# Patient Record
Sex: MALE | Race: WHITE | NOT HISPANIC OR LATINO | Employment: OTHER | URBAN - METROPOLITAN AREA
[De-identification: names, ages, dates, MRNs, and addresses within clinical notes are randomized per-mention and may not be internally consistent; named-entity substitution may affect disease eponyms.]

---

## 2019-09-06 ENCOUNTER — OFFICE VISIT CONVERTED (OUTPATIENT)
Dept: NEUROLOGY | Facility: CLINIC | Age: 64
End: 2019-09-06
Attending: PSYCHIATRY & NEUROLOGY

## 2019-10-07 ENCOUNTER — OFFICE VISIT CONVERTED (OUTPATIENT)
Dept: NEUROLOGY | Facility: CLINIC | Age: 64
End: 2019-10-07
Attending: PSYCHIATRY & NEUROLOGY

## 2019-10-17 ENCOUNTER — HOSPITAL ENCOUNTER (OUTPATIENT)
Dept: OTHER | Facility: HOSPITAL | Age: 64
Discharge: HOME | End: 2019-10-17
Attending: PSYCHIATRY & NEUROLOGY

## 2019-10-17 LAB
ALBUMIN SERPL-MCNC: 4.8 G/DL (ref 3.5–5)
ALBUMIN/GLOB SERPL: 1.7 {RATIO} (ref 1.4–2.6)
ALP SERPL-CCNC: 112 U/L (ref 56–155)
ALT SERPL-CCNC: 10 U/L (ref 10–40)
ANION GAP SERPL CALC-SCNC: 21 MMOL/L (ref 8–19)
AST SERPL-CCNC: 11 U/L (ref 15–50)
BASOPHILS # BLD AUTO: 0.14 10*3/UL (ref 0–0.2)
BASOPHILS NFR BLD AUTO: 1.4 % (ref 0–3)
BILIRUB SERPL-MCNC: 0.37 MG/DL (ref 0.2–1.3)
BUN SERPL-MCNC: 11 MG/DL (ref 5–25)
BUN/CREAT SERPL: 12 {RATIO} (ref 6–20)
CALCIUM SERPL-MCNC: 9.9 MG/DL (ref 8.7–10.4)
CHLORIDE SERPL-SCNC: 100 MMOL/L (ref 99–111)
CONV ABS IMM GRAN: 0.03 10*3/UL (ref 0–0.2)
CONV CO2: 28 MMOL/L (ref 22–32)
CONV IMMATURE GRAN: 0.3 % (ref 0–1.8)
CONV TOTAL PROTEIN: 7.7 G/DL (ref 6.3–8.2)
CREAT UR-MCNC: 0.9 MG/DL (ref 0.7–1.2)
DEPRECATED RDW RBC AUTO: 46.1 FL (ref 35.1–43.9)
EOSINOPHIL # BLD AUTO: 0.58 10*3/UL (ref 0–0.7)
EOSINOPHIL # BLD AUTO: 5.7 % (ref 0–7)
ERYTHROCYTE [DISTWIDTH] IN BLOOD BY AUTOMATED COUNT: 13.2 % (ref 11.6–14.4)
GFR SERPLBLD BASED ON 1.73 SQ M-ARVRAT: >60 ML/MIN/{1.73_M2}
GLOBULIN UR ELPH-MCNC: 2.9 G/DL (ref 2–3.5)
GLUCOSE SERPL-MCNC: 100 MG/DL (ref 70–99)
HCT VFR BLD AUTO: 47 % (ref 42–52)
HGB BLD-MCNC: 15.5 G/DL (ref 14–18)
LYMPHOCYTES # BLD AUTO: 3.26 10*3/UL (ref 1–5)
LYMPHOCYTES NFR BLD AUTO: 32.2 % (ref 20–45)
MCH RBC QN AUTO: 31.2 PG (ref 27–31)
MCHC RBC AUTO-ENTMCNC: 33 G/DL (ref 33–37)
MCV RBC AUTO: 94.6 FL (ref 80–96)
MONOCYTES # BLD AUTO: 0.68 10*3/UL (ref 0.2–1.2)
MONOCYTES NFR BLD AUTO: 6.7 % (ref 3–10)
NEUTROPHILS # BLD AUTO: 5.44 10*3/UL (ref 2–8)
NEUTROPHILS NFR BLD AUTO: 53.7 % (ref 30–85)
NRBC CBCN: 0 % (ref 0–0.7)
OSMOLALITY SERPL CALC.SUM OF ELEC: 297 MOSM/KG (ref 273–304)
PHENYTOIN SERPL-MCNC: 21.5 UG/ML (ref 10–20)
PLATELET # BLD AUTO: 274 10*3/UL (ref 130–400)
PMV BLD AUTO: 8.8 FL (ref 9.4–12.4)
POTASSIUM SERPL-SCNC: 4.7 MMOL/L (ref 3.5–5.3)
RBC # BLD AUTO: 4.97 10*6/UL (ref 4.7–6.1)
SODIUM SERPL-SCNC: 144 MMOL/L (ref 135–147)
WBC # BLD AUTO: 10.13 10*3/UL (ref 4.8–10.8)

## 2020-01-09 ENCOUNTER — CONVERSION ENCOUNTER (OUTPATIENT)
Dept: NEUROLOGY | Facility: CLINIC | Age: 65
End: 2020-01-09

## 2020-01-09 ENCOUNTER — OFFICE VISIT CONVERTED (OUTPATIENT)
Dept: NEUROLOGY | Facility: CLINIC | Age: 65
End: 2020-01-09
Attending: PSYCHIATRY & NEUROLOGY

## 2020-07-09 ENCOUNTER — OFFICE VISIT CONVERTED (OUTPATIENT)
Dept: NEUROLOGY | Facility: CLINIC | Age: 65
End: 2020-07-09
Attending: PSYCHIATRY & NEUROLOGY

## 2020-10-09 ENCOUNTER — OFFICE VISIT CONVERTED (OUTPATIENT)
Dept: NEUROLOGY | Facility: CLINIC | Age: 65
End: 2020-10-09
Attending: PSYCHIATRY & NEUROLOGY

## 2020-10-09 ENCOUNTER — CONVERSION ENCOUNTER (OUTPATIENT)
Dept: NEUROLOGY | Facility: CLINIC | Age: 65
End: 2020-10-09

## 2020-10-14 ENCOUNTER — HOSPITAL ENCOUNTER (OUTPATIENT)
Dept: GENERAL RADIOLOGY | Facility: HOSPITAL | Age: 65
Discharge: HOME | End: 2020-10-14
Attending: PSYCHIATRY & NEUROLOGY

## 2020-10-29 ENCOUNTER — HOSPITAL ENCOUNTER (OUTPATIENT)
Dept: SURGERY | Facility: CLINIC | Age: 65
Discharge: HOME | End: 2020-10-29
Attending: NURSE PRACTITIONER

## 2020-10-29 ENCOUNTER — OFFICE VISIT CONVERTED (OUTPATIENT)
Dept: UROLOGY | Facility: CLINIC | Age: 65
End: 2020-10-29
Attending: NURSE PRACTITIONER

## 2020-10-29 ENCOUNTER — HOSPITAL ENCOUNTER (OUTPATIENT)
Dept: LAB | Facility: HOSPITAL | Age: 65
Discharge: HOME | End: 2020-10-29
Attending: NURSE PRACTITIONER

## 2020-10-29 LAB — PSA SERPL-MCNC: 0.66 NG/ML (ref 0–4)

## 2020-10-31 LAB — BACTERIA UR CULT: NORMAL

## 2020-12-15 ENCOUNTER — TELEPHONE CONVERTED (OUTPATIENT)
Dept: UROLOGY | Facility: CLINIC | Age: 65
End: 2020-12-15
Attending: NURSE PRACTITIONER

## 2020-12-17 ENCOUNTER — HOSPITAL ENCOUNTER (OUTPATIENT)
Dept: LAB | Facility: HOSPITAL | Age: 65
Discharge: HOME | End: 2020-12-17
Attending: NURSE PRACTITIONER

## 2020-12-17 LAB
APPEARANCE UR: CLEAR
BILIRUB UR QL: ABNORMAL
COLOR UR: ABNORMAL
CONV BACTERIA: NEGATIVE
CONV COLLECTION SOURCE (UA): ABNORMAL
CONV HYALINE CASTS IN URINE MICRO: ABNORMAL /[LPF]
CONV UROBILINOGEN IN URINE BY AUTOMATED TEST STRIP: 1 {EHRLICHU}/DL (ref 0.1–1)
GLUCOSE UR QL: NEGATIVE MG/DL
HGB UR QL STRIP: NEGATIVE
KETONES UR QL STRIP: NEGATIVE MG/DL
LEUKOCYTE ESTERASE UR QL STRIP: ABNORMAL
NITRITE UR QL STRIP: NEGATIVE
PH UR STRIP.AUTO: 7.5 [PH] (ref 5–8)
PROT UR QL: NEGATIVE MG/DL
RBC #/AREA URNS HPF: ABNORMAL /[HPF]
SP GR UR: 1.02 (ref 1–1.03)
SQUAMOUS SPT QL MICRO: ABNORMAL /[HPF]
WBC #/AREA URNS HPF: ABNORMAL /[HPF]

## 2020-12-19 LAB — BACTERIA UR CULT: NORMAL

## 2020-12-30 ENCOUNTER — OFFICE VISIT CONVERTED (OUTPATIENT)
Dept: NEUROLOGY | Facility: CLINIC | Age: 65
End: 2020-12-30
Attending: PSYCHIATRY & NEUROLOGY

## 2021-01-28 ENCOUNTER — HOSPITAL ENCOUNTER (OUTPATIENT)
Dept: SURGERY | Facility: CLINIC | Age: 66
Discharge: HOME | End: 2021-01-28
Attending: NURSE PRACTITIONER

## 2021-01-28 ENCOUNTER — OFFICE VISIT CONVERTED (OUTPATIENT)
Dept: UROLOGY | Facility: CLINIC | Age: 66
End: 2021-01-28
Attending: NURSE PRACTITIONER

## 2021-01-28 ENCOUNTER — CONVERSION ENCOUNTER (OUTPATIENT)
Dept: SURGERY | Facility: CLINIC | Age: 66
End: 2021-01-28

## 2021-01-30 LAB — BACTERIA UR CULT: NORMAL

## 2021-02-03 ENCOUNTER — HOSPITAL ENCOUNTER (OUTPATIENT)
Dept: GENERAL RADIOLOGY | Facility: HOSPITAL | Age: 66
Discharge: HOME | End: 2021-02-03
Attending: NURSE PRACTITIONER

## 2021-02-03 LAB
CREAT BLD-MCNC: 0.9 MG/DL (ref 0.6–1.4)
GFR SERPLBLD BASED ON 1.73 SQ M-ARVRAT: >60 ML/MIN/{1.73_M2}

## 2021-02-05 ENCOUNTER — TELEPHONE CONVERTED (OUTPATIENT)
Dept: UROLOGY | Facility: CLINIC | Age: 66
End: 2021-02-05
Attending: NURSE PRACTITIONER

## 2021-02-26 ENCOUNTER — PROCEDURE VISIT CONVERTED (OUTPATIENT)
Dept: UROLOGY | Facility: CLINIC | Age: 66
End: 2021-02-26
Attending: UROLOGY

## 2021-03-16 ENCOUNTER — HOSPITAL ENCOUNTER (OUTPATIENT)
Dept: VACCINE CLINIC | Facility: HOSPITAL | Age: 66
Discharge: HOME | End: 2021-03-16
Attending: INTERNAL MEDICINE

## 2021-04-06 ENCOUNTER — HOSPITAL ENCOUNTER (OUTPATIENT)
Dept: VACCINE CLINIC | Facility: HOSPITAL | Age: 66
Discharge: HOME | End: 2021-04-06
Attending: INTERNAL MEDICINE

## 2021-05-10 NOTE — PROCEDURES
"   Procedure Note      Patient Name: Davonte Marshall   Patient ID: [id number]   Sex: Male   YOB: 1955    Primary Care Provider: Akira Hoff MD   Referring Provider: Akira Hoff MD    Visit Date: February 26, 2021    Provider: Penelope Moya MD   Location: [facility]   Location Address: [address] [place] [address]  [id number]   Location Phone: [phone]          Cystoscopy Procedure:  PROCEDURE: Flexible cystoscope was passed per urethra into the bladder without difficulty after proper consent. The pendulous and bulbar urethra appeared without abnormalities. The prostate was noted to be somewhat enlarged with small median lobe. Patient the bladder was inspected in a systematic meridian fashion. There were no tumors, lesions, or stones noted within the bladder. Of note, there was no increased vascularity as well. Diffuse inflammation noted with some friable mucosa and small areas of erythema. No discrete tumors. Patient had some moderate trabeculations and a posterior diverticulum. Both ureteral orifices were identified and were normal in appearance. The flexible cystoscope was removed. The patient tolerated the procedure well.   FOLLOW UP OFFICE NOTE: Reports gross hematuria last week; also has had associated urethral pain when hematuria happens. Took wife's \"UTI medicine\" at onset of symptoms and it went away. Aware that urine culture was negative. No complaints today.           Assessment  · Hematuria     599.70/R31.9    Problems Reconciled  Plan  · Orders  o Cystoscopy (70543) - 599.70/R31.9 - 02/26/2021  · Medications  o Medications have been Reconciled  o Transition of Care or Provider Policy  · Instructions  o Electronically Identified Patient Education Materials Provided Electronically     Tolerated procedure well.  Instructions provided.      Cystoscopic findings discussed with patient and wife at length.  No discrete tumors or lesions of suspicion appreciated " today.  Patient reports significant history of gross hematuria; could be attributed to patient's prostatomegaly, and Plavix.  Did discuss that patient's bladder appears chronically inflamed and has some friable mucosa which could also be the source of his hematuria.    Patient to continue to monitor and notify office if hematuria recurs.    If patient has recurrence of hematuria with associated pain and discomfort; specimen cups provided so that sample may be obtained for urine culture.  Discussed the importance of obtaining urine sample prior to treatment with antibiotic    Given patient's significant tobacco history; should he have recurrent hematuria will consider further operative evaluation via cystoscopy and bladder biopsy    Patient to follow-up for further discussion in 3 months  All question addressed             Electronically Signed by: Penelope Moya MD -Author on February 26, 2021 06:20:07 PM

## 2021-05-10 NOTE — H&P
History and Physical      Patient Name: Davonte Marshall   Patient ID: [id number]   Sex: Male   YOB: 1955    Primary Care Provider: Katie BROWN   Referring Provider: Akira Hoff MD    Visit Date: October 29, 2020    Provider: KEVIN Eisenberg   Location: [facility]   Location Address: [address] [place] [address]  [id number]   Location Phone: [phone]          Chief Complaint  · pt here for urologic concerns      History Of Present Illness  The patient is a 65 year old /White male, who is sent by Akira Hoff MD, for evaluation of trouble voiding.   Symptoms  The patient's complaints are described as nocturia, dysuria, hesitancy, weak stream, and urinary retention. He describes getting up 2-3 times during the night. The patient reports no additional symptoms. This patient denies gross hematuria and frequency.   He states that AZO OTC has made symptoms better in the past, and nothing makes it worse. The patient's past medical history notable for BPH and hypertension.      Patient states uncle had colon cancer     No HX of  cancers     He was started on Flomax in 9/2020 and per his PCP note he has that he was able to urinate well although patient states no relief with this.    I have no PSA levels I have no other labs, I have no documented rectal exam either.       Past Medical History  Anemia; Anxiety; Arthritis; Chronic bronchitis; Depression; Gall Stones; Head injury; Hemorrhoids; Hyperlipidemia; Hypertension, Benign Essential; Leg pain; Seizures         Past Surgical History  Appendectomy; Back surgery; Cholecystectomy; Hand surgery; Thumb surgery         Medication List  Dilantin Extended 100 mg oral capsule; duloxetine 60 mg oral capsule,delayed release(DR/EC); Flomax 0.4 mg oral capsule; hydrocodone-acetaminophen  mg/15 mL(15 mL) oral solution; ibuprofen oral; losartan-hydrochlorothiazide 100-25 mg oral tablet; morphine 60 mg oral  "capsule, ER multiphase 24 hr; pantoprazole 40 mg oral tablet,delayed release (DR/EC); primidone 50 mg oral tablet; Vitamin D3 oral         Allergy List  Codeine Phosphate; Codeine Sulfate       Allergies Reconciled  Family Medical History  Heart Disease; Family history of bleeding disorder; Family history of stroke; Family history of heart disease         Social History  Alcohol (Never); lives with spouse; ; Tobacco (Current every day); Unemployed         Review of Systems  · Constitutional  o Denies  o : fever, chills  · Eyes  o Denies  o : double vision, cataracts  · HENT  o Denies  o : hearing loss, headaches  · Cardiovascular  o Denies  o : chest pain at rest, chest pain with exercise, irregular heart beats, palpitations, leg cramps with exercise  · Respiratory  o Denies  o : shortness of breath, wheezing, sleep apnea  · Gastrointestinal  o Denies  o : heartburn or indigestion, nausea or vomiting, change in abdominal girth, diarrhea, constipation, blood in stools  · Genitourinary  o Admits  o : additional symptoms as noted in HPI  · Integument  o Denies  o : rash, new skin lesions  · Neurologic  o Denies  o : memory difficulties, headache, mini-strokes, seizures  · Endocrine  o Denies  o : hot flashes, thyroid disorders  · Psychiatric  o Denies  o : depression, schizophrenia, bipolar disorder  · Heme-Lymph  o Denies  o : easy bleeding, easy bruising, sickle cell disease or trait, lymph node enlargement or tenderness  · Allergic-Immunologic  o Denies  o : immune deficiency, HIV, Hepatitis C      Vitals  Date Time BP Position Site L\R Cuff Size HR RR TEMP (F) WT  HT  BMI kg/m2 BSA m2 O2 Sat FR L/min FiO2        10/29/2020 08:47 AM       14  217lbs 6oz 5'  9\" 32.1 2.19             Physical Examination  · Constitutional  o Appearance  o : Well nourished, well developed patient in no acute distress. Ambulating without difficulty.  · Head and Face  o Head  o :   § Inspection  § : atraumatic, " normocephalic  o Face  o :   § Inspection  § : no facial lesions  · Eyes  o Sclerae  o : sclerae white  · Ears, Nose, Mouth and Throat  o Ears  o :   § External Ears  § : appearance within normal limits, no lesions present  o Nose  o :   § External Nose  § : appearance normal  · Neck  o Inspection/Palpation  o : normal appearance, trachea midline  · Respiratory  o Respiratory Effort  o : breathing unlabored  o Inspection of Chest  o : normal appearance, no retractions  o Auscultation of Lungs  o : normal breath sounds throughout  · Genitourinary  o Digital Rectal Examination  o :   § Tone and Masses  § : normal sphincter tone, no rectal masses present  § Prostate  § : nontender to palpation, moderate enlargment, no nodules present, consistency normal  § Seminal Vesicles  § : normal size and symmetry without masses or tenderness  · Musculoskeletal  o Pelvis  o : no pelvic bony or muscular tenderness  o Ribs  o : no deformities or tenderness to palpation present, costochondral junctions nontender to palpation  · Skin and Subcutaneous Tissue  o General Inspection  o : no rashes or lesions present, no lesions present, no areas of discoloration  · Neurologic  o Mental Status Examination  o :   § Orientation  § : grossly oriented to person, place and time  § Speech/Language  § : communication ability within normal limits  o Gait and Station  o : normal gait, able to stand without difficulty  · Psychiatric  o Judgement and Insight  o : judgment and insight intact, judgement for everyday activities and social situations within normal limits, insight intact  o Mood and Affect  o : mood normal, affect appropriate          Results  · In-Office Procedures  o Surgical procedure  § IOP - Bladder Scan/Residual Urine (59794)   § Specimen vol Ur: 166       Assessment  · Nocturia     788.43/R35.1  · BPH with Urinary Obstruction       Benign prostatic hyperplasia with lower urinary tract symptoms     600.01/N40.1  Other obstructive  and reflux uropathy     600.01/N13.8  · Urinary Retention     788.20/R33.9      Plan  · Orders  o Urodynamic study (95731, 66836) - 788.43/R35.1, 788.20/R33.9, 600.01/N40.1 - 10/29/2020  o Catheterization for residual urine (97989) - 788.43/R35.1, 788.20/R33.9, 600.01/N40.1 - 10/29/2020  o PSA Ultrasensitive, ANNUAL SCREENING [facility] (53968, ) - 788.43/R35.1, 788.20/R33.9, 600.01/N40.1 - 10/29/2020  o Urine Culture (Clean Catch) [facility] (01330) - 788.43/R35.1, 788.20/R33.9, 600.01/N40.1 - 10/29/2020  · Medications  o doxycycline hyclate 100 mg oral capsule   SIG: take 1 capsule (100 mg) by oral route 2 times per day for 28 days   DISP: (56) Capsule with 0 refills  Prescribed on 10/29/2020     o Florajen3 460 mg (7.5-6- 1.5 bill. cell) oral capsule   SIG: take 1 capsule by oral route 2 times a day for 10 days   DISP: (20) Capsule with 0 refills  Prescribed on 10/29/2020     o Flomax 0.4 mg oral capsule   SIG: take 2 capsules (0.8 mg) by oral route once daily 1/2 hour following the same meal each day for 60 days   DISP: (120) Capsule with 6 refills  Adjusted on 10/29/2020     o Medications have been Reconciled  o Transition of Care or Provider Policy  · Instructions  o DISCUSSION:  o Findings, possible etiologies and management options were discussed with patient in comprehensive detail  o PLAN: Will order 28 days of antibiotic therapy and increase his Tamsulosin dose to attempt to alleviate his LUTs and ensure this is not related to an infectious process. Will have PSA before next appt  o Electronically Identified Patient Education Materials Provided Electronically            Electronically Signed by: KEVIN Eisenberg -Author on October 30, 2020 09:17:07 AM

## 2021-05-13 NOTE — PROGRESS NOTES
Progress Note      Patient Name: Davonte Marshall   Patient ID: [id number]   Sex: Male   YOB: 1955    Primary Care Provider: Katie BROWN   Referring Provider: Katie BROWN    Visit Date: July 9, 2020    Provider: Glen Pantoja MD   Location: [facility]   Location Address: [address] [place] [address]  [id number]   Location Phone: [phone]          Chief Complaint     8 mo f/u epilepsy, essential tremor, memory loss.       History Of Present Illness  Davonte Marshall is a 64 year old /White male who presents today to [facility] today referred from Katie BROWN.      64-year-old man here for follow-up of his seizures as well as his memory loss and gait abnormalities.  He is memory loss is bothering him that he cannot remember things as well.  He has not had any recurrent seizure.  His other problem is that he has problems with balance.  He thinks that his back has been bothering him and has back pain and he also has limping for years.  He states that he does not have a good balance.  He is continues to take Dilantin 500 mg daily as well as primidone 200 mg twice a day.  He is taking the primidone for his head tremor and his arm tremor.  He also has a leg tremor.  Taking Cymbalta for his back pain.  His wife states that he gets really xiong if he does not take the Cymbalta.       Past Medical History  Anemia; Anxiety; Arthritis; Chronic bronchitis; Depression; Gall Stones; Head injury; Hemorrhoids; Hyperlipidemia; Hypertension, Benign Essential; Leg pain; Seizures         Past Surgical History  Appendectomy; Back surgery; Cholecystectomy; Hand surgery; Thumb surgery         Medication List  Dilantin Extended 100 mg oral capsule; duloxetine 60 mg oral capsule,delayed release(DR/EC); hydrocodone-acetaminophen  mg/15 mL(15 mL) oral solution; ibuprofen oral; losartan-hydrochlorothiazide 100-25 mg oral tablet; morphine 60 mg oral  "capsule, ER multiphase 24 hr; primidone 50 mg oral tablet         Allergy List  Codeine Phosphate; Codeine Sulfate         Family Medical History  Heart Disease; Family history of bleeding disorder; Family history of stroke; Family history of heart disease         Social History  Alcohol (Never); lives with spouse; ; Tobacco (Current every day); Unemployed         Review of Systems  · Constitutional  o Denies  o : chills, excessive sweating, fatigue, fever, sycope/passing out, weight gain, weight loss  · Eyes  o Denies  o : changes in vision, blurry vision, double vision  · HENT  o Denies  o : loss of hearing, ringing in the ears, ear aches, sore throat, nasal congestion, sinus pain, nose bleeds, seasonal allergies  · Cardiovascular  o Admits  o : swollen legs  o Denies  o : blood clots, anemia, easy burising or bleeding, transfusions  · Respiratory  o Denies  o : shortness of breath, dry cough, productive cough, pneumonia, COPD  · Gastrointestinal  o Denies  o : difficulty swallowing, reflux  · Genitourinary  o Admits  o : erectile dysfunction  o Denies  o : incontinence  · Neurologic  o Admits  o : tremor, loss of balance, falls, weakness  o Denies  o : headache, seizure, stroke, dizziness/vertigo, difficulty with sleep, numbness/tingling/paresthesia , difficulty with coordination, difficulty with dexterity  · Musculoskeletal  o Admits  o : pain radiating in leg, low back pain  o Denies  o : neck stiffness/pain, swollen lymph nodes, muscle aches, joint pain, weakness, spasms, sciatica, pain radiating in arm  · Endocrine  o Denies  o : diabetes, thyroid disorder  · Psychiatric  o Denies  o : anxiety, depression      Vitals  Date Time BP Position Site L\R Cuff Size HR RR TEMP (F) WT  HT  BMI kg/m2 BSA m2 O2 Sat HC       07/09/2020 01:07 PM        97.6         07/09/2020 01:30 /71 Sitting    88 - R   226lbs 0oz 5'  9\" 33.37 2.23           Physical Examination     He is alert, fluent, phasic, follows " commands well.  His Mini-Mental status score is 28 out of 30.  He missed 2 out of 3 recent objects.  Clock drawing is intact.  There is no ataxia on finger-to-nose testing.  There is no Parkinson tremor.  There is no rigidity or cogwheeling.  There is no hypomimia..  On Station and gait he is able to get up from the chair slight difficulty he is limping because of his back.  His armswing is normal.  Turning is intact.           Assessment  · Essential tremor       Essential tremor     333.1/G25.0  He is to continue taking primidone at this time for his essential tremor.  · Memory loss     780.93/R41.3  I will repeat his CT scan or MRI of the brain on his next like visit.    40 minutes was spent for this high complexity visit more than half the time spent face-to-face with the patient for examination, counseling, planning and recommendations  · Generalized seizure disorder     345.90/G40.309  I will take him off Dilantin which I told him can cause cerebellar degeneration as well as unsteadiness. He is to take levetiracetam 500 mg twice a day for 2 weeks and then advance milligrams twice a day thereafter. On the fifth week he is to start decreasing the Dilantin by 1 tablet every week until his stop taking the Dilantin in the ninth week. He is to continue taking Dilantin 500 mg daily along with levetiracetam for 4 weeks and start tapering it. I told him that Dilantin can cause cerebellar degeneration.    I told him that he should take precautions with his driving when we are taking him off the Dilantin. He did take a 's evaluation in the past which she states he was able to pass that.  · Back pain     724.5/M54.9  I told him to ask his primary care physician to prescribe him Cymbalta. According to the wife he is using it for back pain as well as his mood swings. I told him that levetiracetam can cause mood swings and they are to call my office should have any problems with the levetiracetam.    Problems  Reconciled  Plan  · Medications  o levetiracetam 500 mg oral tablet   SI tablet twice a day for 2 weeks then 2 tablets twice a day thereafter.   DISP: (120) tablets with 8 refills  Prescribed on 2020     o Medications have been Reconciled  o Transition of Care or Provider Policy  · Instructions  o Encouraged to follow-up with Primary Care Provider for preventative care.            Electronically Signed by: Glen Pantoja MD -Author on 2020 02:34:56 PM

## 2021-05-13 NOTE — PROGRESS NOTES
Progress Note      Patient Name: Davonte Marshall   Patient ID: [id number]   Sex: Male   YOB: 1955    Primary Care Provider: Katie BROWN   Referring Provider: Katie BROWN    Visit Date: October 9, 2020    Provider: Glen Pantoja MD   Location: [facility]   Location Address: [address] [place] [address]  [id number]   Location Phone: [phone]          Chief Complaint     3 mo f/u essential tremor, memory loss, generalized seizure disorder, back pain.       History Of Present Illness  Davonte Marshall is a 65 year old /White male who presents today to [facility] today referred from Katie BROWN.      65-year-old man here for follow-up he is memory loss, head tremor, seizures.  He could not take the Keppra and therefore he is again taking Dilantin.  He has not had any seizures.  He is also taking primidone 200 mg twice a day for his head tremor which his wife states is getting worse.  He has memory worse that is getting worse as well according to his wife.  He is independent with activities of daily living.  He is driving.  He continues to smoke significantly.  He had an MRI of the brain last year showing atrophy with mild chronic ischemic changes around the ventricles.       Past Medical History  Anemia; Anxiety; Arthritis; Chronic bronchitis; Depression; Gall Stones; Head injury; Hemorrhoids; Hyperlipidemia; Hypertension, Benign Essential; Leg pain; Seizures         Past Surgical History  Appendectomy; Back surgery; Cholecystectomy; Hand surgery; Thumb surgery         Medication List  Dilantin Extended 100 mg oral capsule; duloxetine 60 mg oral capsule,delayed release(DR/EC); Flomax 0.4 mg oral capsule; hydrocodone-acetaminophen  mg/15 mL(15 mL) oral solution; ibuprofen oral; losartan-hydrochlorothiazide 100-25 mg oral tablet; morphine 60 mg oral capsule, ER multiphase 24 hr; primidone 50 mg oral tablet  "        Allergy List  Codeine Phosphate; Codeine Sulfate         Family Medical History  Heart Disease; Family history of bleeding disorder; Family history of stroke; Family history of heart disease         Social History  Alcohol (Never); lives with spouse; ; Tobacco (Current every day); Unemployed         Review of Systems  · Constitutional  o Denies  o : chills, excessive sweating, fatigue, fever, sycope/passing out, weight gain, weight loss  · Eyes  o Denies  o : changes in vision, blurry vision, double vision  · HENT  o Denies  o : loss of hearing, ringing in the ears, ear aches, sore throat, nasal congestion, sinus pain, nose bleeds, seasonal allergies  · Cardiovascular  o Denies  o : blood clots, swollen legs, anemia, easy burising or bleeding, transfusions  · Respiratory  o Denies  o : shortness of breath, dry cough, productive cough, pneumonia, COPD  · Gastrointestinal  o Denies  o : difficulty swallowing, reflux  · Genitourinary  o Admits  o : erectile dysfunction  o Denies  o : incontinence  · Neurologic  o Admits  o : headache, tremor, loss of balance, difficulty with coordination  o Denies  o : seizure, stroke, falls, dizziness/vertigo, difficulty with sleep, numbness/tingling/paresthesia , difficulty with dexterity, weakness  · Musculoskeletal  o Admits  o : joint pain, pain radiating in leg, low back pain  o Denies  o : neck stiffness/pain, swollen lymph nodes, muscle aches, weakness, spasms, sciatica, pain radiating in arm  · Endocrine  o Denies  o : diabetes, thyroid disorder  · Psychiatric  o Denies  o : anxiety, depression      Vitals  Date Time BP Position Site L\R Cuff Size HR RR TEMP (F) WT  HT  BMI kg/m2 BSA m2 O2 Sat FR L/min FiO2 HC       10/09/2020 01:53 PM        96           10/09/2020 02:07 /65 Sitting    82 - R   214lbs 16oz 5'  9\" 31.75 2.18             Physical Examination     He is alert, fluent, phasic, follows commands well.  His Mini-Mental status score is 27 out of " 30.  He missed the year, 1 out of 3 recent objects and spelling world 1 out of 5 backwards.  Everything else is intact and clock drawing is normal.  There is no significant tremor noted of the hands extended finger-to-nose testing.  There is intubation of his head.  His head is bent forwards.           Assessment  · Essential tremor       Essential tremor     333.1/G25.0  I discussed with him that there is no effective treatment for head tremor. He is taking primidone 200 mg twice a day. I do not think that increasing it will make a difference.  · Memory loss     780.93/R41.3  I discussed with him that he needs to stop smoking to decrease his vascular risk of dementia. I also told him that he needs to exercise, watch his diet. I will do a CT scan of the brain without contrast and there to call me to find out the results.  · Generalized seizure disorder     345.90/G40.309  He has not had any recurrent seizures. I will see him again in 1 year's time for follow-up. 25 minutes was spent for this moderate complexity visit more than half the time was spent face-to-face with the patient examination, counseling, planning recommendations      Plan  · Orders  o CT Head/Brain without IV Contrast [facility] (68979) - 780.93/R41.3, 333.1/G25.0, 345.90/G40.309 - 10/09/2020  · Medications  o Medications have been Reconciled  o Transition of Care or Provider Policy  · Instructions  o Encouraged to follow-up with Primary Care Provider for preventative care.            Electronically Signed by: Glen Pantoja MD -Author on October 9, 2020 02:43:58 PM

## 2021-05-14 VITALS
SYSTOLIC BLOOD PRESSURE: 118 MMHG | TEMPERATURE: 96 F | HEIGHT: 69 IN | WEIGHT: 215 LBS | DIASTOLIC BLOOD PRESSURE: 65 MMHG | HEART RATE: 82 BPM | BODY MASS INDEX: 31.84 KG/M2

## 2021-05-14 VITALS
HEART RATE: 87 BPM | WEIGHT: 208.37 LBS | TEMPERATURE: 97.6 F | HEIGHT: 69 IN | BODY MASS INDEX: 30.86 KG/M2 | SYSTOLIC BLOOD PRESSURE: 119 MMHG | DIASTOLIC BLOOD PRESSURE: 75 MMHG

## 2021-05-14 VITALS — BODY MASS INDEX: 32.2 KG/M2 | RESPIRATION RATE: 14 BRPM | WEIGHT: 217.37 LBS | HEIGHT: 69 IN

## 2021-05-14 VITALS — BODY MASS INDEX: 30.06 KG/M2 | HEIGHT: 70 IN | WEIGHT: 210 LBS | RESPIRATION RATE: 12 BRPM

## 2021-05-14 NOTE — PROGRESS NOTES
Quick Note      Patient Name: Davonte Marshall   Patient ID: [id number]   Sex: Male   YOB: 1955    Primary Care Provider: Akira Hoff MD   Referring Provider: Akira Hoff MD    Visit Date: December 15, 2020    Provider: KEVIN Eisenberg   Location: [facility]   Location Address: [address] [place] [address]  [id number]   Location Phone: [phone]          History Of Present Illness  TELEHEALTH TELEPHONE VISIT  Davonte Marshall is a 65 year old /White male who is presenting for evaluation via telehealth telephone visit. Verbal consent obtained before beginning visit.   Provider spent 12 minutes with the patient during the telehealth visit.   The following staff were present during this visit: Nisha Ahuja RN, Madeline BROWN   Past Medical History/ Overview of Patient Symptoms     Called patient to discuss results of his urodynamic study.    The patient's urodynamic study performed on 12/10/20 for a weak urinary stream and incomplete bladder emptying.  Weak flow with intermittency of right as well as increased sensation a low bladder capacity due to bladder instability.  There is evidence of strong involuntary bladder contractions that cause urge urinary incontinence.  There is no stress urinary incontinence observed.  During the voiding phase the bladder displayed a very strong detrusor contraction with no evidence of abdominal muscle recruitment to assist voiding.  The flow was weak.  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  Urge urinary incontinence.    The patient's wife reports that he has had some improvement in symptoms since he was started on finasteride in the hospital after his stroke several weeks ago.    He is now only taking 0.4 mg daily of tamsulosin as well as 5 mg of finasteride daily    The patient's wife reports that since his urodynamic study he has had some soreness in his hips with gross hematuria as well  as a fever up to 102.0.    The patient states that he has been decreasing his caffeine intake as well as trying to quit smoking.           Assessment  · Gross hematuria     599.71/R31.0  · BPH loc w urin obs/LUTS       Benign prostatic hyperplasia with lower urinary tract symptoms     600.21/N40.1      Plan  · Orders  o Physician Telephone Evaluation, 11-20 minutes (76316) - 599.71/R31.0, 600.21/N40.1 - 12/15/2020  o Urine Culture (Clean Catch) [facility] (91113) - 599.71/R31.0, 600.21/N40.1 - 12/15/2020  o Urinalysis with Reflex Microscopy (60035) - 599.71/R31.0, 600.21/N40.1 - 12/15/2020  · Medications  o Medications have been Reconciled  o Transition of Care or Provider Policy  · Instructions  o Plan Of Care: Discussed with the patient's wife that the patient will need to provide a outpatient urine culture sample preferably today if not then tomorrow as his complaints could signal a urinary tract infection. Discussed with the patient's wife that the patient will need to continue his finasteride 5 mg daily although I would like to increase his tamsulosin yet again to 0.8 mg daily. We will call patient wife with urine culture results and follow-up in office in 3 months or sooner if needed. Should the patient be without infection and still with urgency and frequency then will refer patient for an in office cystoscopy for evaluation of his lower urinary tracts.            Electronically Signed by: KEVIN Eisenberg -Author on December 15, 2020 08:54:32 PM

## 2021-05-14 NOTE — PROGRESS NOTES
Progress Note      Patient Name: Davonte Marshall   Patient ID: [id number]   Sex: Male   YOB: 1955    Primary Care Provider: Akira Hoff MD   Referring Provider: Akira Hoff MD    Visit Date: December 30, 2020    Provider: Glen Pantoja MD   Location: [facility]   Location Address: [address] [place] [address]  [id number]   Location Phone: [phone]          Chief Complaint     Patient here for f/u       History Of Present Illness  Davonte Marshall is a 65 year old /White male who presents today to [facility] today referred from Akira Hoff MD.      65-year-old man here for follow-up his essential tremor as well as his stroke.  He had Dilantin toxicity and was unsteady and ataxic and was admitted November 13.  He was found coincidentally to have a new lacunar infarction on MRI of the brain.  Patient was started on Plavix as well as aspirin.  He is discharge also on atorvastatin 20 mg since his LDL cholesterol was low at 60 and cholesterol is 115.  He states that he is significantly better with his tremor being off Dilantin and is only taking primidone 250 mg 3 times a day.  He states that he feels much better than before.  He did not know that Dilantin was really giving him so much problems.       Past Medical History  Anemia; Anxiety; Arthritis; Chronic bronchitis; Depression; Gall Stones; Head injury; Hemorrhoids; Hyperlipidemia; Hypertension, Benign Essential; Leg pain; Seizures         Past Surgical History  Appendectomy; Back surgery; Cholecystectomy; Hand surgery; Thumb surgery         Medication List  duloxetine 60 mg oral capsule,delayed release(DR/EC); finasteride 5 mg oral tablet; Flomax 0.4 mg oral capsule; hydrocodone-acetaminophen  mg/15 mL(15 mL) oral solution; Lipitor 20 mg oral tablet; losartan-hydrochlorothiazide 100-25 mg oral tablet; morphine 60 mg oral capsule, ER multiphase 24 hr; Plavix 75 mg oral  "tablet; primidone 50 mg oral tablet; Vitamin D3 oral         Allergy List  Codeine Phosphate; Codeine Sulfate         Family Medical History  Heart Disease; Family history of bleeding disorder; Family history of stroke; Family history of heart disease         Social History  Alcohol (Never); lives with spouse; ; Tobacco (Current every day); Unemployed         Review of Systems  · Constitutional  o Denies  o : chills, excessive sweating, fatigue, fever, sycope/passing out, weight gain, weight loss  · Eyes  o Denies  o : changes in vision, blurred vision, double vision  · HENT  o Denies  o : hearing loss, ringing in the ears, ear aches, sore throat, nasal congestion, sinus pain, nose bleeds, seasonal allergies  · Cardiovascular  o Denies  o : blood clots, swollen legs, anemia, easy burising or bleeding, transfusions  · Respiratory  o Denies  o : shortness of breath, dry cough, productive cough, pneumonia, COPD  · Gastrointestinal  o Denies  o : dysphagia, reflux  · Genitourinary  o Admits  o : incontinence  · Neurologic  o Admits  o : headache, stroke, tremor, loss of balance, falls, difficulty with sleep, difficulty with coordination  o Denies  o : seizure, dizziness/vertigo, numbness/tingling/paresthesia , difficulty with dexterity, weakness  · Musculoskeletal  o Admits  o : low back pain  o Denies  o : neck stiffness/pain, swollen lymph nodes, muscle aches, joint pain, weakness, spasms, sciatica, pain radiating in arm, pain radiating in leg  · Endocrine  o Denies  o : diabetes, thyroid disorder  · Psychiatric  o Denies  o : anxiety, depression      Vitals  Date Time BP Position Site L\R Cuff Size HR RR TEMP (F) WT  HT  BMI kg/m2 BSA m2 O2 Sat FR L/min FiO2        12/30/2020 03:43 /75 Sitting    87 - R  97.6 208lbs 6oz 5'  9\" 30.77 2.15             Physical Examination     He is alert, fluent, phasic, follows commands well.  There is no significant tremor noted with hands extended and finger-to-nose " testing.  Heart is regular in rhythm normal in rate.  Station and gait is unremarkable other than he has a stooped posture which is chronic.  He does not have any Parkinson symptoms.           Assessment  · Essential tremor       Essential tremor     333.1/G25.0  He is to continue taking primidone 250 mg 3 times a day. I will see him again in 6 months time for follow-up.  · Generalized seizure disorder     345.90/G40.309  · Stroke     434.91/I63.9  He is to take aspirin and Plavix for another month and then discontinue aspirin.    15 minutes was spent for this low complexity visit more half the time was spent face-to-face with the patient for examination, counseling, planning and recommendations.        Plan  · Medications  o Medications have been Reconciled  o Transition of Care or Provider Policy  · Instructions  o Encouraged to follow-up with Primary Care Provider for preventative care.            Electronically Signed by: Glen Pantoja MD -Author on December 30, 2020 04:02:44 PM

## 2021-05-14 NOTE — PROGRESS NOTES
Progress Note      Patient Name: Davonte Marshall   Patient ID: [id number]   Sex: Male   YOB: 1955    Primary Care Provider: Akira Hoff MD   Referring Provider: Akira Hoff MD    Visit Date: January 28, 2021    Provider: KEVIN Eisenberg   Location: [facility]   Location Address: [address] [place] [address]  [id number]   Location Phone: [phone]          Chief Complaint  · pt here for urologic concerns      History Of Present Illness  The patient is a 65 year old /White male , who Returns for follow-up on trouble voiding.        Reports that he feels like he is emptying better at this point in time however approximately 4 days ago he states that he had an episode of gross hematuria with clots.    Patient does have an extensive smoking history.  Greater than a pack a day for over 30 years.    PVR 35      Previous:  No HX of  cancers     He was started on Flomax in 9/2020 and per his PCP note he has that he was able to urinate well although patient states no relief with this.    I have no PSA levels I have no other labs, I have no documented rectal exam either.       Past Medical History  Anemia; Anxiety; Arthritis; Chronic bronchitis; Depression; Gall Stones; Head injury; Hemorrhoids; Hyperlipidemia; Hypertension, Benign Essential; Leg pain; Seizures         Past Surgical History  Appendectomy; Back surgery; Cholecystectomy; Hand surgery; Thumb surgery         Medication List  duloxetine 60 mg oral capsule,delayed release(DR/EC); finasteride 5 mg oral tablet; Flomax 0.4 mg oral capsule; hydrocodone-acetaminophen  mg/15 mL(15 mL) oral solution; Lipitor 20 mg oral tablet; losartan-hydrochlorothiazide 100-25 mg oral tablet; morphine 60 mg oral capsule, ER multiphase 24 hr; Plavix 75 mg oral tablet; primidone 250 mg oral tablet; Vitamin D3 oral         Allergy List  Codeine Phosphate; Codeine Sulfate         Family Medical History  Heart Disease;  "Family history of bleeding disorder; Family history of stroke; Family history of heart disease         Social History  Alcohol (Never); lives with spouse; ; Tobacco (Current every day); Unemployed         Review of Systems  · Constitutional  o Denies  o : fever, chills  · Eyes  o Denies  o : double vision, cataracts  · HENT  o Denies  o : hearing loss, headaches  · Cardiovascular  o Denies  o : chest pain at rest, chest pain with exercise, irregular heart beats, palpitations, leg cramps with exercise  · Respiratory  o Denies  o : shortness of breath, wheezing, sleep apnea  · Gastrointestinal  o Denies  o : heartburn or indigestion, nausea or vomiting, change in abdominal girth, diarrhea, constipation, blood in stools  · Genitourinary  o Admits  o : additional symptoms as noted in HPI  · Integument  o Denies  o : rash, new skin lesions  · Neurologic  o Denies  o : memory difficulties, headache, mini-strokes, seizures  · Endocrine  o Denies  o : hot flashes, thyroid disorders  · Psychiatric  o Denies  o : depression, schizophrenia, bipolar disorder  · Heme-Lymph  o Denies  o : easy bleeding, easy bruising, sickle cell disease or trait, lymph node enlargement or tenderness  · Allergic-Immunologic  o Denies  o : immune deficiency, HIV, Hepatitis C      Vitals  Date Time BP Position Site L\R Cuff Size HR RR TEMP (F) WT  HT  BMI kg/m2 BSA m2 O2 Sat FR L/min FiO2 HC       01/28/2021 01:08 PM       12  210lbs 0oz 5'  10\" 30.13 2.17             Physical Examination  · Constitutional  o Appearance  o : Well nourished, well developed patient in no acute distress. Ambulating without difficulty.  · Head and Face  o Head  o :   § Inspection  § : atraumatic, normocephalic  o Face  o :   § Inspection  § : no facial lesions  · Eyes  o Sclerae  o : sclerae white  · Ears, Nose, Mouth and Throat  o Ears  o :   § External Ears  § : appearance within normal limits, no lesions present  o Nose  o :   § External Nose  § : appearance " normal  · Neck  o Inspection/Palpation  o : normal appearance, trachea midline  · Respiratory  o Respiratory Effort  o : breathing unlabored  o Inspection of Chest  o : normal appearance, no retractions  · Musculoskeletal  o Pelvis  o : no pelvic bony or muscular tenderness  o Ribs  o : no deformities or tenderness to palpation present, costochondral junctions nontender to palpation  · Skin and Subcutaneous Tissue  o General Inspection  o : no rashes or lesions present, no lesions present, no areas of discoloration  · Neurologic  o Mental Status Examination  o :   § Orientation  § : grossly oriented to person, place and time  § Speech/Language  § : communication ability within normal limits  o Gait and Station  o : normal gait, able to stand without difficulty  · Psychiatric  o Judgement and Insight  o : judgment and insight intact, judgement for everyday activities and social situations within normal limits, insight intact  o Mood and Affect  o : mood normal, affect appropriate          Results  · In-Office Procedures  o Surgical procedure  § IOP - Bladder Scan/Residual Urine (03587)   § Specimen vol Ur: 35   o Lab procedure  § Automated dipstick urinalysis with microscopy ([id number])   § Color Ur: Yellow   § Clarity Ur: Clear   § Glucose Ur Ql Strip: Negative   § Bilirub Ur Ql Strip: Negative   § Ketones Ur Ql Strip: Negative   § Sp Gr Ur Qn: 1.010   § Hgb Ur Ql Strip: Negative   § pH Ur-LsCnc: 6.0   § Prot Ur Ql Strip: Negative   § Urobilinogen Ur Strip-mCnc: 0.2 E.U./dL   § Nitrite Ur Ql Strip: Negative   § WBC Est Ur Ql Strip: Trace   § RBC UrnS Qn HPF: 0   § WBC UrnS Qn HPF: 2-3   § Bacteria UrnS Qn HPF: few   § Crystals UrnS Qn HPF: 0   § Epithelial Cells (non renal): 0 /HPF  § Epithelial Cells (renal): 0       Assessment  · Gross hematuria     599.71/R31.0  · Nocturia     788.43/R35.1  · BPH with Urinary Obstruction       Benign prostatic hyperplasia with lower urinary tract symptoms     600.01/N40.1  Other  obstructive and reflux uropathy     600.01/N13.8  · Urinary Retention     788.20/R33.9      Plan  · Orders  o CT Abdomen and Pelvis (with and without Contrast) with Bosniak Class and delayed images ([id number]) - 599.71/R31.0 - 01/28/2021  o Cystoscopy (18718) - 599.71/R31.0 - 01/28/2021  o Urine Culture (Clean Catch) [facility] ([id number], [id number]) - 788.43/R35.1, 788.20/R33.9, 600.01/N40.1 - 01/28/2021  · Medications  o Medications have been Reconciled  o Transition of Care or Provider Policy  · Instructions  o DISCUSSION:  o Findings, possible etiologies and management options were discussed with patient in comprehensive detail. As he was with gross hematuria and given his extensive smoking history, a diagnostic workup will need to be performed to rule out the possibility of a malignancy.  o PLAN: Will send patient's urine for culture. We will schedule patient for CT scan abdomen pelvis with and without IV contrast with delayed imaging follow-up with cystoscopy with Dr. Penelope Fox.  o Electronically Identified Patient Education Materials Provided Electronically            Electronically Signed by: KEVIN Eisenberg -Author on January 28, 2021 03:46:15 PM

## 2021-05-14 NOTE — PROGRESS NOTES
Quick Note      Patient Name: Davonte Marshall   Patient ID: [id number]   Sex: Male   YOB: 1955    Primary Care Provider: Akira Hoff MD   Referring Provider: Akira Hoff MD    Visit Date: February 5, 2021    Provider: KEVIN Eisenberg   Location: [facility]   Location Address: [address] [place] [address]  [id number]   Location Phone: [phone]          History Of Present Illness  TELEHEALTH TELEPHONE VISIT  Davonte Marshall is a 65 year old /White male who is presenting for evaluation via telehealth telephone visit. Verbal consent obtained before beginning visit.   Provider spent 10 minutes with the patient during the telehealth visit.   The following staff were present during this visit: Raya Wesley MA, Madeline BROWN   Past Medical History/ Overview of Patient Symptoms     Called patient to discuss results of recent diagnostic imaging.    CT abdomen pelvis with and without IV contrast with delayed imaging performed related to gross hematuria.  Study performed on 2/3/2021 revealed findings most suspicious for cystitis.  No enhancing renal mass or urinary system calculus.  Hepatomegaly.  Circumferential bladder wall thickening and perivesicular haziness were noted on CT scan.    Discussed with patient that as his urine culture was negative this may be chronic inflammation.    Patient is to proceed with scheduled cystoscopy for evaluation of his lower urinary tracts to ensure there is no abnormalities within his bladder that may be causing gross hematuria           Assessment  · Gross hematuria     599.71/R31.0      Plan  · Orders  o Physican Telephone evaluation, 5-10 min (89113) - 599.71/R31.0 - 02/05/2021  · Medications  o Medications have been Reconciled  o Transition of Care or Provider Policy  · Instructions  o Plan Of Care: Patient's wife states continued gross hematuria. Discussed that CT findings consistent with cystitis may be the reason  for this. The patient will need to follow-up with his scheduled cystoscopy to ensure full evaluation of his lower urinary tracts and also ensure that there is no discrete malignancy that was possibly missed on CT scan  o Electronically Identified Patient Education Materials Provided Electronically            Electronically Signed by: KEVIN Eisenberg -Author on February 5, 2021 09:32:37 PM

## 2021-05-15 VITALS
DIASTOLIC BLOOD PRESSURE: 71 MMHG | WEIGHT: 226 LBS | HEIGHT: 69 IN | HEART RATE: 88 BPM | TEMPERATURE: 97.6 F | SYSTOLIC BLOOD PRESSURE: 118 MMHG | BODY MASS INDEX: 33.47 KG/M2

## 2021-05-15 VITALS
DIASTOLIC BLOOD PRESSURE: 70 MMHG | WEIGHT: 216 LBS | HEIGHT: 69 IN | SYSTOLIC BLOOD PRESSURE: 129 MMHG | HEART RATE: 83 BPM | BODY MASS INDEX: 31.99 KG/M2

## 2021-05-15 VITALS
WEIGHT: 209 LBS | HEART RATE: 106 BPM | DIASTOLIC BLOOD PRESSURE: 74 MMHG | BODY MASS INDEX: 30.96 KG/M2 | HEIGHT: 69 IN | SYSTOLIC BLOOD PRESSURE: 132 MMHG

## 2021-05-15 VITALS — SYSTOLIC BLOOD PRESSURE: 139 MMHG | DIASTOLIC BLOOD PRESSURE: 83 MMHG | WEIGHT: 213 LBS | HEART RATE: 91 BPM

## 2021-06-14 NOTE — PROGRESS NOTES
"    UROLOGY OFFICE FOLLOW-UP NOTE    Subjective   HPI  Davonte Marshall is a 65 y.o. male with history of gross hematuria; had cystoscopy at last appointment which indicated prostatomegaly and some friable bladder mucosa.  Patient on Plavix.  Presents for routine follow-up.  Discussed at last appointment that given patient's significant tobacco history, should patient have recurrence of gross hematuria, formal work-up with bladder biopsy in OR should be performed.  Reports gross hematuria when he had UTI; denies other hematuria since last visit.   Patient states that he had a \"really bad\" UTI since last visit.  Sought care and was placed on antibiotic course by [facility].  Unfortunately, those records are unavailable for review at today's appointment.  Patient feeling much better today.  Remains bothered by significantly weakened stream; takes at x20 minutes to void also notes significant sensation of incomplete emptying.  Continues to take Flomax and finasteride.  Takes Plavix and aspirin due to history of stroke.      ______________  Urodynamic testing, 12/10/2020: During filling, increased bladder sensation, low bladder capacity due to bladder instability; evidence of strong involuntary bladder contraction causing urge urinary incontinence.   During voiding, very strong detrusor contraction with no evidence abdominal muscle recruitment to assist with voiding.  Weak flow.  PVR 15 cc.  EMG normal.  Detrusor instability secondary to urinary outlet obstruction; urge urinary incontinence        Medical History:  Past Medical History:   Diagnosis Date   • Anemia    • Anxiety    • Arthritis    • Benign essential hypertension    • Chronic bronchitis (CMS/HCC)    • Depression    • Gall stones    • Head injury    • Hemorrhoids    • Hyperlipidemia    • Leg pain    • Seizures (CMS/HCC)         Social History:  Social History     Socioeconomic History   • Marital status:      Spouse name: Not on file   • " Number of children: Not on file   • Years of education: Not on file   • Highest education level: Not on file   Tobacco Use   • Smoking status: Current Every Day Smoker     Packs/day: 1.00     Types: Cigarettes   Substance and Sexual Activity   • Alcohol use: Never        Family History:  Family History   Problem Relation Age of Onset   • Bleeding Disorder Mother    • Stroke Father    • Heart disease Father    • Heart disease Other         Surgical History:  Past Surgical History:   Procedure Laterality Date   • APPENDECTOMY     • BACK SURGERY     • CHOLECYSTECTOMY     • HAND SURGERY      thumb        Allergies:  Allergies   Allergen Reactions   • Cefdinir Unknown - Low Severity   • Codeine Unknown - High Severity     Chest pain   • Guaifenesin-Codeine Unknown - High Severity        Current Medications:  Current Outpatient Medications   Medication Sig Dispense Refill   • aspirin 81 MG chewable tablet aspirin 81 mg chewable tablet   Chew 1 tablet every day by oral route.     • atorvastatin (LIPITOR) 20 MG tablet atorvastatin 20 mg tablet   TAKE 1 TABLET BY MOUTH ONCE DAILY     • clopidogrel (PLAVIX) 75 MG tablet clopidogrel 75 mg tablet   TAKE 1 TABLET BY MOUTH ONCE DAILY     • DULoxetine (CYMBALTA) 60 MG capsule duloxetine 60 mg capsule,delayed release   TAKE 1 CAPSULE BY MOUTH 2 (TWO) TIMES DAILY     • finasteride (PROSCAR) 5 MG tablet finasteride 5 mg tablet   TAKE ONE TABLET BY MOUTH ONCE DAILY     • HYDROcodone-acetaminophen (NORCO)  MG per tablet Every 8 (Eight) Hours.     • losartan (COZAAR) 50 MG tablet Take 50 mg by mouth Daily.     • Morphine (AVINza) 60 MG 24 hr capsule morphine 60 mg oral capsule, ER multiphase 24 hr take 2 capsules (120 mg) by oral route once daily   Active     • primidone (MYSOLINE) 50 MG tablet 250 mg.     • tamsulosin (FLOMAX) 0.4 MG capsule 24 hr capsule 0.4 mg Daily.       No current facility-administered medications for this visit.       Review of systems  Constitutional:  "Denies fever chills  GI: Denies nausea, vomiting    Objective     Vital Signs:   Resp 15   Ht 175.3 cm (69\")   Wt 96.5 kg (212 lb 12.8 oz)   BMI 31.43 kg/m²       Physical exam  No acute distress, well-nourished  Awake alert and oriented  Mood normal; affect normal    Problem List:  Patient Active Problem List   Diagnosis   • Anemia   • Anxiety   • Arthritis   • Back pain   • Chronic bronchitis (CMS/HCC)   • Complication of surgical procedure   • Degeneration of lumbosacral intervertebral disc   • Depression   • Encounter for long-term (current) use of insulin (CMS/HCC)   • Essential tremor   • Gall stones   • Head injury   • Hemorrhoids   • Herniation of intervertebral disc   • Hyperlipidemia   • Leg pain   • Multifactorial gait disorder   • Neck pain   • Numbness in both hands   • Renal insufficiency   • Scoliosis   • Seizure disorder (CMS/HCC)   • Smokes 1.5 packs of cigarettes per day   • Vitamin D deficiency disease   • Gross hematuria       Assessment/Plan   Diagnoses and all orders for this visit:    1. Gross hematuria (Primary)    2. Dysuria  -     Urine Culture - Urine, Urine, Clean Catch; Future  -     Urine Culture - Urine, Urine, Clean Catch    Discussed findings of UDS and cystoscopy with patient.  Symptoms refractory to both Flomax and finasteride; UDS consistent with bladder outlet obstruction obstructed flow.    Believe patient would likely benefit from bladder outlet procedure; discussed option of performing transurethral resection of prostate.  This procedure would be performed in hopes of alleviating bladder outlet obstruction and thereby his symptoms.  Also aware that the entire prostate is not removed thus there will be regrowth of the prostate over a period of time and could have recurrence of symptoms. Risks benefits and alternatives of transurethral resection of prostate discussed with patient.  Other risks including but not limited to bleeding, infection, pain, need for further " procedures, need for decompression of his bladder via catheter or self cath, worsening urinary symptoms, sexual side effects, urinary incontinence, or damage to surrounding structures.  Patient was understanding of these risks.    Urine culture today  Schedule TURP after obtaining medical clearance to hold aspirin and Plavix   all questions addressed        Signed:  Penelope Fox MD  06/15/21  13:35 EDT      [facility] moderate: 1 chronic illness with exacerbation, progression; decision regarding surgery with identified patient procedure risk factors

## 2021-06-15 ENCOUNTER — PREP FOR SURGERY (OUTPATIENT)
Dept: OTHER | Facility: HOSPITAL | Age: 66
End: 2021-06-15

## 2021-06-15 ENCOUNTER — OFFICE VISIT (OUTPATIENT)
Dept: UROLOGY | Facility: CLINIC | Age: 66
End: 2021-06-15

## 2021-06-15 VITALS — HEIGHT: 69 IN | BODY MASS INDEX: 31.52 KG/M2 | RESPIRATION RATE: 15 BRPM | WEIGHT: 212.8 LBS

## 2021-06-15 DIAGNOSIS — R30.0 DYSURIA: ICD-10-CM

## 2021-06-15 DIAGNOSIS — N40.1 BPH WITH OBSTRUCTION/LOWER URINARY TRACT SYMPTOMS: Primary | ICD-10-CM

## 2021-06-15 DIAGNOSIS — N39.41 URGE INCONTINENCE OF URINE: ICD-10-CM

## 2021-06-15 DIAGNOSIS — N13.8 BPH WITH OBSTRUCTION/LOWER URINARY TRACT SYMPTOMS: Primary | ICD-10-CM

## 2021-06-15 DIAGNOSIS — N40.0 BPH WITHOUT OBSTRUCTION/LOWER URINARY TRACT SYMPTOMS: ICD-10-CM

## 2021-06-15 DIAGNOSIS — R31.0 GROSS HEMATURIA: Primary | ICD-10-CM

## 2021-06-15 PROBLEM — N28.9 RENAL INSUFFICIENCY: Status: ACTIVE | Noted: 2018-09-26

## 2021-06-15 PROBLEM — IMO0002 HERNIATION OF INTERVERTEBRAL DISC: Status: ACTIVE | Noted: 2017-02-24

## 2021-06-15 PROBLEM — K80.20 GALL STONES: Status: ACTIVE | Noted: 2021-06-15

## 2021-06-15 PROBLEM — M51.379 DEGENERATION OF LUMBOSACRAL INTERVERTEBRAL DISC: Status: ACTIVE | Noted: 2018-03-15

## 2021-06-15 PROBLEM — D64.9 ANEMIA: Status: ACTIVE | Noted: 2021-06-15

## 2021-06-15 PROBLEM — K64.9 HEMORRHOIDS: Status: ACTIVE | Noted: 2021-06-15

## 2021-06-15 PROBLEM — M19.90 ARTHRITIS: Status: ACTIVE | Noted: 2021-06-15

## 2021-06-15 PROBLEM — M79.606 LEG PAIN: Status: ACTIVE | Noted: 2021-06-15

## 2021-06-15 PROBLEM — M51.37 DEGENERATION OF LUMBOSACRAL INTERVERTEBRAL DISC: Status: ACTIVE | Noted: 2018-03-15

## 2021-06-15 PROBLEM — M54.2 NECK PAIN: Status: ACTIVE | Noted: 2018-06-06

## 2021-06-15 PROBLEM — F41.9 ANXIETY: Status: ACTIVE | Noted: 2021-06-15

## 2021-06-15 PROBLEM — T81.9XXA COMPLICATION OF SURGICAL PROCEDURE: Status: ACTIVE | Noted: 2018-03-15

## 2021-06-15 PROBLEM — Z79.4 ENCOUNTER FOR LONG-TERM (CURRENT) USE OF INSULIN (HCC): Status: ACTIVE | Noted: 2018-03-15

## 2021-06-15 PROBLEM — F32.A DEPRESSION: Status: ACTIVE | Noted: 2021-06-15

## 2021-06-15 PROBLEM — G25.0 ESSENTIAL TREMOR: Status: ACTIVE | Noted: 2018-09-26

## 2021-06-15 PROBLEM — S09.90XA HEAD INJURY: Status: ACTIVE | Noted: 2021-06-15

## 2021-06-15 PROBLEM — E78.5 HYPERLIPIDEMIA: Status: ACTIVE | Noted: 2021-06-15

## 2021-06-15 PROBLEM — J42 CHRONIC BRONCHITIS (HCC): Status: ACTIVE | Noted: 2021-06-15

## 2021-06-15 PROCEDURE — 87086 URINE CULTURE/COLONY COUNT: CPT | Performed by: UROLOGY

## 2021-06-15 PROCEDURE — 99214 OFFICE O/P EST MOD 30 MIN: CPT | Performed by: UROLOGY

## 2021-06-15 RX ORDER — ATORVASTATIN CALCIUM 20 MG/1
20 TABLET, FILM COATED ORAL NIGHTLY
COMMUNITY
Start: 2020-12-30 | End: 2021-08-31

## 2021-06-15 RX ORDER — MORPHINE SULFATE 60 MG/1
60 CAPSULE, EXTENDED RELEASE ORAL 2 TIMES DAILY
COMMUNITY

## 2021-06-15 RX ORDER — FINASTERIDE 5 MG/1
5 TABLET, FILM COATED ORAL NIGHTLY
COMMUNITY
Start: 2020-12-10 | End: 2021-12-14

## 2021-06-15 RX ORDER — TAMSULOSIN HYDROCHLORIDE 0.4 MG/1
0.4 CAPSULE ORAL DAILY
COMMUNITY
Start: 2020-09-29 | End: 2021-09-21 | Stop reason: SDUPTHER

## 2021-06-15 RX ORDER — DULOXETIN HYDROCHLORIDE 60 MG/1
60 CAPSULE, DELAYED RELEASE ORAL 2 TIMES DAILY
COMMUNITY
Start: 2021-03-29

## 2021-06-15 RX ORDER — CLOPIDOGREL BISULFATE 75 MG/1
75 TABLET ORAL DAILY
Status: ON HOLD | COMMUNITY
Start: 2020-12-30 | End: 2021-07-12

## 2021-06-15 RX ORDER — ASPIRIN 81 MG/1
81 TABLET, CHEWABLE ORAL DAILY
Status: ON HOLD | COMMUNITY
End: 2021-07-13 | Stop reason: SDUPTHER

## 2021-06-15 RX ORDER — PRIMIDONE 50 MG/1
250 TABLET ORAL
COMMUNITY
End: 2021-06-30 | Stop reason: SDUPTHER

## 2021-06-15 RX ORDER — LOSARTAN POTASSIUM 50 MG/1
50 TABLET ORAL 2 TIMES DAILY
Status: ON HOLD | COMMUNITY
End: 2021-07-12

## 2021-06-15 RX ORDER — HYDROCODONE BITARTRATE AND ACETAMINOPHEN 10; 325 MG/1; MG/1
1 TABLET ORAL EVERY 8 HOURS PRN
COMMUNITY

## 2021-06-16 LAB — BACTERIA SPEC AEROBE CULT: ABNORMAL

## 2021-06-30 ENCOUNTER — OFFICE VISIT (OUTPATIENT)
Dept: NEUROLOGY | Facility: CLINIC | Age: 66
End: 2021-06-30

## 2021-06-30 VITALS
WEIGHT: 209 LBS | TEMPERATURE: 97.8 F | HEART RATE: 83 BPM | BODY MASS INDEX: 30.96 KG/M2 | DIASTOLIC BLOOD PRESSURE: 81 MMHG | SYSTOLIC BLOOD PRESSURE: 131 MMHG | HEIGHT: 69 IN

## 2021-06-30 DIAGNOSIS — G25.0 ESSENTIAL TREMOR: ICD-10-CM

## 2021-06-30 DIAGNOSIS — G40.909 SEIZURE DISORDER (HCC): Primary | ICD-10-CM

## 2021-06-30 DIAGNOSIS — I69.30 SEQUELAE, POST-STROKE: ICD-10-CM

## 2021-06-30 PROCEDURE — 99213 OFFICE O/P EST LOW 20 MIN: CPT | Performed by: PSYCHIATRY & NEUROLOGY

## 2021-06-30 RX ORDER — PRIMIDONE 250 MG/1
TABLET ORAL
Qty: 270 TABLET | Refills: 3 | Status: SHIPPED | OUTPATIENT
Start: 2021-06-30 | End: 2022-12-14 | Stop reason: SDUPTHER

## 2021-06-30 NOTE — PROGRESS NOTES
"Chief Complaint  No chief complaint on file.    Subjective          Davonte Marshall is a 65 y.o. male who presents to [facility]  History of Present Illness  65-year-old man here for follow-up of his seizures and essential tremor and memory loss.  I took him off Dilantin November of last year when he developed Dilantin toxicity since he put himself back on Dilantin.  He states that since has been off Dilantin his head is clear and he is not as unsteady.  He is on primidone 250 mg 3 times a day for essential tremor and he states that his tremor significantly improved.  He states that his mind is also clear and he can think much clearer.  He is driving and is independent with activities of daily living.  He lives with his wife.    He tells me that he needs to have prostate surgery.  His doctor wants him to be taken off aspirin.  He continues to take aspirin and Plavix at this time.  He had a stroke which was found coincidently with a lacunar infarction in November of last year.    Objective   Vital Signs:   /81 (BP Location: Right arm, Patient Position: Sitting, Cuff Size: Adult)   Pulse 83   Temp 97.8 °F (36.6 °C)   Ht 175.3 cm (69.02\")   Wt 94.8 kg (209 lb)   BMI 30.85 kg/m²     Physical Exam   He is alert, fluent, phasic, follows commands well.  There is no tremor noted signs extended or finger-to-nose testing.  On station gait is able to tiptoe, heel walk with slight difficulty.  He is able to tandem without losing his balance.  Heart is regular in rate and rhythm.        Assessment and Plan  Diagnoses and all orders for this visit:    1. Seizure disorder (CMS/HCC) (Primary)  Assessment & Plan:  He has not any recurrent seizures.  He is continue taking primidone 250 mg 3 times a day.  He is to get periodic laboratory work-up with CBC and compressive metabolic profile.  I will see him again in 8 months time for follow-up.      2. Essential tremor  Assessment & " Plan:  He has improvement of his essential tremor with primidone 250 mg 3 times a day.      3. Sequelae, post-stroke  Assessment & Plan:  I discussed with him that he needs to stop taking Plavix.  He is to continue taking baby aspirin and he can taking it 3 days to 1 week prior to planned prostate surgery and resume it after prostate surgery.  He is aware of the small risk for recurrent stroke.  He has high risk factors and he continues to smoke.         Total time spent with the patient and coordinating patient care was 25 minutes.    Follow Up  No follow-ups on file.  Patient was given instructions and counseling regarding his condition or for health maintenance advice. Please see specific information pulled into the AVS if appropriate.

## 2021-06-30 NOTE — ASSESSMENT & PLAN NOTE
He has not any recurrent seizures.  He is continue taking primidone 250 mg 3 times a day.  He is to get periodic laboratory work-up with CBC and compressive metabolic profile.  I will see him again in 8 months time for follow-up.

## 2021-06-30 NOTE — ASSESSMENT & PLAN NOTE
I discussed with him that he needs to stop taking Plavix.  He is to continue taking baby aspirin and he can taking it 3 days to 1 week prior to planned prostate surgery and resume it after prostate surgery.  He is aware of the small risk for recurrent stroke.  He has high risk factors and he continues to smoke.

## 2021-07-06 ENCOUNTER — PRE-ADMISSION TESTING (OUTPATIENT)
Dept: PREADMISSION TESTING | Facility: HOSPITAL | Age: 66
End: 2021-07-06

## 2021-07-06 ENCOUNTER — HOSPITAL ENCOUNTER (OUTPATIENT)
Dept: GENERAL RADIOLOGY | Facility: HOSPITAL | Age: 66
Discharge: HOME | End: 2021-07-06

## 2021-07-06 VITALS
OXYGEN SATURATION: 95 % | HEART RATE: 83 BPM | DIASTOLIC BLOOD PRESSURE: 70 MMHG | SYSTOLIC BLOOD PRESSURE: 110 MMHG | WEIGHT: 208 LBS | BODY MASS INDEX: 30.81 KG/M2 | HEIGHT: 69 IN | TEMPERATURE: 97.6 F

## 2021-07-06 DIAGNOSIS — N40.1 BPH WITH OBSTRUCTION/LOWER URINARY TRACT SYMPTOMS: ICD-10-CM

## 2021-07-06 DIAGNOSIS — N13.8 BPH WITH OBSTRUCTION/LOWER URINARY TRACT SYMPTOMS: ICD-10-CM

## 2021-07-06 LAB
ABO GROUP BLD: NORMAL
ANION GAP SERPL CALCULATED.3IONS-SCNC: 10.4 MMOL/L (ref 5–15)
BUN SERPL-MCNC: 8 MG/DL (ref 8–23)
BUN/CREAT SERPL: 9.1 (ref 7–25)
CALCIUM SPEC-SCNC: 9.2 MG/DL (ref 8.6–10.5)
CHLORIDE SERPL-SCNC: 100 MMOL/L (ref 98–107)
CO2 SERPL-SCNC: 29.6 MMOL/L (ref 22–29)
CREAT SERPL-MCNC: 0.88 MG/DL (ref 0.76–1.27)
DEPRECATED RDW RBC AUTO: 41.9 FL (ref 37–54)
ERYTHROCYTE [DISTWIDTH] IN BLOOD BY AUTOMATED COUNT: 12.9 % (ref 12.3–15.4)
GFR SERPL CREATININE-BSD FRML MDRD: 87 ML/MIN/1.73
GLUCOSE SERPL-MCNC: 90 MG/DL (ref 65–99)
HCT VFR BLD AUTO: 41.8 % (ref 37.5–51)
HGB BLD-MCNC: 14.5 G/DL (ref 13–17.7)
MCH RBC QN AUTO: 30.6 PG (ref 26.6–33)
MCHC RBC AUTO-ENTMCNC: 34.7 G/DL (ref 31.5–35.7)
MCV RBC AUTO: 88.2 FL (ref 79–97)
PLATELET # BLD AUTO: 252 10*3/MM3 (ref 140–450)
PMV BLD AUTO: 9.5 FL (ref 6–12)
POTASSIUM SERPL-SCNC: 4 MMOL/L (ref 3.5–5.2)
QT INTERVAL: 378 MS
RBC # BLD AUTO: 4.74 10*6/MM3 (ref 4.14–5.8)
RH BLD: POSITIVE
SODIUM SERPL-SCNC: 140 MMOL/L (ref 136–145)
WBC # BLD AUTO: 9.85 10*3/MM3 (ref 3.4–10.8)

## 2021-07-06 PROCEDURE — 71046 X-RAY EXAM CHEST 2 VIEWS: CPT

## 2021-07-06 PROCEDURE — 85027 COMPLETE CBC AUTOMATED: CPT

## 2021-07-06 PROCEDURE — 93005 ELECTROCARDIOGRAM TRACING: CPT

## 2021-07-06 PROCEDURE — 86901 BLOOD TYPING SEROLOGIC RH(D): CPT

## 2021-07-06 PROCEDURE — 80048 BASIC METABOLIC PNL TOTAL CA: CPT

## 2021-07-06 PROCEDURE — 93010 ELECTROCARDIOGRAM REPORT: CPT | Performed by: INTERNAL MEDICINE

## 2021-07-06 PROCEDURE — 36415 COLL VENOUS BLD VENIPUNCTURE: CPT

## 2021-07-06 PROCEDURE — 86900 BLOOD TYPING SEROLOGIC ABO: CPT

## 2021-07-06 RX ORDER — LOSARTAN POTASSIUM AND HYDROCHLOROTHIAZIDE 12.5; 1 MG/1; MG/1
1 TABLET ORAL DAILY
COMMUNITY
Start: 2021-06-15

## 2021-07-06 RX ORDER — HYDROXYZINE HYDROCHLORIDE 25 MG/1
25 TABLET, FILM COATED ORAL NIGHTLY
COMMUNITY
Start: 2021-06-15 | End: 2023-03-16

## 2021-07-06 NOTE — DISCHARGE INSTRUCTIONS
IMPORTANT INSTRUCTIONS - PRE-ADMISSION TESTING  1. DO NOT EAT OR CHEW anything after midnight the night before your procedure.    2. You may have CLEAR liquids up to __2____ hours prior to ARRIVAL time.   3. Take the following medications the morning of your procedure with JUST A SIP OF WATER:  _Norco, Morphine if needed ________  4. DO NOT BRING your medications to the hospital with you, UNLESS something has changed since your PRE-Admission Testing appointment.  5. Hold all vitamins, supplements, and NSAIDS (Non- steroidal anti-inflammatory meds) for one week prior to surgery (you MAY take Tylenol or Acetaminophen).  6. If you are diabetic, check your blood sugar the morning of your procedure. If it is less than 70 or if you are feeling symptomatic, call the following number for further instructions: [phone]__.  7. Use your inhalers/nebulizers as usual, the morning of your procedure. BRING YOUR INHALERS with you.   8. Bring your CPAP or BIPAP to hospital, ONLY IF YOU WILL BE SPENDING THE NIGHT.   9. Make sure you have a ride home and have someone who will stay with you the day of your procedure after you go home.  10. If you have any questions, please call your Pre-Admission Testing Nurse, Melony _ at [phone]_.   11. Per anesthesia request, do not smoke for 24 hours before your procedure or as instructed by your surgeon.      ••••••Clear Liquid Diet        Find out when you need to start a clear liquid diet.   Think of “clear liquids” as anything you could read a newspaper through. This includes things like water, broth, sports drinks, or tea WITHOUT any kind of milk or cream.           Once you are told to start a clear liquid diet, only drink these things until 2 hours before arrival to the hospital or when the hospital says to stop. Total volume limitation: 8 oz.       Clear liquids you CAN drink:   ; Water   ; Clear broth: beef, chicken, vegetable, or bone broth with nothing in it   ; Gatorade    ; Lemonade or Giles-aid   ; Soda   ; Tea, coffee (NO cream or honey)   ; Jell-O (without fruit)   ; Popsicles (without fruit or cream)   ; Italian ices   ; Juice without pulp: apple, white, grape   ; You may use salt, pepper, and sugar    Do NOT drink:   ; Milk or cream   ; Soy milk, almond milk, coconut milk, or other non-dairy drinks and   creamers   ; Milkshakes or smoothies   ; Tomato juice   ; Orange juice   ; Grapefruit juice   ; Cream soups or any other than broth         Clear Liquid Diet:  ? Do NOT eat any solid food.  ? Do NOT eat or suck on mints or candy.  ? Do NOT chew gum.  ? Do NOT drink thick liquids like milk or juice with pulp in it.  ? Do NOT add milk, cream, or anything like soy milk or almond milk to coffee or tea.

## 2021-07-07 ENCOUNTER — ANESTHESIA EVENT (OUTPATIENT)
Dept: PERIOP | Facility: HOSPITAL | Age: 66
End: 2021-07-07

## 2021-07-12 ENCOUNTER — HOSPITAL ENCOUNTER (OUTPATIENT)
Facility: HOSPITAL | Age: 66
Setting detail: OBSERVATION
Discharge: HOME | End: 2021-07-13
Attending: UROLOGY | Admitting: UROLOGY

## 2021-07-12 ENCOUNTER — ANESTHESIA (OUTPATIENT)
Dept: PERIOP | Facility: HOSPITAL | Age: 66
End: 2021-07-12

## 2021-07-12 DIAGNOSIS — N13.8 BPH WITH OBSTRUCTION/LOWER URINARY TRACT SYMPTOMS: ICD-10-CM

## 2021-07-12 DIAGNOSIS — N40.1 BPH WITH OBSTRUCTION/LOWER URINARY TRACT SYMPTOMS: ICD-10-CM

## 2021-07-12 LAB
ABO GROUP BLD: NORMAL
ALBUMIN SERPL-MCNC: 3.7 G/DL (ref 3.5–5.2)
ALBUMIN/GLOB SERPL: 1.4 G/DL
ALP SERPL-CCNC: 85 U/L (ref 39–117)
ALT SERPL W P-5'-P-CCNC: 9 U/L (ref 1–41)
ANION GAP SERPL CALCULATED.3IONS-SCNC: 9.8 MMOL/L (ref 5–15)
AST SERPL-CCNC: 13 U/L (ref 1–40)
BASOPHILS # BLD AUTO: 0.09 10*3/MM3 (ref 0–0.2)
BASOPHILS NFR BLD AUTO: 1.1 % (ref 0–1.5)
BILIRUB SERPL-MCNC: 0.2 MG/DL (ref 0–1.2)
BLD GP AB SCN SERPL QL: NEGATIVE
BUN SERPL-MCNC: 8 MG/DL (ref 8–23)
BUN/CREAT SERPL: 8.9 (ref 7–25)
CALCIUM SPEC-SCNC: 8.4 MG/DL (ref 8.6–10.5)
CHLORIDE SERPL-SCNC: 101 MMOL/L (ref 98–107)
CO2 SERPL-SCNC: 27.2 MMOL/L (ref 22–29)
CREAT SERPL-MCNC: 0.9 MG/DL (ref 0.76–1.27)
DEPRECATED RDW RBC AUTO: 43.1 FL (ref 37–54)
EOSINOPHIL # BLD AUTO: 0.08 10*3/MM3 (ref 0–0.4)
EOSINOPHIL NFR BLD AUTO: 1 % (ref 0.3–6.2)
ERYTHROCYTE [DISTWIDTH] IN BLOOD BY AUTOMATED COUNT: 12.9 % (ref 12.3–15.4)
GFR SERPL CREATININE-BSD FRML MDRD: 85 ML/MIN/1.73
GLOBULIN UR ELPH-MCNC: 2.6 GM/DL
GLUCOSE SERPL-MCNC: 116 MG/DL (ref 65–99)
HCT VFR BLD AUTO: 41.7 % (ref 37.5–51)
HGB BLD-MCNC: 13.9 G/DL (ref 13–17.7)
IMM GRANULOCYTES # BLD AUTO: 0.03 10*3/MM3 (ref 0–0.05)
IMM GRANULOCYTES NFR BLD AUTO: 0.4 % (ref 0–0.5)
LYMPHOCYTES # BLD AUTO: 1.35 10*3/MM3 (ref 0.7–3.1)
LYMPHOCYTES NFR BLD AUTO: 17.2 % (ref 19.6–45.3)
MAGNESIUM SERPL-MCNC: 1.7 MG/DL (ref 1.6–2.4)
MCH RBC QN AUTO: 30.7 PG (ref 26.6–33)
MCHC RBC AUTO-ENTMCNC: 33.3 G/DL (ref 31.5–35.7)
MCV RBC AUTO: 92.1 FL (ref 79–97)
MONOCYTES # BLD AUTO: 0.3 10*3/MM3 (ref 0.1–0.9)
MONOCYTES NFR BLD AUTO: 3.8 % (ref 5–12)
NEUTROPHILS NFR BLD AUTO: 5.99 10*3/MM3 (ref 1.7–7)
NEUTROPHILS NFR BLD AUTO: 76.5 % (ref 42.7–76)
NRBC BLD AUTO-RTO: 0 /100 WBC (ref 0–0.2)
PLATELET # BLD AUTO: 195 10*3/MM3 (ref 140–450)
PMV BLD AUTO: 9.6 FL (ref 6–12)
POTASSIUM SERPL-SCNC: 3.9 MMOL/L (ref 3.5–5.2)
PROT SERPL-MCNC: 6.3 G/DL (ref 6–8.5)
RBC # BLD AUTO: 4.53 10*6/MM3 (ref 4.14–5.8)
RH BLD: POSITIVE
SODIUM SERPL-SCNC: 138 MMOL/L (ref 136–145)
T&S EXPIRATION DATE: NORMAL
WBC # BLD AUTO: 7.84 10*3/MM3 (ref 3.4–10.8)

## 2021-07-12 PROCEDURE — 80053 COMPREHEN METABOLIC PANEL: CPT | Performed by: INTERNAL MEDICINE

## 2021-07-12 PROCEDURE — 94799 UNLISTED PULMONARY SVC/PX: CPT

## 2021-07-12 PROCEDURE — 83735 ASSAY OF MAGNESIUM: CPT | Performed by: INTERNAL MEDICINE

## 2021-07-12 PROCEDURE — 25010000002 DEXAMETHASONE PER 1 MG: Performed by: NURSE ANESTHETIST, CERTIFIED REGISTERED

## 2021-07-12 PROCEDURE — 25010000002 HYDROMORPHONE 1 MG/ML SOLUTION: Performed by: NURSE ANESTHETIST, CERTIFIED REGISTERED

## 2021-07-12 PROCEDURE — 25010000002 ONDANSETRON PER 1 MG: Performed by: NURSE ANESTHETIST, CERTIFIED REGISTERED

## 2021-07-12 PROCEDURE — 25010000002 FENTANYL CITRATE (PF) 50 MCG/ML SOLUTION: Performed by: NURSE ANESTHETIST, CERTIFIED REGISTERED

## 2021-07-12 PROCEDURE — 52601 PROSTATECTOMY (TURP): CPT | Performed by: UROLOGY

## 2021-07-12 PROCEDURE — 25010000002 PROPOFOL 10 MG/ML EMULSION: Performed by: NURSE ANESTHETIST, CERTIFIED REGISTERED

## 2021-07-12 PROCEDURE — 94640 AIRWAY INHALATION TREATMENT: CPT

## 2021-07-12 PROCEDURE — 85025 COMPLETE CBC W/AUTO DIFF WBC: CPT | Performed by: INTERNAL MEDICINE

## 2021-07-12 PROCEDURE — C1769 GUIDE WIRE: HCPCS | Performed by: UROLOGY

## 2021-07-12 PROCEDURE — 88305 TISSUE EXAM BY PATHOLOGIST: CPT | Performed by: UROLOGY

## 2021-07-12 PROCEDURE — 86850 RBC ANTIBODY SCREEN: CPT | Performed by: UROLOGY

## 2021-07-12 PROCEDURE — 86900 BLOOD TYPING SEROLOGIC ABO: CPT | Performed by: UROLOGY

## 2021-07-12 PROCEDURE — 99220 PR INITIAL OBSERVATION CARE/DAY 70 MINUTES: CPT | Performed by: INTERNAL MEDICINE

## 2021-07-12 PROCEDURE — 25010000002 MIDAZOLAM PER 1MG: Performed by: ANESTHESIOLOGY

## 2021-07-12 PROCEDURE — G0378 HOSPITAL OBSERVATION PER HR: HCPCS

## 2021-07-12 PROCEDURE — 86901 BLOOD TYPING SEROLOGIC RH(D): CPT | Performed by: UROLOGY

## 2021-07-12 RX ORDER — MAGNESIUM HYDROXIDE 1200 MG/15ML
LIQUID ORAL AS NEEDED
Status: DISCONTINUED | OUTPATIENT
Start: 2021-07-12 | End: 2021-07-12 | Stop reason: HOSPADM

## 2021-07-12 RX ORDER — ALBUTEROL SULFATE 2.5 MG/3ML
2.5 SOLUTION RESPIRATORY (INHALATION) EVERY 4 HOURS PRN
Status: DISCONTINUED | OUTPATIENT
Start: 2021-07-12 | End: 2021-07-13 | Stop reason: HOSPADM

## 2021-07-12 RX ORDER — SODIUM CHLORIDE, SODIUM LACTATE, POTASSIUM CHLORIDE, CALCIUM CHLORIDE 600; 310; 30; 20 MG/100ML; MG/100ML; MG/100ML; MG/100ML
9 INJECTION, SOLUTION INTRAVENOUS CONTINUOUS PRN
Status: DISCONTINUED | OUTPATIENT
Start: 2021-07-12 | End: 2021-07-13 | Stop reason: HOSPADM

## 2021-07-12 RX ORDER — ARFORMOTEROL TARTRATE 15 UG/2ML
15 SOLUTION RESPIRATORY (INHALATION)
Status: DISCONTINUED | OUTPATIENT
Start: 2021-07-12 | End: 2021-07-13 | Stop reason: HOSPADM

## 2021-07-12 RX ORDER — ONDANSETRON 2 MG/ML
INJECTION INTRAMUSCULAR; INTRAVENOUS AS NEEDED
Status: DISCONTINUED | OUTPATIENT
Start: 2021-07-12 | End: 2021-07-12 | Stop reason: SURG

## 2021-07-12 RX ORDER — SODIUM CHLORIDE 0.9 % (FLUSH) 0.9 %
10 SYRINGE (ML) INJECTION EVERY 12 HOURS SCHEDULED
Status: DISCONTINUED | OUTPATIENT
Start: 2021-07-12 | End: 2021-07-13 | Stop reason: HOSPADM

## 2021-07-12 RX ORDER — ACETAMINOPHEN 650 MG/1
650 SUPPOSITORY RECTAL EVERY 4 HOURS PRN
Status: DISCONTINUED | OUTPATIENT
Start: 2021-07-12 | End: 2021-07-13 | Stop reason: HOSPADM

## 2021-07-12 RX ORDER — POLYETHYLENE GLYCOL 3350 17 G/17G
17 POWDER, FOR SOLUTION ORAL DAILY PRN
Status: DISCONTINUED | OUTPATIENT
Start: 2021-07-12 | End: 2021-07-13 | Stop reason: HOSPADM

## 2021-07-12 RX ORDER — MORPHINE SULFATE 2 MG/ML
2 INJECTION, SOLUTION INTRAMUSCULAR; INTRAVENOUS
Status: DISCONTINUED | OUTPATIENT
Start: 2021-07-12 | End: 2021-07-13 | Stop reason: HOSPADM

## 2021-07-12 RX ORDER — AMOXICILLIN 250 MG
2 CAPSULE ORAL 2 TIMES DAILY
Status: DISCONTINUED | OUTPATIENT
Start: 2021-07-12 | End: 2021-07-13 | Stop reason: HOSPADM

## 2021-07-12 RX ORDER — NALOXONE HCL 0.4 MG/ML
0.4 VIAL (ML) INJECTION
Status: DISCONTINUED | OUTPATIENT
Start: 2021-07-12 | End: 2021-07-13 | Stop reason: HOSPADM

## 2021-07-12 RX ORDER — AMOXICILLIN 250 MG
2 CAPSULE ORAL 2 TIMES DAILY
Status: DISCONTINUED | OUTPATIENT
Start: 2021-07-12 | End: 2021-07-12

## 2021-07-12 RX ORDER — ACETAMINOPHEN 500 MG
1000 TABLET ORAL ONCE
Status: COMPLETED | OUTPATIENT
Start: 2021-07-12 | End: 2021-07-12

## 2021-07-12 RX ORDER — GLYCOPYRROLATE 0.2 MG/ML
0.2 INJECTION INTRAMUSCULAR; INTRAVENOUS
Status: COMPLETED | OUTPATIENT
Start: 2021-07-12 | End: 2021-07-12

## 2021-07-12 RX ORDER — CHOLECALCIFEROL (VITAMIN D3) 125 MCG
5 CAPSULE ORAL NIGHTLY PRN
Status: DISCONTINUED | OUTPATIENT
Start: 2021-07-12 | End: 2021-07-13 | Stop reason: HOSPADM

## 2021-07-12 RX ORDER — FINASTERIDE 5 MG/1
5 TABLET, FILM COATED ORAL DAILY
Status: DISCONTINUED | OUTPATIENT
Start: 2021-07-12 | End: 2021-07-13 | Stop reason: HOSPADM

## 2021-07-12 RX ORDER — MEPERIDINE HYDROCHLORIDE 25 MG/ML
12.5 INJECTION INTRAMUSCULAR; INTRAVENOUS; SUBCUTANEOUS
Status: DISCONTINUED | OUTPATIENT
Start: 2021-07-12 | End: 2021-07-12

## 2021-07-12 RX ORDER — CEFAZOLIN SODIUM IN 0.9 % NACL 3 G/100 ML
3 INTRAVENOUS SOLUTION, PIGGYBACK (ML) INTRAVENOUS ONCE
Status: COMPLETED | OUTPATIENT
Start: 2021-07-12 | End: 2021-07-12

## 2021-07-12 RX ORDER — SODIUM CHLORIDE 0.9 % (FLUSH) 0.9 %
10 SYRINGE (ML) INJECTION AS NEEDED
Status: DISCONTINUED | OUTPATIENT
Start: 2021-07-12 | End: 2021-07-13 | Stop reason: HOSPADM

## 2021-07-12 RX ORDER — PRIMIDONE 50 MG/1
50 TABLET ORAL EVERY 8 HOURS SCHEDULED
Status: DISCONTINUED | OUTPATIENT
Start: 2021-07-12 | End: 2021-07-13 | Stop reason: HOSPADM

## 2021-07-12 RX ORDER — SODIUM CHLORIDE, SODIUM LACTATE, POTASSIUM CHLORIDE, CALCIUM CHLORIDE 600; 310; 30; 20 MG/100ML; MG/100ML; MG/100ML; MG/100ML
100 INJECTION, SOLUTION INTRAVENOUS CONTINUOUS
Status: DISCONTINUED | OUTPATIENT
Start: 2021-07-12 | End: 2021-07-13

## 2021-07-12 RX ORDER — ACETAMINOPHEN 325 MG/1
650 TABLET ORAL EVERY 4 HOURS PRN
Status: DISCONTINUED | OUTPATIENT
Start: 2021-07-12 | End: 2021-07-13 | Stop reason: HOSPADM

## 2021-07-12 RX ORDER — MORPHINE SULFATE 60 MG/1
60 TABLET, FILM COATED, EXTENDED RELEASE ORAL EVERY 12 HOURS SCHEDULED
Status: DISCONTINUED | OUTPATIENT
Start: 2021-07-12 | End: 2021-07-13 | Stop reason: HOSPADM

## 2021-07-12 RX ORDER — FENTANYL CITRATE 50 UG/ML
INJECTION, SOLUTION INTRAMUSCULAR; INTRAVENOUS AS NEEDED
Status: DISCONTINUED | OUTPATIENT
Start: 2021-07-12 | End: 2021-07-12 | Stop reason: SURG

## 2021-07-12 RX ORDER — PROMETHAZINE HYDROCHLORIDE 12.5 MG/1
25 TABLET ORAL ONCE AS NEEDED
Status: DISCONTINUED | OUTPATIENT
Start: 2021-07-12 | End: 2021-07-12

## 2021-07-12 RX ORDER — ONDANSETRON 2 MG/ML
4 INJECTION INTRAMUSCULAR; INTRAVENOUS ONCE AS NEEDED
Status: DISCONTINUED | OUTPATIENT
Start: 2021-07-12 | End: 2021-07-12

## 2021-07-12 RX ORDER — ONDANSETRON 2 MG/ML
4 INJECTION INTRAMUSCULAR; INTRAVENOUS EVERY 6 HOURS PRN
Status: DISCONTINUED | OUTPATIENT
Start: 2021-07-12 | End: 2021-07-13 | Stop reason: HOSPADM

## 2021-07-12 RX ORDER — HYDROXYZINE HYDROCHLORIDE 25 MG/1
25 TABLET, FILM COATED ORAL EVERY 8 HOURS PRN
Status: DISCONTINUED | OUTPATIENT
Start: 2021-07-12 | End: 2021-07-13 | Stop reason: HOSPADM

## 2021-07-12 RX ORDER — PROPOFOL 10 MG/ML
VIAL (ML) INTRAVENOUS AS NEEDED
Status: DISCONTINUED | OUTPATIENT
Start: 2021-07-12 | End: 2021-07-12 | Stop reason: SURG

## 2021-07-12 RX ORDER — BISACODYL 5 MG/1
5 TABLET, DELAYED RELEASE ORAL DAILY PRN
Status: DISCONTINUED | OUTPATIENT
Start: 2021-07-12 | End: 2021-07-13 | Stop reason: HOSPADM

## 2021-07-12 RX ORDER — ONDANSETRON 4 MG/1
4 TABLET, FILM COATED ORAL EVERY 6 HOURS PRN
Status: DISCONTINUED | OUTPATIENT
Start: 2021-07-12 | End: 2021-07-13 | Stop reason: HOSPADM

## 2021-07-12 RX ORDER — DEXAMETHASONE SODIUM PHOSPHATE 4 MG/ML
INJECTION, SOLUTION INTRA-ARTICULAR; INTRALESIONAL; INTRAMUSCULAR; INTRAVENOUS; SOFT TISSUE AS NEEDED
Status: DISCONTINUED | OUTPATIENT
Start: 2021-07-12 | End: 2021-07-12 | Stop reason: SURG

## 2021-07-12 RX ORDER — SODIUM CHLORIDE 0.9 % (FLUSH) 0.9 %
3 SYRINGE (ML) INJECTION EVERY 12 HOURS SCHEDULED
Status: DISCONTINUED | OUTPATIENT
Start: 2021-07-12 | End: 2021-07-12

## 2021-07-12 RX ORDER — LIDOCAINE HYDROCHLORIDE 20 MG/ML
INJECTION, SOLUTION INFILTRATION; PERINEURAL AS NEEDED
Status: DISCONTINUED | OUTPATIENT
Start: 2021-07-12 | End: 2021-07-12 | Stop reason: SURG

## 2021-07-12 RX ORDER — TAMSULOSIN HYDROCHLORIDE 0.4 MG/1
0.4 CAPSULE ORAL DAILY
Status: DISCONTINUED | OUTPATIENT
Start: 2021-07-12 | End: 2021-07-13 | Stop reason: HOSPADM

## 2021-07-12 RX ORDER — BUDESONIDE 0.5 MG/2ML
0.5 INHALANT ORAL
Status: DISCONTINUED | OUTPATIENT
Start: 2021-07-12 | End: 2021-07-13 | Stop reason: HOSPADM

## 2021-07-12 RX ORDER — IPRATROPIUM BROMIDE AND ALBUTEROL SULFATE 2.5; .5 MG/3ML; MG/3ML
3 SOLUTION RESPIRATORY (INHALATION)
Status: DISCONTINUED | OUTPATIENT
Start: 2021-07-12 | End: 2021-07-13

## 2021-07-12 RX ORDER — MIDAZOLAM HYDROCHLORIDE 1 MG/ML
2 INJECTION INTRAMUSCULAR; INTRAVENOUS ONCE
Status: COMPLETED | OUTPATIENT
Start: 2021-07-12 | End: 2021-07-12

## 2021-07-12 RX ORDER — HYDROCODONE BITARTRATE AND ACETAMINOPHEN 10; 325 MG/1; MG/1
1 TABLET ORAL EVERY 4 HOURS PRN
Status: DISCONTINUED | OUTPATIENT
Start: 2021-07-12 | End: 2021-07-13 | Stop reason: HOSPADM

## 2021-07-12 RX ORDER — PHENYLEPHRINE HCL IN 0.9% NACL 1 MG/10 ML
SYRINGE (ML) INTRAVENOUS AS NEEDED
Status: DISCONTINUED | OUTPATIENT
Start: 2021-07-12 | End: 2021-07-12 | Stop reason: SURG

## 2021-07-12 RX ORDER — BISACODYL 10 MG
10 SUPPOSITORY, RECTAL RECTAL DAILY PRN
Status: DISCONTINUED | OUTPATIENT
Start: 2021-07-12 | End: 2021-07-13 | Stop reason: HOSPADM

## 2021-07-12 RX ORDER — ROCURONIUM BROMIDE 10 MG/ML
INJECTION, SOLUTION INTRAVENOUS AS NEEDED
Status: DISCONTINUED | OUTPATIENT
Start: 2021-07-12 | End: 2021-07-12 | Stop reason: SURG

## 2021-07-12 RX ORDER — DULOXETIN HYDROCHLORIDE 30 MG/1
60 CAPSULE, DELAYED RELEASE ORAL 2 TIMES DAILY
Status: DISCONTINUED | OUTPATIENT
Start: 2021-07-12 | End: 2021-07-13 | Stop reason: HOSPADM

## 2021-07-12 RX ORDER — ARFORMOTEROL TARTRATE 15 UG/2ML
15 SOLUTION RESPIRATORY (INHALATION)
Status: DISCONTINUED | OUTPATIENT
Start: 2021-07-12 | End: 2021-07-12

## 2021-07-12 RX ORDER — PROMETHAZINE HYDROCHLORIDE 25 MG/1
25 SUPPOSITORY RECTAL ONCE AS NEEDED
Status: DISCONTINUED | OUTPATIENT
Start: 2021-07-12 | End: 2021-07-12

## 2021-07-12 RX ORDER — ONDANSETRON 2 MG/ML
4 INJECTION INTRAMUSCULAR; INTRAVENOUS EVERY 4 HOURS PRN
Status: DISCONTINUED | OUTPATIENT
Start: 2021-07-12 | End: 2021-07-13 | Stop reason: HOSPADM

## 2021-07-12 RX ORDER — BUDESONIDE 0.5 MG/2ML
0.5 INHALANT ORAL
Status: DISCONTINUED | OUTPATIENT
Start: 2021-07-12 | End: 2021-07-12

## 2021-07-12 RX ORDER — ATORVASTATIN CALCIUM 20 MG/1
20 TABLET, FILM COATED ORAL DAILY
Status: DISCONTINUED | OUTPATIENT
Start: 2021-07-12 | End: 2021-07-13 | Stop reason: HOSPADM

## 2021-07-12 RX ORDER — PROMETHAZINE HYDROCHLORIDE 12.5 MG/1
12.5 TABLET ORAL EVERY 6 HOURS PRN
Status: DISCONTINUED | OUTPATIENT
Start: 2021-07-12 | End: 2021-07-13 | Stop reason: HOSPADM

## 2021-07-12 RX ORDER — OXYCODONE HYDROCHLORIDE 5 MG/1
5 TABLET ORAL
Status: DISCONTINUED | OUTPATIENT
Start: 2021-07-12 | End: 2021-07-12

## 2021-07-12 RX ORDER — CALCIUM CARBONATE 200(500)MG
2 TABLET,CHEWABLE ORAL 2 TIMES DAILY PRN
Status: DISCONTINUED | OUTPATIENT
Start: 2021-07-12 | End: 2021-07-13 | Stop reason: HOSPADM

## 2021-07-12 RX ADMIN — ACETAMINOPHEN 1000 MG: 500 TABLET ORAL at 07:27

## 2021-07-12 RX ADMIN — PRIMIDONE 50 MG: 50 TABLET ORAL at 16:09

## 2021-07-12 RX ADMIN — DOCUSATE SODIUM 50MG AND SENNOSIDES 8.6MG 2 TABLET: 8.6; 5 TABLET, FILM COATED ORAL at 21:15

## 2021-07-12 RX ADMIN — SODIUM CHLORIDE, PRESERVATIVE FREE 10 ML: 5 INJECTION INTRAVENOUS at 21:16

## 2021-07-12 RX ADMIN — GLYCOPYRROLATE 0.2 MG: 0.2 INJECTION INTRAMUSCULAR; INTRAVENOUS at 07:28

## 2021-07-12 RX ADMIN — HYDROMORPHONE HYDROCHLORIDE 0.5 MG: 1 INJECTION, SOLUTION INTRAMUSCULAR; INTRAVENOUS; SUBCUTANEOUS at 09:25

## 2021-07-12 RX ADMIN — ARFORMOTEROL TARTRATE 15 MCG: 15 SOLUTION RESPIRATORY (INHALATION) at 18:29

## 2021-07-12 RX ADMIN — ROCURONIUM BROMIDE 10 MG: 10 INJECTION INTRAVENOUS at 08:35

## 2021-07-12 RX ADMIN — Medication 100 MCG: at 08:38

## 2021-07-12 RX ADMIN — MIDAZOLAM HYDROCHLORIDE 2 MG: 1 INJECTION, SOLUTION INTRAMUSCULAR; INTRAVENOUS at 07:28

## 2021-07-12 RX ADMIN — ROCURONIUM BROMIDE 40 MG: 10 INJECTION INTRAVENOUS at 07:40

## 2021-07-12 RX ADMIN — Medication 100 MCG: at 08:08

## 2021-07-12 RX ADMIN — PROPOFOL 150 MG: 10 INJECTION, EMULSION INTRAVENOUS at 07:39

## 2021-07-12 RX ADMIN — Medication 150 MCG: at 08:13

## 2021-07-12 RX ADMIN — Medication 100 MCG: at 08:23

## 2021-07-12 RX ADMIN — Medication 50 MCG: at 08:18

## 2021-07-12 RX ADMIN — DULOXETINE HYDROCHLORIDE 60 MG: 30 CAPSULE, DELAYED RELEASE ORAL at 21:15

## 2021-07-12 RX ADMIN — ONDANSETRON 4 MG: 2 INJECTION INTRAMUSCULAR; INTRAVENOUS at 08:22

## 2021-07-12 RX ADMIN — DEXAMETHASONE SODIUM PHOSPHATE 4 MG: 4 INJECTION INTRA-ARTICULAR; INTRALESIONAL; INTRAMUSCULAR; INTRAVENOUS; SOFT TISSUE at 07:52

## 2021-07-12 RX ADMIN — Medication 100 MCG: at 07:57

## 2021-07-12 RX ADMIN — MORPHINE SULFATE 60 MG: 60 TABLET, FILM COATED, EXTENDED RELEASE ORAL at 18:08

## 2021-07-12 RX ADMIN — BUDESONIDE 0.5 MG: 0.5 INHALANT ORAL at 18:29

## 2021-07-12 RX ADMIN — LIDOCAINE HYDROCHLORIDE 80 MG: 20 INJECTION, SOLUTION INFILTRATION; PERINEURAL at 07:39

## 2021-07-12 RX ADMIN — HYDROCODONE BITARTRATE AND ACETAMINOPHEN 1 TABLET: 10; 325 TABLET ORAL at 17:05

## 2021-07-12 RX ADMIN — SUGAMMADEX 200 MG: 100 INJECTION, SOLUTION INTRAVENOUS at 08:52

## 2021-07-12 RX ADMIN — PROPOFOL 40 MG: 10 INJECTION, EMULSION INTRAVENOUS at 07:51

## 2021-07-12 RX ADMIN — HYDROCODONE BITARTRATE AND ACETAMINOPHEN 1 TABLET: 10; 325 TABLET ORAL at 12:36

## 2021-07-12 RX ADMIN — IPRATROPIUM BROMIDE AND ALBUTEROL SULFATE 3 ML: .5; 2.5 SOLUTION RESPIRATORY (INHALATION) at 18:29

## 2021-07-12 RX ADMIN — Medication 100 MCG: at 08:25

## 2021-07-12 RX ADMIN — PRIMIDONE 50 MG: 50 TABLET ORAL at 21:16

## 2021-07-12 RX ADMIN — SODIUM CHLORIDE, POTASSIUM CHLORIDE, SODIUM LACTATE AND CALCIUM CHLORIDE 100 ML/HR: 600; 310; 30; 20 INJECTION, SOLUTION INTRAVENOUS at 16:10

## 2021-07-12 RX ADMIN — SODIUM CHLORIDE, POTASSIUM CHLORIDE, SODIUM LACTATE AND CALCIUM CHLORIDE 9 ML/HR: 600; 310; 30; 20 INJECTION, SOLUTION INTRAVENOUS at 07:27

## 2021-07-12 RX ADMIN — SODIUM CHLORIDE, POTASSIUM CHLORIDE, SODIUM LACTATE AND CALCIUM CHLORIDE 100 ML/HR: 600; 310; 30; 20 INJECTION, SOLUTION INTRAVENOUS at 10:54

## 2021-07-12 RX ADMIN — Medication 100 MCG: at 08:45

## 2021-07-12 RX ADMIN — FENTANYL CITRATE 50 MCG: 50 INJECTION INTRAMUSCULAR; INTRAVENOUS at 07:39

## 2021-07-12 RX ADMIN — DOCUSATE SODIUM 50MG AND SENNOSIDES 8.6MG 2 TABLET: 8.6; 5 TABLET, FILM COATED ORAL at 12:16

## 2021-07-12 RX ADMIN — LIDOCAINE HYDROCHLORIDE 60 MG: 20 INJECTION, SOLUTION INFILTRATION; PERINEURAL at 08:48

## 2021-07-12 RX ADMIN — Medication 100 MCG: at 08:40

## 2021-07-12 RX ADMIN — CEFAZOLIN 3 G: 1 INJECTION, POWDER, FOR SOLUTION INTRAMUSCULAR; INTRAVENOUS; PARENTERAL at 07:38

## 2021-07-12 RX ADMIN — ROCURONIUM BROMIDE 20 MG: 10 INJECTION INTRAVENOUS at 08:07

## 2021-07-12 NOTE — ANESTHESIA PREPROCEDURE EVALUATION
Anesthesia Evaluation     Patient summary reviewed and Nursing notes reviewed   no history of anesthetic complications:  NPO Solid Status: > 8 hours  NPO Liquid Status: > 2 hours           Airway   Mallampati: II  TM distance: >3 FB  Neck ROM: full  No difficulty expected  Dental    (+) edentulous    Pulmonary - normal exam    breath sounds clear to auscultation  (+) a smoker Current,   Cardiovascular - normal exam  Exercise tolerance: good (4-7 METS)    Rhythm: regular    (+) hypertension, hyperlipidemia,       Neuro/Psych  (+) CVA (nov 2020, no residual),     GI/Hepatic/Renal/Endo - negative ROS     Musculoskeletal     (+) back pain,       ROS comment: Sees pain mgt for back.  Baseline opioid  Abdominal    Substance History - negative use     OB/GYN negative ob/gyn ROS         Other   arthritis,                      Anesthesia Plan    ASA 3     general   (Spinal contraindicated; only off plavix 4 days)  intravenous induction     Anesthetic plan, all risks, benefits, and alternatives have been provided, discussed and informed consent has been obtained with: patient.

## 2021-07-12 NOTE — OP NOTE
Preoperative diagnosis  Clinical BPH (symptoms included weak stream, straining, hesitancy, and sensation of incomplete emptying)    Postoperative diagnosis  Same as above    Procedure performed  1.  Rigid cystoscopy  2.  Transurethral resection of prostate with bipolar energy    Attending surgeon  Penelope Fox MD     Anesthesia  General    EBL  10 mL    Complications  None    Specimen  Prostate chips for pathology    Findings  No bladder mucosal abnormalities. Cystoscopy revealed bilateral ureteral orifices.   This area was not involved in the resection. Resection proceded and remained proximal to the verumontanum.    Indications  63 y.o. male agreed to undergo the above named procedure after discussion of the alternatives, risks and benefits.    He was found to have BPH symptoms listed above and  failed combination medical therapy.  Informed consent was obtained.      Procedure  The patient was taken to the operating room and placed supine on the operating table.   Pre-operative antibiotics were administered.    Bilateral lower extremity SCDs were placed.    After induction of general anesthesia the patient was positioned in dorsal lithotomy and his cathter was removed.    A time-out was performed and the patient was prepped and draped in a sterile fashion.      A 21 Latvian rigid cystoscope was introduced into the bladder under direct vision and a cystoscopy was performed.  Pan cystoscopy was performed at 360 degree manner.  No mucosal abnormalities were appreciated.  Bilateral orthotopic ureters were identified and appeared normal.  A 27 Latvian continuous flow resectoscope was introduced into the bladder. Resection began with the bipolar unit and saline irrigation.  Using a working element, the prostate was resected and a wide open bladder neck remained.    Hemostasis was confirmed throughout the procedure.  All prostate chips were irrigated out.  A 22F 3-way cathter was placed and CBI started.  Catheter was  placed to traction with a 500 cc bag.  The patient tolerated the procedure well, was awakened, extubated and transferred to the recovery room in stable condition.

## 2021-07-12 NOTE — PLAN OF CARE
Goal Outcome Evaluation:           Progress: improving  Outcome Summary: Pt CBI going at a slow rate, urine clear with no pink tinge. Tension in place as ordered. Pt continues with ETCO2 montior.  Alise Russell RN

## 2021-07-12 NOTE — ANESTHESIA POSTPROCEDURE EVALUATION
Patient: Davonte Marshall    Procedure Summary     Date: 07/12/21 Room / Location: [facility] OR 06 / [facility] MAIN OR    Anesthesia Start: 0736 Anesthesia Stop: 0906    Procedure: CYSTOSCOPY TRANSURETHRAL RESECTION OF PROSTATE (N/A ) Diagnosis:       BPH with obstruction/lower urinary tract symptoms      (BPH with obstruction/lower urinary tract symptoms [N40.1, N13.8])    Surgeons: Penelope Fox MD Provider: Jitendra Pineda MD    Anesthesia Type: general ASA Status: 3          Anesthesia Type: general    Vitals  Vitals Value Taken Time   /63 07/12/21 0921   Temp 36.3 °C (97.3 °F) 07/12/21 0905   Pulse 86 07/12/21 0922   Resp 18 07/12/21 0910   SpO2 96 % 07/12/21 0922   Vitals shown include unvalidated device data.        Post Anesthesia Care and Evaluation    Patient location during evaluation: bedside  Patient participation: complete - patient participated  Level of consciousness: awake  Pain management: adequate  Airway patency: patent  Anesthetic complications: No anesthetic complications  PONV Status: none  Cardiovascular status: acceptable and stable  Respiratory status: acceptable  Hydration status: acceptable

## 2021-07-12 NOTE — H&P
[facility]  [facility]   Patient Name: Davonte Marshall  : 1955  MRN: [id number]  Primary Care Physician:  Ivanna Low, KEVIN  Date of admission: 2021    Subjective   Subjective     Chief Complaint: Bladder pain    HPI:    Davonte Marshall is a 65 y.o. male with PMH PAD, seizure disorder, COPD, hypertension, depression, chronic pain syndrome, BPH with LUTS to is admitted to the hospital after TURP procedure by Dr. Fox in urology.  Patient has had lower urinary tract symptoms for years.  He states he has been taking Flomax and finasteride but lately they seem to no longer help. He has also had a history of gross hematuria, he had a cystoscopy in the past year which indicated prostatomegaly and friable bladder mucosa.  For these reasons, decision was made to proceed with rigid cystoscopy and TURP.  Patient tolerated procedure well and was seen postoperatively.  He states he does have pain in his lower back, about 6/10 which is near his baseline back pain.  Otherwise, states he is feeling well.  Denies fevers, chills, nausea or vomiting.  Otherwise no new complaints.    Personal History     Past Medical History:  PAD  Seizure disorder  COPD  Chronic pain syndrome  Hypertension  Depression  Tremor  Chronic opioid dependence  Hypertension  BPH with LUTS    Past Surgical History:  Appendectomy, cholecystectomy, history of back surgery with rods and artifical discs     Family History:   CAD in her father, mother with GI bleeding     Social History:   Lives at home with his wife.  Current one half PPD smoker, has been a smoker for over 40 years.  No alcohol or illicit drug use.    Home Medications:  DULoxetine, HYDROcodone-acetaminophen, Morphine, aspirin, atorvastatin, clopidogrel, finasteride, hydrOXYzine, losartan-hydrochlorothiazide, primidone, and tamsulosin    Allergies:  Allergies   Allergen Reactions   • Codeine Arrhythmia     Chest pain   •  Guaifenesin-Codeine Arrhythmia     Chest pain   • Cefdinir Unknown - Low Severity       Review of Systems   A 14 point review of systems was obtained and otherwise negative unless stated above    Objective   Objective     Vitals:   Temp:  [97.3 °F (36.3 °C)-98.1 °F (36.7 °C)] 97.7 °F (36.5 °C)  Heart Rate:  [74-95] 79  Resp:  [12-18] 12  BP: ()/(50-86) 115/61  Flow (L/min):  [2] 2    Physical Exam    Constitutional: Awake, alert, no acute distress, resting comfortably on arrival   Eyes: Pupils equal, sclerae anicteric, no conjunctival injection    HENT: NCAT, mucous membranes moist   Neck: Supple, no thyromegaly, no lymphadenopathy, trachea midline   Respiratory: Diminished breath sounds bilateral bases, otherwise clear to auscultation bilaterally, nasal cannula in place, nonlabored respirations    Cardiovascular: RRR, no murmurs, rubs, or gallops, palpable pedal pulses bilaterally   Gastrointestinal: Positive bowel sounds, soft, nontender, nondistended   Musculoskeletal: No bilateral ankle edema, no clubbing or cyanosis to extremities   Psychiatric: Appropriate affect, cooperative   Neurologic: Oriented x 3, strength symmetric in all extremities, Cranial Nerves grossly intact to confrontation, speech clear   Skin: No rashes     Result Review    Result Review:  I have personally reviewed the results from the time of this admission to 7/12/2021 13:03 EDT and agree with these findings:  [x]  Laboratory  [x]  Microbiology  [x]  Radiology  [x]  EKG/Telemetry   []  Cardiology/Vascular   []  Pathology  []  Old records  []  Other:  Telemetry reviewed showed normal sinus rhythm    Assessment/Plan   Assessment / Plan     Assessment/Plan:   Status post TURP  BPH with LUTS  Postoperative hypoxia  History of hematuria  COPD  PAD  Seizure disorder  Chronic pain syndrome  Hypertension  Depression  Tremor  Chronic opioid dependence  Hypertension     Well observe in the hospital for management of the above  Dr. Fox in  urology is following, appreciate assistance  Plan for CBI per her recommendations  Obtain repeat CBC and CMP now  Restart appropriate home medications, patient is on MS Contin with Norco for breakthrough.  Monitor vital signs closely while on high doses of narcotics.  Narcan if needed for respiratory depression  Start duo nebs every 4 hours, Pulmicort and Brovana twice daily, albuterol as needed  Incentive spirometer, wean O2 as tolerated keep sats greater than 90%  Trend renal function and electrolytes with a.m. BMP  Trend Hgb and WBC with a.m. CBC  Clinical course will dictate further management    Discussed case with, bedside RN, urology    DVT prophylaxis:  Mechanical DVT prophylaxis orders are present.    CODE STATUS:    Level Of Support Discussed With: Patient  Code Status: CPR  Medical Interventions (Level of Support Prior to Arrest): Full      Admission Status:  I believe this patient meets observation status.    Electronically signed by Chencho Hauser MD, 07/12/21, 1:03 PM EDT.

## 2021-07-13 VITALS
SYSTOLIC BLOOD PRESSURE: 135 MMHG | DIASTOLIC BLOOD PRESSURE: 69 MMHG | HEIGHT: 69 IN | BODY MASS INDEX: 31.44 KG/M2 | OXYGEN SATURATION: 98 % | HEART RATE: 76 BPM | RESPIRATION RATE: 16 BRPM | WEIGHT: 212.3 LBS | TEMPERATURE: 99 F

## 2021-07-13 PROBLEM — R09.02 POSTOPERATIVE HYPOXIA: Status: RESOLVED | Noted: 2021-07-13 | Resolved: 2021-07-13

## 2021-07-13 PROBLEM — I10 ESSENTIAL HYPERTENSION: Chronic | Status: ACTIVE | Noted: 2021-07-13

## 2021-07-13 PROBLEM — F11.20 OPIOID DEPENDENCE (HCC): Chronic | Status: ACTIVE | Noted: 2021-07-13

## 2021-07-13 PROBLEM — Z90.79 S/P TURP: Status: ACTIVE | Noted: 2021-07-13

## 2021-07-13 PROBLEM — Z98.890 POSTOPERATIVE HYPOXIA: Status: ACTIVE | Noted: 2021-07-13

## 2021-07-13 PROBLEM — Z98.890 POSTOPERATIVE HYPOXIA: Status: RESOLVED | Noted: 2021-07-13 | Resolved: 2021-07-13

## 2021-07-13 PROBLEM — D64.9 POSTOPERATIVE ANEMIA: Status: ACTIVE | Noted: 2021-07-13

## 2021-07-13 PROBLEM — R09.02 POSTOPERATIVE HYPOXIA: Status: ACTIVE | Noted: 2021-07-13

## 2021-07-13 LAB
ANION GAP SERPL CALCULATED.3IONS-SCNC: 9.7 MMOL/L (ref 5–15)
BASOPHILS # BLD AUTO: 0.08 10*3/MM3 (ref 0–0.2)
BASOPHILS NFR BLD AUTO: 0.8 % (ref 0–1.5)
BUN SERPL-MCNC: 8 MG/DL (ref 8–23)
BUN/CREAT SERPL: 9.2 (ref 7–25)
CALCIUM SPEC-SCNC: 8.4 MG/DL (ref 8.6–10.5)
CHLORIDE SERPL-SCNC: 100 MMOL/L (ref 98–107)
CO2 SERPL-SCNC: 30.3 MMOL/L (ref 22–29)
CREAT SERPL-MCNC: 0.87 MG/DL (ref 0.76–1.27)
CYTO UR: NORMAL
DEPRECATED RDW RBC AUTO: 43.3 FL (ref 37–54)
EOSINOPHIL # BLD AUTO: 0.32 10*3/MM3 (ref 0–0.4)
EOSINOPHIL NFR BLD AUTO: 3 % (ref 0.3–6.2)
ERYTHROCYTE [DISTWIDTH] IN BLOOD BY AUTOMATED COUNT: 13.2 % (ref 12.3–15.4)
GFR SERPL CREATININE-BSD FRML MDRD: 89 ML/MIN/1.73
GLUCOSE SERPL-MCNC: 92 MG/DL (ref 65–99)
HCT VFR BLD AUTO: 35.8 % (ref 37.5–51)
HGB BLD-MCNC: 11.9 G/DL (ref 13–17.7)
IMM GRANULOCYTES # BLD AUTO: 0.03 10*3/MM3 (ref 0–0.05)
IMM GRANULOCYTES NFR BLD AUTO: 0.3 % (ref 0–0.5)
LAB AP CASE REPORT: NORMAL
LAB AP CLINICAL INFORMATION: NORMAL
LYMPHOCYTES # BLD AUTO: 1.94 10*3/MM3 (ref 0.7–3.1)
LYMPHOCYTES NFR BLD AUTO: 18.3 % (ref 19.6–45.3)
MAGNESIUM SERPL-MCNC: 1.6 MG/DL (ref 1.6–2.4)
MCH RBC QN AUTO: 30.4 PG (ref 26.6–33)
MCHC RBC AUTO-ENTMCNC: 33.2 G/DL (ref 31.5–35.7)
MCV RBC AUTO: 91.3 FL (ref 79–97)
MONOCYTES # BLD AUTO: 0.69 10*3/MM3 (ref 0.1–0.9)
MONOCYTES NFR BLD AUTO: 6.5 % (ref 5–12)
NEUTROPHILS NFR BLD AUTO: 7.57 10*3/MM3 (ref 1.7–7)
NEUTROPHILS NFR BLD AUTO: 71.1 % (ref 42.7–76)
NRBC BLD AUTO-RTO: 0 /100 WBC (ref 0–0.2)
PATH REPORT.FINAL DX SPEC: NORMAL
PATH REPORT.GROSS SPEC: NORMAL
PLATELET # BLD AUTO: 187 10*3/MM3 (ref 140–450)
PMV BLD AUTO: 9.6 FL (ref 6–12)
POTASSIUM SERPL-SCNC: 3.5 MMOL/L (ref 3.5–5.2)
RBC # BLD AUTO: 3.92 10*6/MM3 (ref 4.14–5.8)
SODIUM SERPL-SCNC: 140 MMOL/L (ref 136–145)
WBC # BLD AUTO: 10.63 10*3/MM3 (ref 3.4–10.8)

## 2021-07-13 PROCEDURE — 51798 US URINE CAPACITY MEASURE: CPT

## 2021-07-13 PROCEDURE — 99225 PR SBSQ OBSERVATION CARE/DAY 25 MINUTES: CPT | Performed by: INTERNAL MEDICINE

## 2021-07-13 PROCEDURE — 94799 UNLISTED PULMONARY SVC/PX: CPT

## 2021-07-13 PROCEDURE — 83735 ASSAY OF MAGNESIUM: CPT | Performed by: INTERNAL MEDICINE

## 2021-07-13 PROCEDURE — G0378 HOSPITAL OBSERVATION PER HR: HCPCS

## 2021-07-13 PROCEDURE — 80048 BASIC METABOLIC PNL TOTAL CA: CPT | Performed by: UROLOGY

## 2021-07-13 PROCEDURE — 85025 COMPLETE CBC W/AUTO DIFF WBC: CPT | Performed by: INTERNAL MEDICINE

## 2021-07-13 PROCEDURE — 99024 POSTOP FOLLOW-UP VISIT: CPT | Performed by: UROLOGY

## 2021-07-13 RX ORDER — ASPIRIN 81 MG/1
81 TABLET, CHEWABLE ORAL DAILY
Start: 2021-07-14

## 2021-07-13 RX ORDER — DOCUSATE SODIUM 100 MG/1
200 CAPSULE, LIQUID FILLED ORAL 2 TIMES DAILY PRN
Qty: 60 CAPSULE | Refills: 0 | Status: SHIPPED | OUTPATIENT
Start: 2021-07-13

## 2021-07-13 RX ADMIN — MORPHINE SULFATE 60 MG: 60 TABLET, FILM COATED, EXTENDED RELEASE ORAL at 08:03

## 2021-07-13 RX ADMIN — BUDESONIDE 0.5 MG: 0.5 INHALANT ORAL at 06:05

## 2021-07-13 RX ADMIN — PRIMIDONE 50 MG: 50 TABLET ORAL at 05:46

## 2021-07-13 RX ADMIN — PRIMIDONE 50 MG: 50 TABLET ORAL at 13:53

## 2021-07-13 RX ADMIN — ARFORMOTEROL TARTRATE 15 MCG: 15 SOLUTION RESPIRATORY (INHALATION) at 06:05

## 2021-07-13 RX ADMIN — DULOXETINE HYDROCHLORIDE 60 MG: 30 CAPSULE, DELAYED RELEASE ORAL at 08:03

## 2021-07-13 RX ADMIN — HYDROCODONE BITARTRATE AND ACETAMINOPHEN 1 TABLET: 10; 325 TABLET ORAL at 02:07

## 2021-07-13 RX ADMIN — SODIUM CHLORIDE, PRESERVATIVE FREE 10 ML: 5 INJECTION INTRAVENOUS at 08:04

## 2021-07-13 RX ADMIN — IPRATROPIUM BROMIDE AND ALBUTEROL SULFATE 3 ML: .5; 2.5 SOLUTION RESPIRATORY (INHALATION) at 06:05

## 2021-07-13 RX ADMIN — SODIUM CHLORIDE, POTASSIUM CHLORIDE, SODIUM LACTATE AND CALCIUM CHLORIDE 100 ML/HR: 600; 310; 30; 20 INJECTION, SOLUTION INTRAVENOUS at 02:25

## 2021-07-13 RX ADMIN — IPRATROPIUM BROMIDE AND ALBUTEROL SULFATE 3 ML: .5; 2.5 SOLUTION RESPIRATORY (INHALATION) at 01:28

## 2021-07-13 RX ADMIN — DOCUSATE SODIUM 50MG AND SENNOSIDES 8.6MG 2 TABLET: 8.6; 5 TABLET, FILM COATED ORAL at 08:03

## 2021-07-13 NOTE — PLAN OF CARE
Goal Outcome Evaluation:           Progress: improving  Outcome Summary: F/C removed this am at 0746. Pt voiding well and no residual urine on bladder scan. Pt to D/C home. Wife notified. Alise Russell RN

## 2021-07-13 NOTE — PLAN OF CARE
Goal Outcome Evaluation:              Outcome Summary: Urine clear yellow with CBI running slow all night.  Pain controlled with oral pain meds.  VSS.

## 2021-07-13 NOTE — PROGRESS NOTES
[facility]   Hospitalist Progress Note  Date: 2021  Patient Name: Davonte Marshall  : 1955  MRN: [id number]  Date of admission: 2021      Subjective   Subjective     Chief Complaint: follow up for BPH s/p TURP    Summary: 65 year old male history of BPH with LUTS, hematuria with prostatomegaly and friable bladder mucosa underwent cystoscopy and TURP on .    Interval Followup: No overnight events reported.  No fevers.  Patient resting comfortably this morning. De Los Santos has been removed.  Did urinate spontaneously.  Has mild dysuria and urine appears pink.  Denies uncontrolled pain. Has tolerated oral intake without issue.  Ambulating independently.  Overall feeling well without acute complaints    Review of Systems   Negative for fever or chills, chills, malaise, flank pain, nausea or vomiting, difficulty ambulating, confusion   Positive for mild dysuria, hematuria, mild suprapubic pain    Objective   Objective     Vitals:   Temp:  [97.5 °F (36.4 °C)-98.8 °F (37.1 °C)] 98.6 °F (37 °C)  Heart Rate:  [69-87] 84  Resp:  [10-20] 20  BP: ()/(45-77) 130/66  Flow (L/min):  [1-2] 1  Physical Exam    Constitutional: Awake, nontoxic-appearing, conversant, NAD   Eyes: Pupils equal, sclerae anicteric, no conjunctival injection   HENT: NCAT, mucous membranes moist   Neck: Supple, trachea midline   Respiratory: Clear to auscultation bilaterally, nonlabored respirations    Cardiovascular: RRR, no murmurs, no pedal edema   Gastrointestinal: Positive bowel sounds, soft, nontender, nondistended   Musculoskeletal: No gross deformity, no clubbing or cyanosis to extremities   Neurologic: Oriented x 3, Cranial Nerves grossly intact, speech clear   Skin: No rashes or open wounds    Result Review    Result Review:  I have personally reviewed the results from the time of this admission to 2021 09:23 EDT and agree with these findings:  [x]  Laboratory WBC 10.63, hemoglobin 11.9, creatinine 0.87  []   Microbiology  []  Radiology  []  EKG/Telemetry   []  Cardiology/Vascular   []  Pathology  []  Old records  [x]  Other: Intraoperative procedure note 7/12    Assessment/Plan   Assessment / Plan     Assessment:  Active Hospital Problems    Diagnosis  POA   • **BPH with obstruction/lower urinary tract symptoms [N40.1, N13.8]  Yes     Added automatically from request for surgery [id number]     • S/P TURP [Z90.79]  Not Applicable   • Postoperative anemia [D64.9]  No   • Essential hypertension [I10]  Yes   • Opioid dependence (CMS/HCC) [F11.20]  Yes   • Depression [F32.9]  Yes   • Hyperlipidemia [E78.5]  Yes       Plan:    Appreciate urology recommendations  Status post De Los Santos removal  Bladder scan this morning and every 4 hours  Continue finasteride, tamsulosin, primidone   Mild drop in hemoglobin not unexpected; no indication for transfusion  Discontinue IV fluids  Continue to hold home losartan/HCTZ  Continue to hold daily baby aspirin; per urology can likely be resumed tomorrow  Continue chronic MS Contin regimen as ordered.  Continue bowel regimen  Continue duloxetine as ordered  Continue daily statin therapy  Activity as tolerated  Disposition: Home when cleared by urology    Discussed plan with RN.    DVT prophylaxis: SCDs  Mechanical DVT prophylaxis orders are present.    CODE STATUS:   Level Of Support Discussed With: Patient  Code Status: CPR  Medical Interventions (Level of Support Prior to Arrest): Full        Electronically signed by Willy Tena DO, 07/13/21, 9:23 AM EDT.

## 2021-07-13 NOTE — DISCHARGE SUMMARY
[facility]   DISCHARGE SUMMARY      Name:  Davonte Marshall   Age:  65 y.o.  Sex:  male  :  1955  MRN:  [id number]   Visit Number:  [id number]  Primary Care Physician:  Ivanna Low APRN  Date of Discharge:  2021  Admission Date:  2021      Discharge Diagnosis:     Active Hospital Problems    Diagnosis  POA   • **BPH with obstruction/lower urinary tract symptoms [N40.1, N13.8]  Yes   • S/P TURP [Z90.79]  Not Applicable   • Postoperative anemia [D64.9]  No   • Postoperative hypoxia [R09.02, Z98.890]  No   • Essential hypertension [I10]  Yes   • Opioid dependence (CMS/HCC) [F11.20]  Yes   • Depression [F32.9]  Yes   • Hyperlipidemia [E78.5]  Yes      Resolved Hospital Problems   No resolved problems to display.         Presenting Problem/History of Present Illness:    BPH with obstruction/lower urinary tract symptoms [N40.1, N13.8]         Hospital Course:    Patient underwent the below procedure.  He tolerated procedure well.  Hospitalist service requested to admit patient given medical comorbidities.  Please see operative report for further details.  Patient was admitted postoperatively for pain control and monitoring.  Patient remained stable during admission.  Postoperatively he was able to ambulate as well as tolerate a diet.  Void trial was provided and patient was able to spontaneously void without significant postvoid residual.  At time of discharge patient was at his baseline mobility status, tolerating regular diet, and pain was controlled with p.o. medications.  At this time all goals of inpatient care met patient is deemed ready for discharge home     Procedures Performed:    Procedure(s):  CYSTOSCOPY TRANSURETHRAL RESECTION OF PROSTATE       Consults:     Consults     No orders found for last 30 day(s).          Pertinent Test Results:     Lab Results (all)     Procedure Component Value Units Date/Time    Tissue Pathology Exam [[id number]] Collected: 21 0842     Specimen: Tissue from Prostate Updated: 07/13/21 0957     Case Report --     Surgical Pathology Report                         Case: [id number]                                  Authorizing Provider:  Penelope Fox MD      Collected:           07/12/2021 08:42 AM          Ordering Location:     [facility] MAIN Received:            07/12/2021 10:17 AM                                 OR                                                                           Pathologist:           Gilda Aquino MD                                                     Specimen:    Prostate, PROSTATE CHIPS                                                                    Clinical Information --     Comment: BPH with obstruction/lower urinary tract symptoms          Final Diagnosis --     Prostate gland, transurethral resection:   - Benign prostatic hyperplasia         Gross Description --     Prostate chips: Received in formalin are irregular fragments of pink to light tan rubbery prostate gland filtered and measuring 4.6 x 4 x 1.2 cm in aggregate weighing 5 g.  All 1A through 1F.  CRE       Microscopic Description --     Comment: Microscopic examination performed.         CBC Auto Differential [[id number]]  (Abnormal) Collected: 07/13/21 0413    Specimen: Blood Updated: 07/13/21 0551     WBC 10.63 10*3/mm3      RBC 3.92 10*6/mm3      Hemoglobin 11.9 g/dL      Hematocrit 35.8 %      MCV 91.3 fL      MCH 30.4 pg      MCHC 33.2 g/dL      RDW 13.2 %      RDW-SD 43.3 fl      MPV 9.6 fL      Platelets 187 10*3/mm3      Neutrophil % 71.1 %      Lymphocyte % 18.3 %      Monocyte % 6.5 %      Eosinophil % 3.0 %      Basophil % 0.8 %      Immature Grans % 0.3 %      Neutrophils, Absolute 7.57 10*3/mm3      Lymphocytes, Absolute 1.94 10*3/mm3      Monocytes, Absolute 0.69 10*3/mm3      Eosinophils, Absolute 0.32 10*3/mm3      Basophils, Absolute 0.08 10*3/mm3      Immature Grans, Absolute 0.03 10*3/mm3      nRBC 0.0 /100 WBC      Basic Metabolic Panel [[id number]]  (Abnormal) Collected: 07/13/21 0413    Specimen: Blood Updated: 07/13/21 0549     Glucose 92 mg/dL      BUN 8 mg/dL      Creatinine 0.87 mg/dL      Sodium 140 mmol/L      Potassium 3.5 mmol/L      Chloride 100 mmol/L      CO2 30.3 mmol/L      Calcium 8.4 mg/dL      eGFR Non African Amer 89 mL/min/1.73      BUN/Creatinine Ratio 9.2     Anion Gap 9.7 mmol/L     Narrative:      GFR Normal >60  Chronic Kidney Disease <60  Kidney Failure <15      Magnesium [[id number]]  (Normal) Collected: 07/13/21 0413    Specimen: Blood Updated: 07/13/21 0549     Magnesium 1.6 mg/dL     Comprehensive Metabolic Panel [[id number]]  (Abnormal) Collected: 07/12/21 1400    Specimen: Blood Updated: 07/12/21 1527     Glucose 116 mg/dL      BUN 8 mg/dL      Creatinine 0.90 mg/dL      Sodium 138 mmol/L      Potassium 3.9 mmol/L      Chloride 101 mmol/L      CO2 27.2 mmol/L      Calcium 8.4 mg/dL      Total Protein 6.3 g/dL      Albumin 3.70 g/dL      ALT (SGPT) 9 U/L      AST (SGOT) 13 U/L      Alkaline Phosphatase 85 U/L      Total Bilirubin 0.2 mg/dL      eGFR Non African Amer 85 mL/min/1.73      Globulin 2.6 gm/dL      A/G Ratio 1.4 g/dL      BUN/Creatinine Ratio 8.9     Anion Gap 9.8 mmol/L     Narrative:      GFR Normal >60  Chronic Kidney Disease <60  Kidney Failure <15      Magnesium [[id number]]  (Normal) Collected: 07/12/21 1400    Specimen: Blood Updated: 07/12/21 1527     Magnesium 1.7 mg/dL     CBC & Differential [[id number]]  (Abnormal) Collected: 07/12/21 1400    Specimen: Blood Updated: 07/12/21 1426    Narrative:      The following orders were created for panel order CBC & Differential.  Procedure                               Abnormality         Status                     ---------                               -----------         ------                     CBC Auto Differential[[id number]]        Abnormal            Final result                 Please view results for these tests on the  "individual orders.    CBC Auto Differential [[id number]]  (Abnormal) Collected: 07/12/21 1400    Specimen: Blood Updated: 07/12/21 1426     WBC 7.84 10*3/mm3      RBC 4.53 10*6/mm3      Hemoglobin 13.9 g/dL      Hematocrit 41.7 %      MCV 92.1 fL      MCH 30.7 pg      MCHC 33.3 g/dL      RDW 12.9 %      RDW-SD 43.1 fl      MPV 9.6 fL      Platelets 195 10*3/mm3      Neutrophil % 76.5 %      Lymphocyte % 17.2 %      Monocyte % 3.8 %      Eosinophil % 1.0 %      Basophil % 1.1 %      Immature Grans % 0.4 %      Neutrophils, Absolute 5.99 10*3/mm3      Lymphocytes, Absolute 1.35 10*3/mm3      Monocytes, Absolute 0.30 10*3/mm3      Eosinophils, Absolute 0.08 10*3/mm3      Basophils, Absolute 0.09 10*3/mm3      Immature Grans, Absolute 0.03 10*3/mm3      nRBC 0.0 /100 WBC           Imaging Results (All)     None          Condition on Discharge:      Good    Vital Signs:    /66 (BP Location: Right arm, Patient Position: Lying)   Pulse 81   Temp 98.2 °F (36.8 °C) (Oral)   Resp 20   Ht 175.3 cm (69\")   Wt 96.3 kg (212 lb 4.9 oz)   SpO2 96%   BMI 31.35 kg/m²     Discharge Disposition:    Home or Self Care    Discharge Medication:       Discharge Medications      New Medications      Instructions Start Date   docusate sodium 250 MG capsule  Commonly known as: COLACE   250 mg, Oral, 2 Times Daily PRN         Changes to Medications      Instructions Start Date   primidone 250 MG tablet  Commonly known as: MYSOLINE  What changed:   · how much to take  · how to take this  · when to take this   1 tablet 3 times a day         Continue These Medications      Instructions Start Date   aspirin 81 MG chewable tablet   81 mg, Oral, Daily, Per Dr. Arroyo pt instructed to stop 3 days prior to procedure   Start Date: July 14, 2021     atorvastatin 20 MG tablet  Commonly known as: LIPITOR   20 mg, Oral, Nightly      DULoxetine 60 MG capsule  Commonly known as: CYMBALTA   60 mg, Oral, 2 Times Daily      finasteride 5 MG " tablet  Commonly known as: PROSCAR   5 mg, Oral, Nightly      HYDROcodone-acetaminophen  MG per tablet  Commonly known as: NORCO   1 tablet, Oral, Every 8 Hours PRN      hydrOXYzine 25 MG tablet  Commonly known as: ATARAX   25 mg, Oral, Nightly      losartan-hydrochlorothiazide 100-12.5 MG per tablet  Commonly known as: HYZAAR   1 tablet, Oral, Daily      Morphine 60 MG 24 hr capsule  Commonly known as: AVINza   60 mg, Oral, 2 times daily      tamsulosin 0.4 MG capsule 24 hr capsule  Commonly known as: FLOMAX   0.4 mg, Daily             Discharge Diet:     Diet Instructions     Advance Diet as Tolerated      Diet:      Diet Texture / Consistency: Regular    Be sure to drink plenty of fluids until urine is clear          Activity at Discharge:     Activity Instructions     Discharge Activity      Okay to resume aspirin tomorrow (Wednesday) and Eliquis on Thursday if urine is clearing and not passing excessive clot.    Activity may be performed as tolerated.    Do not return to work or drive while taking narcotic pain medication. Blood in the urine as well as back and crampy bladder pain is expected after this procedure. Be sure to drink plenty of fluids. Wean narcotic pain medication as tolerated and discomfort improves. Do not take additional Tylenol while taking prescription pain medication.  May take ibuprofen as needed for additional discomfort.  Once no longer taking narcotic pain medication, may take Tylenol and ibuprofen. It is important to decrease the amount of pain medication as you are discomfort improves as this is not a medication that can be called in over the phone. Call urology office with any questions or concerns.          Follow-up Appointments:    Future Appointments   Date Time Provider Department Center   2/28/2022  2:30 PM Glen Pantoja MD [facility]     Additional Instructions for the Follow-ups that You Need to Schedule     Discharge Follow-up with Specified Provider:  Dr. Fox; 1 Week   As directed      To: Dr. Fox    Follow Up: 1 Week    Follow Up Details: [phone]               Test Results Pending at Discharge:           Penelope Fox MD  07/13/21  15:49 EDT

## 2021-07-13 NOTE — PROGRESS NOTES
[facility]   Urology Progress Note    Patient Name: Davonte Marshall  : 1955  MRN: [id number]  Primary Care Physician:  Ivanna Low APRN  Date of admission: 2021    Subjective   Subjective       No acute events; some bother with catheter    Objective   Objective     Vitals:   Temp:  [97.3 °F (36.3 °C)-98.8 °F (37.1 °C)] 98.6 °F (37 °C)  Heart Rate:  [69-95] 84  Resp:  [10-20] 20  BP: ()/(45-86) 130/66  Flow (L/min):  [1-2] 1  Physical Exam     Alert and oriented x3  No acute distress  Unlabored respirations  Nontender/nondistended       Result Review    Result Review:  I have personally reviewed the results from the time of this admission to 2021 08:33 EDT and agree with these findings:  [x]  Laboratory  []  Microbiology  []  Radiology  []  EKG/Telemetry   []  Cardiology/Vascular   []  Pathology  []  Old records  []  Other:      Assessment/Plan   Assessment / Plan     Brief Patient Summary:  Davonte Marshall is a 65 y.o. male who s/p TURP, 21    Active Hospital Problems:  Active Hospital Problems    Diagnosis    • **BPH with obstruction/lower urinary tract symptoms      Added automatically from request for surgery [id number]       Plan:      Void trial today  Post void bladder scan  Ambulate  PO symptom control  Hold anticoagulation today; ok to resume tomorrow if urine remains clear and not passing excessive clot  Possible dc later today with/without conti    Electronically signed by Penelope Fox MD, 21, 8:33 AM EDT.

## 2021-07-13 NOTE — CONSULTS
"Nutrition Services    Patient Name: Davonte Marshall  YOB: 1955  MRN: [id number]  Admission date: 7/12/2021  Observation status    CLINICAL NUTRITION ASSESSMENT      Reason for Assessment  MST score 2+/ 14-23# wt loss reported     H&P:    Past Medical History:   Diagnosis Date   • Anemia    • Anxiety    • Arthritis    • Benign essential hypertension    • Chronic bronchitis (CMS/HCC)    • Depression    • Enlarged prostate    • Floaters in visual field    • Gall stones    • Generalized weakness    • Head injury    • Hemorrhoids    • Hyperlipidemia    • Leg pain    • Numbness in both hands 7/6/2015   • Seizures (CMS/HCC)     last \"quite a while ago\"   • Stroke (CMS/HCC) 11/2020    left sided weakness        Current Problems:   Active Hospital Problems    Diagnosis    • **BPH with obstruction/lower urinary tract symptoms      Added automatically from request for surgery [id number]          Nutrition/Diet History         Narrative     Chart review reveals stable wt x 6 mos.  Edema:  Leg, Left Edema: 1+ (Trace)  Leg, Right Edema: 1+ (Trace)       Anthropometrics        Current Height, Weight Height: 175.3 cm (69\")  Weight: 96.3 kg (212 lb 4.9 oz)        Admit Height, Weight Flowsheet Rows      First Filed Value   Admission Height  175.3 cm (69\") Documented at 07/12/2021 0611   Admission Weight  96.3 kg (212 lb 4.9 oz) Documented at 07/12/2021 0611             Ideal Body Weight (IBW) Ideal Body Weight (IBW) (kg): 73.69   % Ideal Body Weight % Ideal Body Weight: 130.69        Usual Body Weight    % Usual Body Weight    Wt Change Observation    Weight Hx  Wt Readings from Last 30 Encounters:   07/12/21 0611 96.3 kg (212 lb 4.9 oz)   07/06/21 0948 94.3 kg (208 lb)   06/30/21 1555 94.8 kg (209 lb)   06/15/21 1312 96.5 kg (212 lb 12.8 oz)   01/28/21 0000 95.3 kg (210 lb)   12/30/20 0000 94.5 kg (208 lb 6 oz)   10/29/20 0000 98.6 kg (217 lb 6 oz)   10/09/20 0000 97.5 kg (215 lb)   07/09/20 0000 103 kg (226 lb) " "  01/09/20 0000 98 kg (216 lb)   10/07/19 0000 96.6 kg (213 lb)   09/06/19 0000 94.8 kg (209 lb)        BMI kg/m2 Body mass index is 31.35 kg/m².       Estimated/Assessed Needs       Energy Requirements    Height for Calculation  Height: 175.3 cm (69\")   Weight for Calculation    Method for Estimation     EST Needs (kcal/day)        Protein Requirements    Weight for Calculation    EST Protein Needs (g/kg)    EST Daily Needs (g/day)        Fluid Requirements     Estimated Needs (mL/day)        Fluid Deficit    Current Na Level (mEq/L)    Desired Na Level (mEq/L) 140   Estimated Fluid Deficit (L)       Labs/Medications         Pertinent Labs Reviewed.   Results from last 7 days   Lab Units 07/13/21  0413 07/12/21  1400 07/06/21  1039   SODIUM mmol/L 140 138 140   POTASSIUM mmol/L 3.5 3.9 4.0   CHLORIDE mmol/L 100 101 100   CO2 mmol/L 30.3* 27.2 29.6*   BUN mg/dL 8 8 8   CREATININE mg/dL 0.87 0.90 0.88   CALCIUM mg/dL 8.4* 8.4* 9.2   BILIRUBIN mg/dL  --  0.2  --    ALK PHOS U/L  --  85  --    ALT (SGPT) U/L  --  9  --    AST (SGOT) U/L  --  13  --    GLUCOSE mg/dL 92 116* 90     Results from last 7 days   Lab Units 07/13/21  0413 07/12/21  1400   MAGNESIUM mg/dL 1.6 1.7   HEMOGLOBIN g/dL 11.9* 13.9   HEMATOCRIT % 35.8* 41.7     No results found for: COVID19  No results found for: HGBA1C      Pertinent Medications Reviewed.     Current Nutrition Orders & Evaluation of Intake       Oral Nutrition     Current PO Diet Diet Regular; Consistent Carbohydrate   Supplement    PO Evaluation     % PO Intake      Clinical Course    Nutrition Course Details      Nutrition Diagnosis         Nutrition Dx Problem 1 n/a       Nutrition Intervention        RD Action Continue current diet     Monitor/Evaluation        Monitor Per protocol     Electronically signed by:  Janett Feliciano RD  07/13/21 08:46 EDT    "

## 2021-07-14 ENCOUNTER — READMISSION MANAGEMENT (OUTPATIENT)
Dept: CALL CENTER | Facility: HOSPITAL | Age: 66
End: 2021-07-14

## 2021-07-14 NOTE — OUTREACH NOTE
Prep Survey      Responses   [facility] patient discharged from?  [facility]   Is LACE score < 7 ?  Yes   Emergency Room discharge w/ pulse ox?  No   Eligibility  Not Eligible   What are the reasons patient is not eligible?  Other   Does the patient have one of the following disease processes/diagnoses(primary or secondary)?  Other   Prep survey completed?  Yes          Maranda Stark RN

## 2021-07-21 ENCOUNTER — OFFICE VISIT (OUTPATIENT)
Dept: UROLOGY | Facility: CLINIC | Age: 66
End: 2021-07-21

## 2021-07-21 VITALS — WEIGHT: 212 LBS | RESPIRATION RATE: 16 BRPM | BODY MASS INDEX: 31.4 KG/M2 | HEIGHT: 69 IN

## 2021-07-21 DIAGNOSIS — R39.11 BENIGN PROSTATIC HYPERPLASIA WITH URINARY HESITANCY: ICD-10-CM

## 2021-07-21 DIAGNOSIS — N40.1 BENIGN PROSTATIC HYPERPLASIA WITH URINARY HESITANCY: ICD-10-CM

## 2021-07-21 DIAGNOSIS — N39.41 URGE INCONTINENCE OF URINE: Primary | ICD-10-CM

## 2021-07-21 LAB — SCAN: NORMAL

## 2021-07-21 PROCEDURE — 51798 US URINE CAPACITY MEASURE: CPT | Performed by: UROLOGY

## 2021-07-21 PROCEDURE — 99024 POSTOP FOLLOW-UP VISIT: CPT | Performed by: UROLOGY

## 2021-07-21 RX ORDER — HYDROXYZINE HYDROCHLORIDE 25 MG/1
25 TABLET, FILM COATED ORAL
COMMUNITY
Start: 2021-07-06 | End: 2023-03-16

## 2021-07-21 NOTE — PROGRESS NOTES
"    UROLOGY OFFICE FOLLOW-UP NOTE    Subjective   HPI  Davonte Marshall is a 65 y.o. male with history of gross hematuria; had cystoscopy at last appointment which indicated prostatomegaly and some friable bladder mucosa.  Patient on Plavix.  Presents for routine follow-up.  Discussed at last appointment that given patient's significant tobacco history, should patient have recurrence of gross hematuria, formal work-up with bladder biopsy in OR should be performed.  Reports gross hematuria when he had UTI; denies other hematuria since last visit.   Patient states that he had a \"really bad\" UTI since last visit.  Sought care and was placed on antibiotic course by [facility].  Unfortunately, those records are unavailable for review at today's appointment.  Patient feeling much better today.  Remains bothered by significantly weakened stream; takes at x20 minutes to void also notes significant sensation of incomplete emptying.  Continues to take Flomax and finasteride.  Takes Plavix and aspirin due to history of stroke.    Update 7/21/2021: Patient presents for postop follow-up after TURP, 7/12/2021.  Patient was discharged home without catheter after passing void trial.  Reports doing well after TURP; better stream; significant improvement of hesitancy.  Some increase in baseline urgency.  Denies incontinence.  Generally pleased.  Denies significant hematuria.      ______________  TURP, prostate pathology, 7/12/2021: Benign prostatic hyperplasia    Urodynamic testing, 12/10/2020: During filling, increased bladder sensation, low bladder capacity due to bladder instability; evidence of strong involuntary bladder contraction causing urge urinary incontinence.   During voiding, very strong detrusor contraction with no evidence abdominal muscle recruitment to assist with voiding.  Weak flow.  PVR 15 cc.  EMG normal.  Detrusor instability secondary to urinary outlet obstruction; urge urinary " "incontinence        Medical History:  Past Medical History:   Diagnosis Date   • Anemia    • Anxiety    • Arthritis    • Benign essential hypertension    • Chronic bronchitis (CMS/HCC)    • Depression    • Enlarged prostate    • Floaters in visual field    • Gall stones    • Generalized weakness    • Head injury    • Hemorrhoids    • Hyperlipidemia    • Leg pain    • Numbness in both hands 7/6/2015   • Seizures (CMS/HCC)     last \"quite a while ago\"   • Stroke (CMS/HCC) 11/2020    left sided weakness        Social History:  Social History     Socioeconomic History   • Marital status:      Spouse name: Not on file   • Number of children: Not on file   • Years of education: Not on file   • Highest education level: Not on file   Tobacco Use   • Smoking status: Current Every Day Smoker     Packs/day: 0.50     Years: 45.00     Pack years: 22.50     Types: Cigarettes   • Smokeless tobacco: Never Used   • Tobacco comment: last this morning 4am   Vaping Use   • Vaping Use: Never used   Substance and Sexual Activity   • Alcohol use: Never   • Drug use: Never   • Sexual activity: Defer        Family History:  Family History   Problem Relation Age of Onset   • Bleeding Disorder Mother    • Stroke Father    • Heart disease Father    • Heart disease Other    • Malig Hyperthermia Neg Hx         Surgical History:  Past Surgical History:   Procedure Laterality Date   • APPENDECTOMY     • BACK SURGERY      4   • CHOLECYSTECTOMY     • CYSTOSCOPY Bilateral     7/12/21   • CYSTOSCOPY TRANSURETHRAL RESECTION OF PROSTATE N/A 7/12/2021    Procedure: CYSTOSCOPY TRANSURETHRAL RESECTION OF PROSTATE;  Surgeon: Penelope Fox MD;  Location: [facility] (formerly [facility]);  Service: Urology;  Laterality: N/A;   • HAND SURGERY Bilateral     thumb   • LYMPH NODE DISSECTION      jaw line   • TURP / TRANSURETHRAL INCISION / DRAINAGE PROSTATE  07/12/2021        Allergies:  Allergies   Allergen Reactions   • Codeine Arrhythmia     Chest pain   • " "Guaifenesin-Codeine Arrhythmia     Chest pain   • Cefdinir Unknown - Low Severity        Current Medications:  Current Outpatient Medications   Medication Sig Dispense Refill   • aspirin 81 MG chewable tablet Chew 1 tablet Daily. Per Dr. Arroyo pt instructed to stop 3 days prior to procedure     • DULoxetine (CYMBALTA) 60 MG capsule Take 60 mg by mouth 2 (Two) Times a Day.     • finasteride (PROSCAR) 5 MG tablet Take 5 mg by mouth Every Night.     • HYDROcodone-acetaminophen (NORCO)  MG per tablet Take 1 tablet by mouth Every 8 (Eight) Hours As Needed.     • hydrOXYzine (ATARAX) 25 MG tablet Take 25 mg by mouth Every Night.     • losartan-hydrochlorothiazide (HYZAAR) 100-12.5 MG per tablet Take 1 tablet by mouth Daily.     • Morphine (AVINza) 60 MG 24 hr capsule Take 60 mg by mouth 2 (two) times a day.     • primidone (MYSOLINE) 250 MG tablet 1 tablet 3 times a day (Patient taking differently: Take 250 mg by mouth 3 (Three) Times a Day. 1 tablet 3 times a day) 270 tablet 3   • tamsulosin (FLOMAX) 0.4 MG capsule 24 hr capsule 0.4 mg Daily.     • atorvastatin (LIPITOR) 20 MG tablet Take 20 mg by mouth Every Night.     • docusate sodium (COLACE) 100 MG capsule Take 2 capsules by mouth 2 (Two) Times a Day As Needed for Constipation. 60 capsule 0   • hydrOXYzine (ATARAX) 25 MG tablet Take 25 mg by mouth.       No current facility-administered medications for this visit.       Review of systems  Constitutional: Denies fever chills  GI: Denies nausea, vomiting    Objective     Vital Signs:   Resp 16   Ht 175.3 cm (69.02\")   Wt 96.2 kg (212 lb)   BMI 31.29 kg/m²       Physical exam  No acute distress, well-nourished  Awake alert and oriented  Mood normal; affect normal    Bladder Scan interpretation 07/21/2021    Estimation of residual urine via BVI 3000 [employer] Bladder Scan  Residual Urine: 16 ml  Indication: Urge incontinence of urine    Benign prostatic hyperplasia with urinary hesitancy   Position: " Supine  Examination: Incremental scanning of the suprapubic area using 2.0 MHz transducer using copious amounts of acoustic gel.   Findings: An anechoic area was demonstrated which represented the bladder, with measurement of residual urine as noted. I inspected this myself. In that the residual urine was stable or insignificant, refer to plan for treatment and plan necessary at this time.             Problem List:  Patient Active Problem List   Diagnosis   • Anemia   • Anxiety   • Arthritis   • Back pain   • Chronic bronchitis (CMS/HCC)   • Complication of surgical procedure   • Degeneration of lumbosacral intervertebral disc   • Depression   • Encounter for long-term (current) use of insulin (CMS/HCC)   • Essential tremor   • Gall stones   • Head injury   • Hemorrhoids   • Herniation of intervertebral disc   • Hyperlipidemia   • Leg pain   • Neck pain   • Renal insufficiency   • Scoliosis   • Seizure disorder (CMS/HCC)   • Smokes 1.5 packs of cigarettes per day   • Vitamin D deficiency disease   • Gross hematuria   • BPH without obstruction/lower urinary tract symptoms   • Urge incontinence of urine   • Benign prostatic hyperplasia with urinary hesitancy   • Sequelae, post-stroke   • S/P TURP   • Postoperative anemia   • Postoperative hypoxia   • Essential hypertension   • Opioid dependence (CMS/HCC)       Assessment/Plan   Diagnoses and all orders for this visit:    1. Urge incontinence of urine (Primary)    2. Benign prostatic hyperplasia with urinary hesitancy    Other orders  -     Bladder Scan       Doing well post TURP; discussed expected postoperative course including worsening urinary urgency; would anticipate resolution of this with time.  Currently voiding with low postvoid residual bladder scan; continue to monitor at subsequent visit  Encouraged to slowly increase activity as tolerated  Follow-up in 6 weeks or earlier as needed; repeat bladder scan  All questions addressed    Signed:  Penelope Fox  MD  07/21/21  10:26 EDT

## 2021-08-31 RX ORDER — ATORVASTATIN CALCIUM 20 MG/1
TABLET, FILM COATED ORAL
Qty: 30 TABLET | Refills: 6 | Status: SHIPPED | OUTPATIENT
Start: 2021-08-31

## 2021-08-31 NOTE — TELEPHONE ENCOUNTER
There is no mention of this medication in the last note on 06/30/2021, but it was last filled by [facility] in December 2020 for 30 day supply with 6 refills. Next fu is scheduled for 02/28/2022. Refill request to last until f/u sent to [facility] for review.

## 2021-09-21 ENCOUNTER — OFFICE VISIT (OUTPATIENT)
Dept: UROLOGY | Facility: CLINIC | Age: 66
End: 2021-09-21

## 2021-09-21 VITALS — WEIGHT: 214.8 LBS | HEIGHT: 69 IN | RESPIRATION RATE: 18 BRPM | BODY MASS INDEX: 31.81 KG/M2

## 2021-09-21 DIAGNOSIS — N39.41 URGE INCONTINENCE OF URINE: ICD-10-CM

## 2021-09-21 DIAGNOSIS — R39.11 BENIGN PROSTATIC HYPERPLASIA WITH URINARY HESITANCY: Primary | ICD-10-CM

## 2021-09-21 DIAGNOSIS — N40.1 BENIGN PROSTATIC HYPERPLASIA WITH URINARY HESITANCY: Primary | ICD-10-CM

## 2021-09-21 LAB — SPECIMEN VOL SMN: NORMAL ML

## 2021-09-21 PROCEDURE — 51798 US URINE CAPACITY MEASURE: CPT | Performed by: UROLOGY

## 2021-09-21 PROCEDURE — 99024 POSTOP FOLLOW-UP VISIT: CPT | Performed by: UROLOGY

## 2021-09-21 RX ORDER — TAMSULOSIN HYDROCHLORIDE 0.4 MG/1
0.4 CAPSULE ORAL DAILY
Qty: 90 CAPSULE | Refills: 3 | Status: SHIPPED | OUTPATIENT
Start: 2021-09-21 | End: 2021-12-13 | Stop reason: SDUPTHER

## 2021-09-21 NOTE — PROGRESS NOTES
"    UROLOGY OFFICE FOLLOW-UP NOTE    Subjective   HPI  Davonte Marshall is a 66 y.o. male with history of gross hematuria; had cystoscopy at last appointment which indicated prostatomegaly and some friable bladder mucosa.  Patient on Plavix.  Presents for routine follow-up.  Discussed at last appointment that given patient's significant tobacco history, should patient have recurrence of gross hematuria, formal work-up with bladder biopsy in OR should be performed.  Reports gross hematuria when he had UTI; denies other hematuria since last visit.   Patient states that he had a \"really bad\" UTI since last visit.  Sought care and was placed on antibiotic course by [facility].  Unfortunately, those records are unavailable for review at today's appointment.  Patient feeling much better today.  Remains bothered by significantly weakened stream; takes at x20 minutes to void also notes significant sensation of incomplete emptying.  Continues to take Flomax and finasteride.  Takes Plavix and aspirin due to history of stroke.    Update 7/21/2021: Patient presents for postop follow-up after TURP, 7/12/2021.  Patient was discharged home without catheter after passing void trial.  Reports doing well after TURP; better stream; significant improvement of hesitancy.  Some increase in baseline urgency.  Denies incontinence.  Generally pleased.  Denies significant hematuria.      Update 9/21/2021: Patient presents for routine follow-up, PVR check.  Patient states he is doing very well.  \"Peeing like a fire hose.\"  Has some baseline urgency but decreased urge incontinence since last visit.  Overall very pleased with result after TURP.  PVR today: 82 cc  Continues to take Flomax; wonders if he can come off of it.  ______________  TURP, prostate pathology, 7/12/2021: Benign prostatic hyperplasia    Urodynamic testing, 12/10/2020: During filling, increased bladder sensation, low bladder capacity due to bladder instability; " "evidence of strong involuntary bladder contraction causing urge urinary incontinence.   During voiding, very strong detrusor contraction with no evidence abdominal muscle recruitment to assist with voiding.  Weak flow.  PVR 15 cc.  EMG normal.  Detrusor instability secondary to urinary outlet obstruction; urge urinary incontinence        Medical History:  Past Medical History:   Diagnosis Date   • Anemia    • Anxiety    • Arthritis    • Benign essential hypertension    • Chronic bronchitis (CMS/HCC)    • Depression    • Enlarged prostate    • Floaters in visual field    • Gall stones    • Generalized weakness    • Head injury    • Hemorrhoids    • Hyperlipidemia    • Leg pain    • Numbness in both hands 7/6/2015   • Seizures (CMS/HCC)     last \"quite a while ago\"   • Stroke (CMS/HCC) 11/2020    left sided weakness        Social History:  Social History     Socioeconomic History   • Marital status:      Spouse name: Not on file   • Number of children: Not on file   • Years of education: Not on file   • Highest education level: Not on file   Tobacco Use   • Smoking status: Current Every Day Smoker     Packs/day: 0.50     Years: 45.00     Pack years: 22.50     Types: Cigarettes   • Smokeless tobacco: Never Used   • Tobacco comment: last this morning 4am   Vaping Use   • Vaping Use: Never used   Substance and Sexual Activity   • Alcohol use: Never   • Drug use: Never   • Sexual activity: Defer        Family History:  Family History   Problem Relation Age of Onset   • Bleeding Disorder Mother    • Stroke Father    • Heart disease Father    • Heart disease Other    • Malig Hyperthermia Neg Hx         Surgical History:  Past Surgical History:   Procedure Laterality Date   • APPENDECTOMY     • BACK SURGERY      4   • CHOLECYSTECTOMY     • CYSTOSCOPY Bilateral     7/12/21   • CYSTOSCOPY TRANSURETHRAL RESECTION OF PROSTATE N/A 7/12/2021    Procedure: CYSTOSCOPY TRANSURETHRAL RESECTION OF PROSTATE;  Surgeon: Ruddy" "MD Penelope;  Location: [facility] MAIN OR;  Service: Urology;  Laterality: N/A;   • HAND SURGERY Bilateral     thumb   • LYMPH NODE DISSECTION      jaw line   • TURP / TRANSURETHRAL INCISION / DRAINAGE PROSTATE  07/12/2021        Allergies:  Allergies   Allergen Reactions   • Codeine Arrhythmia     Chest pain   • Guaifenesin-Codeine Arrhythmia     Chest pain   • Cefdinir Unknown - Low Severity        Current Medications:  Current Outpatient Medications   Medication Sig Dispense Refill   • aspirin 81 MG chewable tablet Chew 1 tablet Daily. Per Dr. Arryoo pt instructed to stop 3 days prior to procedure     • atorvastatin (LIPITOR) 20 MG tablet TAKE 1 TABLET BY MOUTH ONCE DAILY 30 tablet 6   • docusate sodium (COLACE) 100 MG capsule Take 2 capsules by mouth 2 (Two) Times a Day As Needed for Constipation. 60 capsule 0   • DULoxetine (CYMBALTA) 60 MG capsule Take 60 mg by mouth 2 (Two) Times a Day.     • finasteride (PROSCAR) 5 MG tablet Take 5 mg by mouth Every Night.     • HYDROcodone-acetaminophen (NORCO)  MG per tablet Take 1 tablet by mouth Every 8 (Eight) Hours As Needed.     • hydrOXYzine (ATARAX) 25 MG tablet Take 25 mg by mouth Every Night.     • hydrOXYzine (ATARAX) 25 MG tablet Take 25 mg by mouth.     • losartan-hydrochlorothiazide (HYZAAR) 100-12.5 MG per tablet Take 1 tablet by mouth Daily.     • Morphine (AVINza) 60 MG 24 hr capsule Take 60 mg by mouth 2 (two) times a day.     • primidone (MYSOLINE) 250 MG tablet 1 tablet 3 times a day (Patient taking differently: Take 250 mg by mouth 3 (Three) Times a Day. 1 tablet 3 times a day) 270 tablet 3   • tamsulosin (FLOMAX) 0.4 MG capsule 24 hr capsule Take 1 capsule by mouth Daily. 90 capsule 3     No current facility-administered medications for this visit.       Review of systems  Constitutional: Denies fever chills  GI: Denies nausea, vomiting    Objective     Vital Signs:   Resp 18   Ht 175.3 cm (69\")   Wt 97.4 kg (214 lb 12.8 oz)   BMI 31.72 kg/m²   "     Physical exam  No acute distress, well-nourished  Awake alert and oriented  Mood normal; affect normal    Bladder Scan interpretation 07/21/2021    Estimation of residual urine via [employer] 3000 [employer] Bladder Scan  Residual Urine: 82 ml  Indication: Benign prostatic hyperplasia with urinary hesitancy    Urge incontinence of urine   Position: Supine  Examination: Incremental scanning of the suprapubic area using 2.0 MHz transducer using copious amounts of acoustic gel.   Findings: An anechoic area was demonstrated which represented the bladder, with measurement of residual urine as noted. I inspected this myself. In that the residual urine was stable or insignificant, refer to plan for treatment and plan necessary at this time.             Problem List:  Patient Active Problem List   Diagnosis   • Anemia   • Anxiety   • Arthritis   • Back pain   • Chronic bronchitis (CMS/HCC)   • Complication of surgical procedure   • Degeneration of lumbosacral intervertebral disc   • Depression   • Encounter for long-term (current) use of insulin (CMS/HCC)   • Essential tremor   • Gall stones   • Head injury   • Hemorrhoids   • Herniation of intervertebral disc   • Hyperlipidemia   • Leg pain   • Neck pain   • Renal insufficiency   • Scoliosis   • Seizure disorder (CMS/HCC)   • Smokes 1.5 packs of cigarettes per day   • Vitamin D deficiency disease   • Gross hematuria   • BPH without obstruction/lower urinary tract symptoms   • Urge incontinence of urine   • Benign prostatic hyperplasia with urinary hesitancy   • Sequelae, post-stroke   • S/P TURP   • Postoperative anemia   • Postoperative hypoxia   • Essential hypertension   • Opioid dependence (CMS/HCC)       Assessment/Plan   Diagnoses and all orders for this visit:    1. Benign prostatic hyperplasia with urinary hesitancy (Primary)  -     Bladder Scan  -     tamsulosin (FLOMAX) 0.4 MG capsule 24 hr capsule; Take 1 capsule by mouth Daily.  Dispense: 90 capsule; Refill: 3    2.  Urge incontinence of urine  -     Bladder Scan       Continues to do well after TURP; voiding with acceptable postvoid residual bladder scan.  Continue to monitor at subsequent visit.  Discussed that often patients are able to come off of BPH meds after TURP; encouraged trial off of Flomax if desired.  Discussed that should he have worsening of symptoms he may resume it; thus refill sent to pharmacy.    Plan for follow-up 1 year or earlier as needed  All question addressed     Signed:  Penelope Fox MD  09/21/21  11:40 EDT

## 2021-12-13 DIAGNOSIS — N40.1 BENIGN PROSTATIC HYPERPLASIA WITH URINARY HESITANCY: ICD-10-CM

## 2021-12-13 DIAGNOSIS — N40.1 BENIGN PROSTATIC HYPERPLASIA WITH URINARY HESITANCY: Primary | ICD-10-CM

## 2021-12-13 DIAGNOSIS — R39.11 BENIGN PROSTATIC HYPERPLASIA WITH URINARY HESITANCY: Primary | ICD-10-CM

## 2021-12-13 DIAGNOSIS — R39.11 BENIGN PROSTATIC HYPERPLASIA WITH URINARY HESITANCY: ICD-10-CM

## 2021-12-13 RX ORDER — TAMSULOSIN HYDROCHLORIDE 0.4 MG/1
0.4 CAPSULE ORAL DAILY
Qty: 90 CAPSULE | Refills: 3 | Status: SHIPPED | OUTPATIENT
Start: 2021-12-13 | End: 2023-01-04 | Stop reason: SDUPTHER

## 2021-12-14 RX ORDER — FINASTERIDE 5 MG/1
TABLET, FILM COATED ORAL
Qty: 90 TABLET | Refills: 3 | Status: SHIPPED | OUTPATIENT
Start: 2021-12-14

## 2022-02-28 ENCOUNTER — OFFICE VISIT (OUTPATIENT)
Dept: NEUROLOGY | Facility: CLINIC | Age: 67
End: 2022-02-28

## 2022-02-28 VITALS
WEIGHT: 215.5 LBS | HEART RATE: 103 BPM | SYSTOLIC BLOOD PRESSURE: 136 MMHG | BODY MASS INDEX: 31.92 KG/M2 | HEIGHT: 69 IN | DIASTOLIC BLOOD PRESSURE: 82 MMHG

## 2022-02-28 DIAGNOSIS — G25.0 ESSENTIAL TREMOR: Primary | ICD-10-CM

## 2022-02-28 DIAGNOSIS — I69.30 SEQUELAE, POST-STROKE: ICD-10-CM

## 2022-02-28 DIAGNOSIS — G40.909 SEIZURE DISORDER: ICD-10-CM

## 2022-02-28 PROCEDURE — 99214 OFFICE O/P EST MOD 30 MIN: CPT | Performed by: PSYCHIATRY & NEUROLOGY

## 2022-02-28 NOTE — ASSESSMENT & PLAN NOTE
He is to continue taking aspirin on a daily basis.  I discussed with him that he cannot take ibuprofen or any nonsteroidal anti-inflammatory agents since it will increase his risk for cardiovascular and cerebrovascular disease as well as renal failure.

## 2022-02-28 NOTE — ASSESSMENT & PLAN NOTE
He is to continue taking primidone 250 mg 3 times a day.  He has not had any recurrent seizures.  He is to get laboratory work-up from his primary care to get liver function testing as well as CBC.  I discussed with him that his primary care provider can follow him up however he wants to continue following up with me.  I will see him again in 1 years time for follow-up.

## 2022-02-28 NOTE — PROGRESS NOTES
"Chief Complaint  No chief complaint on file.    Subjective    31    Davonte Marshall is a 66 y.o. male who presents to [facility] NEUROLOGY & NEUROSURGERY  History of Present Illness  66-year-old man here for follow-up of his stroke, tremors, memory problems.  He is independent with activities of daily living.  His wife is with him today.  He continues to smoke.  He is taking primidone 250 mg 3 times a day.  He has not had any recurrent seizures.  He is taking aspirin on a daily basis.  He is off Plavix.  His wife states that he does not have significant memory problems since I took him off Dilantin.    He has not had no falls.  He is not using a walking device.    He states that he has back pain and is was bothering him more than anything else.  He is seeing pain management.  They are giving him hydrocodone as well as morphine.  He wants to know if he can take ibuprofen.    Objective   Vital Signs:   /82   Pulse 103   Ht 175.3 cm (69\")   Wt 97.8 kg (215 lb 8 oz)   BMI 31.82 kg/m²     Physical Exam   He is alert, fluent, phasic, follows commands well.  Is fully oriented to time and place.  He is able to tell me about world events.  He does not know the president [place].  He can carry on conversation without difficulty.  His wife tells me that she spoils him.  There is no tremor noted with hands extended and finger-to-nose testing.  Heart is regular rhythm normal in rate.  Station gait is unremarkable        Assessment and Plan  Diagnoses and all orders for this visit:    1. Essential tremor (Primary)  Assessment & Plan:  His essential tremor is controlled on primidone.      2. Seizure disorder (HCC)  Assessment & Plan:  He is to continue taking primidone 250 mg 3 times a day.  He has not had any recurrent seizures.  He is to get laboratory work-up from his primary care to get liver function testing as well as CBC.  I discussed with him that his primary care provider can follow him " up however he wants to continue following up with me.  I will see him again in 1 years time for follow-up.      3. Sequelae, post-stroke  Assessment & Plan:  He is to continue taking aspirin on a daily basis.  I discussed with him that he cannot take ibuprofen or any nonsteroidal anti-inflammatory agents since it will increase his risk for cardiovascular and cerebrovascular disease as well as renal failure.         Total time spent with the patient and coordinating patient care was 31 minutes.    Follow Up  No follow-ups on file.  Patient was given instructions and counseling regarding his condition or for health maintenance advice. Please see specific information pulled into the AVS if appropriate.

## 2022-11-15 DIAGNOSIS — N40.1 BENIGN PROSTATIC HYPERPLASIA WITH URINARY HESITANCY: ICD-10-CM

## 2022-11-15 DIAGNOSIS — R39.11 BENIGN PROSTATIC HYPERPLASIA WITH URINARY HESITANCY: ICD-10-CM

## 2022-11-15 RX ORDER — FINASTERIDE 5 MG/1
TABLET, FILM COATED ORAL
Qty: 90 TABLET | Refills: 3 | OUTPATIENT
Start: 2022-11-15

## 2022-11-28 DIAGNOSIS — N40.1 BENIGN PROSTATIC HYPERPLASIA WITH URINARY HESITANCY: ICD-10-CM

## 2022-11-28 DIAGNOSIS — R39.11 BENIGN PROSTATIC HYPERPLASIA WITH URINARY HESITANCY: ICD-10-CM

## 2022-11-28 RX ORDER — TAMSULOSIN HYDROCHLORIDE 0.4 MG/1
CAPSULE ORAL
Qty: 180 CAPSULE | OUTPATIENT
Start: 2022-11-28

## 2022-12-14 ENCOUNTER — TELEPHONE (OUTPATIENT)
Dept: NEUROLOGY | Facility: CLINIC | Age: 67
End: 2022-12-14

## 2022-12-14 RX ORDER — PRIMIDONE 250 MG/1
TABLET ORAL
Qty: 270 TABLET | Refills: 0 | Status: SHIPPED | OUTPATIENT
Start: 2022-12-14 | End: 2023-03-27 | Stop reason: SDUPTHER

## 2022-12-14 NOTE — TELEPHONE ENCOUNTER
PATIENT SPOUSE CALLING TO ADVISE PATIENT WENT TO PHARMACY TO  PRIMIDONE 250 MG TABLET AND WAS ADVISED BY PHARMACY THAT HE HAD NO REFILLS.    REQUESTING A REFILL ORDER BE SENT TO PHARMACY    [facility] - [address] - [phone]  - [phone]      PATIENT IS OUT OF MEDICATION.

## 2023-01-04 DIAGNOSIS — N40.1 BENIGN PROSTATIC HYPERPLASIA WITH URINARY HESITANCY: ICD-10-CM

## 2023-01-04 DIAGNOSIS — R39.11 BENIGN PROSTATIC HYPERPLASIA WITH URINARY HESITANCY: ICD-10-CM

## 2023-01-04 RX ORDER — TAMSULOSIN HYDROCHLORIDE 0.4 MG/1
0.4 CAPSULE ORAL DAILY
Qty: 90 CAPSULE | Refills: 3 | Status: SHIPPED | OUTPATIENT
Start: 2023-01-04

## 2023-01-04 NOTE — TELEPHONE ENCOUNTER
Caller:MIKEY TSE    Relationship: SPOUSE  Best call back number: [phone]    Requested Prescriptions:   Requested Prescriptions     Pending Prescriptions Disp Refills   • tamsulosin (FLOMAX) 0.4 MG capsule 24 hr capsule 90 capsule 3     Sig: Take 1 capsule by mouth Daily.        Pharmacy where request should be sent:  [facility] [address] [facility] - [phone]  - [phone]   [phone]     Additional details provided by patient: PT HAS ENOUGH UNTIL TOMORROW. DIDN'T REALIZE THEY WERE OUT OF REFILLS    Does the patient have less than a 3 day supply:  [x] Yes  [] No    Would you like a call back once the refill request has been completed: [x] Yes [] No    If the office needs to give you a call back, can they leave a voicemail: [] Yes [x] No    Danica Tyson Rep   01/04/23 11:50 EST

## 2023-03-16 ENCOUNTER — OFFICE VISIT (OUTPATIENT)
Dept: NEUROLOGY | Facility: CLINIC | Age: 68
End: 2023-03-16
Payer: MEDICARE

## 2023-03-16 VITALS
DIASTOLIC BLOOD PRESSURE: 69 MMHG | WEIGHT: 227 LBS | SYSTOLIC BLOOD PRESSURE: 126 MMHG | BODY MASS INDEX: 33.62 KG/M2 | HEART RATE: 95 BPM | HEIGHT: 69 IN

## 2023-03-16 DIAGNOSIS — G25.0 ESSENTIAL TREMOR: ICD-10-CM

## 2023-03-16 DIAGNOSIS — G24.3 CERVICAL DYSTONIA: Primary | ICD-10-CM

## 2023-03-16 PROCEDURE — 99214 OFFICE O/P EST MOD 30 MIN: CPT | Performed by: PSYCHIATRY & NEUROLOGY

## 2023-03-16 PROCEDURE — 3074F SYST BP LT 130 MM HG: CPT | Performed by: PSYCHIATRY & NEUROLOGY

## 2023-03-16 PROCEDURE — 3078F DIAST BP <80 MM HG: CPT | Performed by: PSYCHIATRY & NEUROLOGY

## 2023-03-16 RX ORDER — HYDROXYZINE 50 MG/1
50 TABLET, FILM COATED ORAL EVERY 8 HOURS PRN
COMMUNITY
Start: 2022-12-13

## 2023-03-16 NOTE — PROGRESS NOTES
"Chief Complaint  Follow-up (1 YEAR FOLLOW UP/)    Subjective          Davonte Marshall is a 67 y.o. male who presents to [facility] NEUROLOGY & NEUROSURGERY  History of Present Illness  67-year-old man here for follow-up of his tremor.  He is tremor is controlled on primidone 250 mg 3 times a day.  He is complaining of neck pain, shoulder pain and has had being contracted forward.  He states that has been ongoing for years.  He also has problems with walking.  He has hip pain.  He has chronic back pain.  He has been followed by pain management.  They are giving him hydrocodone as well as morphine.    Objective   Vital Signs:   /69 (BP Location: Left arm, Patient Position: Sitting, Cuff Size: Adult)   Pulse 95   Ht 175.3 cm (69.02\")   Wt 103 kg (227 lb)   BMI 33.51 kg/m²     Physical Exam   He is alert, fluent, phasic, follows commands well.  He has no tremor.  He has tightness in his trapezius bilaterally as well as anterocollis.  I have seen his in the past however I did not address it since it was not bothering him.  It is now bothering him significantly.  Station gait is able to ambulate decreased arm swing.  There is no facial bradykinesia.        Assessment and Plan  Diagnoses and all orders for this visit:    1. Cervical dystonia (Primary)  Assessment & Plan:  I will refer him to the [facility] movement disorder clinic to determine if he is a Botox candidate for his anterocollis and cervical dystonia.  He is agreeable to be referred to that facility.    Orders:  -     Ambulatory Referral to Neurology    2. Essential tremor  Assessment & Plan:  He is to continue taking primidone 250 mg 3 times a day.         Total time spent with the patient and coordinating patient care was 35 minutes.    Follow Up  No follow-ups on file.  Patient was given instructions and counseling regarding his condition or for health maintenance advice. Please see specific information pulled into " the AVS if appropriate.

## 2023-03-16 NOTE — ASSESSMENT & PLAN NOTE
I will refer him to the [facility] movement disorder clinic to determine if he is a Botox candidate for his anterocollis and cervical dystonia.  He is agreeable to be referred to that facility.

## 2023-03-27 RX ORDER — PRIMIDONE 250 MG/1
TABLET ORAL
Qty: 270 TABLET | Refills: 1 | Status: SHIPPED | OUTPATIENT
Start: 2023-03-27

## 2023-03-27 NOTE — TELEPHONE ENCOUNTER
Caller: MIKEY TSE    Relationship: Emergency Contact    Best call back number: [phone]    Requested Prescriptions:   Requested Prescriptions     Pending Prescriptions Disp Refills   • primidone (MYSOLINE) 250 MG tablet 270 tablet 0     Si tablet 3 times a day        Pharmacy where request should be sent: [facility] [address] - [phone]  - [phone] FX     Last office visit with prescribing clinician: 3/16/2023   Last telemedicine visit with prescribing clinician: 2023   Next office visit with prescribing clinician: 2023     Additional details provided by patient: PT SPOUSE IS REQUESTING A 6 MONTH TO A YEAR SUPPLY BE SENT OVER IF POSSIBLE     Does the patient have less than a 3 day supply:  [x] Yes  [] No    Would you like a call back once the refill request has been completed: [] Yes [x] No    If the office needs to give you a call back, can they leave a voicemail: [] Yes [x] No    Danica Champion Rep   23 14:29 EDT

## 2023-08-30 NOTE — TELEPHONE ENCOUNTER
Caller: MIKEY TSE    Relationship: Emergency Contact    Best call back number: [phone]        Requested Prescriptions:   Requested Prescriptions     Pending Prescriptions Disp Refills    primidone (MYSOLINE) 250 MG tablet 270 tablet 1     Si tablet 3 times a day        Pharmacy where request should be sent: [facility] [address]  - [phone]  - [phone] FX     Last office visit with prescribing clinician: 3/16/2023   Last telemedicine visit with prescribing clinician: Visit date not found   Next office visit with prescribing clinician: Visit date not found     Additional details provided by patient: PT CURRENTLY HAS ABOUT A WEEK AND A HALFS WORTH OF MEDICATION.     Does the patient have less than a 3 day supply:  [] Yes  [x] No    Would you like a call back once the refill request has been completed: [] Yes [x] No    If the office needs to give you a call back, can they leave a voicemail: [] Yes [x] No    Danica Washburn Rep   23 14:07 EDT

## 2023-08-31 RX ORDER — PRIMIDONE 250 MG/1
TABLET ORAL
Qty: 270 TABLET | Refills: 3 | Status: SHIPPED | OUTPATIENT
Start: 2023-08-31

## 2023-08-31 RX ORDER — PRIMIDONE 250 MG/1
TABLET ORAL
Qty: 270 TABLET | Refills: 1 | Status: SHIPPED | OUTPATIENT
Start: 2023-08-31 | End: 2023-08-31 | Stop reason: SDUPTHER

## 2023-10-31 DIAGNOSIS — R39.11 BENIGN PROSTATIC HYPERPLASIA WITH URINARY HESITANCY: ICD-10-CM

## 2023-10-31 DIAGNOSIS — N40.1 BENIGN PROSTATIC HYPERPLASIA WITH URINARY HESITANCY: ICD-10-CM

## 2023-11-01 RX ORDER — TAMSULOSIN HYDROCHLORIDE 0.4 MG/1
1 CAPSULE ORAL DAILY
Qty: 90 CAPSULE | Refills: 3 | OUTPATIENT
Start: 2023-11-01

## 2023-11-02 ENCOUNTER — TELEPHONE (OUTPATIENT)
Dept: UROLOGY | Facility: CLINIC | Age: 68
End: 2023-11-02
Payer: MEDICARE

## 2024-01-17 ENCOUNTER — TELEPHONE (OUTPATIENT)
Dept: UROLOGY | Facility: CLINIC | Age: 69
End: 2024-01-17

## 2024-01-17 NOTE — TELEPHONE ENCOUNTER
CALLED PT TO OFFER R/S OF CX'D APPT FOR MED REFILL 1/17 W/ JACIEL    SPOKE W/ PATIENT WHO STATED HE NO LONGER NEEDS MEDICATION AND DECLINED TO R/S.    ANYTHING ELSE TO DO?

## 2024-03-27 ENCOUNTER — APPOINTMENT (OUTPATIENT)
Dept: CT IMAGING | Facility: HOSPITAL | Age: 69
DRG: 871 | End: 2024-03-27
Payer: MEDICARE

## 2024-03-27 ENCOUNTER — APPOINTMENT (OUTPATIENT)
Dept: GENERAL RADIOLOGY | Facility: HOSPITAL | Age: 69
DRG: 871 | End: 2024-03-27
Payer: MEDICARE

## 2024-03-27 ENCOUNTER — APPOINTMENT (OUTPATIENT)
Dept: CARDIOLOGY | Facility: HOSPITAL | Age: 69
DRG: 871 | End: 2024-03-27
Payer: MEDICARE

## 2024-03-27 ENCOUNTER — HOSPITAL ENCOUNTER (INPATIENT)
Facility: HOSPITAL | Age: 69
LOS: 20 days | Discharge: NURSING, REHAB OR LONG-TERM CARE | DRG: 871 | End: 2024-04-16
Attending: EMERGENCY MEDICINE | Admitting: STUDENT IN AN ORGANIZED HEALTH CARE EDUCATION/TRAINING PROGRAM
Payer: MEDICARE

## 2024-03-27 DIAGNOSIS — R13.12 OROPHARYNGEAL DYSPHAGIA: ICD-10-CM

## 2024-03-27 DIAGNOSIS — M79.605 PAIN IN BOTH LOWER EXTREMITIES: ICD-10-CM

## 2024-03-27 DIAGNOSIS — G93.41 METABOLIC ENCEPHALOPATHY: ICD-10-CM

## 2024-03-27 DIAGNOSIS — I21.4 NSTEMI (NON-ST ELEVATED MYOCARDIAL INFARCTION): ICD-10-CM

## 2024-03-27 DIAGNOSIS — M51.37 DEGENERATION OF LUMBOSACRAL INTERVERTEBRAL DISC: ICD-10-CM

## 2024-03-27 DIAGNOSIS — G89.29 CHRONIC BACK PAIN, UNSPECIFIED BACK LOCATION, UNSPECIFIED BACK PAIN LATERALITY: ICD-10-CM

## 2024-03-27 DIAGNOSIS — J96.01 ACUTE RESPIRATORY FAILURE WITH HYPOXIA: Primary | ICD-10-CM

## 2024-03-27 DIAGNOSIS — N17.9 AKI (ACUTE KIDNEY INJURY): ICD-10-CM

## 2024-03-27 DIAGNOSIS — Z74.09 IMPAIRED MOBILITY AND ADLS: ICD-10-CM

## 2024-03-27 DIAGNOSIS — G93.41 ACUTE METABOLIC ENCEPHALOPATHY: ICD-10-CM

## 2024-03-27 DIAGNOSIS — M79.604 PAIN IN BOTH LOWER EXTREMITIES: ICD-10-CM

## 2024-03-27 DIAGNOSIS — M54.9 CHRONIC BACK PAIN, UNSPECIFIED BACK LOCATION, UNSPECIFIED BACK PAIN LATERALITY: ICD-10-CM

## 2024-03-27 DIAGNOSIS — Z78.9 IMPAIRED MOBILITY AND ADLS: ICD-10-CM

## 2024-03-27 DIAGNOSIS — R26.2 DIFFICULTY IN WALKING: ICD-10-CM

## 2024-03-27 DIAGNOSIS — M54.2 NECK PAIN: ICD-10-CM

## 2024-03-27 DIAGNOSIS — I69.30 SEQUELAE, POST-STROKE: ICD-10-CM

## 2024-03-27 LAB
ACB CMPLX DNA BAL NAA+NON-PRB-NCNCRNG: NOT DETECTED
ALBUMIN SERPL-MCNC: 3.4 G/DL (ref 3.5–5.2)
ALBUMIN SERPL-MCNC: 4.3 G/DL (ref 3.5–5.2)
ALBUMIN/GLOB SERPL: 0.9 G/DL
ALBUMIN/GLOB SERPL: 1.1 G/DL
ALP SERPL-CCNC: 132 U/L (ref 39–117)
ALP SERPL-CCNC: 153 U/L (ref 39–117)
ALT SERPL W P-5'-P-CCNC: 36 U/L (ref 1–41)
ALT SERPL W P-5'-P-CCNC: 37 U/L (ref 1–41)
AMMONIA BLD-SCNC: 52 UMOL/L (ref 16–60)
AMPHET+METHAMPHET UR QL: NEGATIVE
ANION GAP SERPL CALCULATED.3IONS-SCNC: 20.7 MMOL/L (ref 5–15)
ANION GAP SERPL CALCULATED.3IONS-SCNC: 25.4 MMOL/L (ref 5–15)
APAP SERPL-MCNC: <5 MCG/ML (ref 0–30)
APTT PPP: 137.6 SECONDS (ref 78–95.9)
APTT PPP: 30.1 SECONDS (ref 78–95.9)
APTT PPP: 63.7 SECONDS (ref 78–95.9)
ARTERIAL PATENCY WRIST A: POSITIVE
AST SERPL-CCNC: 68 U/L (ref 1–40)
AST SERPL-CCNC: 75 U/L (ref 1–40)
BACTERIA UR QL AUTO: ABNORMAL /HPF
BARBITURATES UR QL SCN: POSITIVE
BASE EXCESS BLDA CALC-SCNC: -10.8 MMOL/L (ref -2–2)
BASE EXCESS BLDA CALC-SCNC: -14.7 MMOL/L (ref -2–2)
BASE EXCESS BLDA CALC-SCNC: -8.4 MMOL/L (ref -2–2)
BASOPHILS # BLD AUTO: 0.16 10*3/MM3 (ref 0–0.2)
BASOPHILS NFR BLD AUTO: 0.5 % (ref 0–1.5)
BDY SITE: ABNORMAL
BENZODIAZ UR QL SCN: POSITIVE
BH CV ECHO MEAS - EDV(MOD-SP2): 52.2 ML
BH CV ECHO MEAS - EDV(MOD-SP4): 52.6 ML
BH CV ECHO MEAS - EF(MOD-BP): 33.3 %
BH CV ECHO MEAS - EF(MOD-SP2): 37.4 %
BH CV ECHO MEAS - EF(MOD-SP4): 27 %
BH CV ECHO MEAS - ESV(MOD-SP2): 32.7 ML
BH CV ECHO MEAS - ESV(MOD-SP4): 38.4 ML
BH CV ECHO MEAS - SV(MOD-SP2): 19.5 ML
BH CV ECHO MEAS - SV(MOD-SP4): 14.2 ML
BILIRUB SERPL-MCNC: 0.4 MG/DL (ref 0–1.2)
BILIRUB SERPL-MCNC: 0.4 MG/DL (ref 0–1.2)
BILIRUB UR QL STRIP: ABNORMAL
BLACTX-M ISLT/SPM QL: NOT DETECTED
BLAIMP ISLT/SPM QL: NOT DETECTED
BLAKPC ISLT/SPM QL: NOT DETECTED
BLAOXA-48-LIKE ISLT/SPM QL: NOT DETECTED
BLAVIM ISLT/SPM QL: NOT DETECTED
BUN SERPL-MCNC: 29 MG/DL (ref 8–23)
BUN SERPL-MCNC: 31 MG/DL (ref 8–23)
BUN/CREAT SERPL: 11.8 (ref 7–25)
BUN/CREAT SERPL: 11.8 (ref 7–25)
C PNEUM DNA NPH QL NAA+NON-PROBE: NOT DETECTED
CA-I BLDA-SCNC: 1.09 MMOL/L (ref 1.13–1.32)
CA-I BLDA-SCNC: 1.12 MMOL/L (ref 1.13–1.32)
CA-I BLDA-SCNC: 1.13 MMOL/L (ref 1.13–1.32)
CALCIUM SPEC-SCNC: 8.2 MG/DL (ref 8.6–10.5)
CALCIUM SPEC-SCNC: 9.1 MG/DL (ref 8.6–10.5)
CANNABINOIDS SERPL QL: NEGATIVE
CHLORIDE BLDA-SCNC: 102 MMOL/L (ref 98–106)
CHLORIDE BLDA-SCNC: 104 MMOL/L (ref 98–106)
CHLORIDE BLDA-SCNC: 99 MMOL/L (ref 98–106)
CHLORIDE SERPL-SCNC: 100 MMOL/L (ref 98–107)
CHLORIDE SERPL-SCNC: 96 MMOL/L (ref 98–107)
CK SERPL-CCNC: 621 U/L (ref 20–200)
CLARITY UR: CLEAR
CLUMPED PLATELETS: PRESENT
CO2 SERPL-SCNC: 13.6 MMOL/L (ref 22–29)
CO2 SERPL-SCNC: 20.3 MMOL/L (ref 22–29)
COCAINE UR QL: NEGATIVE
COHGB MFR BLD: 0.4 % (ref 0–1.5)
COHGB MFR BLD: 0.9 % (ref 0–1.5)
COHGB MFR BLD: 2.1 % (ref 0–1.5)
COLOR UR: ABNORMAL
CREAT SERPL-MCNC: 2.46 MG/DL (ref 0.76–1.27)
CREAT SERPL-MCNC: 2.62 MG/DL (ref 0.76–1.27)
D-LACTATE SERPL-SCNC: 3.5 MMOL/L (ref 0.5–2)
D-LACTATE SERPL-SCNC: 5.5 MMOL/L (ref 0.5–2)
D-LACTATE SERPL-SCNC: 7.4 MMOL/L (ref 0.5–2)
D-LACTATE SERPL-SCNC: 8.5 MMOL/L (ref 0.5–2)
D-LACTATE SERPL-SCNC: 9.8 MMOL/L (ref 0.5–2)
DEPRECATED RDW RBC AUTO: 45.5 FL (ref 37–54)
DEPRECATED RDW RBC AUTO: 47.4 FL (ref 37–54)
E CLOAC COMP DNA BAL NAA+NON-PRB-NCNCRNG: NOT DETECTED
E COLI DNA BAL NAA+NON-PRB-NCNCRNG: NOT DETECTED
EGFRCR SERPLBLD CKD-EPI 2021: 26.1 ML/MIN/1.73
EGFRCR SERPLBLD CKD-EPI 2021: 28.2 ML/MIN/1.73
EOSINOPHIL # BLD AUTO: 0 10*3/MM3 (ref 0–0.4)
EOSINOPHIL NFR BLD AUTO: 0 % (ref 0.3–6.2)
ERYTHROCYTE [DISTWIDTH] IN BLOOD BY AUTOMATED COUNT: 13.7 % (ref 12.3–15.4)
ERYTHROCYTE [DISTWIDTH] IN BLOOD BY AUTOMATED COUNT: 13.8 % (ref 12.3–15.4)
ETHANOL BLD-MCNC: <10 MG/DL (ref 0–10)
ETHANOL UR QL: <0.01 %
FENTANYL UR-MCNC: NEGATIVE NG/ML
FHHB: 11.6 % (ref 0–5)
FHHB: 5.1 % (ref 0–5)
FHHB: 5.9 % (ref 0–5)
FLUAV SUBTYP SPEC NAA+PROBE: NOT DETECTED
FLUAV SUBTYP SPEC NAA+PROBE: NOT DETECTED
FLUBV RNA ISLT QL NAA+PROBE: NOT DETECTED
FLUBV RNA ISLT QL NAA+PROBE: NOT DETECTED
GAS FLOW AIRWAY: ABNORMAL L/MIN
GEN 5 2HR TROPONIN T REFLEX: 1109 NG/L
GLOBULIN UR ELPH-MCNC: 3.6 GM/DL
GLOBULIN UR ELPH-MCNC: 3.9 GM/DL
GLUCOSE BLDA-MCNC: 126 MG/DL (ref 70–99)
GLUCOSE BLDA-MCNC: 148 MG/DL (ref 70–99)
GLUCOSE BLDA-MCNC: 187 MG/DL (ref 70–99)
GLUCOSE BLDC GLUCOMTR-MCNC: 122 MG/DL (ref 70–99)
GLUCOSE SERPL-MCNC: 154 MG/DL (ref 65–99)
GLUCOSE SERPL-MCNC: 169 MG/DL (ref 65–99)
GLUCOSE UR STRIP-MCNC: NEGATIVE MG/DL
GP B STREP DNA BAL NAA+NON-PRB-NCNCRNG: NOT DETECTED
HADV DNA SPEC NAA+PROBE: NOT DETECTED
HAEM INFLU DNA BAL NAA+NON-PRB-NCNCRNG: NOT DETECTED
HCO3 BLDA-SCNC: 17.4 MMOL/L (ref 22–26)
HCO3 BLDA-SCNC: 17.6 MMOL/L (ref 22–26)
HCO3 BLDA-SCNC: 19.4 MMOL/L (ref 22–26)
HCOV RNA LOWER RESP QL NAA+NON-PROBE: NOT DETECTED
HCT VFR BLD AUTO: 47.4 % (ref 37.5–51)
HCT VFR BLD AUTO: 49 % (ref 37.5–51)
HGB BLD-MCNC: 14.8 G/DL (ref 13–17.7)
HGB BLD-MCNC: 16.4 G/DL (ref 13–17.7)
HGB BLDA-MCNC: 16.1 G/DL (ref 13.8–16.4)
HGB BLDA-MCNC: 17.1 G/DL (ref 13.8–16.4)
HGB BLDA-MCNC: 17.7 G/DL (ref 13.8–16.4)
HGB UR QL STRIP.AUTO: NEGATIVE
HMPV RNA NPH QL NAA+NON-PROBE: NOT DETECTED
HOLD SPECIMEN: NORMAL
HOLD SPECIMEN: NORMAL
HPIV RNA LOWER RESP QL NAA+NON-PROBE: NOT DETECTED
HYALINE CASTS UR QL AUTO: ABNORMAL /LPF
IMM GRANULOCYTES # BLD AUTO: 0.21 10*3/MM3 (ref 0–0.05)
IMM GRANULOCYTES NFR BLD AUTO: 0.7 % (ref 0–0.5)
INHALED O2 CONCENTRATION: 40 %
INHALED O2 CONCENTRATION: 50 %
INHALED O2 CONCENTRATION: 60 %
INR PPP: 1.15 (ref 0.86–1.15)
K AEROGENES DNA BAL NAA+NON-PRB-NCNCRNG: NOT DETECTED
K OXYTOCA DNA BAL NAA+NON-PRB-NCNCRNG: NOT DETECTED
K PNEU GRP DNA BAL NAA+NON-PRB-NCNCRNG: DETECTED
KETONES UR QL STRIP: NEGATIVE
L PNEUMO DNA LOWER RESP QL NAA+NON-PROBE: NOT DETECTED
L PNEUMO1 AG UR QL IA: NEGATIVE
LACTATE BLDA-SCNC: 4.16 MMOL/L (ref 0.5–2)
LACTATE BLDA-SCNC: 5.54 MMOL/L (ref 0.5–2)
LACTATE BLDA-SCNC: 7.35 MMOL/L (ref 0.5–2)
LARGE PLATELETS: ABNORMAL
LEUKOCYTE ESTERASE UR QL STRIP.AUTO: ABNORMAL
LYMPHOCYTES # BLD AUTO: 1.49 10*3/MM3 (ref 0.7–3.1)
LYMPHOCYTES # BLD MANUAL: 1.2 10*3/MM3 (ref 0.7–3.1)
LYMPHOCYTES NFR BLD AUTO: 5 % (ref 19.6–45.3)
LYMPHOCYTES NFR BLD MANUAL: 2 % (ref 5–12)
M CATARRHALIS DNA BAL NAA+NON-PRB-NCNCRNG: DETECTED
M PNEUMO IGG SER IA-ACNC: NOT DETECTED
MACROPHAGE FLUID: 3 %
MAGNESIUM SERPL-MCNC: 1.7 MG/DL (ref 1.6–2.4)
MCH RBC QN AUTO: 29.7 PG (ref 26.6–33)
MCH RBC QN AUTO: 30.1 PG (ref 26.6–33)
MCHC RBC AUTO-ENTMCNC: 31.2 G/DL (ref 31.5–35.7)
MCHC RBC AUTO-ENTMCNC: 33.5 G/DL (ref 31.5–35.7)
MCV RBC AUTO: 90.1 FL (ref 79–97)
MCV RBC AUTO: 95 FL (ref 79–97)
MECA+MECC ISLT/SPM QL: NOT DETECTED
METHADONE UR QL SCN: NEGATIVE
METHGB BLD QL: 0.2 % (ref 0–1.5)
METHGB BLD QL: 0.3 % (ref 0–1.5)
METHGB BLD QL: 0.3 % (ref 0–1.5)
MODALITY: ABNORMAL
MONOCYTES # BLD AUTO: 1.41 10*3/MM3 (ref 0.1–0.9)
MONOCYTES # BLD: 0.3 10*3/MM3 (ref 0.1–0.9)
MONOCYTES NFR BLD AUTO: 4.7 % (ref 5–12)
MRSA DNA SPEC QL NAA+PROBE: NORMAL
NDM GENE: NOT DETECTED
NEUTROPHILS # BLD AUTO: 13.55 10*3/MM3 (ref 1.7–7)
NEUTROPHILS NFR BLD AUTO: 26.63 10*3/MM3 (ref 1.7–7)
NEUTROPHILS NFR BLD AUTO: 89.1 % (ref 42.7–76)
NEUTROPHILS NFR BLD MANUAL: 58 % (ref 42.7–76)
NEUTROPHILS NFR FLD MANUAL: 97 %
NEUTS BAND NFR BLD MANUAL: 32 % (ref 0–5)
NITRITE UR QL STRIP: NEGATIVE
NOTE: ABNORMAL
NOTE: ABNORMAL
NRBC BLD AUTO-RTO: 0 /100 WBC (ref 0–0.2)
NT-PROBNP SERPL-MCNC: 2457 PG/ML (ref 0–900)
OPIATES UR QL: POSITIVE
OXYCODONE UR QL SCN: POSITIVE
OXYHGB MFR BLDV: 87.2 % (ref 94–99)
OXYHGB MFR BLDV: 92.6 % (ref 94–99)
OXYHGB MFR BLDV: 93.4 % (ref 94–99)
P AERUGINOSA DNA BAL NAA+NON-PRB-NCNCRNG: NOT DETECTED
PCO2 BLDA: 47.8 MM HG (ref 35–45)
PCO2 BLDA: 47.8 MM HG (ref 35–45)
PCO2 BLDA: 67.9 MM HG (ref 35–45)
PH BLDA: 7.03 PH UNITS (ref 7.35–7.45)
PH BLDA: 7.18 PH UNITS (ref 7.35–7.45)
PH BLDA: 7.23 PH UNITS (ref 7.35–7.45)
PH UR STRIP.AUTO: <=5 [PH] (ref 5–8)
PHOSPHATE SERPL-MCNC: 6.8 MG/DL (ref 2.5–4.5)
PLATELET # BLD AUTO: 266 10*3/MM3 (ref 140–450)
PLATELET # BLD AUTO: 333 10*3/MM3 (ref 140–450)
PMV BLD AUTO: 9.2 FL (ref 6–12)
PMV BLD AUTO: 9.3 FL (ref 6–12)
PO2 BLD: 138 MM[HG] (ref 0–500)
PO2 BLD: 142 MM[HG] (ref 0–500)
PO2 BLD: 195 MM[HG] (ref 0–500)
PO2 BLDA: 71.1 MM HG (ref 80–100)
PO2 BLDA: 78 MM HG (ref 80–100)
PO2 BLDA: 82.9 MM HG (ref 80–100)
POTASSIUM BLDA-SCNC: 3.52 MMOL/L (ref 3.5–5)
POTASSIUM BLDA-SCNC: 4.15 MMOL/L (ref 3.5–5)
POTASSIUM BLDA-SCNC: 4.48 MMOL/L (ref 3.5–5)
POTASSIUM SERPL-SCNC: 3.9 MMOL/L (ref 3.5–5.2)
POTASSIUM SERPL-SCNC: 4.6 MMOL/L (ref 3.5–5.2)
PROCALCITONIN SERPL-MCNC: 0.87 NG/ML (ref 0–0.25)
PROT SERPL-MCNC: 7 G/DL (ref 6–8.5)
PROT SERPL-MCNC: 8.2 G/DL (ref 6–8.5)
PROT UR QL STRIP: NEGATIVE
PROTEUS SP DNA BAL NAA+NON-PRB-NCNCRNG: NOT DETECTED
PROTHROMBIN TIME: 14.9 SECONDS (ref 11.8–14.9)
RBC # BLD AUTO: 4.99 10*6/MM3 (ref 4.14–5.8)
RBC # BLD AUTO: 5.44 10*6/MM3 (ref 4.14–5.8)
RBC # UR STRIP: ABNORMAL /HPF
RBC MORPH BLD: NORMAL
REF LAB TEST METHOD: ABNORMAL
RHINOVIRUS RNA SPEC NAA+PROBE: NOT DETECTED
RSV RNA NPH QL NAA+NON-PROBE: NOT DETECTED
RSV RNA NPH QL NAA+NON-PROBE: NOT DETECTED
S AUREUS DNA BAL NAA+NON-PRB-NCNCRNG: DETECTED
S MARCESCENS DNA BAL NAA+NON-PRB-NCNCRNG: NOT DETECTED
S PNEUM AG SPEC QL LA: POSITIVE
S PNEUM DNA BAL NAA+NON-PRB-NCNCRNG: DETECTED
S PYO DNA BAL NAA+NON-PRB-NCNCRNG: NOT DETECTED
SALICYLATES SERPL-MCNC: <0.3 MG/DL
SAO2 % BLDCOA: 88.3 % (ref 95–99)
SAO2 % BLDCOA: 94.1 % (ref 95–99)
SAO2 % BLDCOA: 94.8 % (ref 95–99)
SARS-COV-2 RNA RESP QL NAA+PROBE: NOT DETECTED
SMALL PLATELETS BLD QL SMEAR: ADEQUATE
SODIUM BLDA-SCNC: 138.9 MMOL/L (ref 136–146)
SODIUM BLDA-SCNC: 139.1 MMOL/L (ref 136–146)
SODIUM BLDA-SCNC: 141.4 MMOL/L (ref 136–146)
SODIUM SERPL-SCNC: 137 MMOL/L (ref 136–145)
SODIUM SERPL-SCNC: 139 MMOL/L (ref 136–145)
SP GR UR STRIP: 1.02 (ref 1–1.03)
SQUAMOUS #/AREA URNS HPF: ABNORMAL /HPF
TROPONIN T DELTA: 394 NG/L
TROPONIN T SERPL HS-MCNC: 715 NG/L
UROBILINOGEN UR QL STRIP: ABNORMAL
VARIANT LYMPHS NFR BLD MANUAL: 8 % (ref 19.6–45.3)
VISUAL PRESENCE OF BLOOD: NORMAL
WBC # UR STRIP: ABNORMAL /HPF
WBC MORPH BLD: NORMAL
WBC NRBC COR # BLD AUTO: 15.05 10*3/MM3 (ref 3.4–10.8)
WBC NRBC COR # BLD AUTO: 29.9 10*3/MM3 (ref 3.4–10.8)
WHOLE BLOOD HOLD COAG: NORMAL
WHOLE BLOOD HOLD SPECIMEN: NORMAL

## 2024-03-27 PROCEDURE — 85730 THROMBOPLASTIN TIME PARTIAL: CPT | Performed by: EMERGENCY MEDICINE

## 2024-03-27 PROCEDURE — 80307 DRUG TEST PRSMV CHEM ANLYZR: CPT | Performed by: EMERGENCY MEDICINE

## 2024-03-27 PROCEDURE — 87070 CULTURE OTHR SPECIMN AEROBIC: CPT | Performed by: INTERNAL MEDICINE

## 2024-03-27 PROCEDURE — 87633 RESP VIRUS 12-25 TARGETS: CPT | Performed by: INTERNAL MEDICINE

## 2024-03-27 PROCEDURE — 31500 INSERT EMERGENCY AIRWAY: CPT

## 2024-03-27 PROCEDURE — 89051 BODY FLUID CELL COUNT: CPT | Performed by: INTERNAL MEDICINE

## 2024-03-27 PROCEDURE — 93308 TTE F-UP OR LMTD: CPT | Performed by: INTERNAL MEDICINE

## 2024-03-27 PROCEDURE — 71045 X-RAY EXAM CHEST 1 VIEW: CPT

## 2024-03-27 PROCEDURE — 0B9F8ZX DRAINAGE OF RIGHT LOWER LUNG LOBE, VIA NATURAL OR ARTIFICIAL OPENING ENDOSCOPIC, DIAGNOSTIC: ICD-10-PCS | Performed by: INTERNAL MEDICINE

## 2024-03-27 PROCEDURE — 80179 DRUG ASSAY SALICYLATE: CPT | Performed by: EMERGENCY MEDICINE

## 2024-03-27 PROCEDURE — 82375 ASSAY CARBOXYHB QUANT: CPT | Performed by: NURSE PRACTITIONER

## 2024-03-27 PROCEDURE — 25010000002 VANCOMYCIN 5 G RECONSTITUTED SOLUTION: Performed by: EMERGENCY MEDICINE

## 2024-03-27 PROCEDURE — 93005 ELECTROCARDIOGRAM TRACING: CPT | Performed by: EMERGENCY MEDICINE

## 2024-03-27 PROCEDURE — 51798 US URINE CAPACITY MEASURE: CPT

## 2024-03-27 PROCEDURE — 99291 CRITICAL CARE FIRST HOUR: CPT | Performed by: INTERNAL MEDICINE

## 2024-03-27 PROCEDURE — 0BH17EZ INSERTION OF ENDOTRACHEAL AIRWAY INTO TRACHEA, VIA NATURAL OR ARTIFICIAL OPENING: ICD-10-PCS | Performed by: INTERNAL MEDICINE

## 2024-03-27 PROCEDURE — 94799 UNLISTED PULMONARY SVC/PX: CPT

## 2024-03-27 PROCEDURE — 25810000003 LACTATED RINGERS PER 1000 ML: Performed by: NURSE PRACTITIONER

## 2024-03-27 PROCEDURE — 80053 COMPREHEN METABOLIC PANEL: CPT | Performed by: EMERGENCY MEDICINE

## 2024-03-27 PROCEDURE — 25010000002 AMPICILLIN-SULBACTAM PER 1.5 G: Performed by: INTERNAL MEDICINE

## 2024-03-27 PROCEDURE — 83050 HGB METHEMOGLOBIN QUAN: CPT | Performed by: STUDENT IN AN ORGANIZED HEALTH CARE EDUCATION/TRAINING PROGRAM

## 2024-03-27 PROCEDURE — 82375 ASSAY CARBOXYHB QUANT: CPT | Performed by: EMERGENCY MEDICINE

## 2024-03-27 PROCEDURE — 25810000003 LACTATED RINGERS SOLUTION: Performed by: NURSE PRACTITIONER

## 2024-03-27 PROCEDURE — 82805 BLOOD GASES W/O2 SATURATION: CPT | Performed by: NURSE PRACTITIONER

## 2024-03-27 PROCEDURE — 99223 1ST HOSP IP/OBS HIGH 75: CPT | Performed by: STUDENT IN AN ORGANIZED HEALTH CARE EDUCATION/TRAINING PROGRAM

## 2024-03-27 PROCEDURE — 0BC78ZZ EXTIRPATION OF MATTER FROM LEFT MAIN BRONCHUS, VIA NATURAL OR ARTIFICIAL OPENING ENDOSCOPIC: ICD-10-PCS | Performed by: INTERNAL MEDICINE

## 2024-03-27 PROCEDURE — 87641 MR-STAPH DNA AMP PROBE: CPT | Performed by: NURSE PRACTITIONER

## 2024-03-27 PROCEDURE — 25810000003 SODIUM CHLORIDE 0.9 % SOLUTION: Performed by: EMERGENCY MEDICINE

## 2024-03-27 PROCEDURE — 84100 ASSAY OF PHOSPHORUS: CPT | Performed by: INTERNAL MEDICINE

## 2024-03-27 PROCEDURE — 83050 HGB METHEMOGLOBIN QUAN: CPT | Performed by: EMERGENCY MEDICINE

## 2024-03-27 PROCEDURE — 82140 ASSAY OF AMMONIA: CPT | Performed by: EMERGENCY MEDICINE

## 2024-03-27 PROCEDURE — 87637 SARSCOV2&INF A&B&RSV AMP PRB: CPT | Performed by: EMERGENCY MEDICINE

## 2024-03-27 PROCEDURE — 25010000002 METHYLPREDNISOLONE PER 40 MG: Performed by: INTERNAL MEDICINE

## 2024-03-27 PROCEDURE — 82077 ASSAY SPEC XCP UR&BREATH IA: CPT | Performed by: EMERGENCY MEDICINE

## 2024-03-27 PROCEDURE — 84484 ASSAY OF TROPONIN QUANT: CPT | Performed by: STUDENT IN AN ORGANIZED HEALTH CARE EDUCATION/TRAINING PROGRAM

## 2024-03-27 PROCEDURE — P9612 CATHETERIZE FOR URINE SPEC: HCPCS

## 2024-03-27 PROCEDURE — 82805 BLOOD GASES W/O2 SATURATION: CPT | Performed by: EMERGENCY MEDICINE

## 2024-03-27 PROCEDURE — 94660 CPAP INITIATION&MGMT: CPT

## 2024-03-27 PROCEDURE — 99291 CRITICAL CARE FIRST HOUR: CPT

## 2024-03-27 PROCEDURE — 99285 EMERGENCY DEPT VISIT HI MDM: CPT

## 2024-03-27 PROCEDURE — 82375 ASSAY CARBOXYHB QUANT: CPT | Performed by: STUDENT IN AN ORGANIZED HEALTH CARE EDUCATION/TRAINING PROGRAM

## 2024-03-27 PROCEDURE — 87040 BLOOD CULTURE FOR BACTERIA: CPT | Performed by: EMERGENCY MEDICINE

## 2024-03-27 PROCEDURE — 93010 ELECTROCARDIOGRAM REPORT: CPT | Performed by: INTERNAL MEDICINE

## 2024-03-27 PROCEDURE — 85025 COMPLETE CBC W/AUTO DIFF WBC: CPT | Performed by: EMERGENCY MEDICINE

## 2024-03-27 PROCEDURE — 25010000002 CEFEPIME PER 500 MG: Performed by: EMERGENCY MEDICINE

## 2024-03-27 PROCEDURE — 25010000002 AMPICILLIN-SULBACTAM PER 1.5 G: Performed by: NURSE PRACTITIONER

## 2024-03-27 PROCEDURE — 99222 1ST HOSP IP/OBS MODERATE 55: CPT | Performed by: INTERNAL MEDICINE

## 2024-03-27 PROCEDURE — 87449 NOS EACH ORGANISM AG IA: CPT | Performed by: NURSE PRACTITIONER

## 2024-03-27 PROCEDURE — 25010000002 PROPOFOL 10 MG/ML EMULSION: Performed by: EMERGENCY MEDICINE

## 2024-03-27 PROCEDURE — 94640 AIRWAY INHALATION TREATMENT: CPT

## 2024-03-27 PROCEDURE — 99292 CRITICAL CARE ADDL 30 MIN: CPT | Performed by: INTERNAL MEDICINE

## 2024-03-27 PROCEDURE — 51702 INSERT TEMP BLADDER CATH: CPT

## 2024-03-27 PROCEDURE — 5A1945Z RESPIRATORY VENTILATION, 24-96 CONSECUTIVE HOURS: ICD-10-PCS | Performed by: INTERNAL MEDICINE

## 2024-03-27 PROCEDURE — 80143 DRUG ASSAY ACETAMINOPHEN: CPT | Performed by: EMERGENCY MEDICINE

## 2024-03-27 PROCEDURE — 87077 CULTURE AEROBIC IDENTIFY: CPT | Performed by: INTERNAL MEDICINE

## 2024-03-27 PROCEDURE — 25010000002 NALOXONE HCL 2 MG/2ML SOLUTION PREFILLED SYRINGE: Performed by: EMERGENCY MEDICINE

## 2024-03-27 PROCEDURE — 36600 WITHDRAWAL OF ARTERIAL BLOOD: CPT | Performed by: STUDENT IN AN ORGANIZED HEALTH CARE EDUCATION/TRAINING PROGRAM

## 2024-03-27 PROCEDURE — 25010000002 PROPOFOL 10 MG/ML EMULSION: Performed by: STUDENT IN AN ORGANIZED HEALTH CARE EDUCATION/TRAINING PROGRAM

## 2024-03-27 PROCEDURE — 80053 COMPREHEN METABOLIC PANEL: CPT | Performed by: INTERNAL MEDICINE

## 2024-03-27 PROCEDURE — 87205 SMEAR GRAM STAIN: CPT | Performed by: INTERNAL MEDICINE

## 2024-03-27 PROCEDURE — 25010000002 HEPARIN (PORCINE) 25000-0.45 UT/250ML-% SOLUTION: Performed by: EMERGENCY MEDICINE

## 2024-03-27 PROCEDURE — 87899 AGENT NOS ASSAY W/OPTIC: CPT | Performed by: NURSE PRACTITIONER

## 2024-03-27 PROCEDURE — 85610 PROTHROMBIN TIME: CPT | Performed by: EMERGENCY MEDICINE

## 2024-03-27 PROCEDURE — 83605 ASSAY OF LACTIC ACID: CPT | Performed by: EMERGENCY MEDICINE

## 2024-03-27 PROCEDURE — 85007 BL SMEAR W/DIFF WBC COUNT: CPT | Performed by: STUDENT IN AN ORGANIZED HEALTH CARE EDUCATION/TRAINING PROGRAM

## 2024-03-27 PROCEDURE — 31624 DX BRONCHOSCOPE/LAVAGE: CPT | Performed by: INTERNAL MEDICINE

## 2024-03-27 PROCEDURE — 94761 N-INVAS EAR/PLS OXIMETRY MLT: CPT

## 2024-03-27 PROCEDURE — 83050 HGB METHEMOGLOBIN QUAN: CPT | Performed by: NURSE PRACTITIONER

## 2024-03-27 PROCEDURE — 25010000002 SULFUR HEXAFLUORIDE MICROSPH 60.7-25 MG RECONSTITUTED SUSPENSION: Performed by: INTERNAL MEDICINE

## 2024-03-27 PROCEDURE — 84484 ASSAY OF TROPONIN QUANT: CPT | Performed by: EMERGENCY MEDICINE

## 2024-03-27 PROCEDURE — 82550 ASSAY OF CK (CPK): CPT | Performed by: EMERGENCY MEDICINE

## 2024-03-27 PROCEDURE — 0BC38ZZ EXTIRPATION OF MATTER FROM RIGHT MAIN BRONCHUS, VIA NATURAL OR ARTIFICIAL OPENING ENDOSCOPIC: ICD-10-PCS | Performed by: INTERNAL MEDICINE

## 2024-03-27 PROCEDURE — 70450 CT HEAD/BRAIN W/O DYE: CPT

## 2024-03-27 PROCEDURE — 81001 URINALYSIS AUTO W/SCOPE: CPT | Performed by: EMERGENCY MEDICINE

## 2024-03-27 PROCEDURE — 82805 BLOOD GASES W/O2 SATURATION: CPT | Performed by: STUDENT IN AN ORGANIZED HEALTH CARE EDUCATION/TRAINING PROGRAM

## 2024-03-27 PROCEDURE — 94002 VENT MGMT INPAT INIT DAY: CPT

## 2024-03-27 PROCEDURE — 31645 BRNCHSC W/THER ASPIR 1ST: CPT | Performed by: INTERNAL MEDICINE

## 2024-03-27 PROCEDURE — 36415 COLL VENOUS BLD VENIPUNCTURE: CPT | Performed by: EMERGENCY MEDICINE

## 2024-03-27 PROCEDURE — 31500 INSERT EMERGENCY AIRWAY: CPT | Performed by: STUDENT IN AN ORGANIZED HEALTH CARE EDUCATION/TRAINING PROGRAM

## 2024-03-27 PROCEDURE — 93308 TTE F-UP OR LMTD: CPT

## 2024-03-27 PROCEDURE — 36600 WITHDRAWAL OF ARTERIAL BLOOD: CPT | Performed by: EMERGENCY MEDICINE

## 2024-03-27 PROCEDURE — 87071 CULTURE AEROBIC QUANT OTHER: CPT | Performed by: INTERNAL MEDICINE

## 2024-03-27 PROCEDURE — 84145 PROCALCITONIN (PCT): CPT | Performed by: STUDENT IN AN ORGANIZED HEALTH CARE EDUCATION/TRAINING PROGRAM

## 2024-03-27 PROCEDURE — 94664 DEMO&/EVAL PT USE INHALER: CPT

## 2024-03-27 PROCEDURE — 36600 WITHDRAWAL OF ARTERIAL BLOOD: CPT | Performed by: NURSE PRACTITIONER

## 2024-03-27 PROCEDURE — 85025 COMPLETE CBC W/AUTO DIFF WBC: CPT | Performed by: STUDENT IN AN ORGANIZED HEALTH CARE EDUCATION/TRAINING PROGRAM

## 2024-03-27 PROCEDURE — 82948 REAGENT STRIP/BLOOD GLUCOSE: CPT

## 2024-03-27 PROCEDURE — 83880 ASSAY OF NATRIURETIC PEPTIDE: CPT | Performed by: EMERGENCY MEDICINE

## 2024-03-27 PROCEDURE — 85730 THROMBOPLASTIN TIME PARTIAL: CPT | Performed by: INTERNAL MEDICINE

## 2024-03-27 PROCEDURE — 87186 SC STD MICRODIL/AGAR DIL: CPT | Performed by: INTERNAL MEDICINE

## 2024-03-27 PROCEDURE — 83735 ASSAY OF MAGNESIUM: CPT | Performed by: INTERNAL MEDICINE

## 2024-03-27 RX ORDER — PROCHLORPERAZINE EDISYLATE 5 MG/ML
5 INJECTION INTRAMUSCULAR; INTRAVENOUS EVERY 6 HOURS PRN
Status: DISCONTINUED | OUTPATIENT
Start: 2024-03-27 | End: 2024-04-16 | Stop reason: HOSPADM

## 2024-03-27 RX ORDER — ACETAMINOPHEN 325 MG/1
650 TABLET ORAL EVERY 6 HOURS PRN
Status: DISCONTINUED | OUTPATIENT
Start: 2024-03-27 | End: 2024-04-06

## 2024-03-27 RX ORDER — ASPIRIN 81 MG/1
81 TABLET, CHEWABLE ORAL DAILY
Status: DISCONTINUED | OUTPATIENT
Start: 2024-03-27 | End: 2024-04-12

## 2024-03-27 RX ORDER — ACETAMINOPHEN 325 MG/1
650 TABLET ORAL EVERY 6 HOURS PRN
Status: DISCONTINUED | OUTPATIENT
Start: 2024-03-27 | End: 2024-03-27

## 2024-03-27 RX ORDER — LIDOCAINE 4 G/G
1 PATCH TOPICAL DAILY PRN
Status: DISCONTINUED | OUTPATIENT
Start: 2024-03-27 | End: 2024-04-06

## 2024-03-27 RX ORDER — CHOLECALCIFEROL (VITAMIN D3) 125 MCG
5 CAPSULE ORAL NIGHTLY PRN
Status: DISCONTINUED | OUTPATIENT
Start: 2024-03-27 | End: 2024-03-27

## 2024-03-27 RX ORDER — ATORVASTATIN CALCIUM 40 MG/1
40 TABLET, FILM COATED ORAL NIGHTLY
Status: DISCONTINUED | OUTPATIENT
Start: 2024-03-27 | End: 2024-03-27

## 2024-03-27 RX ORDER — BISACODYL 5 MG/1
5 TABLET, DELAYED RELEASE ORAL DAILY PRN
Status: DISCONTINUED | OUTPATIENT
Start: 2024-03-27 | End: 2024-04-02

## 2024-03-27 RX ORDER — ASPIRIN 81 MG/1
81 TABLET, CHEWABLE ORAL DAILY
Status: DISCONTINUED | OUTPATIENT
Start: 2024-03-27 | End: 2024-03-27

## 2024-03-27 RX ORDER — LABETALOL HYDROCHLORIDE 5 MG/ML
10 INJECTION, SOLUTION INTRAVENOUS EVERY 4 HOURS PRN
Status: DISCONTINUED | OUTPATIENT
Start: 2024-03-27 | End: 2024-03-31

## 2024-03-27 RX ORDER — FAMOTIDINE 10 MG/ML
20 INJECTION, SOLUTION INTRAVENOUS DAILY
Status: DISCONTINUED | OUTPATIENT
Start: 2024-03-27 | End: 2024-04-06

## 2024-03-27 RX ORDER — HEPARIN SODIUM 10000 [USP'U]/100ML
12 INJECTION, SOLUTION INTRAVENOUS
Status: DISCONTINUED | OUTPATIENT
Start: 2024-03-27 | End: 2024-03-30

## 2024-03-27 RX ORDER — HYDROXYZINE HYDROCHLORIDE 25 MG/1
25 TABLET, FILM COATED ORAL 3 TIMES DAILY PRN
Status: DISCONTINUED | OUTPATIENT
Start: 2024-03-27 | End: 2024-03-27

## 2024-03-27 RX ORDER — BENZONATATE 200 MG/1
200 CAPSULE ORAL 3 TIMES DAILY PRN
COMMUNITY

## 2024-03-27 RX ORDER — ATORVASTATIN CALCIUM 40 MG/1
20 TABLET, FILM COATED ORAL NIGHTLY
Status: DISCONTINUED | OUTPATIENT
Start: 2024-03-27 | End: 2024-03-28

## 2024-03-27 RX ORDER — SODIUM CHLORIDE 0.9 % (FLUSH) 0.9 %
10 SYRINGE (ML) INJECTION AS NEEDED
Status: DISCONTINUED | OUTPATIENT
Start: 2024-03-27 | End: 2024-04-16 | Stop reason: HOSPADM

## 2024-03-27 RX ORDER — ONDANSETRON 2 MG/ML
4 INJECTION INTRAMUSCULAR; INTRAVENOUS EVERY 4 HOURS PRN
Status: DISCONTINUED | OUTPATIENT
Start: 2024-03-27 | End: 2024-04-16 | Stop reason: HOSPADM

## 2024-03-27 RX ORDER — NOREPINEPHRINE BITARTRATE 0.03 MG/ML
.02-.3 INJECTION, SOLUTION INTRAVENOUS
Status: DISCONTINUED | OUTPATIENT
Start: 2024-03-27 | End: 2024-03-29

## 2024-03-27 RX ORDER — CHOLECALCIFEROL (VITAMIN D3) 125 MCG
5 CAPSULE ORAL NIGHTLY PRN
Status: DISCONTINUED | OUTPATIENT
Start: 2024-03-27 | End: 2024-04-13

## 2024-03-27 RX ORDER — FENTANYL CITRATE-0.9 % NACL/PF 10 MCG/ML
50-300 PLASTIC BAG, INJECTION (ML) INTRAVENOUS
Status: DISCONTINUED | OUTPATIENT
Start: 2024-03-27 | End: 2024-03-29

## 2024-03-27 RX ORDER — AMOXICILLIN 250 MG
2 CAPSULE ORAL 2 TIMES DAILY
Status: DISCONTINUED | OUTPATIENT
Start: 2024-03-27 | End: 2024-03-30

## 2024-03-27 RX ORDER — ETOMIDATE 2 MG/ML
INJECTION INTRAVENOUS
Status: COMPLETED | OUTPATIENT
Start: 2024-03-27 | End: 2024-03-27

## 2024-03-27 RX ORDER — CHLORHEXIDINE GLUCONATE 0.12 MG/ML
15 RINSE ORAL EVERY 12 HOURS SCHEDULED
Status: DISCONTINUED | OUTPATIENT
Start: 2024-03-27 | End: 2024-04-04

## 2024-03-27 RX ORDER — HYDROXYZINE HYDROCHLORIDE 25 MG/1
25 TABLET, FILM COATED ORAL 3 TIMES DAILY PRN
Status: DISCONTINUED | OUTPATIENT
Start: 2024-03-27 | End: 2024-04-12

## 2024-03-27 RX ORDER — BISACODYL 10 MG
10 SUPPOSITORY, RECTAL RECTAL DAILY PRN
Status: DISCONTINUED | OUTPATIENT
Start: 2024-03-27 | End: 2024-03-27

## 2024-03-27 RX ORDER — NICOTINE 21 MG/24HR
1 PATCH, TRANSDERMAL 24 HOURS TRANSDERMAL DAILY PRN
Status: DISCONTINUED | OUTPATIENT
Start: 2024-03-27 | End: 2024-04-06

## 2024-03-27 RX ORDER — PRIMIDONE 250 MG/1
250 TABLET ORAL 3 TIMES DAILY
Status: DISCONTINUED | OUTPATIENT
Start: 2024-03-27 | End: 2024-04-12

## 2024-03-27 RX ORDER — DULOXETIN HYDROCHLORIDE 30 MG/1
60 CAPSULE, DELAYED RELEASE ORAL 2 TIMES DAILY
Status: DISCONTINUED | OUTPATIENT
Start: 2024-03-27 | End: 2024-03-31

## 2024-03-27 RX ORDER — AMOXICILLIN 250 MG
2 CAPSULE ORAL 2 TIMES DAILY
Status: DISCONTINUED | OUTPATIENT
Start: 2024-03-27 | End: 2024-03-27

## 2024-03-27 RX ORDER — ALUMINA, MAGNESIA, AND SIMETHICONE 2400; 2400; 240 MG/30ML; MG/30ML; MG/30ML
15 SUSPENSION ORAL EVERY 6 HOURS PRN
Status: DISCONTINUED | OUTPATIENT
Start: 2024-03-27 | End: 2024-03-27

## 2024-03-27 RX ORDER — HYDROCHLOROTHIAZIDE 12.5 MG/1
12.5 TABLET ORAL
Status: DISCONTINUED | OUTPATIENT
Start: 2024-03-27 | End: 2024-04-07

## 2024-03-27 RX ORDER — METHYLPREDNISOLONE SODIUM SUCCINATE 40 MG/ML
40 INJECTION, POWDER, LYOPHILIZED, FOR SOLUTION INTRAMUSCULAR; INTRAVENOUS EVERY 8 HOURS
Status: DISCONTINUED | OUTPATIENT
Start: 2024-03-27 | End: 2024-03-30

## 2024-03-27 RX ORDER — BISACODYL 10 MG
10 SUPPOSITORY, RECTAL RECTAL DAILY PRN
Status: DISCONTINUED | OUTPATIENT
Start: 2024-03-27 | End: 2024-04-13

## 2024-03-27 RX ORDER — MORPHINE SULFATE 60 MG/1
60 TABLET, FILM COATED, EXTENDED RELEASE ORAL 2 TIMES DAILY
COMMUNITY
Start: 2024-02-21 | End: 2024-04-16 | Stop reason: HOSPADM

## 2024-03-27 RX ORDER — NOREPINEPHRINE BITARTRATE 0.03 MG/ML
INJECTION, SOLUTION INTRAVENOUS
Status: COMPLETED
Start: 2024-03-27 | End: 2024-03-27

## 2024-03-27 RX ORDER — LOSARTAN POTASSIUM 50 MG/1
100 TABLET ORAL
Status: DISCONTINUED | OUTPATIENT
Start: 2024-03-27 | End: 2024-04-07

## 2024-03-27 RX ORDER — NITROGLYCERIN 0.4 MG/1
0.4 TABLET SUBLINGUAL
Status: DISCONTINUED | OUTPATIENT
Start: 2024-03-27 | End: 2024-04-06

## 2024-03-27 RX ORDER — SODIUM CHLORIDE 9 MG/ML
40 INJECTION, SOLUTION INTRAVENOUS AS NEEDED
Status: DISCONTINUED | OUTPATIENT
Start: 2024-03-27 | End: 2024-04-16 | Stop reason: HOSPADM

## 2024-03-27 RX ORDER — BISACODYL 5 MG/1
5 TABLET, DELAYED RELEASE ORAL DAILY PRN
Status: DISCONTINUED | OUTPATIENT
Start: 2024-03-27 | End: 2024-03-27

## 2024-03-27 RX ORDER — ALUMINA, MAGNESIA, AND SIMETHICONE 2400; 2400; 240 MG/30ML; MG/30ML; MG/30ML
15 SUSPENSION ORAL EVERY 6 HOURS PRN
Status: DISCONTINUED | OUTPATIENT
Start: 2024-03-27 | End: 2024-03-30

## 2024-03-27 RX ORDER — POLYETHYLENE GLYCOL 3350 17 G/17G
17 POWDER, FOR SOLUTION ORAL DAILY PRN
Status: DISCONTINUED | OUTPATIENT
Start: 2024-03-27 | End: 2024-03-27

## 2024-03-27 RX ORDER — SODIUM CHLORIDE, SODIUM LACTATE, POTASSIUM CHLORIDE, CALCIUM CHLORIDE 600; 310; 30; 20 MG/100ML; MG/100ML; MG/100ML; MG/100ML
100 INJECTION, SOLUTION INTRAVENOUS CONTINUOUS
Status: DISCONTINUED | OUTPATIENT
Start: 2024-03-27 | End: 2024-03-29

## 2024-03-27 RX ORDER — IPRATROPIUM BROMIDE AND ALBUTEROL SULFATE 2.5; .5 MG/3ML; MG/3ML
3 SOLUTION RESPIRATORY (INHALATION)
Status: DISCONTINUED | OUTPATIENT
Start: 2024-03-27 | End: 2024-04-02

## 2024-03-27 RX ORDER — ROCURONIUM BROMIDE 10 MG/ML
INJECTION, SOLUTION INTRAVENOUS
Status: COMPLETED | OUTPATIENT
Start: 2024-03-27 | End: 2024-03-27

## 2024-03-27 RX ORDER — POLYETHYLENE GLYCOL 3350 17 G/17G
17 POWDER, FOR SOLUTION ORAL DAILY PRN
Status: DISCONTINUED | OUTPATIENT
Start: 2024-03-27 | End: 2024-04-13

## 2024-03-27 RX ORDER — NALOXONE HYDROCHLORIDE 1 MG/ML
2 INJECTION INTRAMUSCULAR; INTRAVENOUS; SUBCUTANEOUS ONCE
Status: COMPLETED | OUTPATIENT
Start: 2024-03-27 | End: 2024-03-27

## 2024-03-27 RX ORDER — SODIUM CHLORIDE 0.9 % (FLUSH) 0.9 %
10 SYRINGE (ML) INJECTION EVERY 12 HOURS SCHEDULED
Status: DISCONTINUED | OUTPATIENT
Start: 2024-03-27 | End: 2024-04-16 | Stop reason: HOSPADM

## 2024-03-27 RX ADMIN — SODIUM CHLORIDE 1000 ML: 9 INJECTION, SOLUTION INTRAVENOUS at 07:52

## 2024-03-27 RX ADMIN — PRIMIDONE 250 MG: 250 TABLET ORAL at 22:41

## 2024-03-27 RX ADMIN — ATORVASTATIN CALCIUM 20 MG: 40 TABLET, FILM COATED ORAL at 22:41

## 2024-03-27 RX ADMIN — PROPOFOL 25 MCG/KG/MIN: 10 INJECTION, EMULSION INTRAVENOUS at 15:20

## 2024-03-27 RX ADMIN — PRIMIDONE 250 MG: 250 TABLET ORAL at 15:10

## 2024-03-27 RX ADMIN — SODIUM CHLORIDE, POTASSIUM CHLORIDE, SODIUM LACTATE AND CALCIUM CHLORIDE 100 ML/HR: 600; 310; 30; 20 INJECTION, SOLUTION INTRAVENOUS at 11:00

## 2024-03-27 RX ADMIN — NALOXONE HYDROCHLORIDE 2 MG: 1 INJECTION PARENTERAL at 06:23

## 2024-03-27 RX ADMIN — Medication 10 ML: at 22:54

## 2024-03-27 RX ADMIN — ACETAMINOPHEN 650 MG: 325 TABLET ORAL at 18:03

## 2024-03-27 RX ADMIN — AMPICILLIN AND SULBACTAM 3 G: 2; 1 INJECTION, POWDER, FOR SOLUTION INTRAVENOUS at 22:41

## 2024-03-27 RX ADMIN — DOCUSATE SODIUM 50MG AND SENNOSIDES 8.6MG 2 TABLET: 8.6; 5 TABLET, FILM COATED ORAL at 22:41

## 2024-03-27 RX ADMIN — PRIMIDONE 250 MG: 250 TABLET ORAL at 10:46

## 2024-03-27 RX ADMIN — ROCURONIUM BROMIDE 100 MG: 10 INJECTION, SOLUTION INTRAVENOUS at 06:27

## 2024-03-27 RX ADMIN — Medication 10 ML: at 10:06

## 2024-03-27 RX ADMIN — IPRATROPIUM BROMIDE AND ALBUTEROL SULFATE 3 ML: .5; 3 SOLUTION RESPIRATORY (INHALATION) at 11:55

## 2024-03-27 RX ADMIN — IPRATROPIUM BROMIDE AND ALBUTEROL SULFATE 3 ML: .5; 3 SOLUTION RESPIRATORY (INHALATION) at 23:45

## 2024-03-27 RX ADMIN — IPRATROPIUM BROMIDE AND ALBUTEROL SULFATE 3 ML: .5; 3 SOLUTION RESPIRATORY (INHALATION) at 14:15

## 2024-03-27 RX ADMIN — SODIUM CHLORIDE, POTASSIUM CHLORIDE, SODIUM LACTATE AND CALCIUM CHLORIDE 1000 ML: 600; 310; 30; 20 INJECTION, SOLUTION INTRAVENOUS at 10:01

## 2024-03-27 RX ADMIN — ETOMIDATE 20 MG: 40 INJECTION, SOLUTION INTRAVENOUS at 06:25

## 2024-03-27 RX ADMIN — SODIUM CHLORIDE 1000 ML: 9 INJECTION, SOLUTION INTRAVENOUS at 05:50

## 2024-03-27 RX ADMIN — CEFEPIME 2000 MG: 2 INJECTION, POWDER, FOR SOLUTION INTRAVENOUS at 04:36

## 2024-03-27 RX ADMIN — NOREPINEPHRINE BITARTRATE 8 MG: 0.03 INJECTION, SOLUTION INTRAVENOUS at 10:06

## 2024-03-27 RX ADMIN — METHYLPREDNISOLONE SODIUM SUCCINATE 40 MG: 40 INJECTION INTRAMUSCULAR; INTRAVENOUS at 22:41

## 2024-03-27 RX ADMIN — NOREPINEPHRINE BITARTRATE 0.02 MCG/KG/MIN: 0.03 INJECTION, SOLUTION INTRAVENOUS at 15:10

## 2024-03-27 RX ADMIN — PROPOFOL 5 MCG/KG/MIN: 10 INJECTION, EMULSION INTRAVENOUS at 06:57

## 2024-03-27 RX ADMIN — ASPIRIN 81 MG 81 MG: 81 TABLET ORAL at 10:47

## 2024-03-27 RX ADMIN — DOCUSATE SODIUM 50MG AND SENNOSIDES 8.6MG 2 TABLET: 8.6; 5 TABLET, FILM COATED ORAL at 10:47

## 2024-03-27 RX ADMIN — CHLORHEXIDINE GLUCONATE 15 ML: 1.2 RINSE ORAL at 11:03

## 2024-03-27 RX ADMIN — AMPICILLIN AND SULBACTAM 3 G: 2; 1 INJECTION, POWDER, FOR SOLUTION INTRAVENOUS at 10:46

## 2024-03-27 RX ADMIN — PROPOFOL 25 MCG/KG/MIN: 10 INJECTION, EMULSION INTRAVENOUS at 10:04

## 2024-03-27 RX ADMIN — Medication 75 MCG/HR: at 11:01

## 2024-03-27 RX ADMIN — Medication 50 MCG/HR: at 06:58

## 2024-03-27 RX ADMIN — HEPARIN SODIUM 12 UNITS/KG/HR: 10000 INJECTION, SOLUTION INTRAVENOUS at 07:48

## 2024-03-27 RX ADMIN — Medication 75 MCG/HR: at 18:21

## 2024-03-27 RX ADMIN — AMPICILLIN AND SULBACTAM 3 G: 2; 1 INJECTION, POWDER, FOR SOLUTION INTRAVENOUS at 15:09

## 2024-03-27 RX ADMIN — IPRATROPIUM BROMIDE AND ALBUTEROL SULFATE 3 ML: .5; 3 SOLUTION RESPIRATORY (INHALATION) at 19:07

## 2024-03-27 RX ADMIN — PROPOFOL 25 MCG/KG/MIN: 10 INJECTION, EMULSION INTRAVENOUS at 22:54

## 2024-03-27 RX ADMIN — VANCOMYCIN HYDROCHLORIDE 1750 MG: 5 INJECTION, POWDER, LYOPHILIZED, FOR SOLUTION INTRAVENOUS at 05:21

## 2024-03-27 RX ADMIN — ACETAMINOPHEN 650 MG: 325 TABLET ORAL at 10:23

## 2024-03-27 RX ADMIN — SULFUR HEXAFLUORIDE 2 ML: KIT at 10:36

## 2024-03-27 RX ADMIN — METHYLPREDNISOLONE SODIUM SUCCINATE 40 MG: 40 INJECTION INTRAMUSCULAR; INTRAVENOUS at 12:03

## 2024-03-27 RX ADMIN — FAMOTIDINE 20 MG: 10 INJECTION INTRAVENOUS at 17:16

## 2024-03-27 NOTE — CONSULTS
03/27/24 1534   Coping/Psychosocial   Additional Documentation Spiritual Care (Group)   Spiritual Care   Use of Spiritual Resources spirituality for coping, indicated strong use of  (pt's  has called to check on him but will not be able to come to the hospital as the  just had surgery himself.)   Spiritual Care Source  initiative   Spiritual Care Follow-Up will follow closely   Response to Spiritual Care receptive of support   Spiritual Care Visit Type initial   Spiritual Care Request family support   Receptivity to Spiritual Care other (see comments)  (at time of visit pt is on vent support. introductions made to his family at bedside)

## 2024-03-27 NOTE — PROCEDURES
Procedure:  Bronchoscopy with clearance of airways, bronchoalveolar lavage     Pre-Operative Diagnosis: Septic shock, respiratory failure, concern for aspiration of food and airways     Post-Operative Diagnosis: Diffuse mucous plugging     Timeout performed     Anesthesia: Propofol and fentanyl     Procedure Details: The patient's family was consented for the procedure with all risk and benefit of the procedure explained in detail.  She was given the opportunity to ask questions and all concerns were answered. The bronchoscope was inserted into the main airway via the endotracheal tube. An anatomical survey was done of the main airways and the subsegmental bronchus.      Findings: Endotracheal tube is in adequate position 2 cm above the main rocío. The trachea was normal in caliber and had no mucosal abnormalities. The left tracheobronchial tree appeared anatomically normal with diffuse erythema, edema and friability. The right tracheobronchial tree appeared anatomically normal with diffuse erythema, edema and friability.  Mucous plugging was noted throughout entire left and right-sided tracheobronchial tree with thick viscous purulent secretions.  These were suctioned and removed.  A bronchoalveolar lavage was performed using 2 x 60 mL aliquots of normal saline  instilled into the right lower lobe bronchus then aspirated back. There was 35 mL turbid purulent fluid in return.       Findings:  Endotracheal tube in adequate position 2 cm above main rocío  Diffuse erythema, edema and friability of all airways  Diffuse mucous plugging throughout entire left and right side tracheobronchial trees with thick viscous purulent secretions     Estimated Blood Loss: 0mL     Specimens:  Bronchoalveolar lavage right lower lobe bronchus     Complications: None; patient tolerated the procedure well.     Disposition: Remains critically ill in the ICU on ventilator     Patient tolerated the procedure well.    Electronically signed  by Mikey Mahajan MD, 03/27/24, 11:28 AM EDT.

## 2024-03-27 NOTE — PROCEDURES
Endotracheal Intubation Procedure Note  Indication for endotracheal intubation: impending respiratory failure  Sedation: etomidate  Paralytic: rocuronium  Lidocaine: no  Atropine: no  Equipment: Acacia 3 laryngoscope blade and 8.0mm cuffed endotracheal tube  Cricoid Pressure: no  Number of attempts: 1  ETT location confirmed by by auscultation and by CXR.    Procedure performed by me, supervised by Dr. Martins

## 2024-03-27 NOTE — ED PROVIDER NOTES
"Time: 6:36 AM EDT  Date of encounter:  3/27/2024  Independent Historian/Clinical History and Information was obtained by:   Patient, Family, and EMS    History is limited by: Altered Mental Status    Chief Complaint: AMS and hypoxia      History of Present Illness:  Patient is a 68 y.o. year old male who presents to the emergency department for evaluation of Altered mental status and hypoxia.  Per report from patient's wife he ate breakfast yesterday and the rest today he was somnolent.  She states she could not wake him up.  She noticed he was having difficulty breathing.  She called EMS when they arrived O2 sats were in the 70s.  He was placed on CPAP.  On arrival he was switched to BiPAP.  History is limited at this time.      HPI    Patient Care Team  Primary Care Provider: Ivanna Low APRN    Past Medical History:     Allergies   Allergen Reactions    Codeine Arrhythmia     Chest pain    Guaifenesin-Codeine Arrhythmia     Chest pain    Cefdinir Unknown - Low Severity     Past Medical History:   Diagnosis Date    Anemia     Anxiety     Arthritis     Benign essential hypertension     Chronic bronchitis     Depression     Enlarged prostate     Floaters in visual field     Gall stones     Generalized weakness     Head injury     Hemorrhoids     Hyperlipidemia     Leg pain     Numbness in both hands 7/6/2015    Seizures     last \"quite a while ago\"    Stroke 11/2020    left sided weakness     Past Surgical History:   Procedure Laterality Date    APPENDECTOMY      BACK SURGERY      4    CHOLECYSTECTOMY      CYSTOSCOPY Bilateral     7/12/21    CYSTOSCOPY TRANSURETHRAL RESECTION OF PROSTATE N/A 7/12/2021    Procedure: CYSTOSCOPY TRANSURETHRAL RESECTION OF PROSTATE;  Surgeon: Penelope Fox MD;  Location: [facility] MAIN OR;  Service: Urology;  Laterality: N/A;    HAND SURGERY Bilateral     thumb    LYMPH NODE DISSECTION      jaw line    TURP / TRANSURETHRAL INCISION / DRAINAGE PROSTATE  07/12/2021     Family " History   Problem Relation Age of Onset    Bleeding Disorder Mother     Stroke Father     Heart disease Father     Heart disease Other     Malig Hyperthermia Neg Hx        Home Medications:  Prior to Admission medications    Medication Sig Start Date End Date Taking? Authorizing Provider   aspirin 81 MG chewable tablet Chew 1 tablet Daily. Per Dr. Fox pt instructed to stop 3 days prior to procedure 7/14/21   Penelope Fox MD   atorvastatin (LIPITOR) 20 MG tablet TAKE 1 TABLET BY MOUTH ONCE DAILY  Patient not taking: Reported on 3/16/2023 8/31/21   Glen Pantoja MD   docusate sodium (COLACE) 100 MG capsule Take 2 capsules by mouth 2 (Two) Times a Day As Needed for Constipation. 7/13/21   Penelope Fox MD   DULoxetine (CYMBALTA) 60 MG capsule Take 1 capsule by mouth 2 (Two) Times a Day. 3/29/21   Ava Urbina MD   finasteride (PROSCAR) 5 MG tablet TAKE 1 TABLET BY MOUTH ONCE DAILY 12/14/21   Penelope Fox MD   HYDROcodone-acetaminophen (NORCO)  MG per tablet Take 1 tablet by mouth Every 8 (Eight) Hours As Needed.    Ava Urbina MD   hydrOXYzine (ATARAX) 50 MG tablet Take 1 tablet by mouth Every 8 (Eight) Hours As Needed. 12/13/22   Ava Urbina MD   losartan-hydrochlorothiazide (HYZAAR) 100-12.5 MG per tablet Take 1 tablet by mouth Daily. 6/15/21   Ava Urbina MD   Morphine (AVINza) 60 MG 24 hr capsule Take 1 capsule by mouth 2 (two) times a day.    Ava Urbina MD   primidone (MYSOLINE) 250 MG tablet 1 tablet 3 times a day 8/31/23   Glen Pantoja MD   tamsulosin (FLOMAX) 0.4 MG capsule 24 hr capsule Take 1 capsule by mouth Daily.  Patient taking differently: Take 2 capsules by mouth Daily. 1/4/23   Penelope Fox MD        Social History:   Social History     Tobacco Use    Smoking status: Every Day     Current packs/day: 0.50     Average packs/day: 0.5 packs/day for 45.0 years (22.5 ttl pk-yrs)     Types: Cigarettes     "Smokeless tobacco: Never    Tobacco comments:     last this morning 4am   Vaping Use    Vaping status: Never Used   Substance Use Topics    Alcohol use: Never    Drug use: Never         Review of Systems:  Review of Systems   Unable to perform ROS: Mental status change        Physical Exam:  /86   Pulse (!) 121   Temp 99.1 °F (37.3 °C)   Resp 26   Ht 175.3 cm (69\")   Wt 92.1 kg (203 lb 0.7 oz)   SpO2 93%   BMI 29.98 kg/m²     Physical Exam  Constitutional:       Comments: somnolent   HENT:      Head: Normocephalic and atraumatic.      Right Ear: External ear normal.      Left Ear: External ear normal.      Mouth/Throat:      Mouth: Mucous membranes are dry.   Eyes:      Extraocular Movements: Extraocular movements intact.      Pupils: Pupils are equal, round, and reactive to light.   Cardiovascular:      Rate and Rhythm: Regular rhythm. Tachycardia present.      Pulses: Normal pulses.   Pulmonary:      Breath sounds: Wheezing and rhonchi present.   Abdominal:      General: Abdomen is flat. There is no distension.      Palpations: Abdomen is soft.      Tenderness: There is no abdominal tenderness.   Musculoskeletal:         General: Normal range of motion.      Cervical back: Normal range of motion.   Skin:     General: Skin is warm.      Capillary Refill: Capillary refill takes less than 2 seconds.   Neurological:      Comments: Neuro exam limited. Patient oriented to self and place. He is moving all extremities but not consistently following commands or answering questions                  Procedures:  Procedures      Medical Decision Making:      Comorbidities that affect care:    HLD, CVA, Hypertension    External Notes reviewed:    Previous Clinic Note: Patient seen by neurology on 3/16/2023 for cervical dystonia and essential tremor.      The following orders were placed and all results were independently analyzed by me:  Orders Placed This Encounter   Procedures    Blood Culture - Blood,    Blood " Culture - Blood,    COVID-19, FLU A/B, RSV PCR 1 HR TAT - Swab, Nasopharynx    Respiratory Culture - Sputum, ET Suction    XR Chest 1 View    CT Head Without Contrast    XR Chest 1 View    [place] Draw    Comprehensive Metabolic Panel    BNP    Single High Sensitivity Troponin T    CBC Auto Differential    Lactic Acid, Plasma    ABG with Co-Ox and Electrolytes    Urinalysis With Culture If Indicated - Urine, Clean Catch    Urine Drug Screen - Urine, Clean Catch    Ethanol    Acetaminophen Level    Salicylate Level    Ammonia    STAT Lactic Acid, Reflex    High Sensitivity Troponin T 2Hr    CK    Urinalysis, Microscopic Only - Urine, Clean Catch    Protime-INR    aPTT    aPTT    Procalcitonin    NPO Diet NPO Type: Strict NPO    Undress & Gown    Continuous Pulse Oximetry    Vital Signs    Straight Cath    Straight cath    Bladder scan    Notify Provider Platelet Count Less Than 66216    Notify Provider if PTT Not in Therapeutic Range After 24 Hours    Stop Infusion & Notify Provider if Bleeding Occurs    RN To Release aPTT Order 6 Hours After Heparin Bolus & 6 Hours After Any Heparin Rate Change    After 2 Consecutive Therapeutic aPTTs, Obtain aPTT Daily.  If a Rate Adjustment is Necessary, Resume Every 6 Hour aPTT Draws    Inpatient Hospitalist Consult    Oxygen Therapy- Nasal Cannula; Titrate 1-6 LPM Per SpO2; 90 - 95%    ECG 12 Lead ED Triage Standing Order; SOA    Insert Peripheral IV    Inpatient Admission    CBC & Differential    Green Top (Gel)    Lavender Top    Gold Top - SST    Light Blue Top    CBC & Differential       Medications Given in the Emergency Department:  Medications   sodium chloride 0.9 % flush 10 mL (has no administration in time range)   vancomycin 1750 mg/500 mL 0.9% NS IVPB ([facility]) (1,750 mg Intravenous New Bag 3/27/24 0521)   sodium chloride 0.9 % bolus 1,000 mL (has no administration in time range)   heparin bolus from bag solution 5,000 Units (has no administration in time range)    heparin 48047 units/250 mL (100 units/mL) in 0.45 % NaCl infusion (has no administration in time range)   heparin bolus from bag solution 4,000 Units (has no administration in time range)   heparin bolus from bag solution 2,000 Units (has no administration in time range)   etomidate (AMIDATE) injection (20 mg Intravenous Given 3/27/24 0625)   rocuronium (ZEMURON) injection (100 mg Intravenous Given 3/27/24 0627)   sennosides-docusate (PERICOLACE) 8.6-50 MG per tablet 2 tablet (has no administration in time range)     And   polyethylene glycol (MIRALAX) packet 17 g (has no administration in time range)     And   bisacodyl (DULCOLAX) EC tablet 5 mg (has no administration in time range)     And   bisacodyl (DULCOLAX) suppository 10 mg (has no administration in time range)   fentaNYL (SUBLIMAZE) bolus from bag 10 mcg/mL injection 50 mcg (has no administration in time range)   fentaNYL 1000 mcg in 100 mL NS infusion (has no administration in time range)   propofol (DIPRIVAN) infusion 10 mg/mL 100 mL (has no administration in time range)   cefepime (MAXIPIME) 2000 mg/100 mL 0.9% NS (mbp) (0 mg Intravenous Stopped 3/27/24 0524)   sodium chloride 0.9 % bolus 1,000 mL (1,000 mL Intravenous New Bag 3/27/24 0550)   Naloxone HCl (NARCAN) injection 2 mg (2 mg Intravenous Given 3/27/24 0623)        ED Course:    ED Course as of 03/27/24 0644   Wed Mar 27, 2024   0644 ECG 12 Lead ED Triage Standing Order; SOA  Sinus tachycardia with rate of 106.  No acute ST elevation.  Normal MO and QTc.  EKG interpreted by me [LD]      ED Course User Index  [LD] Faviola Martins MD       Labs:    Lab Results (last 24 hours)       Procedure Component Value Units Date/Time    ABG with Co-Ox and Electrolytes [[id number]]  (Abnormal) Collected: 03/27/24 0347    Specimen: Arterial Blood from Arm, Right Updated: 03/27/24 0350     pH, Arterial 7.226 pH units      pCO2, Arterial 47.8 mm Hg      pO2, Arterial 82.9 mm Hg      HCO3, Arterial 19.4  mmol/L      Base Excess, Arterial -8.4 mmol/L      O2 Saturation, Arterial 94.8 %      Hemoglobin, Blood Gas 17.7 g/dL      Carboxyhemoglobin 2.1 %      Methemoglobin 0.20 %      Oxyhemoglobin 92.6 %      FHHB 5.1 %      Dominic's Test Positive     Note 16/8     Site Arterial: right radial     Modality BiPap     FIO2 60 %      Flow Rate --     Sodium, Arterial 139.1 mmol/L      Potassium, Arterial 4.48 mmol/L      Ionized Calcium, Arterial 1.13 mmol/L      Chloride, Arterial 99 mmol/L      Glucose, Arterial 148 mg/dL      Lactate, Arterial 4.16 mmol/L      PO2/FIO2 138    CBC & Differential [[id number]]  (Abnormal) Collected: 03/27/24 0355    Specimen: Blood Updated: 03/27/24 0401    Narrative:      The following orders were created for panel order CBC & Differential.  Procedure                               Abnormality         Status                     ---------                               -----------         ------                     CBC Auto Differential[[id number]]        Abnormal            Final result                 Please view results for these tests on the individual orders.    Comprehensive Metabolic Panel [[id number]]  (Abnormal) Collected: 03/27/24 0355    Specimen: Blood Updated: 03/27/24 0420     Glucose 154 mg/dL      BUN 29 mg/dL      Creatinine 2.46 mg/dL      Sodium 137 mmol/L      Potassium 4.6 mmol/L      Chloride 96 mmol/L      CO2 20.3 mmol/L      Calcium 9.1 mg/dL      Total Protein 8.2 g/dL      Albumin 4.3 g/dL      ALT (SGPT) 37 U/L      AST (SGOT) 68 U/L      Alkaline Phosphatase 153 U/L      Total Bilirubin 0.4 mg/dL      Globulin 3.9 gm/dL      A/G Ratio 1.1 g/dL      BUN/Creatinine Ratio 11.8     Anion Gap 20.7 mmol/L      eGFR 28.2 mL/min/1.73     Narrative:      GFR Normal >60  Chronic Kidney Disease <60  Kidney Failure <15      BNP [[id number]]  (Abnormal) Collected: 03/27/24 0355    Specimen: Blood Updated: 03/27/24 0419     proBNP 2,457.0 pg/mL     Narrative:      This assay is used  as an aid in the diagnosis of individuals suspected of having heart failure. It can be used as an aid in the diagnosis of acute decompensated heart failure (ADHF) in patients presenting with signs and symptoms of ADHF to the emergency department (ED). In addition, NT-proBNP of <300 pg/mL indicates ADHF is not likely.    Age Range Result Interpretation  NT-proBNP Concentration (pg/mL:      <50             Positive            >450                   Gray                 300-450                    Negative             <300    50-75           Positive            >900                  Gray                300-900                  Negative            <300      >75             Positive            >1800                  Gray                300-1800                  Negative            <300    Single High Sensitivity Troponin T [[id number]]  (Abnormal) Collected: 03/27/24 0355    Specimen: Blood Updated: 03/27/24 0432     HS Troponin T 715 ng/L     Narrative:      High Sensitive Troponin T Reference Range:  <14.0 ng/L- Negative Female for AMI  <22.0 ng/L- Negative Male for AMI  >=14 - Abnormal Female indicating possible myocardial injury.  >=22 - Abnormal Male indicating possible myocardial injury.   Clinicians would have to utilize clinical acumen, EKG, Troponin, and serial changes to determine if it is an Acute Myocardial Infarction or myocardial injury due to an underlying chronic condition.         CBC Auto Differential [[id number]]  (Abnormal) Collected: 03/27/24 0355    Specimen: Blood Updated: 03/27/24 0401     WBC 29.90 10*3/mm3      RBC 5.44 10*6/mm3      Hemoglobin 16.4 g/dL      Hematocrit 49.0 %      MCV 90.1 fL      MCH 30.1 pg      MCHC 33.5 g/dL      RDW 13.8 %      RDW-SD 45.5 fl      MPV 9.2 fL      Platelets 333 10*3/mm3      Neutrophil % 89.1 %      Lymphocyte % 5.0 %      Monocyte % 4.7 %      Eosinophil % 0.0 %      Basophil % 0.5 %      Immature Grans % 0.7 %      Neutrophils, Absolute 26.63 10*3/mm3       Lymphocytes, Absolute 1.49 10*3/mm3      Monocytes, Absolute 1.41 10*3/mm3      Eosinophils, Absolute 0.00 10*3/mm3      Basophils, Absolute 0.16 10*3/mm3      Immature Grans, Absolute 0.21 10*3/mm3      nRBC 0.0 /100 WBC     Blood Culture - Blood, Arm, Right [[id number]] Collected: 03/27/24 0355    Specimen: Blood from Arm, Right Updated: 03/27/24 0357    Blood Culture - Blood, Arm, Left [[id number]] Collected: 03/27/24 0355    Specimen: Blood from Arm, Left Updated: 03/27/24 0357    Lactic Acid, Plasma [[id number]]  (Abnormal) Collected: 03/27/24 0355    Specimen: Blood Updated: 03/27/24 0432     Lactate 5.5 mmol/L     Ethanol [[id number]] Collected: 03/27/24 0355    Specimen: Blood Updated: 03/27/24 0432     Ethanol <10 mg/dL      Ethanol % <0.010 %     Narrative:      Ethanol (Plasma)  <10 Essentially Negative    Toxic Concentrations           mg/dL    Flushing, slowing of reflexes    Impaired visual activity         Depression of CNS              >100  Possible Coma                  >300       Acetaminophen Level [[id number]]  (Normal) Collected: 03/27/24 0355    Specimen: Blood Updated: 03/27/24 0432     Acetaminophen <5.0 mcg/mL     Salicylate Level [[id number]]  (Normal) Collected: 03/27/24 0355    Specimen: Blood Updated: 03/27/24 0432     Salicylate <0.3 mg/dL     CK [[id number]]  (Abnormal) Collected: 03/27/24 0355    Specimen: Blood Updated: 03/27/24 0549     Creatine Kinase 621 U/L     Protime-INR [[id number]]  (Normal) Collected: 03/27/24 0355    Specimen: Blood Updated: 03/27/24 0617     Protime 14.9 Seconds      INR 1.15    Narrative:      Suggested Therapeutic Ranges For Oral Anticoagulant Therapy:  Level of Therapy                      INR Target Range  Standard Dose                            2.0-3.0  High Dose                                2.5-3.5  Patients not receiving anticoagulant  Therapy Normal Range                     0.86-1.15    aPTT [[id number]]  (Abnormal) Collected: 03/27/24 0355     Specimen: Blood Updated: 03/27/24 0617     PTT 30.1 seconds     Procalcitonin [[id number]] Collected: 03/27/24 0355    Specimen: Blood Updated: 03/27/24 0637    Ammonia [[id number]]  (Normal) Collected: 03/27/24 0440    Specimen: Blood Updated: 03/27/24 0504     Ammonia 52 umol/L     Urinalysis With Culture If Indicated - Straight Cath [[id number]]  (Abnormal) Collected: 03/27/24 0517    Specimen: Urine from Straight Cath Updated: 03/27/24 0551     Color, UA Dark Yellow     Appearance, UA Clear     pH, UA <=5.0     Specific Gravity, UA 1.025     Glucose, UA Negative     Ketones, UA Negative     Bilirubin, UA Small (1+)     Blood, UA Negative     Protein, UA Negative     Leuk Esterase, UA Trace     Nitrite, UA Negative     Urobilinogen, UA 0.2 E.U./dL    Narrative:      In absence of clinical symptoms, the presence of pyuria, bacteria, and/or nitrites on the urinalysis result does not correlate with infection.    Urine Drug Screen - Straight Cath [[id number]]  (Abnormal) Collected: 03/27/24 0517    Specimen: Urine from Straight Cath Updated: 03/27/24 0610     Amphet/Methamphet, Screen Negative     Barbiturates Screen, Urine Positive     Benzodiazepine Screen, Urine Positive     Cocaine Screen, Urine Negative     Opiate Screen Positive     THC, Screen, Urine Negative     Methadone Screen, Urine Negative     Oxycodone Screen, Urine Positive     Fentanyl, Urine Negative    Narrative:      Negative Thresholds Per Drugs Screened:    Amphetamines                 500 ng/ml  Barbiturates                 200 ng/ml  Benzodiazepines              100 ng/ml  Cocaine                      300 ng/ml  Methadone                    300 ng/ml  Opiates                      300 ng/ml  Oxycodone                    100 ng/ml  THC                           50 ng/ml  Fentanyl                       5 ng/ml      The Normal Value for all drugs tested is negative. This report includes final unconfirmed screening results to be used for medical  treatment purposes only. Unconfirmed results must not be used for non-medical purposes such as employment or legal testing. Clinical consideration should be applied to any drug of abuse test, particularly when unconfirmed results are used.            Urinalysis, Microscopic Only - Straight Cath [[id number]]  (Abnormal) Collected: 03/27/24 0517    Specimen: Urine from Straight Cath Updated: 03/27/24 0609     RBC, UA None Seen /HPF      WBC, UA 3-5 /HPF      Comment: Urine culture not indicated.        Bacteria, UA None Seen /HPF      Squamous Epithelial Cells, UA 0-2 /HPF      Hyaline Casts, UA 3-6 /LPF      Methodology Manual Light Microscopy    COVID-19, FLU A/B, RSV PCR 1 HR TAT - Swab, Nasopharynx [[id number]]  (Normal) Collected: 03/27/24 0518    Specimen: Swab from Nasopharynx Updated: 03/27/24 0602     COVID19 Not Detected     Influenza A PCR Not Detected     Influenza B PCR Not Detected     RSV, PCR Not Detected    Narrative:      Fact sheet for providers: https://www.fda.gov/media/939540/download    Fact sheet for patients: https://www.fda.gov/media/225996/download    Test performed by PCR.    Respiratory Culture - Sputum, ET Suction [[id number]] Collected: 03/27/24 0640    Specimen: Sputum from ET Suction Updated: 03/27/24 0643             Imaging:    CT Head Without Contrast    Result Date: 3/27/2024  CT HEAD WO CONTRAST-  Date of Exam: 3/27/2024 5:12 AM.  Indication: headache; AMS (altered mental state)  Comparison: 11/12/2020.  Technique: Axial CT images were obtained of the head without contrast administration.  Reconstructed 2D coronal and sagittal images were also obtained. Automated exposure control and iterative construction methods were used. The study is motion-limited.  FINDINGS: A routine nonenhanced head CT was performed. No acute brain abnormality is seen. No acute infarct. No acute intracranial hemorrhage. No midline shift or acute intracranial mass effect is seen. The ventricular system and the  extra-axial spaces are prominent. There is suspected mild chronic small vessel ischemic disease. Arterial calcifications are seen. No acute skull fracture is identified. There is moderate age-indeterminate sinus disease suggested, especially involving the right maxillary antrum and the right frontal paranasal sinuses. There is also involvement of the bilateral ethmoid and the bilateral sphenoid paranasal sinuses (to a lesser degree). Acute, superimposed upon chronic, sinus disease is possible. The imaged middle ear clefts (that is, the bilateral mastoid air cell complexes, bilateral middle ear cavities, and bilateral external auditory canals) are well aerated. No definite acute orbital findings are seen. There are degenerative changes of the partially imaged atlantoaxial joint.       No acute brain abnormality is appreciated. No definite acute infarct. No acute intracranial hemorrhage.  There is possible acute sinus disease, as detailed above.       Electronically Signed By-Rc Vann MD On:3/27/2024 5:23 AM      XR Chest 1 View    Result Date: 3/27/2024  XR CHEST 1 VW-  Date of Exam: 3/27/2024 3:55 AM  Indication: SOA/SOB/shortness of air/shortness of breath.  Comparison: 11/12/2020.  FINDINGS: A single AP (or PA) upright portable chest radiograph was performed. Borderline cardiac enlargement is seen. No acute infiltrate is appreciated. No pleural effusion or pneumothorax is identified. Chronic calcified granulomatous disease involves the chest. External artifacts obscure detail. The thoracic aorta is mildly ectatic. No significant interval change is seen since the prior study (or studies).  CONCLUSION: No acute infiltrate is appreciated.  Electronically Signed By-Rc Vann MD On:3/27/2024 4:21 AM         Differential Diagnosis and Discussion:    Altered Mental Status: Based on the patient's signs and symptoms, differential diagnosis includes but is not limited to meningitis, stroke, sepsis, subarachnoid  hemorrhage, intracranial bleeding, encephalitis, and metabolic encephalopathy.    All labs were reviewed and interpreted by me.  All X-rays impressions were independently interpreted by me.  EKG was interpreted by me.  CT scan radiology impression was interpreted by me.    MDM  Number of Diagnoses or Management Options  Diagnosis management comments: On arrival patient is altered.  History limited.  Per report O2 sats were in the 70s.  Patient was placed on BiPAP on arrival.  Labs showed elevated creatinine of 2.4 concerning for acute kidney injury.  White count also elevated to 29.  Troponin elevated 715.  EKG showed no acute ST elevation.  pCO2 slightly elevated at 47.8.  Tox screen positive for barbiturates, benzodiazepines opiates and oxycodone.  Patient was given Narcan.  Patient became agitated.  He became more hypoxic and tachypneic.  For airway protection patient was intubated.  Patient will be admitted to the ICU for further care.       Amount and/or Complexity of Data Reviewed  Clinical lab tests: reviewed  Review and summarize past medical records: yes  Independent visualization of images, tracings, or specimens: yes    Risk of Complications, Morbidity, and/or Mortality  Presenting problems: moderate  Management options: moderate         Critical Care Note: Total Critical Care time of 40 minutes. Total critical care time documented does not include time spent on separately billed procedures for services of nurses or physician assistants. I personally saw and examined the patient. I have reviewed all diagnostic interpretations and treatment plans as written. I was present for the key portions of any procedures performed and the inclusive time noted in any critical care statement. Critical care time includes patient management by me, time spent at the patients bedside,  time to review lab and imaging results, discussing patient care, documentation in the medical record, and time spent with family or  caregiver.        Patient Care Considerations:    CT ABDOMEN AND PELVIS: I considered ordering a CT scan of the abdomen and pelvis however no abdominal tenderness on exam      Consultants/Shared Management Plan:    Hospitalist: I have discussed the case with Dr Jaramillo who agrees to accept the patient for admission.    Social Determinants of Health:    Patient has presented with family members who are responsible, reliable and will ensure follow up care.      Disposition and Care Coordination:    Admit:   Through independent evaluation of the patient's history, physical, and imperical data, the patient meets criteria for inpatient admission to the hospital.        Final diagnoses:   Acute respiratory failure with hypoxia   Metabolic encephalopathy   DIONNA (acute kidney injury)   NSTEMI (non-ST elevated myocardial infarction)        ED Disposition       ED Disposition   Decision to Admit    Condition   --    Comment   Level of Care: Critical Care [6]   Diagnosis: Acute metabolic encephalopathy [[id number]]   Certification: I Certify That Inpatient Hospital Services Are Medically Necessary For Greater Than 2 Midnights                 This medical record created using voice recognition software.             Faviola Martins MD  03/27/24 0644

## 2024-03-27 NOTE — PLAN OF CARE
Goal Outcome Evaluation:      Pt arrived to ICU from ED around 0800 this AM. Pt remains on ventilator on propofol, fentanyl, levo, and heparin gtt. Bedside bronch done. Pt placed on cooling blanket and given tylenol for fevers. Family remains at bedside. VSS. Lilliana Price RN

## 2024-03-27 NOTE — CONSULTS
"  [facility]   Cardiology Consult Note    Patient Name: Davonte Marshall  : 1955  MRN: [id number]  Primary Care Physician:  Ivanna Low APRN  Referring Physician: No ref. provider found  Date of admission: 3/27/2024    Subjective   Subjective     Reason for Consult/ Chief Complaint: Elevated troponin    HPI:  Davonte Marshall is a 68 y.o. male with past medical history significant for hypertension, obesity, dyslipidemia, chronic pain, was admitted to the hospital with altered mental status and potential narcotics overdose and aspiration pneumonia.  He was noted to have septic versus cardiogenic shock and elevated troponin which prompted this consultation.  The patient is currently intubated and sedated.  History was obtained from chart review.  Patient's wife saw him minimally responsive yesterday morning and she made him breakfast that he did not eat.  When she came back from work she found him at home and thought he had eggs in his mouth and was minimally responsive.  She called EMS and his oxygen saturation was in the 70s.  He was placed on BiPAP with an adequate response and was emergently intubated.  He was noted to have severe metabolic/respiratory acidosis and highly elevated troponin exceeding 700.  Lactic acid was 7.  He was started on antibiotics for potential multifocal pneumonia and septic shock.  He was started on heparin infusion for possible non-STEMI.  He is unresponsive on the ventilator.    Review of Systems   Could not be obtained    Personal History     Past Medical History:   Diagnosis Date    Anemia     Anxiety     Arthritis     Benign essential hypertension     Chronic bronchitis     Depression     Enlarged prostate     Floaters in visual field     Gall stones     Generalized weakness     Head injury     Hemorrhoids     Hyperlipidemia     Leg pain     Numbness in both hands 2015    Seizures     last \"quite a while ago\"    Stroke 2020    left sided weakness    "     Family History: family history includes Bleeding Disorder in his mother; Heart disease in his father and another family member; Stroke in his father. Otherwise pertinent FHx was reviewed and not pertinent to current issue.    Social History:  reports that he has been smoking cigarettes. He started smoking about 37 years ago. He has a 41.1 pack-year smoking history. He has never used smokeless tobacco. He reports that he does not drink alcohol and does not use drugs.    Home Medications:  DULoxetine, HYDROcodone-acetaminophen, Morphine, aspirin, atorvastatin, benzonatate, docusate sodium, hydrOXYzine, losartan-hydrochlorothiazide, and primidone    Allergies:  Allergies   Allergen Reactions    Codeine Arrhythmia     Chest pain    Guaifenesin-Codeine Arrhythmia     Chest pain    Cefdinir Unknown - Low Severity       Objective    Objective     Vitals:   Temp:  [99.1 °F (37.3 °C)-101.8 °F (38.8 °C)] 101.8 °F (38.8 °C)  Heart Rate:  [] 97  Resp:  [24-26] 24  BP: ()/() 168/122  FiO2 (%):  [50 %-60 %] 60 %      Physical Exam:   Constitutional: Intubated, sedated, ET tube in place   Eyes: sclerae anicteric, no conjunctival injection   HENT: NCAT, mucous membranes moist   Neck: Supple, no thyromegaly, no lymphadenopathy, trachea midline   Respiratory: Scattered rhonchi, nonlabored respirations    Cardiovascular: RRR, no murmurs, rubs, or gallops, palpable pedal pulses bilaterally   Gastrointestinal: Obese, soft, nontender, nondistended   Musculoskeletal: Extremities are cold, trace bilateral ankle edema, no clubbing or cyanosis to extremities   Psychiatric: Could not be assessed.                Neurologic: Could not be assessed.   Skin: No rashes     Result Review    Result Review:  I have personally reviewed the results from the time of this admission to 3/27/2024 13:51 EDT and agree with these findings:  [x]  Laboratory  []  Microbiology  [x]  Radiology  [x]  EKG/Telemetry   [x]  Cardiology/Vascular    []  Pathology  [x]  Old records  []  Other:  Most notable findings include:     CMP          3/27/2024    03:47 3/27/2024    03:55 3/27/2024    07:22 3/27/2024    09:20 3/27/2024    12:12   CMP   Glucose 148  154  187  169  126    BUN  29   31     Creatinine  2.46   2.62     EGFR  28.2   26.1     Sodium 139.1  137  141.4  139  138.9    Potassium  4.6   3.9     Chloride  96   100     Calcium  9.1   8.2     Total Protein  8.2   7.0     Albumin  4.3   3.4     Globulin  3.9   3.6     Total Bilirubin  0.4   0.4     Alkaline Phosphatase  153   132     AST (SGOT)  68   75     ALT (SGPT)  37   36     Albumin/Globulin Ratio  1.1   0.9     BUN/Creatinine Ratio  11.8   11.8     Anion Gap  20.7   25.4        CBC          6/12/2023    10:04 12/4/2023    10:42 3/27/2024    03:55 3/27/2024    12:09   CBC   WBC 7.07     13.31     29.90  15.05    RBC 4.92     4.99     5.44  4.99    Hemoglobin 15.2     15.4     16.4  14.8    Hematocrit 44.2     44.8     49.0  47.4    MCV 89.8     89.8     90.1  95.0    MCH 30.9     30.9     30.1  29.7    MCHC 34.4     34.4     33.5  31.2    RDW 13.8     12.6     13.8  13.7    Platelets 191     247     333  266       Details          This result is from an external source.              Lab Results   Component Value Date    TROPONINT 1,109 (C) 03/27/2024         Assessment & Plan   Assessment / Plan     Brief Patient Summary:  Davonte Marshall is a 68 y.o. male with:    Acute metabolic encephalopathy  Pneumococcal pneumonia  Acute hypoxemic/hypercapnic respiratory failure, on the ventilator  Severe metabolic acidosis  Lactic acidosis, 8.5  Acute kidney injury  Elevated troponin, most likely due to demand ischemia.  ECG shows old anterior and inferior MI.  Echo was technically difficult, however, demonstrated normal LV systolic function without hemodynamically significant valvular pathology.  History of seizure, on primidone    Plan:   Troponin rise is more likely related to demand ischemia in the  setting of respiratory failure, septic shock and acute kidney injury.  His echocardiogram was technically difficult, however, demonstrates preserved LVEF without hemodynamically significant valvular pathology.  No ischemic ST-T changes were noted on his ECG.  Continue heparin infusion x 48 hours.  Continue hemodynamic support as per primary team.  The patient will need cardiac stress test after resolution of acute medical issues.  Continue aspirin.  May start statin therapy if able to swallow.    Thank you for the consultation.  The patient will be followed peripherally.  Please call with changes of clinical status or if questions arise.    Electronically signed by Era Light MD, 03/27/24, 1:51 PM EDT.

## 2024-03-27 NOTE — PROGRESS NOTES
[facility]   Hospitalist Progress Note    Date of admission: 3/27/2024  Patient Name: Davonte Marshall  1955  Date: 3/27/2024      Subjective     Chief Complaint   Patient presents with    Respiratory Distress       Interval Followup: Intubated and sedated currently.  Patient wife and family at bedside.  Critically by patient's home regimen does not take any benzodiazepines or Fioricet or similar medications as far she can tell.  Has not had any medicines apart from some cough medicines recently they are over-the-counter as far as wife mentions.    Patient has been on the primidone especially 3 times daily thinks maybe is only taking twice daily and that is only because she helps him with his medications and he probably misses the afternoon dose often when she is not there to give it to him.  She does not think he takes any extra doses more about him forgetting some of his scheduled doses.  Has not had any seizures for years and has been Kae continued on this for tremors most recently she denies any substance abuse concerns states she is on the chronic opiates but does not abuse them as far as she can tell    Objective     Vitals:   Temp:  [99.1 °F (37.3 °C)-100.7 °F (38.2 °C)] 100.7 °F (38.2 °C)  Heart Rate:  [] 92  Resp:  [24-26] 24  BP: (123-218)/() 147/77  FiO2 (%):  [50 %] 50 %    Physical Exam  Ill sedated in bed on ventilator  Pupils sluggish minimally reactive  Coarse breath sounds rhonchorous/slightly diminished in the right, symmetric chest rise  Elevated rate and regular rhythm no lower extremity edema  Obese abdomen not rigid positive bowel sounds  Not alert oriented    Result Review:  Vital signs, labs and recent relevant imaging reviewed.        acetaminophen    aluminum-magnesium hydroxide-simethicone    senna-docusate sodium **AND** polyethylene glycol **AND** bisacodyl **AND** bisacodyl    Diclofenac Sodium    fentaNYL    heparin    heparin    hydrOXYzine    Lidocaine     melatonin    nicotine    nitroglycerin    ondansetron    Pharmacy to Dose Cefepime    Pharmacy to dose vancomycin    prochlorperazine    sodium chloride    sodium chloride    sodium chloride    ampicillin-sulbactam, 3 g, Intravenous, Q6H  aspirin, 81 mg, Nasogastric, Daily  atorvastatin, 40 mg, Nasogastric, Nightly  chlorhexidine, 15 mL, Mouth/Throat, Q12H  ipratropium-albuterol, 3 mL, Nebulization, Q4H - RT  lactated ringers, 1,000 mL, Intravenous, Once  senna-docusate sodium, 2 tablet, Oral, BID  sodium chloride, 10 mL, Intravenous, Q12H        XR Chest 1 View    Result Date: 3/27/2024  Impression:  1. Endotracheal tube in good position with the tip 3.5 cm above the rocío. 2. Minimal bibasilar airspace disease which may be secondary to pneumonia or atelectasis.   Electronically Signed By-Presley Baltazar MD On:3/27/2024 7:17 AM      CT Head Without Contrast    Result Date: 3/27/2024   No acute brain abnormality is appreciated. No definite acute infarct. No acute intracranial hemorrhage.  There is possible acute sinus disease, as detailed above.       Electronically Signed By-Rc Vann MD On:3/27/2024 5:23 AM       Assessment / Plan     Summary: 68-year-old male with hypertension, chronic pain on high dose of morphine who presented after having worsening lethargy and mental status apparently was found to still have food in his mouth from the day before, is midline responsive, prior to the CT department had hypoxia with sats in the 70s initially requiring BiPAP, had aspiration episode and required intubation for airway distress.  Treating for sepsis with multifocal pneumonia, possible cardiogenic shock, aspiration, hypoxemic hypercapnic respiratory failure, polypharmacy and oversedation and question positive benzodiazepines and barbiturates on talk screen that he is on prescribed.  Pulmonology and cardiology assisting    Assessment/Plan (clinically significant if listed here)  Sepsis with shock, multifactorial  with pneumonia and possibly also cardiogenic shock  Pneumococcal pneumonia  Aspiration Pneumonia  Acute hypoxemic and hypercapnic respiratory failure requiring mechanical intervention  Acute metabolic encephalopathy in setting of polypharmacy and hypercapnia  Tox positive benzodiazepines and barbiturates not prescribed  Lactic acidosis, severe  Metabolic acidosis  NSTEMI, suspect type II from demand/sepsis  DIONNA, secondary to sepsis/shock/poor perfusion  Age indeterminate sinusitis   Hx of seizures and Essential tremor - on primidone  Hyperlipidemia  Depression/anxiety on outpatient Cymbalta  Question underlying dementia with forgetfulness as outpatient  Polypharmacy and difficulty with medication adherence    Follow-up cultures, preliminary showing pneumococcal pneumonia, follow-up pending blood cultures  5 days of high-dose IV steroids for septic shock/pneumococcal pneumonia  Placed on IV famotidine while on steroids  IV antibiotics with Unasyn for 7 to 10 days depending on response to treatment  Requiring Levophed, monitor blood pressure  Check echo trend troponin appreciate cardiology assistance discussed initial case, aspirin statin heparin drip stress test and further evaluation once clinically recovered from acute illness, EKG sinus on acute st elevation  Hold opiates and multiple sedating medications, talk screen positive for benzodiazepines and barbiturates this is not on his Pee, medication list, and I discussed with his wife at length - is not taking any benzos/barbituates as far she can tell and doesn't believe he is on any illicit /non-prescribed substances   Resume home primidone for now given risk of withdrawal seizure, takes this for essential tremors and history of seizures, follows with Dr Pantoja has not seen him in several months.  Has been stable in terms of seizure history at last office visit.  May need to consider alternative medication given sedation risk but suspect symptoms largely due  morphine/norco in combination with cymbalta with renal failure - unclear source of bzd/barbituates    (Outpatient 60mg morphine sulfate er bid, norco 10 tid, no benzos/barbituates on outpt labs.  Holding, needs adjusted regimen at time of discharge)  Check EEG given questionable adherence to home primidone although lower suspicion for seizure activity  Intubated for airway protection was requiring BiPAP initially, metabolic and respiratory acidosis pCO2 was 47.8  Holding duloxetine given renal failure may need dose adjustment long-term  Getting initial sepsis fluids, bnp elevated and suspect may ultimately need diuresis but given shock, lactic acidosis continue fluids for now  has urethral catheter in place, monitor I's and O's, last creatinine from 2022 with 0.9 no recent for comparison.  Will need voiding trial before catheter removed avoid nephrotoxic agents  Hold home losartan and HCTZ monitor blood pressure; use alternative agent if needed given renal function   Ct head negative apart from possible moderate age indetermine sinus disease right maxillary and right frontal paranasal in particular.  S/p vanc/cefepime in ed, continuing on unaysn for now for aspiration /pna, will and sinusitis coverage   Patient falsely elevated blood pressure initially from stress as otherwise septic with shock, partially worse with propofol/sedation  Resume home aspirin and statin.  Mild AST elevation monitor liver enzymes, suspect will have increase in enzymes on trend in setting of shock picture/ischemia  PT/OT  Check a.m. CBC, BMP, magnesium, phosphorus, lfts, ptt monitoring  Continue hospitalization at current level of care in the icu, critically ill.  Likely will need rehab      DVT prophylaxis:  Medical DVT prophylaxis orders are present.    Level Of Support Discussed With: Health Care Surrogate  Code Status (Patient has no pulse and is not breathing): CPR (Attempt to Resuscitate)  Medical Interventions (Patient has pulse or  is breathing): Full Support    Patient is critically ill due to septic shock possibly cardiogenic shock, respite failure counting intubation, renal failure, multiple issues as above.  I have spent 35 minutes of critical care time reviewing documentation, pertinent labs, imaging studies, examining the patient, modifying care plan, and discussing patient's condition and care plan with the patient's wife/family, nursing and cardiology and pulmonology.      CBC          6/12/2023    10:04 12/4/2023    10:42 3/27/2024    03:55   CBC   WBC 7.07     13.31     29.90    RBC 4.92     4.99     5.44    Hemoglobin 15.2     15.4     16.4    Hematocrit 44.2     44.8     49.0    MCV 89.8     89.8     90.1    MCH 30.9     30.9     30.1    MCHC 34.4     34.4     33.5    RDW 13.8     12.6     13.8    Platelets 191     247     333       Details          This result is from an external source.               CMP          3/27/2024    03:47 3/27/2024    03:55 3/27/2024    07:22   CMP   Glucose 148  154  187    BUN  29     Creatinine  2.46     EGFR  28.2     Sodium 139.1  137  141.4    Potassium  4.6     Chloride  96     Calcium  9.1     Total Protein  8.2     Albumin  4.3     Globulin  3.9     Total Bilirubin  0.4     Alkaline Phosphatase  153     AST (SGOT)  68     ALT (SGPT)  37     Albumin/Globulin Ratio  1.1     BUN/Creatinine Ratio  11.8     Anion Gap  20.7

## 2024-03-27 NOTE — H&P
[facility]   HOSPITALIST HISTORY AND PHYSICAL  Date: 3/27/2024   Patient Name: Davonte Marshall  : 1955  MRN: [id number]  Primary Care Physician:  Ivanna Low APRN  Date of admission: 3/27/2024    Subjective   Subjective     Chief Complaint: Somnolence    HPI:    Davonte Marshall is a 68 y.o. male  with a past medical history of hypertension, hyperlipidemia, BPH, chronic back pain status post laminectomy on morphine, CVA, seizure disorder, depression presented to the ED for evaluation of altered mental status, minimally responsive.  patient's wife stated that patient has been minimally responsive since yesterday morning, she fixed him breakfast but he did not eat and patient's wife left for work and by evening when she came back from the work patient was still very minimally responsive and he did not wake up for dinner, so patient called EMS.  When the EMS has reached home patient was noted to be saturating at 70% on room air and very minimally responsive and has been brought into the ED. Patient was very minimally responsive in the ED, was placed on BiPAP.  During my evaluation patient was arousable to voice and was able to tell his name.  Denies any abdominal pain, difficulty breathing, chest pain, nausea.  As per the wife he did not had any fevers, chills.  Patient's urine tox came positive for barbiturates, benzodiazepines, opiates, oxycodone.  Given the patient was very lethargic and urine drug screen, Narcan was administered and patient became very tachypneic and went into respiratory distress, belly breathing requiring intubation in the ED.    In the ED, vital signs pulse 109, respiratory 26, blood pressure 123/80 on BiPAP 60% FiO2 saturating around 91%.  ABG 7.2  on 60% FiO2, lactic acid 4.16, high-sensitivity troponin 715, proBNP 2457, sodium 137, potassium 4.6, creatinine 2.46, bicarb 20.3, anion gap 20.7, baseline creatinine a year ago was 0.94, rest of the CMP with no  "significant findings, ammonia 52, lactic acid 5.5, WBC elevated 29.9, normal hemoglobin, platelets.  COVID flu RSV pending.  Blood cultures in process.  Serum ethanol, acetaminophen, salicylate levels within normal limits.  EKG showed sinus tachycardia with no significant ST-T wave changes.  CT head without contrast showed no acute brain abnormalities appreciated.  Chest x-ray showed no acute infiltrate.  Urinalysis in process.  Patient has been admitted for further evaluation and management of acute hypoxic and hypercapnic respiratory failure, combined metabolic and respiratory acidosis, lactic acidosis, elevated troponin, elevated proBNP, sepsis, acute metabolic encephalopathy, DIONNA      Personal History     Past Medical History:   Diagnosis Date    Anemia     Anxiety     Arthritis     Benign essential hypertension     Chronic bronchitis     Depression     Enlarged prostate     Floaters in visual field     Gall stones     Generalized weakness     Head injury     Hemorrhoids     Hyperlipidemia     Leg pain     Numbness in both hands 7/6/2015    Seizures     last \"quite a while ago\"    Stroke 11/2020    left sided weakness         Past Surgical History:   Procedure Laterality Date    APPENDECTOMY      BACK SURGERY      4    CHOLECYSTECTOMY      CYSTOSCOPY Bilateral     7/12/21    CYSTOSCOPY TRANSURETHRAL RESECTION OF PROSTATE N/A 7/12/2021    Procedure: CYSTOSCOPY TRANSURETHRAL RESECTION OF PROSTATE;  Surgeon: Penelope Fox MD;  Location: [facility] MAIN OR;  Service: Urology;  Laterality: N/A;    HAND SURGERY Bilateral     thumb    LYMPH NODE DISSECTION      jaw line    TURP / TRANSURETHRAL INCISION / DRAINAGE PROSTATE  07/12/2021         Family History   Problem Relation Age of Onset    Bleeding Disorder Mother     Stroke Father     Heart disease Father     Heart disease Other     Malig Hyperthermia Neg Hx          Social History     Socioeconomic History    Marital status:    Tobacco Use    Smoking status: " Every Day     Current packs/day: 0.50     Average packs/day: 0.5 packs/day for 45.0 years (22.5 ttl pk-yrs)     Types: Cigarettes    Smokeless tobacco: Never    Tobacco comments:     last this morning 4am   Vaping Use    Vaping status: Never Used   Substance and Sexual Activity    Alcohol use: Never    Drug use: Never    Sexual activity: Defer         Home Medications:  DULoxetine, HYDROcodone-acetaminophen, Morphine, aspirin, atorvastatin, docusate sodium, finasteride, hydrOXYzine, losartan-hydrochlorothiazide, primidone, and tamsulosin    Allergies:  Allergies   Allergen Reactions    Codeine Arrhythmia     Chest pain    Guaifenesin-Codeine Arrhythmia     Chest pain    Cefdinir Unknown - Low Severity       Review of Systems   Unable to obtain the complete review of systems    Objective   Objective     Vitals:   Temp:  [99.1 °F (37.3 °C)] 99.1 °F (37.3 °C)  Heart Rate:  [109-121] 121  Resp:  [26] 26  BP: (123-124)/(80-86) 124/86    Physical Exam    Constitutional: Awake, alert, no acute distress   Eyes: Pupils equal, sclerae anicteric, no conjunctival injection   HENT: NCAT, mucous membranes moist   Neck: Supple, no thyromegaly, no lymphadenopathy, trachea midline   Respiratory: Clear to auscultation bilaterally, nonlabored respirations    Cardiovascular: RRR, no murmurs, rubs, or gallops, palpable pedal pulses bilaterally   Gastrointestinal: Positive bowel sounds, soft, nontender, nondistended   Musculoskeletal: No bilateral ankle edema, no clubbing or cyanosis to extremities   Psychiatric: Appropriate affect, cooperative   Neurologic: Oriented x 3, strength symmetric in all extremities, Cranial Nerves grossly intact to confrontation, speech clear   Skin: No rashes     Result Review    Result Review:  I have personally reviewed the results from the time of this admission to 3/27/2024 06:58 EDT and agree with these findings:  []  Laboratory  []  Microbiology  []  Radiology  []  EKG/Telemetry   []   Cardiology/Vascular   []  Pathology  []  Old records  []  Other:        Imaging Results (Last 24 Hours)       Procedure Component Value Units Date/Time    XR Chest 1 View [[id number]] Resulted: 03/27/24 0642     Updated: 03/27/24 0643    CT Head Without Contrast [[id number]] Collected: 03/27/24 0518     Updated: 03/27/24 0525    Narrative:      CT HEAD WO CONTRAST-     Date of Exam: 3/27/2024 5:12 AM.     Indication: headache; AMS (altered mental state)      Comparison: 11/12/2020.     Technique:   Axial CT images were obtained of the head without contrast  administration.  Reconstructed 2D coronal and sagittal images were also  obtained. Automated exposure control and iterative construction methods  were used. The study is motion-limited.     FINDINGS:  A routine nonenhanced head CT was performed. No acute brain abnormality  is seen. No acute infarct. No acute intracranial hemorrhage. No midline  shift or acute intracranial mass effect is seen. The ventricular system  and the extra-axial spaces are prominent. There is suspected mild  chronic small vessel ischemic disease. Arterial calcifications are seen.  No acute skull fracture is identified. There is moderate  age-indeterminate sinus disease suggested, especially involving the  right maxillary antrum and the right frontal paranasal sinuses. There is  also involvement of the bilateral ethmoid and the bilateral sphenoid  paranasal sinuses (to a lesser degree). Acute, superimposed upon  chronic, sinus disease is possible. The imaged middle ear clefts (that  is, the bilateral mastoid air cell complexes, bilateral middle ear  cavities, and bilateral external auditory canals) are well aerated. No  definite acute orbital findings are seen. There are degenerative changes  of the partially imaged atlantoaxial joint.       Impression:         No acute brain abnormality is appreciated. No definite acute infarct. No  acute intracranial hemorrhage.      There is possible acute  sinus disease, as detailed above.                    Electronically Signed By-Rc Vann MD On:3/27/2024 5:23 AM       XR Chest 1 View [[id number]] Collected: 03/27/24 0419     Updated: 03/27/24 0423    Narrative:      XR CHEST 1 VW-     Date of Exam: 3/27/2024 3:55 AM     Indication: SOA/SOB/shortness of air/shortness of breath.     Comparison: 11/12/2020.     FINDINGS:   A single AP (or PA) upright portable chest radiograph was performed.  Borderline cardiac enlargement is seen. No acute infiltrate is  appreciated. No pleural effusion or pneumothorax is identified. Chronic  calcified granulomatous disease involves the chest. External artifacts  obscure detail. The thoracic aorta is mildly ectatic. No significant  interval change is seen since the prior study (or studies).     CONCLUSION:   No acute infiltrate is appreciated.     Electronically Signed By-Rc Vann MD On:3/27/2024 4:21 AM                chlorhexidine, 15 mL, Mouth/Throat, Q12H  heparin, 5,000 Units, Intravenous, Once  senna-docusate sodium, 2 tablet, Oral, BID  sodium chloride, 1,000 mL, Intravenous, Once  vancomycin, 20 mg/kg, Intravenous, Once         Assessment & Plan   Assessment / Plan     Assessment/Plan:   Acute metabolic encephalopathy, unclear etiology DDx likely due to opioid intoxication  Acute respiratory distress after receiving Narcan requiring intubation  Acute hypoxic and hypercapnic respiratory failure  Lactic acidosis  Combined metabolic and respiratory acidosis  Elevated troponin  Elevated proBNP  Sepsis, unknown etiology  DIONNA  Elevated CK levels  Elevated troponin, NSTEMI type I versus type II due to DIONNA, hypoxia  Dehydration  History of hypertension  History of hyperlipidemia  History of BPH  Chronic back pain s/p laminectomy  History of CVA, with mild residual weakness per wife, unsure which side   seizure disorder   Depression    Plan  Admit to inpatient, critical care  Continue on vent  Intensivist consult   [place]  Continue propofol, fentanyl  ABG in 1 hour  Trend lactic acid  Continue cefepime and vancomycin  Follow-up on blood cultures  Trend lactic acid  Monitor electrolytes, renal function, hemoglobin, platelets with repeat labs at 10 AM  Consider consulting neurology regarding the antiseizure recommendations.  Patient takes primidone at home and his last dose was Monday night  Follow-up on repeat troponin levels  Started on heparin drip  Cardiology consult for NSTEMI, Dr. Light  Cardiac echo    DVT prophylaxis:  Medical DVT prophylaxis orders are present.        CODE STATUS:    Level Of Support Discussed With: Health Care Surrogate  Code Status (Patient has no pulse and is not breathing): CPR (Attempt to Resuscitate)  Medical Interventions (Patient has pulse or is breathing): Full Support      Admission Status:  I believe this patient meets inpatient status.    Part of this note may be an electronic transcription/translation of spoken language to printed text using the Dragon Dictation System    Caroline Jaramillo MD

## 2024-03-27 NOTE — CONSULTS
"Pulmonary / Critical Care Consult Note      Patient Name: Davonte Marshall  : 1955  MRN: [id number]  Primary Care Physician:  Ivanna Low APRN  Referring Physician: Garret Shah MD  Date of admission: 3/27/2024    Subjective   Subjective     Reason for Consult/ Chief Complaint:   Respiratory failure, minimally responsive    HPI:  Davonte Marshall is a 68 y.o. male with a history of hypertension, hyperlipidemia and chronic pain on a large amount of morphine, benzodiazepines and other sedating medications at home came in last night minimally responsive.  Patient's wife states that she saw him minimally responsive yesterday morning and she made him breakfast that he did not eat.  When she came back from work she found him at home and thought he had eggs in his mouth and still minimally responsive.  She called EMS with O2 saturations in the 70s.  In the emergency department he was placed on BiPAP which he failed and was emergently intubated.  Patient has mixed respiratory and metabolic acidosis, markedly elevated troponin of over 700, lactic acid of 7.  X-ray is concerning for multifocal pneumonia and family is worried that he aspirated food into his lungs.  He is currently intubated and completely unresponsive on ventilator.      Personal History     Past Medical History:   Diagnosis Date    Anemia     Anxiety     Arthritis     Benign essential hypertension     Chronic bronchitis     Depression     Enlarged prostate     Floaters in visual field     Gall stones     Generalized weakness     Head injury     Hemorrhoids     Hyperlipidemia     Leg pain     Numbness in both hands 2015    Seizures     last \"quite a while ago\"    Stroke 2020    left sided weakness       Past Surgical History:   Procedure Laterality Date    APPENDECTOMY      BACK SURGERY      4    CHOLECYSTECTOMY      CYSTOSCOPY Bilateral     21    CYSTOSCOPY TRANSURETHRAL RESECTION OF PROSTATE N/A 2021    Procedure: " CYSTOSCOPY TRANSURETHRAL RESECTION OF PROSTATE;  Surgeon: Penelope Fox MD;  Location: [facility] MAIN OR;  Service: Urology;  Laterality: N/A;    HAND SURGERY Bilateral     thumb    LYMPH NODE DISSECTION      jaw line    TURP / TRANSURETHRAL INCISION / DRAINAGE PROSTATE  07/12/2021       Family History: family history includes Bleeding Disorder in his mother; Heart disease in his father and another family member; Stroke in his father. Otherwise pertinent FHx was reviewed and not pertinent to current issue.    Social History:  reports that he has been smoking cigarettes. He started smoking about 37 years ago. He has a 41.1 pack-year smoking history. He has never used smokeless tobacco. He reports that he does not drink alcohol and does not use drugs.    Home Medications:  DULoxetine, HYDROcodone-acetaminophen, Morphine, aspirin, atorvastatin, benzonatate, docusate sodium, hydrOXYzine, losartan-hydrochlorothiazide, and primidone    Allergies:  Allergies   Allergen Reactions    Codeine Arrhythmia     Chest pain    Guaifenesin-Codeine Arrhythmia     Chest pain    Cefdinir Unknown - Low Severity       Objective    Objective     Vitals:   Temp:  [99.1 °F (37.3 °C)-100.7 °F (38.2 °C)] 100.6 °F (38.1 °C)  Heart Rate:  [] 110  Resp:  [24-26] 24  BP: (123-218)/() 147/77  FiO2 (%):  [50 %-60 %] 60 %    Physical Exam:  Vital Signs Reviewed   General: Elderly male intubated and unresponsive on ventilator  HEENT: Pupils sluggish and minimally reactive, no extraocular movements.  OP with endotracheal tube  Neck:  Supple, no JVD, no thyromegaly  Chest:  good aeration, coarse right greater than left crackles and rhonchi, tympanic to percussion bilaterally, synchronous with ventilator   CV: Sinus tachycardia, no MGR, pulses 2+, equal.  Abd:  Soft, NT, ND, + BS, no HSM  EXT:  no clubbing, no cyanosis, trace BLE edema, extremities warm but mottled  Neuro: Intubated and sedated, not following commands, does have slight  grimace to painful stimuli  Skin: No rashes or lesions noted      Result Review    Result Review:  I have personally reviewed the results from the time of this admission to 3/27/2024 11:31 EDT and agree with these findings:  [x]  Laboratory  [x]  Microbiology  [x]  Radiology  [x]  EKG/Telemetry   [x]  Cardiology/Vascular   []  Pathology  []  Old records  []  Other:  Most notable findings include:       Lab 03/27/24  0920 03/27/24  0722 03/27/24  0355 03/27/24  0347   WBC  --   --  29.90*  --    HEMOGLOBIN  --   --  16.4  --    HEMATOCRIT  --   --  49.0  --    PLATELETS  --   --  333  --    SODIUM 139  --  137  --    SODIUM, ARTERIAL  --  141.4  --  139.1   POTASSIUM 3.9  --  4.6  --    CHLORIDE 100  --  96*  --    CO2 13.6*  --  20.3*  --    BUN 31*  --  29*  --    CREATININE 2.62*  --  2.46*  --    GLUCOSE 169*  --  154*  --    GLUCOSE, ARTERIAL  --  187*  --  148*   CALCIUM 8.2*  --  9.1  --    PHOSPHORUS 6.8*  --   --   --    TOTAL PROTEIN 7.0  --  8.2  --    ALBUMIN 3.4*  --  4.3  --    GLOBULIN 3.6  --  3.9  --      Chest x-ray with adequate position of endotracheal tube and bilateral airspace disease  Bronchoscopy results pending  Strep pneumo urine antigen positive.  BAL neutrophil predominant  Lactic acid 9.8  Troponin has gone from 700 to 1100  ABG 7.02/68/71/17  Flu/COVID/RSV negative  Drug screen positive for barbiturates, benzodiazepines, opioids and oxycodone  BNP 2400  Procalcitonin 0.87        Assessment & Plan   Assessment / Plan     Active Hospital Problems:  Active Hospital Problems    Diagnosis     **Acute metabolic encephalopathy          Impression:  Septic shock  Question cardiogenic shock  Pneumococcal pneumonia  Aspiration into airways, initial encounter  Lactic acidosis, clinically significant  Metabolic acidosis  Acute hypoxemic and hypercapnic respiratory failure require mechanical ventilation  Altered mental status  Toxic/metabolic encephalopathy  NSTEMI likely type II from demand  ischemia  Acute kidney injury secondary to prerenal etiology  History of seizures on primidone    Plan:  Continue mechanical ventilation with current settings.  Continue assist-control.  Continue current tidal volume and rate.  I have increased PEEP to 10 this morning on rounds.  Wean O2 to keep SPO2 greater than 90%  Bronchoscopy BAL performed.  See note for details.  Significant mucous plugs removed.  Cultures pending  Strep pneumo urine antigen positive.  I de-escalated antibiotics to Unasyn.  Will need 7 to 10 days of therapy given severity of illness.  Start day 1/5 of high-dose IV steroids in the setting of severe pneumococcal pneumonia critical illness  Continue norepinephrine and may need to add vasopressin to keep mean arterial pressure greater than 65  Agree with fluid boluses.  Will bolus additional LR now and start LR at 100 mL/h  Check echocardiogram  Add aspirin and statin.  Agree with heparin drip per ACS protocol  Trend lactic acid until cleared  Hold any tube feeds for now  Hold all sedating medications.  Will need to monitor closely for opioid and benzodiazepine withdrawal as the hospitalization goes on  Hold all nephrotoxic agents  Trend renal panel and electrolytes  Sedation with propofol and fentanyl repeat ABG this afternoon and  Monitor neurological status closely.  I suspect neurological status will improve as acidosis improves  EEG per hospitalist    Discussed extensively with wife at bedside    DVT prophylaxis:  Medical DVT prophylaxis orders are present.    Code Status and Medical Interventions:   Ordered at: 03/27/24 0657     Level Of Support Discussed With:    Health Care Surrogate     Code Status (Patient has no pulse and is not breathing):    CPR (Attempt to Resuscitate)     Medical Interventions (Patient has pulse or is breathing):    Full Support        The patient is critically ill in the ICU with septic shock, possible cardiogenic shock, pneumococcal pneumonia, NSTEMI, lactic  acidosis, altered mental status, acute hypoxemic and hypercapnic respiratory failure require mechanical ventilation, acute kidney injury. Multidisciplinary bedside critical care rounds were performed with nursing staff, respiratory therapy, pharmacy, nutritional services, social work. I have personally reviewed the chart, labs and any pertinent imaging available.  I have spent 106 minutes of critical care time, excluding procedures, in the care of this patient.    Electronically signed by Mikey Mahajan MD, 03/27/24, 11:36 AM EDT.

## 2024-03-27 NOTE — PLAN OF CARE
Goal Outcome Evaluation:      AT 0615 called to ED due to saturation dropping into the 50s. When I arrived in the room it appeared that patient had vomited into the bipap mask. Removed the mask and md proceeded with intubation. Patient is currently on AC 22,450, +5 and 50%. 8.0 ET tube, 25 cm at the lip. Sputum sent to lab.  Patient appears to be resting more comfortably at this time, continues to have rhonchi throughout. No breakdown noted on the face at this time.    Static Compliance: 34  Plateau pressure: 19

## 2024-03-28 ENCOUNTER — APPOINTMENT (OUTPATIENT)
Dept: NEUROLOGY | Facility: HOSPITAL | Age: 69
DRG: 871 | End: 2024-03-28
Payer: MEDICARE

## 2024-03-28 LAB
ALBUMIN SERPL-MCNC: 3.2 G/DL (ref 3.5–5.2)
ALBUMIN/GLOB SERPL: 0.9 G/DL
ALP SERPL-CCNC: 82 U/L (ref 39–117)
ALT SERPL W P-5'-P-CCNC: 32 U/L (ref 1–41)
ANION GAP SERPL CALCULATED.3IONS-SCNC: 17.4 MMOL/L (ref 5–15)
APTT PPP: 117.9 SECONDS (ref 78–95.9)
APTT PPP: 61.1 SECONDS (ref 78–95.9)
APTT PPP: 70.6 SECONDS (ref 78–95.9)
APTT PPP: 74.3 SECONDS (ref 78–95.9)
ARTERIAL PATENCY WRIST A: POSITIVE
AST SERPL-CCNC: 67 U/L (ref 1–40)
BASE EXCESS BLDA CALC-SCNC: -4.2 MMOL/L (ref -2–2)
BASOPHILS # BLD AUTO: 0.15 10*3/MM3 (ref 0–0.2)
BASOPHILS NFR BLD AUTO: 0.5 % (ref 0–1.5)
BDY SITE: ABNORMAL
BILIRUB SERPL-MCNC: 0.6 MG/DL (ref 0–1.2)
BUN SERPL-MCNC: 40 MG/DL (ref 8–23)
BUN/CREAT SERPL: 19 (ref 7–25)
CA-I BLDA-SCNC: 1.03 MMOL/L (ref 1.13–1.32)
CALCIUM SPEC-SCNC: 8.3 MG/DL (ref 8.6–10.5)
CHLORIDE BLDA-SCNC: 105 MMOL/L (ref 98–106)
CHLORIDE SERPL-SCNC: 100 MMOL/L (ref 98–107)
CO2 SERPL-SCNC: 20.6 MMOL/L (ref 22–29)
COHGB MFR BLD: 0.1 % (ref 0–1.5)
CREAT SERPL-MCNC: 2.11 MG/DL (ref 0.76–1.27)
D-LACTATE SERPL-SCNC: 5.5 MMOL/L (ref 0.5–2)
DEPRECATED RDW RBC AUTO: 47.2 FL (ref 37–54)
EGFRCR SERPLBLD CKD-EPI 2021: 33.9 ML/MIN/1.73
EOSINOPHIL # BLD AUTO: 0.01 10*3/MM3 (ref 0–0.4)
EOSINOPHIL NFR BLD AUTO: 0 % (ref 0.3–6.2)
ERYTHROCYTE [DISTWIDTH] IN BLOOD BY AUTOMATED COUNT: 14.2 % (ref 12.3–15.4)
FHHB: 6.6 % (ref 0–5)
GAS FLOW AIRWAY: ABNORMAL L/MIN
GLOBULIN UR ELPH-MCNC: 3.4 GM/DL
GLUCOSE BLDA-MCNC: 125 MG/DL (ref 70–99)
GLUCOSE BLDC GLUCOMTR-MCNC: 129 MG/DL (ref 70–99)
GLUCOSE SERPL-MCNC: 124 MG/DL (ref 65–99)
HCO3 BLDA-SCNC: 21.3 MMOL/L (ref 22–26)
HCT VFR BLD AUTO: 43.2 % (ref 37.5–51)
HGB BLD-MCNC: 14.3 G/DL (ref 13–17.7)
HGB BLDA-MCNC: 14.9 G/DL (ref 13.8–16.4)
IMM GRANULOCYTES # BLD AUTO: 0.48 10*3/MM3 (ref 0–0.05)
IMM GRANULOCYTES NFR BLD AUTO: 1.8 % (ref 0–0.5)
INHALED O2 CONCENTRATION: 40 %
LACTATE BLDA-SCNC: 3.05 MMOL/L (ref 0.5–2)
LYMPHOCYTES # BLD AUTO: 1.57 10*3/MM3 (ref 0.7–3.1)
LYMPHOCYTES NFR BLD AUTO: 5.7 % (ref 19.6–45.3)
MAGNESIUM SERPL-MCNC: 1.5 MG/DL (ref 1.6–2.4)
MCH RBC QN AUTO: 30 PG (ref 26.6–33)
MCHC RBC AUTO-ENTMCNC: 33.1 G/DL (ref 31.5–35.7)
MCV RBC AUTO: 90.8 FL (ref 79–97)
METHGB BLD QL: 0.2 % (ref 0–1.5)
MODALITY: ABNORMAL
MONOCYTES # BLD AUTO: 0.81 10*3/MM3 (ref 0.1–0.9)
MONOCYTES NFR BLD AUTO: 3 % (ref 5–12)
NEUTROPHILS NFR BLD AUTO: 24.29 10*3/MM3 (ref 1.7–7)
NEUTROPHILS NFR BLD AUTO: 89 % (ref 42.7–76)
NOTE: ABNORMAL
NRBC BLD AUTO-RTO: 0 /100 WBC (ref 0–0.2)
OXYHGB MFR BLDV: 93.1 % (ref 94–99)
PCO2 BLDA: 40.6 MM HG (ref 35–45)
PH BLDA: 7.34 PH UNITS (ref 7.35–7.45)
PHOSPHATE SERPL-MCNC: 4.2 MG/DL (ref 2.5–4.5)
PLATELET # BLD AUTO: 257 10*3/MM3 (ref 140–450)
PMV BLD AUTO: 9.7 FL (ref 6–12)
PO2 BLD: 171 MM[HG] (ref 0–500)
PO2 BLDA: 68.4 MM HG (ref 80–100)
POTASSIUM BLDA-SCNC: 4.19 MMOL/L (ref 3.5–5)
POTASSIUM SERPL-SCNC: 4.4 MMOL/L (ref 3.5–5.2)
PROT SERPL-MCNC: 6.6 G/DL (ref 6–8.5)
RBC # BLD AUTO: 4.76 10*6/MM3 (ref 4.14–5.8)
SAO2 % BLDCOA: 93.4 % (ref 95–99)
SODIUM BLDA-SCNC: 137.8 MMOL/L (ref 136–146)
SODIUM SERPL-SCNC: 138 MMOL/L (ref 136–145)
TROPONIN T SERPL HS-MCNC: 1311 NG/L
WBC NRBC COR # BLD AUTO: 27.31 10*3/MM3 (ref 3.4–10.8)

## 2024-03-28 PROCEDURE — 94799 UNLISTED PULMONARY SVC/PX: CPT

## 2024-03-28 PROCEDURE — 82375 ASSAY CARBOXYHB QUANT: CPT | Performed by: INTERNAL MEDICINE

## 2024-03-28 PROCEDURE — 85730 THROMBOPLASTIN TIME PARTIAL: CPT | Performed by: INTERNAL MEDICINE

## 2024-03-28 PROCEDURE — 25010000002 AMPICILLIN-SULBACTAM PER 1.5 G: Performed by: INTERNAL MEDICINE

## 2024-03-28 PROCEDURE — 83605 ASSAY OF LACTIC ACID: CPT | Performed by: EMERGENCY MEDICINE

## 2024-03-28 PROCEDURE — 25810000003 LACTATED RINGERS PER 1000 ML: Performed by: NURSE PRACTITIONER

## 2024-03-28 PROCEDURE — 80053 COMPREHEN METABOLIC PANEL: CPT | Performed by: INTERNAL MEDICINE

## 2024-03-28 PROCEDURE — 84484 ASSAY OF TROPONIN QUANT: CPT | Performed by: INTERNAL MEDICINE

## 2024-03-28 PROCEDURE — 36600 WITHDRAWAL OF ARTERIAL BLOOD: CPT | Performed by: INTERNAL MEDICINE

## 2024-03-28 PROCEDURE — 83050 HGB METHEMOGLOBIN QUAN: CPT | Performed by: INTERNAL MEDICINE

## 2024-03-28 PROCEDURE — 82948 REAGENT STRIP/BLOOD GLUCOSE: CPT

## 2024-03-28 PROCEDURE — 94664 DEMO&/EVAL PT USE INHALER: CPT

## 2024-03-28 PROCEDURE — 84100 ASSAY OF PHOSPHORUS: CPT | Performed by: INTERNAL MEDICINE

## 2024-03-28 PROCEDURE — 25010000002 HALOPERIDOL LACTATE PER 5 MG: Performed by: INTERNAL MEDICINE

## 2024-03-28 PROCEDURE — 85025 COMPLETE CBC W/AUTO DIFF WBC: CPT | Performed by: INTERNAL MEDICINE

## 2024-03-28 PROCEDURE — 95819 EEG AWAKE AND ASLEEP: CPT

## 2024-03-28 PROCEDURE — 99291 CRITICAL CARE FIRST HOUR: CPT | Performed by: INTERNAL MEDICINE

## 2024-03-28 PROCEDURE — 25010000002 PROPOFOL 10 MG/ML EMULSION: Performed by: STUDENT IN AN ORGANIZED HEALTH CARE EDUCATION/TRAINING PROGRAM

## 2024-03-28 PROCEDURE — 25010000002 MAGNESIUM SULFATE 4 GM/100ML SOLUTION: Performed by: INTERNAL MEDICINE

## 2024-03-28 PROCEDURE — 83735 ASSAY OF MAGNESIUM: CPT | Performed by: INTERNAL MEDICINE

## 2024-03-28 PROCEDURE — 25010000002 LEVETRIRACETAM PER 10 MG: Performed by: INTERNAL MEDICINE

## 2024-03-28 PROCEDURE — 25010000002 HEPARIN (PORCINE) 25000-0.45 UT/250ML-% SOLUTION: Performed by: STUDENT IN AN ORGANIZED HEALTH CARE EDUCATION/TRAINING PROGRAM

## 2024-03-28 PROCEDURE — 25010000002 METHYLPREDNISOLONE PER 40 MG: Performed by: INTERNAL MEDICINE

## 2024-03-28 PROCEDURE — 82805 BLOOD GASES W/O2 SATURATION: CPT | Performed by: INTERNAL MEDICINE

## 2024-03-28 PROCEDURE — 94003 VENT MGMT INPAT SUBQ DAY: CPT

## 2024-03-28 RX ORDER — HALOPERIDOL 5 MG/ML
4 INJECTION INTRAMUSCULAR EVERY 4 HOURS PRN
Status: DISCONTINUED | OUTPATIENT
Start: 2024-03-28 | End: 2024-03-28

## 2024-03-28 RX ORDER — DEXMEDETOMIDINE HYDROCHLORIDE 4 UG/ML
.2-1.5 INJECTION, SOLUTION INTRAVENOUS
Status: DISCONTINUED | OUTPATIENT
Start: 2024-03-28 | End: 2024-03-30

## 2024-03-28 RX ORDER — BUDESONIDE 0.5 MG/2ML
0.5 INHALANT ORAL
Status: DISCONTINUED | OUTPATIENT
Start: 2024-03-28 | End: 2024-04-16 | Stop reason: HOSPADM

## 2024-03-28 RX ORDER — HALOPERIDOL 5 MG/ML
4 INJECTION INTRAMUSCULAR EVERY 4 HOURS PRN
Status: DISCONTINUED | OUTPATIENT
Start: 2024-03-28 | End: 2024-03-31

## 2024-03-28 RX ORDER — ARFORMOTEROL TARTRATE 15 UG/2ML
15 SOLUTION RESPIRATORY (INHALATION)
Status: DISCONTINUED | OUTPATIENT
Start: 2024-03-28 | End: 2024-04-16 | Stop reason: HOSPADM

## 2024-03-28 RX ORDER — MAGNESIUM SULFATE HEPTAHYDRATE 40 MG/ML
4 INJECTION, SOLUTION INTRAVENOUS ONCE
Status: COMPLETED | OUTPATIENT
Start: 2024-03-28 | End: 2024-03-28

## 2024-03-28 RX ORDER — ATORVASTATIN CALCIUM 20 MG/1
20 TABLET, FILM COATED ORAL NIGHTLY
Status: DISCONTINUED | OUTPATIENT
Start: 2024-03-28 | End: 2024-04-12

## 2024-03-28 RX ORDER — DEXMEDETOMIDINE HYDROCHLORIDE 4 UG/ML
INJECTION, SOLUTION INTRAVENOUS
Status: DISPENSED
Start: 2024-03-28 | End: 2024-03-28

## 2024-03-28 RX ADMIN — IPRATROPIUM BROMIDE AND ALBUTEROL SULFATE 3 ML: .5; 3 SOLUTION RESPIRATORY (INHALATION) at 07:05

## 2024-03-28 RX ADMIN — HEPARIN SODIUM 11 UNITS/KG/HR: 10000 INJECTION, SOLUTION INTRAVENOUS at 01:00

## 2024-03-28 RX ADMIN — PROPOFOL 5 MCG/KG/MIN: 10 INJECTION, EMULSION INTRAVENOUS at 17:46

## 2024-03-28 RX ADMIN — DEXMEDETOMIDINE HYDROCHLORIDE 0.2 MCG/KG/HR: 4 INJECTION, SOLUTION INTRAVENOUS at 10:07

## 2024-03-28 RX ADMIN — IPRATROPIUM BROMIDE AND ALBUTEROL SULFATE 3 ML: .5; 3 SOLUTION RESPIRATORY (INHALATION) at 11:33

## 2024-03-28 RX ADMIN — IPRATROPIUM BROMIDE AND ALBUTEROL SULFATE 3 ML: .5; 3 SOLUTION RESPIRATORY (INHALATION) at 15:16

## 2024-03-28 RX ADMIN — HALOPERIDOL LACTATE 4 MG: 5 INJECTION, SOLUTION INTRAMUSCULAR at 11:31

## 2024-03-28 RX ADMIN — METHYLPREDNISOLONE SODIUM SUCCINATE 40 MG: 40 INJECTION INTRAMUSCULAR; INTRAVENOUS at 03:32

## 2024-03-28 RX ADMIN — AMPICILLIN AND SULBACTAM 3 G: 2; 1 INJECTION, POWDER, FOR SOLUTION INTRAVENOUS at 03:32

## 2024-03-28 RX ADMIN — MAGNESIUM SULFATE 4 G: 4 INJECTION INTRAVENOUS at 08:17

## 2024-03-28 RX ADMIN — IPRATROPIUM BROMIDE AND ALBUTEROL SULFATE 3 ML: .5; 3 SOLUTION RESPIRATORY (INHALATION) at 23:22

## 2024-03-28 RX ADMIN — AMPICILLIN AND SULBACTAM 3 G: 2; 1 INJECTION, POWDER, FOR SOLUTION INTRAVENOUS at 11:02

## 2024-03-28 RX ADMIN — HALOPERIDOL LACTATE 4 MG: 5 INJECTION, SOLUTION INTRAMUSCULAR at 16:26

## 2024-03-28 RX ADMIN — IPRATROPIUM BROMIDE AND ALBUTEROL SULFATE 3 ML: .5; 3 SOLUTION RESPIRATORY (INHALATION) at 02:36

## 2024-03-28 RX ADMIN — AMPICILLIN AND SULBACTAM 3 G: 2; 1 INJECTION, POWDER, FOR SOLUTION INTRAVENOUS at 17:37

## 2024-03-28 RX ADMIN — ARFORMOTEROL TARTRATE 15 MCG: 15 SOLUTION RESPIRATORY (INHALATION) at 11:33

## 2024-03-28 RX ADMIN — Medication 10 ML: at 20:49

## 2024-03-28 RX ADMIN — BUDESONIDE 0.5 MG: 0.5 INHALANT ORAL at 11:33

## 2024-03-28 RX ADMIN — ARFORMOTEROL TARTRATE 15 MCG: 15 SOLUTION RESPIRATORY (INHALATION) at 19:30

## 2024-03-28 RX ADMIN — LEVETIRACETAM 750 MG: 100 INJECTION, SOLUTION INTRAVENOUS at 23:57

## 2024-03-28 RX ADMIN — SODIUM CHLORIDE, POTASSIUM CHLORIDE, SODIUM LACTATE AND CALCIUM CHLORIDE 100 ML/HR: 600; 310; 30; 20 INJECTION, SOLUTION INTRAVENOUS at 14:44

## 2024-03-28 RX ADMIN — DEXMEDETOMIDINE HYDROCHLORIDE 1 MCG/KG/HR: 4 INJECTION, SOLUTION INTRAVENOUS at 14:18

## 2024-03-28 RX ADMIN — Medication 50 MCG/HR: at 17:47

## 2024-03-28 RX ADMIN — Medication 10 ML: at 11:03

## 2024-03-28 RX ADMIN — PROPOFOL 40 MCG/KG/MIN: 10 INJECTION, EMULSION INTRAVENOUS at 09:00

## 2024-03-28 RX ADMIN — BUDESONIDE 0.5 MG: 0.5 INHALANT ORAL at 19:30

## 2024-03-28 RX ADMIN — DOCUSATE SODIUM 50MG AND SENNOSIDES 8.6MG 2 TABLET: 8.6; 5 TABLET, FILM COATED ORAL at 20:48

## 2024-03-28 RX ADMIN — Medication 50 MCG/HR: at 08:31

## 2024-03-28 RX ADMIN — METHYLPREDNISOLONE SODIUM SUCCINATE 40 MG: 40 INJECTION INTRAMUSCULAR; INTRAVENOUS at 20:48

## 2024-03-28 RX ADMIN — IPRATROPIUM BROMIDE AND ALBUTEROL SULFATE 3 ML: .5; 3 SOLUTION RESPIRATORY (INHALATION) at 19:30

## 2024-03-28 RX ADMIN — FAMOTIDINE 20 MG: 10 INJECTION INTRAVENOUS at 11:02

## 2024-03-28 RX ADMIN — ATORVASTATIN CALCIUM 20 MG: 40 TABLET, FILM COATED ORAL at 20:48

## 2024-03-28 RX ADMIN — CHLORHEXIDINE GLUCONATE 15 ML: 1.2 RINSE ORAL at 20:49

## 2024-03-28 RX ADMIN — NOREPINEPHRINE BITARTRATE 0.02 MCG/KG/MIN: 0.03 INJECTION, SOLUTION INTRAVENOUS at 18:51

## 2024-03-28 RX ADMIN — ASPIRIN 81 MG 81 MG: 81 TABLET ORAL at 11:02

## 2024-03-28 RX ADMIN — PROPOFOL 25 MCG/KG/MIN: 10 INJECTION, EMULSION INTRAVENOUS at 05:13

## 2024-03-28 NOTE — PLAN OF CARE
Goal Outcome Evaluation:  Plan of Care Reviewed With: patient        Progress: no change  Outcome Evaluation: Patient is currently on the ventilator, AC 24, , & PEEP 10. Patient is unabke to be weaned due to a PEEP for 10.

## 2024-03-28 NOTE — CONSULTS
"Nutrition Services    Patient Name: Davonte Marshall  YOB: 1955  MRN: [id number]  Admission date: 3/27/2024      CLINICAL NUTRITION ASSESSMENT      Reason for Assessment  Tube Feeding Assessment     H&P:  Past Medical History:   Diagnosis Date    Anemia     Anxiety     Arthritis     Benign essential hypertension     Chronic bronchitis     Depression     Enlarged prostate     Floaters in visual field     Gall stones     Generalized weakness     Head injury     Hemorrhoids     Hyperlipidemia     Leg pain     Numbness in both hands 7/6/2015    Seizures     last \"quite a while ago\"    Stroke 11/2020    left sided weakness        Current Problems:   Active Hospital Problems    Diagnosis     **Acute metabolic encephalopathy         Nutrition/Diet History         Narrative   Pt presents to ED in altered mental state, minimally responsive, and respiratory distress. Pt is current on vent.   RD consulted for tube feed assessment.   Pt has NG tube in place     Currently on 100mL/hr lactated ringers and Levo pressor.     Pt receiving magnesium replenishment d/t low Mg level.       Anthropometrics        Current Height, Weight Height: 175.3 cm (69.02\")  Weight: 89 kg (196 lb 3.4 oz)   Current BMI Body mass index is 28.96 kg/m².   BMI Classification Overweight   % %   Adjusted Body Weight (ABW)    Weight Hx  Wt Readings from Last 30 Encounters:   03/27/24 0931 89 kg (196 lb 3.4 oz)   03/27/24 0842 89 kg (196 lb 3.4 oz)   03/27/24 0347 92.1 kg (203 lb 0.7 oz)   03/16/23 1526 103 kg (227 lb)   02/28/22 1429 97.8 kg (215 lb 8 oz)   09/21/21 1059 97.4 kg (214 lb 12.8 oz)   07/21/21 0911 96.2 kg (212 lb)   07/12/21 0611 96.3 kg (212 lb 4.9 oz)   07/06/21 0948 94.3 kg (208 lb)   06/30/21 1555 94.8 kg (209 lb)   06/15/21 1312 96.5 kg (212 lb 12.8 oz)   01/28/21 0000 95.3 kg (210 lb)   12/30/20 0000 94.5 kg (208 lb 6 oz)   10/29/20 0000 98.6 kg (217 lb 6 oz)   10/09/20 0000 97.5 kg (215 lb)   07/09/20 0000 103 kg " "(226 lb)   01/09/20 0000 98 kg (216 lb)   10/07/19 0000 96.6 kg (213 lb)   09/06/19 0000 94.8 kg (209 lb)          Wt Change Observation 31# weight loss in 1 year     Estimated/Assessed Needs  Estimated Needs based on: Current Body Weight - critical care        Energy Requirements 12-25 kcal/kg   EST Needs (kcal/day) 1068 - 2225       Protein Requirements 1.2-2.0 g/kg   EST Daily Needs (g/day) 106.8 - 178       Fluid Requirements 25 ml/kg    Estimated Needs (mL/day) 2225     Labs/Medications Mg low, receiving replenishment         Pertinent Labs Reviewed.   Results from last 7 days   Lab Units 03/28/24  0724 03/28/24  0252 03/27/24  1212 03/27/24  0920 03/27/24  0722 03/27/24  0355   SODIUM mmol/L  --  138  --  139  --  137   SODIUM, ARTERIAL mmol/L 137.8  --  138.9  --    < >  --    POTASSIUM mmol/L  --  4.4  --  3.9  --  4.6   CHLORIDE mmol/L  --  100  --  100  --  96*   CO2 mmol/L  --  20.6*  --  13.6*  --  20.3*   BUN mg/dL  --  40*  --  31*  --  29*   CREATININE mg/dL  --  2.11*  --  2.62*  --  2.46*   CALCIUM mg/dL  --  8.3*  --  8.2*  --  9.1   BILIRUBIN mg/dL  --  0.6  --  0.4  --  0.4   ALK PHOS U/L  --  82  --  132*  --  153*   ALT (SGPT) U/L  --  32  --  36  --  37   AST (SGOT) U/L  --  67*  --  75*  --  68*   GLUCOSE mg/dL  --  124*  --  169*  --  154*   GLUCOSE, ARTERIAL mg/dL 125*  --  126*  --    < >  --     < > = values in this interval not displayed.     Results from last 7 days   Lab Units 03/28/24  0252 03/27/24  1209 03/27/24  0920   MAGNESIUM mg/dL 1.5*  --  1.7   PHOSPHORUS mg/dL 4.2  --  6.8*   HEMOGLOBIN g/dL 14.3   < >  --    HEMATOCRIT % 43.2   < >  --     < > = values in this interval not displayed.     COVID19   Date Value Ref Range Status   03/27/2024 Not Detected Not Detected - Ref. Range Final     No results found for: \"HGBA1C\"      Pertinent Medications Reviewed.     Malnutrition Severity Assessment              Nutrition Diagnosis         Nutrition Dx Problem 1 Inadequate oral Intake " related to inadequate oral Intake as evidenced by intubated/sedated, NPO.     Nutrition Intervention           Current Nutrition Orders & Evaluation of Intake       Current PO Diet NPO Diet NPO Type: Strict NPO   Supplement No active supplement orders           Nutrition Intervention/Prescription        TF:   Begin Impact Peptide 1.5 @ 20mL/hr, advance as tolerated to goal rate of 65mL/hr, for 22 hrs. Free water flushes of 140mL q3h.     Gives:   2145 kcals, 134g Pro, 1101 mL free water, 2221 total water         Medical Nutrition Therapy/Nutrition Education          Learner     Readiness N/A  N/A     Method     Response N/A  N/A     Monitor/Evaluation        Monitor Per protocol, I&O, EN delivery/tolerance, GI status, POC/GOC     Nutrition Discharge Plan         To be determined     Electronically signed by:  Sofia Fisher  03/28/24 12:16 EDT

## 2024-03-28 NOTE — PROGRESS NOTES
Pulmonary / Critical Care Progress Note      Patient Name: Davonte Marshall  : 1955  MRN: [id number]  Attending:  Garret Shah MD   Date of admission: 3/27/2024    Subjective   Subjective   Patient critically ill with respiratory failure on ventilator    Over past 24 hours:  Failed SBT.  Does have increased work of breathing  Lactic acidosis unchanged.  White blood cell count improved.  Neurologically doing better and a little bit more awake  Blood pressure better and weaning off pressors  On antibiotics for aspiration ammonia.  BAL PCR positive for multiple organisms  Weak and fatigue  Troponins are still elevated.  Echocardiac grossly unremarkable.  Appreciate cardiology input.  On heparin drip.      Objective     Vitals:   Vital signs for last 24 hours:  Temp:  [97.3 °F (36.3 °C)-102.9 °F (39.4 °C)] 100.4 °F (38 °C)  Heart Rate:  [] 108  Resp:  [24-30] 24  BP: ()/() 147/67  FiO2 (%):  [40 %-60 %] 40 %    Intake/Output last 3 shifts:  I/O last 3 completed shifts:  In: 1849.4 [I.V.:1749.4; IV Piggyback:100]  Out: 75 [Urine:75]  Intake/Output this shift:  I/O this shift:  In: 1646 [I.V.:1646]  Out: 400 [Urine:400]    Vent settings for last 24 hours:  Mode: VC/AC  FiO2 (%):  [40 %-60 %] 40 %  S RR:  [24] 24  S VT:  [500 mL] 500 mL  PEEP/CPAP (cm H2O):  [10 cm H20] 10 cm H20  MAP (cm H2O):  [12-19] 15    Hemodynamic parameters for last 24 hours:       Physical Exam   Vital Signs Reviewed   General: Elderly male intubated and unresponsive on ventilator  HEENT: Pupils sluggish and minimally reactive, no extraocular movements.  OP with endotracheal tube  Neck:  Supple, no JVD, no thyromegaly  Chest:  good aeration, coarse bilateral rhonchi increased work of breathing on, tympanic to percussion bilaterally,  ventilator   CV: Sinus tachycardia, no MGR, pulses 2+, equal.  Abd:  Soft, NT, ND, + BS, no HSM  EXT:  no clubbing, no cyanosis, trace BLE edema, extremities warm and less mottled  Neuro:  Intubated and sedated, waking up more and grimacing more to pain  Skin: No rashes or lesions noted        Result Review    Result Review:  I have personally reviewed the results from the time of this admission to 3/28/2024 06:52 EDT and agree with these findings:  [x]  Laboratory  [x]  Microbiology  [x]  Radiology  [x]  EKG/Telemetry   [x]  Cardiology/Vascular   []  Pathology  []  Old records  []  Other:  Most notable findings include:       Lab 03/28/24  0724 03/28/24  0252 03/27/24  1212 03/27/24  1209 03/27/24  0920 03/27/24  0722 03/27/24  0355 03/27/24  0347   WBC  --  27.31*  --  15.05*  --   --  29.90*  --    HEMOGLOBIN  --  14.3  --  14.8  --   --  16.4  --    HEMATOCRIT  --  43.2  --  47.4  --   --  49.0  --    PLATELETS  --  257  --  266  --   --  333  --    SODIUM  --  138  --   --  139  --  137  --    SODIUM, ARTERIAL 137.8  --  138.9  --   --  141.4  --  139.1   POTASSIUM  --  4.4  --   --  3.9  --  4.6  --    CHLORIDE  --  100  --   --  100  --  96*  --    CO2  --  20.6*  --   --  13.6*  --  20.3*  --    BUN  --  40*  --   --  31*  --  29*  --    CREATININE  --  2.11*  --   --  2.62*  --  2.46*  --    GLUCOSE  --  124*  --   --  169*  --  154*  --    GLUCOSE, ARTERIAL 125*  --  126*  --   --  187*  --  148*   CALCIUM  --  8.3*  --   --  8.2*  --  9.1  --    PHOSPHORUS  --  4.2  --   --  6.8*  --   --   --    TOTAL PROTEIN  --  6.6  --   --  7.0  --  8.2  --    ALBUMIN  --  3.2*  --   --  3.4*  --  4.3  --    GLOBULIN  --  3.4  --   --  3.6  --  3.9  --      Results for orders placed during the hospital encounter of 03/27/24    Adult Transthoracic Echo Limited W/ Cont if Necessary Per Protocol    Interpretation Summary    Extremely technically difficult study with very limited views.    The subcostal view is the only one where we can see myocardium and it looks like ejection fraction is greater than 50%.  Cannot rule out regional wall motion abnormalities.    No obvious significant valvular disease by  Doppler.  The valves are not well-visualized.    Left ventricular diastolic function was indeterminate.    Troponin 1300  BAL PCR with Klebsiella, Moraxella, MSSA and strep pneumo  Strep pneumo urine antigen positive    Assessment & Plan   Assessment / Plan     Active Hospital Problems:  Active Hospital Problems    Diagnosis     **Acute metabolic encephalopathy      Impression:  Septic shock  Aspiration pneumonia secondary to Klebsiella, strep pneumo, MSSA and Moraxella  Lactic acidosis, clinically significant  Metabolic acidosis  Acute hypoxemic and hypercapnic respiratory failure require mechanical ventilation  Altered mental status  Toxic/metabolic encephalopathy  NSTEMI, type II from demand ischemia  Acute kidney injury secondary to prerenal etiology  Hypomagnesemia  Leukocytosis  History of seizures/tremors on primidone     Plan:  Continue mechanical ventilation with current settings.  Drop PEEP to 8.  Continue PSV wean as tolerated and daily SBT's.  Wean O2 to keep SPO2 greater than 90%  On day 2/10.  Will consider de-escalating further based off of culture results  On day 2/5 of high-dose steroids  Wean off of pressors to keep mean arterial pressure greater than 65  Will do another 24 hours of IV fluids with LR at 100 mL/h.  Consider stopping tomorrow  Check troponins this morning remain elevated.  Echocardiogram with no obvious wall motion abnormalities and normal ejection fraction.  Complete 48 to 72 hours of heparin drip per ACS protocol.  Appreciate cardiology input  Continue aspirin and statin  Start tube feeds per dietitian recommendations  Limit all sedating medications  EEG today per hospitalist  Trend lactic acid until cleared  Trend renal panel and electrolytes.  Replace magnesium IV    Discussed with family at bedside    DVT prophylaxis:  Medical DVT prophylaxis orders are present.    CODE STATUS:   Level Of Support Discussed With: Health Care Surrogate  Code Status (Patient has no pulse and is not  breathing): CPR (Attempt to Resuscitate)  Medical Interventions (Patient has pulse or is breathing): Full Support      The patient is critically ill in the ICU with septic shock, respiratory failure on ventilator, hypomagnesemia, acute kidney injury, aspiration pneumonia, NSTEMI. Multidisciplinary bedside critical care rounds were performed with nursing staff, respiratory therapy, pharmacy, nutritional services, social work. I have personally reviewed the chart, labs and any pertinent imaging available.  I have spent 41 minutes of critical care time, excluding procedures, in the care of this patient.    Electronically signed by Mikey Mahajan MD, 03/28/24, 4:45 PM EDT.

## 2024-03-28 NOTE — PROGRESS NOTES
[facility]   Hospitalist Progress Note    Date of admission: 3/27/2024  Patient Name: Davonte Marshall  1955  Date: 3/28/2024      Subjective     Chief Complaint   Patient presents with    Respiratory Distress       Interval Followup: Febrile.  Requiring Precedex drip for agitation while on the ventilator.  Doing better on pressure support but was unable to pass a spontaneous breathing trial for extubation.  Discussed with patient's wife at bedside    Objective     Vitals:   Temp:  [97.3 °F (36.3 °C)-101.5 °F (38.6 °C)] 101.5 °F (38.6 °C)  Heart Rate:  [] 110  Resp:  [24-42] 42  BP: ()/() 153/72  FiO2 (%):  [30 %-60 %] 30 %    Physical Exam  Ill appearing intubated, wife at bedside  Having some movement of extremities, not purposeful currently given sedation does seem to partially localize the exam  Coarse vented breath sounds, symmetric chest rise, no acute wheezing  Elevated rate and regular rhythm trace lower extremity edema  Obese abdomen not rigid positive bowel sounds  Not alert oriented, seems somewhat restless    Result Review:  Vital signs, labs and recent relevant imaging reviewed.        acetaminophen    aluminum-magnesium hydroxide-simethicone    senna-docusate sodium **AND** polyethylene glycol **AND** bisacodyl **AND** bisacodyl    Diclofenac Sodium    fentaNYL    haloperidol lactate    heparin    heparin    hydrOXYzine    labetalol    Lidocaine    melatonin    nicotine    nitroglycerin    ondansetron    prochlorperazine    sodium chloride    sodium chloride    sodium chloride    ampicillin-sulbactam, 3 g, Intravenous, Q6H  arformoterol, 15 mcg, Nebulization, BID - RT  aspirin, 81 mg, Nasogastric, Daily  atorvastatin, 20 mg, Nasogastric, Nightly  budesonide, 0.5 mg, Nebulization, BID - RT  chlorhexidine, 15 mL, Mouth/Throat, Q12H  [Held by provider] DULoxetine, 60 mg, Oral, BID  famotidine, 20 mg, Intravenous, Daily  [Held by provider] losartan, 100 mg, Oral, Q24H    And  [Held by provider] hydroCHLOROthiazide, 12.5 mg, Oral, Q24H  ipratropium-albuterol, 3 mL, Nebulization, Q4H - RT  methylPREDNISolone sodium succinate, 40 mg, Intravenous, Q8H  [Held by provider] primidone, 250 mg, Nasogastric, TID  senna-docusate sodium, 2 tablet, Nasogastric, BID  sodium chloride, 10 mL, Intravenous, Q12H        XR Chest 1 View    Result Date: 3/27/2024  Impression:  1. Endotracheal tube in good position with the tip 3.5 cm above the rocío. 2. Minimal bibasilar airspace disease which may be secondary to pneumonia or atelectasis.   Electronically Signed By-Presley Baltazar MD On:3/27/2024 7:17 AM      CT Head Without Contrast    Result Date: 3/27/2024   No acute brain abnormality is appreciated. No definite acute infarct. No acute intracranial hemorrhage.  There is possible acute sinus disease, as detailed above.       Electronically Signed By-Rc Vann MD On:3/27/2024 5:23 AM       Assessment / Plan     Summary: 68-year-old male with hypertension, chronic pain on high dose of morphine who presented after having worsening lethargy and mental status apparently was found to still have food in his mouth from the day before, is midline responsive, prior to the CT department had hypoxia with sats in the 70s initially requiring BiPAP, had aspiration episode and required intubation for airway distress.  Treating for sepsis with multifocal pneumonia, possible cardiogenic shock, aspiration, hypoxemic hypercapnic respiratory failure, polypharmacy and oversedation and question positive benzodiazepines and barbiturates on talk screen that he is on prescribed.  Pulmonology and cardiology assisting    Assessment/Plan (clinically significant if listed here)  Sepsis with shock, multifactorial with pneumonia and possibly also cardiogenic shock  Aspiration pneumonia with Klebsiella, Streptococcus, MSSA and Moraxella  Acute hypoxemic and hypercapnic respiratory failure requiring mechanical intervention  Acute  metabolic encephalopathy in setting of polypharmacy and hypercapnia  Tox positive benzodiazepines and barbiturates not prescribed  Lactic acidosis, severe  Metabolic acidosis  NSTEMI, suspect type II from demand/sepsis  DIONNA, secondary to sepsis/shock/poor perfusion  Age indeterminate sinusitis   Hx of seizures and Essential tremor - on primidone  Hyperlipidemia  Depression/anxiety on outpatient Cymbalta  Question underlying dementia with forgetfulness as outpatient  Polypharmacy and difficulty with medication adherence  Hypomagnesemia    Unable to extubate today possibly tomorrow continue ventilator as per pulmonology  Continue on high-dose steroids, on famotidine while on steroids/GI prophylaxis  Continue on antibiotics for polymicrobial pneumonia, Unasyn cultures resulted with 4 different organisms as above  Continue with additional IV fluids for now may ultimately need diuresis.  Levophed intermittently being needed  ECHO    Extremely technically difficult study with very limited views.    The subcostal view is the only one where we can see myocardium and it looks like ejection fraction is greater than 50%.  Cannot rule out regional wall motion abnormalities.    No obvious significant valvular disease by Doppler.  The valves are not well-visualized.    Left ventricular diastolic function was indeterminate.  Follow-up EEG, monitor for seizures, primidone on hold and avoiding sedating medications as able, monitor for withdrawal.  Patient's baseline home regimen needs to be markedly reduced suspect.  Also still unclear why his toxicology was positive for benzodiazepines and barbiturates is not on this as an outpatient as Pee.  (Outpatient 60mg morphine sulfate er bid, norco 10 tid, no benzos/barbituates on outpt labs.  Holding, needs adjusted regimen at time of discharge)  Holding duloxetine given renal failure may need dose adjustment long-term  Repeat creatinine down to 2.1, continue to monitor,  Holding home  losartan/HCTZ.  Last creatinine from 2022 was 0.9 no more recent for comparison.  Will need voiding trial once his catheter is eventually removed  Ct head negative apart from possible moderate age indetermine sinus disease right maxillary and right frontal paranasal in particular.  Less likely to be acute bacterial sinusitis but should be covered by above antibiotics regardless  Continue home aspirin and statin.  Mild AST elevation initially decreasing on repeat.  Monitor suspect ischemic/hypotension picture contributing  PT/OT  Check a.m. CBC, BMP, magnesium, phosphorus, lfts, ptt monitoring  Continue hospitalization at current level of care in the icu, critically ill.        DVT prophylaxis:  Medical DVT prophylaxis orders are present.    Level Of Support Discussed With: Health Care Surrogate  Code Status (Patient has no pulse and is not breathing): CPR (Attempt to Resuscitate)  Medical Interventions (Patient has pulse or is breathing): Full Support    Patient is critically ill due to septic shock possibly cardiogenic shock, respiratory failure, acute renal failure, multiple additional issues as above.  I have spent 31 minutes of critical care time reviewing documentation, pertinent labs, imaging studies, examining the patient, modifying care plan, and discussing patient's condition and care plan with the patient's wife at bedside, pulmonology, nursing, social work.      CBC          12/4/2023    10:42 3/27/2024    03:55 3/27/2024    12:09 3/28/2024    02:52   CBC   WBC 13.31     29.90  15.05  27.31    RBC 4.99     5.44  4.99  4.76    Hemoglobin 15.4     16.4  14.8  14.3    Hematocrit 44.8     49.0  47.4  43.2    MCV 89.8     90.1  95.0  90.8    MCH 30.9     30.1  29.7  30.0    MCHC 34.4     33.5  31.2  33.1    RDW 12.6     13.8  13.7  14.2    Platelets 247     333  266  257       Details          This result is from an external source.               CMP          3/27/2024    03:47 3/27/2024    03:55 3/27/2024     07:22 3/27/2024    09:20 3/27/2024    12:12 3/28/2024    02:52 3/28/2024    07:24   CMP   Glucose 148  154  187  169  126  124  125    BUN  29   31   40     Creatinine  2.46   2.62   2.11     EGFR  28.2   26.1   33.9     Sodium 139.1  137  141.4  139  138.9  138  137.8    Potassium  4.6   3.9   4.4     Chloride  96   100   100     Calcium  9.1   8.2   8.3     Total Protein  8.2   7.0   6.6     Albumin  4.3   3.4   3.2     Globulin  3.9   3.6   3.4     Total Bilirubin  0.4   0.4   0.6     Alkaline Phosphatase  153   132   82     AST (SGOT)  68   75   67     ALT (SGPT)  37   36   32     Albumin/Globulin Ratio  1.1   0.9   0.9     BUN/Creatinine Ratio  11.8   11.8   19.0     Anion Gap  20.7   25.4   17.4

## 2024-03-28 NOTE — CONSULTS
03/28/24 0925   Spiritual Care   Spiritual Care Source  initiative   Spiritual Care Visit Type follow-up   Spiritual Care Request family support

## 2024-03-28 NOTE — PLAN OF CARE
Goal Outcome Evaluation:                      Pressure support most of day on precedex and 2 doses of iv haldol. However at 6pm precedex was max dose and iv haldol not able to be given again. Resp rate up to 55 consistently. NP rounded and ordered to place back on propfol and febtanyl for the night. Reassess in the morning

## 2024-03-28 NOTE — PLAN OF CARE
Goal Outcome Evaluation:   Patient currently intubated and on pressure support settings of 10/8, and fio2 30% @ this time but varies. Patient currently tolerating well. Plans to extubated patient tomorrow as long as patient tolerates well throughout night. No issues or changes at this time.

## 2024-03-28 NOTE — PLAN OF CARE
Goal Outcome Evaluation:      No issues over night. VSS, levo off.       Problem: Communication Impairment (Mechanical Ventilation, Invasive)  Goal: Effective Communication  Outcome: Ongoing, Progressing     Problem: Device-Related Complication Risk (Mechanical Ventilation, Invasive)  Goal: Optimal Device Function  Outcome: Ongoing, Progressing  Intervention: Optimize Device Care and Function  Recent Flowsheet Documentation  Taken 3/28/2024 0400 by Poonam Donohue RN  Airway Safety Measures:   mask valve resuscitator at bedside   manual resuscitator/mask at bedside   oxygen flowmeter at bedside   suction at bedside  Taken 3/28/2024 0000 by Poonam Donohue RN  Airway Safety Measures:   mask valve resuscitator at bedside   manual resuscitator/mask at bedside   oxygen flowmeter at bedside   suction at bedside  Taken 3/27/2024 2000 by Poonam Donohue RN  Airway Safety Measures:   mask valve resuscitator at bedside   manual resuscitator/mask at bedside   oxygen flowmeter at bedside   suction at bedside     Problem: Skin and Tissue Injury (Mechanical Ventilation, Invasive)  Goal: Absence of Device-Related Skin and Tissue Injury  Outcome: Ongoing, Progressing  Intervention: Maintain Skin and Tissue Health  Recent Flowsheet Documentation  Taken 3/27/2024 2000 by Poonam Donohue RN  Device Skin Pressure Protection:   absorbent pad utilized/changed   pressure points protected   positioning supports utilized     Problem: Adult Inpatient Plan of Care  Goal: Plan of Care Review  Outcome: Ongoing, Progressing  Goal: Patient-Specific Goal (Individualized)  Outcome: Ongoing, Progressing  Goal: Absence of Hospital-Acquired Illness or Injury  Outcome: Ongoing, Progressing  Intervention: Identify and Manage Fall Risk  Recent Flowsheet Documentation  Taken 3/28/2024 0400 by Poonam Donohue RN  Safety Promotion/Fall Prevention:   safety round/check completed   activity supervised   assistive device/personal items within reach   clutter  free environment maintained   fall prevention program maintained   lighting adjusted   room organization consistent  Taken 3/28/2024 0000 by Poonam Donohue RN  Safety Promotion/Fall Prevention:   activity supervised   assistive device/personal items within reach   clutter free environment maintained   fall prevention program maintained   lighting adjusted   room organization consistent   safety round/check completed  Taken 3/27/2024 2300 by Poonam Donohue RN  Safety Promotion/Fall Prevention: safety round/check completed  Taken 3/27/2024 2200 by Poonam Donohue RN  Safety Promotion/Fall Prevention: safety round/check completed  Taken 3/27/2024 2100 by Poonam Donohue RN  Safety Promotion/Fall Prevention: safety round/check completed  Taken 3/27/2024 2000 by Poonam Donohue RN  Safety Promotion/Fall Prevention:   activity supervised   assistive device/personal items within reach   clutter free environment maintained   fall prevention program maintained   lighting adjusted   room organization consistent   safety round/check completed   toileting scheduled  Intervention: Prevent Skin Injury  Recent Flowsheet Documentation  Taken 3/28/2024 0400 by Poonam Donohue RN  Body Position:   turned   legs elevated  Taken 3/28/2024 0000 by Poonam Donohue RN  Body Position:   turned   legs elevated  Taken 3/27/2024 2200 by Poonam Donohue RN  Body Position:   turned   legs elevated  Taken 3/27/2024 2000 by Poonam Donohue RN  Body Position:   turned   legs elevated  Intervention: Prevent and Manage VTE (Venous Thromboembolism) Risk  Recent Flowsheet Documentation  Taken 3/28/2024 0000 by Poonam Donohue RN  Activity Management: bedrest  Taken 3/27/2024 2000 by Poonam Donohue RN  Activity Management: bedrest  Intervention: Prevent Infection  Recent Flowsheet Documentation  Taken 3/28/2024 0000 by Poonam Donohue RN  Infection Prevention:   environmental surveillance performed   hand hygiene promoted   rest/sleep promoted    single patient room provided   personal protective equipment utilized  Taken 3/27/2024 2000 by Poonam Donohue RN  Infection Prevention:   environmental surveillance performed   hand hygiene promoted   personal protective equipment utilized   rest/sleep promoted   single patient room provided  Goal: Optimal Comfort and Wellbeing  Outcome: Ongoing, Progressing  Goal: Readiness for Transition of Care  Outcome: Ongoing, Progressing     Problem: Skin Injury Risk Increased  Goal: Skin Health and Integrity  Outcome: Ongoing, Progressing  Intervention: Optimize Skin Protection  Recent Flowsheet Documentation  Taken 3/28/2024 0400 by Poonam Donohue RN  Head of Bed (HOB) Positioning: HOB at 30-45 degrees  Taken 3/28/2024 0000 by Poonam Donohue RN  Head of Bed (HOB) Positioning: HOB at 30-45 degrees  Taken 3/27/2024 2000 by Poonam Donohue RN  Head of Bed (HOB) Positioning: HOB at 30-45 degrees     Problem: Restraint, Nonviolent  Goal: Absence of Harm or Injury  Outcome: Ongoing, Progressing  Intervention: Implement Least Restrictive Safety Strategies  Recent Flowsheet Documentation  Taken 3/28/2024 0200 by Poonam Donohue RN  Medical Device Protection:   IV pole/bag removed from visual field   tubing secured  Less Restrictive Alternative:   bed alarm in use   emotional support provided   environment adjusted   surveillance provided  De-Escalation Techniques:   appropriate behavior reinforced   medication administered   reoriented   stimulation decreased   verbally redirected  Taken 3/28/2024 0000 by Poonam Donohue RN  Medical Device Protection:   IV pole/bag removed from visual field   tubing secured  Less Restrictive Alternative:   bed alarm in use   emotional support provided   environment adjusted   surveillance provided  De-Escalation Techniques:   appropriate behavior reinforced   stimulation decreased   reoriented   verbally redirected  Taken 3/27/2024 2200 by Poonam Donohue RN  Medical Device Protection:   tubing  secured   IV pole/bag removed from visual field  Less Restrictive Alternative:   bed alarm in use   emotional support provided   environment adjusted   surveillance provided  De-Escalation Techniques:   appropriate behavior reinforced   increased round frequency   reoriented   stimulation decreased   verbally redirected  Taken 3/27/2024 2000 by Poonam Donohue RN  Medical Device Protection:   IV pole/bag removed from visual field   tubing secured  Less Restrictive Alternative:   bed alarm in use   emotional support provided   environment adjusted   surveillance provided  De-Escalation Techniques:   appropriate behavior reinforced   increased round frequency   reoriented   stimulation decreased   quiet time facilitated   verbally redirected  Diversional Activities: television  Intervention: Protect Skin and Joint Integrity  Recent Flowsheet Documentation  Taken 3/28/2024 0400 by Poonam Donohue RN  Body Position:   turned   legs elevated  Taken 3/28/2024 0000 by Poonam Donohue RN  Body Position:   turned   legs elevated  Taken 3/27/2024 2200 by Poonam Donohue RN  Body Position:   turned   legs elevated  Taken 3/27/2024 2000 by Poonam Donohue RN  Body Position:   turned   legs elevated     Problem: Fall Injury Risk  Goal: Absence of Fall and Fall-Related Injury  Outcome: Ongoing, Progressing  Intervention: Promote Injury-Free Environment  Recent Flowsheet Documentation  Taken 3/28/2024 0400 by Poonam Donohue RN  Safety Promotion/Fall Prevention:   safety round/check completed   activity supervised   assistive device/personal items within reach   clutter free environment maintained   fall prevention program maintained   lighting adjusted   room organization consistent  Taken 3/28/2024 0000 by Poonam Donohue RN  Safety Promotion/Fall Prevention:   activity supervised   assistive device/personal items within reach   clutter free environment maintained   fall prevention program maintained   lighting adjusted   room  organization consistent   safety round/check completed  Taken 3/27/2024 2300 by Poonam Donohue RN  Safety Promotion/Fall Prevention: safety round/check completed  Taken 3/27/2024 2200 by Poonam Donohue RN  Safety Promotion/Fall Prevention: safety round/check completed  Taken 3/27/2024 2100 by Poonam Donohue RN  Safety Promotion/Fall Prevention: safety round/check completed  Taken 3/27/2024 2000 by Poonam Donohue RN  Safety Promotion/Fall Prevention:   activity supervised   assistive device/personal items within reach   clutter free environment maintained   fall prevention program maintained   lighting adjusted   room organization consistent   safety round/check completed   toileting scheduled

## 2024-03-28 NOTE — PROGRESS NOTES
RT EQUIPMENT DEVICE RELATED - SKIN ASSESSMENT    RT Medical Equipment/Device:     ETT [place]/Anchorfast    Skin Assessment:      MOUTH, CHEEKS, NOSE:  Intact    Device Skin Pressure Protection:  Skin-to-device areas padded:  Anchorfast    Nurse Notification:  Dahiana Henley, RRT

## 2024-03-28 NOTE — PROGRESS NOTES
RT EQUIPMENT DEVICE RELATED - SKIN ASSESSMENT    RT Medical Equipment/Device:     ETT [place]/Anchorfast    Skin Assessment:      Cheek:  Intact  Nares:  Intact  Mouth:  Intact    Device Skin Pressure Protection:  Skin-to-device areas padded:  Anchorfast    Nurse Notification:  Dahiana Caldera, RRT

## 2024-03-29 LAB
ALBUMIN SERPL-MCNC: 2.6 G/DL (ref 3.5–5.2)
ALBUMIN/GLOB SERPL: 0.9 G/DL
ALP SERPL-CCNC: 58 U/L (ref 39–117)
ALT SERPL W P-5'-P-CCNC: 20 U/L (ref 1–41)
ANION GAP SERPL CALCULATED.3IONS-SCNC: 12.9 MMOL/L (ref 5–15)
APTT PPP: 67.1 SECONDS (ref 78–95.9)
APTT PPP: 69.2 SECONDS (ref 78–95.9)
APTT PPP: 84.6 SECONDS (ref 78–95.9)
AST SERPL-CCNC: 49 U/L (ref 1–40)
BASOPHILS # BLD AUTO: 0.09 10*3/MM3 (ref 0–0.2)
BASOPHILS NFR BLD AUTO: 0.6 % (ref 0–1.5)
BILIRUB SERPL-MCNC: 0.4 MG/DL (ref 0–1.2)
BUN SERPL-MCNC: 31 MG/DL (ref 8–23)
BUN/CREAT SERPL: 29.2 (ref 7–25)
CALCIUM SPEC-SCNC: 7.9 MG/DL (ref 8.6–10.5)
CHLORIDE SERPL-SCNC: 101 MMOL/L (ref 98–107)
CO2 SERPL-SCNC: 23.1 MMOL/L (ref 22–29)
CREAT SERPL-MCNC: 1.06 MG/DL (ref 0.76–1.27)
D-LACTATE SERPL-SCNC: 3.5 MMOL/L (ref 0.5–2)
DEPRECATED RDW RBC AUTO: 46.1 FL (ref 37–54)
EGFRCR SERPLBLD CKD-EPI 2021: 77.4 ML/MIN/1.73
EOSINOPHIL # BLD AUTO: 0.08 10*3/MM3 (ref 0–0.4)
EOSINOPHIL NFR BLD AUTO: 0.6 % (ref 0.3–6.2)
ERYTHROCYTE [DISTWIDTH] IN BLOOD BY AUTOMATED COUNT: 13.9 % (ref 12.3–15.4)
GEN 5 2HR TROPONIN T REFLEX: 781 NG/L
GLOBULIN UR ELPH-MCNC: 2.9 GM/DL
GLUCOSE BLDC GLUCOMTR-MCNC: 110 MG/DL (ref 70–99)
GLUCOSE BLDC GLUCOMTR-MCNC: 113 MG/DL (ref 70–99)
GLUCOSE SERPL-MCNC: 129 MG/DL (ref 65–99)
HCT VFR BLD AUTO: 32.5 % (ref 37.5–51)
HGB BLD-MCNC: 10.8 G/DL (ref 13–17.7)
IMM GRANULOCYTES # BLD AUTO: 1.38 10*3/MM3 (ref 0–0.05)
IMM GRANULOCYTES NFR BLD AUTO: 9.6 % (ref 0–0.5)
LARGE PLATELETS: NORMAL
LYMPHOCYTES # BLD AUTO: 0.81 10*3/MM3 (ref 0.7–3.1)
LYMPHOCYTES NFR BLD AUTO: 5.6 % (ref 19.6–45.3)
MAGNESIUM SERPL-MCNC: 2.1 MG/DL (ref 1.6–2.4)
MCH RBC QN AUTO: 30.2 PG (ref 26.6–33)
MCHC RBC AUTO-ENTMCNC: 33.2 G/DL (ref 31.5–35.7)
MCV RBC AUTO: 90.8 FL (ref 79–97)
MONOCYTES # BLD AUTO: 0.31 10*3/MM3 (ref 0.1–0.9)
MONOCYTES NFR BLD AUTO: 2.1 % (ref 5–12)
NEUTROPHILS NFR BLD AUTO: 11.77 10*3/MM3 (ref 1.7–7)
NEUTROPHILS NFR BLD AUTO: 81.5 % (ref 42.7–76)
NRBC BLD AUTO-RTO: 0 /100 WBC (ref 0–0.2)
PHOSPHATE SERPL-MCNC: 1.2 MG/DL (ref 2.5–4.5)
PLATELET # BLD AUTO: 142 10*3/MM3 (ref 140–450)
PMV BLD AUTO: 10.4 FL (ref 6–12)
POTASSIUM SERPL-SCNC: 3.7 MMOL/L (ref 3.5–5.2)
PROT SERPL-MCNC: 5.5 G/DL (ref 6–8.5)
RBC # BLD AUTO: 3.58 10*6/MM3 (ref 4.14–5.8)
RBC MORPH BLD: NORMAL
SMALL PLATELETS BLD QL SMEAR: ADEQUATE
SODIUM SERPL-SCNC: 137 MMOL/L (ref 136–145)
TROPONIN T DELTA: -3 NG/L
TROPONIN T SERPL HS-MCNC: 784 NG/L
WBC MORPH BLD: NORMAL
WBC NRBC COR # BLD AUTO: 14.44 10*3/MM3 (ref 3.4–10.8)

## 2024-03-29 PROCEDURE — 84100 ASSAY OF PHOSPHORUS: CPT | Performed by: INTERNAL MEDICINE

## 2024-03-29 PROCEDURE — 82948 REAGENT STRIP/BLOOD GLUCOSE: CPT

## 2024-03-29 PROCEDURE — 83605 ASSAY OF LACTIC ACID: CPT | Performed by: INTERNAL MEDICINE

## 2024-03-29 PROCEDURE — 99233 SBSQ HOSP IP/OBS HIGH 50: CPT | Performed by: PSYCHIATRY & NEUROLOGY

## 2024-03-29 PROCEDURE — 25810000003 SODIUM CHLORIDE 0.9 % SOLUTION: Performed by: INTERNAL MEDICINE

## 2024-03-29 PROCEDURE — 85007 BL SMEAR W/DIFF WBC COUNT: CPT | Performed by: INTERNAL MEDICINE

## 2024-03-29 PROCEDURE — 83735 ASSAY OF MAGNESIUM: CPT | Performed by: INTERNAL MEDICINE

## 2024-03-29 PROCEDURE — 25010000002 LEVETRIRACETAM PER 10 MG: Performed by: INTERNAL MEDICINE

## 2024-03-29 PROCEDURE — 94799 UNLISTED PULMONARY SVC/PX: CPT

## 2024-03-29 PROCEDURE — 80053 COMPREHEN METABOLIC PANEL: CPT | Performed by: INTERNAL MEDICINE

## 2024-03-29 PROCEDURE — 25010000002 PROPOFOL 10 MG/ML EMULSION: Performed by: STUDENT IN AN ORGANIZED HEALTH CARE EDUCATION/TRAINING PROGRAM

## 2024-03-29 PROCEDURE — 84484 ASSAY OF TROPONIN QUANT: CPT | Performed by: INTERNAL MEDICINE

## 2024-03-29 PROCEDURE — 85730 THROMBOPLASTIN TIME PARTIAL: CPT | Performed by: INTERNAL MEDICINE

## 2024-03-29 PROCEDURE — 94003 VENT MGMT INPAT SUBQ DAY: CPT

## 2024-03-29 PROCEDURE — 87493 C DIFF AMPLIFIED PROBE: CPT | Performed by: INTERNAL MEDICINE

## 2024-03-29 PROCEDURE — 99291 CRITICAL CARE FIRST HOUR: CPT | Performed by: INTERNAL MEDICINE

## 2024-03-29 PROCEDURE — 94660 CPAP INITIATION&MGMT: CPT

## 2024-03-29 PROCEDURE — 94664 DEMO&/EVAL PT USE INHALER: CPT

## 2024-03-29 PROCEDURE — 94669 MECHANICAL CHEST WALL OSCILL: CPT

## 2024-03-29 PROCEDURE — 25010000002 HEPARIN (PORCINE) 25000-0.45 UT/250ML-% SOLUTION: Performed by: STUDENT IN AN ORGANIZED HEALTH CARE EDUCATION/TRAINING PROGRAM

## 2024-03-29 PROCEDURE — 25010000002 METHYLPREDNISOLONE PER 40 MG: Performed by: INTERNAL MEDICINE

## 2024-03-29 PROCEDURE — 85025 COMPLETE CBC W/AUTO DIFF WBC: CPT | Performed by: INTERNAL MEDICINE

## 2024-03-29 PROCEDURE — 94761 N-INVAS EAR/PLS OXIMETRY MLT: CPT

## 2024-03-29 PROCEDURE — 25010000002 AMPICILLIN-SULBACTAM PER 1.5 G: Performed by: NURSE PRACTITIONER

## 2024-03-29 PROCEDURE — 25010000002 FUROSEMIDE PER 20 MG: Performed by: INTERNAL MEDICINE

## 2024-03-29 PROCEDURE — 25010000002 AMPICILLIN-SULBACTAM PER 1.5 G: Performed by: INTERNAL MEDICINE

## 2024-03-29 PROCEDURE — 25810000003 LACTATED RINGERS PER 1000 ML: Performed by: NURSE PRACTITIONER

## 2024-03-29 RX ORDER — FENTANYL/ROPIVACAINE/NS/PF 2-625MCG/1
15 PLASTIC BAG, INJECTION (ML) EPIDURAL
Status: DISCONTINUED | OUTPATIENT
Start: 2024-03-29 | End: 2024-03-29

## 2024-03-29 RX ORDER — FUROSEMIDE 10 MG/ML
40 INJECTION INTRAMUSCULAR; INTRAVENOUS ONCE
Status: COMPLETED | OUTPATIENT
Start: 2024-03-29 | End: 2024-03-29

## 2024-03-29 RX ORDER — FENTANYL/ROPIVACAINE/NS/PF 2-625MCG/1
15 PLASTIC BAG, INJECTION (ML) EPIDURAL
Status: COMPLETED | OUTPATIENT
Start: 2024-03-29 | End: 2024-03-29

## 2024-03-29 RX ADMIN — ATORVASTATIN CALCIUM 20 MG: 40 TABLET, FILM COATED ORAL at 21:24

## 2024-03-29 RX ADMIN — CHLORHEXIDINE GLUCONATE 15 ML: 1.2 RINSE ORAL at 21:24

## 2024-03-29 RX ADMIN — PROPOFOL 30 MCG/KG/MIN: 10 INJECTION, EMULSION INTRAVENOUS at 06:25

## 2024-03-29 RX ADMIN — IPRATROPIUM BROMIDE AND ALBUTEROL SULFATE 3 ML: .5; 3 SOLUTION RESPIRATORY (INHALATION) at 23:31

## 2024-03-29 RX ADMIN — IPRATROPIUM BROMIDE AND ALBUTEROL SULFATE 3 ML: .5; 3 SOLUTION RESPIRATORY (INHALATION) at 16:19

## 2024-03-29 RX ADMIN — IPRATROPIUM BROMIDE AND ALBUTEROL SULFATE 3 ML: .5; 3 SOLUTION RESPIRATORY (INHALATION) at 12:09

## 2024-03-29 RX ADMIN — IPRATROPIUM BROMIDE AND ALBUTEROL SULFATE 3 ML: .5; 3 SOLUTION RESPIRATORY (INHALATION) at 07:16

## 2024-03-29 RX ADMIN — FUROSEMIDE 40 MG: 10 INJECTION, SOLUTION INTRAMUSCULAR; INTRAVENOUS at 13:05

## 2024-03-29 RX ADMIN — DEXMEDETOMIDINE HYDROCHLORIDE 0.2 MCG/KG/HR: 4 INJECTION, SOLUTION INTRAVENOUS at 07:41

## 2024-03-29 RX ADMIN — LEVETIRACETAM 750 MG: 100 INJECTION, SOLUTION INTRAVENOUS at 21:26

## 2024-03-29 RX ADMIN — SODIUM CHLORIDE, POTASSIUM CHLORIDE, SODIUM LACTATE AND CALCIUM CHLORIDE 100 ML/HR: 600; 310; 30; 20 INJECTION, SOLUTION INTRAVENOUS at 00:50

## 2024-03-29 RX ADMIN — BUDESONIDE 0.5 MG: 0.5 INHALANT ORAL at 07:16

## 2024-03-29 RX ADMIN — AMPICILLIN AND SULBACTAM 3 G: 2; 1 INJECTION, POWDER, FOR SOLUTION INTRAVENOUS at 17:09

## 2024-03-29 RX ADMIN — CHLORHEXIDINE GLUCONATE 15 ML: 1.2 RINSE ORAL at 08:16

## 2024-03-29 RX ADMIN — POTASSIUM PHOSPHATE, MONOBASIC POTASSIUM PHOSPHATE, DIBASIC 15 MMOL: 224; 236 INJECTION, SOLUTION, CONCENTRATE INTRAVENOUS at 14:41

## 2024-03-29 RX ADMIN — METHYLPREDNISOLONE SODIUM SUCCINATE 40 MG: 40 INJECTION INTRAMUSCULAR; INTRAVENOUS at 03:56

## 2024-03-29 RX ADMIN — BUDESONIDE 0.5 MG: 0.5 INHALANT ORAL at 18:31

## 2024-03-29 RX ADMIN — LEVETIRACETAM 750 MG: 100 INJECTION, SOLUTION INTRAVENOUS at 08:31

## 2024-03-29 RX ADMIN — POTASSIUM PHOSPHATE, MONOBASIC POTASSIUM PHOSPHATE, DIBASIC 15 MMOL: 224; 236 INJECTION, SOLUTION, CONCENTRATE INTRAVENOUS at 12:19

## 2024-03-29 RX ADMIN — FAMOTIDINE 20 MG: 10 INJECTION INTRAVENOUS at 08:16

## 2024-03-29 RX ADMIN — PROPOFOL 35 MCG/KG/MIN: 10 INJECTION, EMULSION INTRAVENOUS at 02:15

## 2024-03-29 RX ADMIN — ACETAMINOPHEN 650 MG: 325 TABLET ORAL at 15:43

## 2024-03-29 RX ADMIN — METHYLPREDNISOLONE SODIUM SUCCINATE 40 MG: 40 INJECTION INTRAMUSCULAR; INTRAVENOUS at 11:18

## 2024-03-29 RX ADMIN — ARFORMOTEROL TARTRATE 15 MCG: 15 SOLUTION RESPIRATORY (INHALATION) at 18:31

## 2024-03-29 RX ADMIN — AMPICILLIN AND SULBACTAM 3 G: 2; 1 INJECTION, POWDER, FOR SOLUTION INTRAVENOUS at 11:03

## 2024-03-29 RX ADMIN — AMPICILLIN AND SULBACTAM 3 G: 2; 1 INJECTION, POWDER, FOR SOLUTION INTRAVENOUS at 00:16

## 2024-03-29 RX ADMIN — METHYLPREDNISOLONE SODIUM SUCCINATE 40 MG: 40 INJECTION INTRAMUSCULAR; INTRAVENOUS at 21:24

## 2024-03-29 RX ADMIN — ASPIRIN 81 MG 81 MG: 81 TABLET ORAL at 08:16

## 2024-03-29 RX ADMIN — IPRATROPIUM BROMIDE AND ALBUTEROL SULFATE 3 ML: .5; 3 SOLUTION RESPIRATORY (INHALATION) at 03:51

## 2024-03-29 RX ADMIN — POTASSIUM PHOSPHATE, MONOBASIC POTASSIUM PHOSPHATE, DIBASIC 15 MMOL: 224; 236 INJECTION, SOLUTION, CONCENTRATE INTRAVENOUS at 08:16

## 2024-03-29 RX ADMIN — IPRATROPIUM BROMIDE AND ALBUTEROL SULFATE 3 ML: .5; 3 SOLUTION RESPIRATORY (INHALATION) at 18:31

## 2024-03-29 RX ADMIN — Medication 10 ML: at 21:24

## 2024-03-29 RX ADMIN — Medication 10 ML: at 08:16

## 2024-03-29 RX ADMIN — AMPICILLIN AND SULBACTAM 3 G: 2; 1 INJECTION, POWDER, FOR SOLUTION INTRAVENOUS at 05:56

## 2024-03-29 RX ADMIN — ARFORMOTEROL TARTRATE 15 MCG: 15 SOLUTION RESPIRATORY (INHALATION) at 07:16

## 2024-03-29 RX ADMIN — ACETAMINOPHEN 650 MG: 325 TABLET ORAL at 10:19

## 2024-03-29 RX ADMIN — HEPARIN SODIUM 13 UNITS/KG/HR: 10000 INJECTION, SOLUTION INTRAVENOUS at 07:01

## 2024-03-29 RX ADMIN — Medication 75 MCG/HR: at 06:09

## 2024-03-29 NOTE — PROGRESS NOTES
"Nutrition Services    Patient Name: Davonte Marshall  YOB: 1955  MRN: [id number]  Admission date: 3/27/2024    PROGRESS NOTE      Encounter Information: EN Follow Up       PO Diet: NPO Diet NPO Type: Strict NPO   PO Supplements: NPO   PO Intake:  NPO       Current nutrition support: Impact Peptide 1.5 @ 65 ml/hr   ml q3h    Provides: 2145 kcal, 134 g pro, 2221 ml fluids       Estimated Needs: 3907-0174 kcal, 107-178 g pro, 2225 ml       Nutrition support review: Pt is at goal rate. No intolerances noted.        Labs (reviewed below): Phos 1.2--replacing       GI Function:  Last BM on 3/29       Nutrition Intervention Updates: Plans to extubate today. RD to recalculate estimated needs s/p extubation.      Results from last 7 days   Lab Units 03/29/24  0548 03/28/24  0724 03/28/24  0252 03/27/24  1212 03/27/24  0920   SODIUM mmol/L 137  --  138  --  139   SODIUM, ARTERIAL mmol/L  --  137.8  --    < >  --    POTASSIUM mmol/L 3.7  --  4.4  --  3.9   CHLORIDE mmol/L 101  --  100  --  100   CO2 mmol/L 23.1  --  20.6*  --  13.6*   BUN mg/dL 31*  --  40*  --  31*   CREATININE mg/dL 1.06  --  2.11*  --  2.62*   CALCIUM mg/dL 7.9*  --  8.3*  --  8.2*   BILIRUBIN mg/dL 0.4  --  0.6  --  0.4   ALK PHOS U/L 58  --  82  --  132*   ALT (SGPT) U/L 20  --  32  --  36   AST (SGOT) U/L 49*  --  67*  --  75*   GLUCOSE mg/dL 129*  --  124*  --  169*   GLUCOSE, ARTERIAL mg/dL  --  125*  --    < >  --     < > = values in this interval not displayed.     Results from last 7 days   Lab Units 03/29/24  0548 03/28/24  0252 03/27/24  1209 03/27/24  0920   MAGNESIUM mg/dL 2.1 1.5*  --  1.7   PHOSPHORUS mg/dL 1.2* 4.2  --  6.8*   HEMOGLOBIN g/dL 10.8* 14.3   < >  --    HEMATOCRIT % 32.5* 43.2   < >  --     < > = values in this interval not displayed.     COVID19   Date Value Ref Range Status   03/27/2024 Not Detected Not Detected - Ref. Range Final     No results found for: \"HGBA1C\"    RD to follow up per " protocol.    Electronically signed by:  Tessie Marshall RD  03/29/24 09:13 EDT

## 2024-03-29 NOTE — PROGRESS NOTES
RT EQUIPMENT DEVICE RELATED - SKIN ASSESSMENT    RT Medical Equipment/Device:     ETT [place]/Anchorfast    Skin Assessment:      Cheek:  Intact  Lips:  Intact  Mouth:  Intact    Device Skin Pressure Protection:  Positioning supports utilized    Nurse Notification:  Dahiana Guillen, RRT

## 2024-03-29 NOTE — SIGNIFICANT NOTE
03/29/24 0840   Physical Therapy Time and Intention   Session Not Performed other (see comments)  (hold, plans to extubate)

## 2024-03-29 NOTE — PROGRESS NOTES
Respiratory Therapist Broncho-Pulmonary Hygiene Progress Note      Patient Name:  Davonte Marshall  YOB: 1955    Davonte Marshall meets the qualification for Level 4 of the Bronco-Pulmonary Hygiene Protocol. This was based on my daily patient assessment and includes review of chest x-ray results, cough ability and quality, oxygenation, secretions or risk for secretion development and patient mobility.     Broncho-Pulmonary Hygiene Assessment:    Level of Movement: Inability or reluctance to change body positions   Obtunded    Breath Sounds: Diminished and/or coarse rhonchi    Cough: Ineffective, weak or not cough efforts    Chest X-Ray: Possible signs of consolidation and/or atelectasis or clear.     Sputum Productions: Thick, tenacious retained secretions with difffiulty clearing    History and Physical: COPD with mucus plugging or retained secretions    SpO2 to Oxygen Need: greater than 92% on room air or  less than 3L nasal canula    Current SpO2 is: 94% on room air    Based on this information, I have completed the following interventions: CPT (vest)      Electronically signed by Mercedes Pedraza CRT, 03/29/24, 4:34 PM EDT.

## 2024-03-29 NOTE — PROGRESS NOTES
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  Despite abnormal EEG, he is at least responsive whenever off sedation, so I am not overly concerned for the possibility of status epilepticus.  I would resume IV Levetiracetam for now.  Whenever he is more stable, can consider transitioning back to his PO Primidone    Our recommendations are outlined below    Consultations :  Toxic metabolic work up per primary team    DVT Prophylaxis :  Choice of Primary Team    Disposition :  Neurology will follow    ----------------------------------------------------------------------------------------------------    Imaging  HEAD CT: No acute findings  EEG:  Abnormal EEG because of:  1. Frequent high-voltage epileptiform discharges which are generalized and synchronous consistent with primary generalized type epilepsy.  2. Slow background activity admixed with slower waves compatible with moderate to marked diffuse encephalopathy.    Labs  UDS positive benzodiazepines and barbiturates not prescribed (Primidone may have triggered positive barbiturate)    Chief Complaint:  History of seizures    History of Present Illness:  Patient is a 68 year old Male.  68 y.o. male was brought to care on 3/27/2024 for altered mental status, minimally responsive, he apparently had been minimally responsive since the morning of 3/26/204. When the EMS has reached home patient was noted to be saturating at 70% on room air and very minimally responsive and has been brought into the ED. Patient was very minimally responsive in the ED, was placed on BiPAP. He went into  respiratory distress after Narcan administration and required intubation. Per nursing staff, no obvious seizure like semiology has been noted during his inpatient course in the hospital. Per his wife at bedside, he was being treated with Primidone as an outpatient. His regular neurologist also stopped Phenytoin in the past since the patient has a history of stroke.       Past Medical History:       Hypertension       Hyperlipidemia       Stroke       There is no history of Diabetes Mellitus       There is no history of Atrial Fibrillation       There is no history of Coronary Artery Disease       There is no history of Covid-19    Medications:    No Anticoagulant use   No Antiplatelet use  Reviewed EMR for current medications    Allergies:   Reviewed  Description: Codeine Arrhythmia Chest pain  Guaifenesin-Codeine Arrhythmia Chest pain  Cefdinir Unknown - Low Severity    Social History:  Unable To Obtain Due To Patient Status : Patient Cannot Speak    Family History:    There Is Family History Of:noncontributory  There is no family history of premature cerebrovascular disease pertinent to this consultation    ROS :  14 Points Review of Systems was performed and was negative except mentioned in HPI.    Past Surgical History:  There Is No Surgical History Contributory To Today’s Visit  There Is Surgical History of:  as noted above    Examination  BP(173/80), Pulse(68), Temp(98.1), Resp(16),    Coma Exam:    Ventilator:  Yes  Sedated: No  Paralyzed: No  Breathes spontaneously above ventilator rate: Yes    Responds to Voice:  Yes  Orients: Yes    Responds to Sternal rub:  Yes  Postures: No    Withdraws limb from painful stimulation:  Yes R Arm, L Arm, R Leg, L Leg    Spontaneous limb movement:   Right arm: Yes  Left arm: Yes  Right leg: Yes  Left leg: Yes    Pupils:  Equal and Reactive    Corneal Reflexes:  Present    Cough/Gag Reflexes:  Absent           Patient/Family was informed the Neurology Consult would happen  via TeleHealth consult by way of interactive audio and video telecommunications and consented to receiving care in this manner.    Telehealth Neurology consultation was provided. I spent minutes providing telehealth care. This includes time spent for face to face visit via telemedicine, review of medical records, imaging studies and discussion of findings with providers, the patient and/or family.      Dr Homar Long      [facility]  For Inpatient follow-up with [facility] physician please call [facility] [phone]. This is not an outpatient service. Post hospital discharge, please contact hospital directly.    Please do not communicate with [facility] physicians via secure chat. If you have any questions, Please contact [facility].  Please call or reconsult our service if there are any clinical or diagnostic changes.

## 2024-03-29 NOTE — PROGRESS NOTES
Pulmonary / Critical Care Progress Note      Patient Name: Davonte Marshall  : 1955  MRN: [id number]  Attending:  Garret Shah MD   Date of admission: 3/27/2024    Subjective   Subjective   Patient critically ill with respiratory failure on ventilator    Over past 24 hours:  Past spontaneous breathing trial.  More awake today and following simple commands  Lactic acid still elevated as white blood cell count  Repeat troponin pending  EEG last night did show seizures and given Keppra however much more neurologically intact this morning  Lactic acidosis unchanged.  White blood cell count improved.  Neurologically doing better and a little bit more awake  Weaning off pressors  Remains on antibiotics for aspiration ammonia.  BAL PCR positive for multiple organisms.  BAL (and awaiting sensitivities  Weak and fatigue  Troponins are still elevated.  Echocardiac grossly unremarkable.  Appreciate cardiology input.  On heparin drip.      Objective     Vitals:   Vital signs for last 24 hours:  Temp:  [98.1 °F (36.7 °C)-101.8 °F (38.8 °C)] 100.6 °F (38.1 °C)  Heart Rate:  [] 86  Resp:  [15-42] 33  BP: ()/() 115/74  FiO2 (%):  [30 %] 30 %    Intake/Output last 3 shifts:  I/O last 3 completed shifts:  In: 5865.1 [I.V.:5133.1; Other:421; NG/GT:311]  Out: 2625 [Urine:2625]  Intake/Output this shift:  No intake/output data recorded.    Vent settings for last 24 hours:  Mode: PS  FiO2 (%):  [30 %] 30 %  PEEP/CPAP (cm H2O):  [8 cm H20] 8 cm H20  IN SUP:  [10 cm H20] 10 cm H20  MAP (cm H2O):  [11-12] 11    Hemodynamic parameters for last 24 hours:       Physical Exam   Vital Signs Reviewed   General: Elderly male intubated and unresponsive on ventilator  HEENT: Pupils sluggish and minimally reactive, no extraocular movements.  OP with endotracheal tube  Neck:  Supple, no JVD, no thyromegaly  Chest:  good aeration, coarse bilateral rhonchi increased work of breathing on, tympanic to percussion bilaterally,   ventilator   CV: Sinus tachycardia, no MGR, pulses 2+, equal.  Abd:  Soft, NT, ND, + BS, no HSM  EXT:  no clubbing, no cyanosis, increasing BLE edema, extremities warm and less mottled  Neuro: Intubated and sedated, more awake and following simple commands, moving all extremities, grimacing more to pain  Skin: No rashes or lesions noted    Result Review    Result Review:  I have personally reviewed the results from the time of this admission to 3/29/2024 12:56 EDT and agree with these findings:  [x]  Laboratory  [x]  Microbiology  [x]  Radiology  [x]  EKG/Telemetry   [x]  Cardiology/Vascular   []  Pathology  []  Old records  []  Other:  Most notable findings include:       Lab 03/29/24  0548 03/28/24  0724 03/28/24  0252 03/27/24  1212 03/27/24  1209 03/27/24  0920 03/27/24  0722 03/27/24  0355   WBC 14.44*  --  27.31*  --  15.05*  --   --  29.90*   HEMOGLOBIN 10.8*  --  14.3  --  14.8  --   --  16.4   HEMATOCRIT 32.5*  --  43.2  --  47.4  --   --  49.0   PLATELETS 142  --  257  --  266  --   --  333   SODIUM 137  --  138  --   --  139  --  137   SODIUM, ARTERIAL  --  137.8  --  138.9  --   --  141.4  --    POTASSIUM 3.7  --  4.4  --   --  3.9  --  4.6   CHLORIDE 101  --  100  --   --  100  --  96*   CO2 23.1  --  20.6*  --   --  13.6*  --  20.3*   BUN 31*  --  40*  --   --  31*  --  29*   CREATININE 1.06  --  2.11*  --   --  2.62*  --  2.46*   GLUCOSE 129*  --  124*  --   --  169*  --  154*   GLUCOSE, ARTERIAL  --  125*  --  126*  --   --  187*  --    CALCIUM 7.9*  --  8.3*  --   --  8.2*  --  9.1   PHOSPHORUS 1.2*  --  4.2  --   --  6.8*  --   --    TOTAL PROTEIN 5.5*  --  6.6  --   --  7.0  --  8.2   ALBUMIN 2.6*  --  3.2*  --   --  3.4*  --  4.3   GLOBULIN 2.9  --  3.4  --   --  3.6  --  3.9     Results for orders placed during the hospital encounter of 03/27/24    Adult Transthoracic Echo Limited W/ Cont if Necessary Per Protocol    Interpretation Summary    Extremely technically difficult study with very  limited views.    The subcostal view is the only one where we can see myocardium and it looks like ejection fraction is greater than 50%.  Cannot rule out regional wall motion abnormalities.    No obvious significant valvular disease by Doppler.  The valves are not well-visualized.    Left ventricular diastolic function was indeterminate.    Troponin 1300  BAL PCR with Klebsiella, Moraxella, MSSA and strep pneumo.  BAL with multiple organisms  Strep pneumo urine antigen positive  EEG with seizures    Assessment & Plan   Assessment / Plan     Active Hospital Problems:  Active Hospital Problems    Diagnosis     **Acute metabolic encephalopathy      Impression:  Septic shock  Aspiration pneumonia secondary to Klebsiella, strep pneumo, MSSA and Moraxella  Lactic acidosis, clinically significant  Metabolic acidosis  Acute hypoxemic and hypercapnic respiratory failure require mechanical ventilation  Altered mental status  Toxic/metabolic encephalopathy  NSTEMI, type II from demand ischemia  Acute kidney injury secondary to prerenal etiology  Hypomagnesemia  Hypophosphatemia  Leukocytosis  Seizures/tremors.  On primidone at home     Plan:  More awake this morning and passed SBT.  Extubate to BiPAP 14/7.  Wean O2 to keep SPO2 greater than 90%  Continue Unasyn day 3/10.  De-escalate further based off of BAL results.    On day 3/5 of high-dose steroids  Wean off of pressors to keep mean arterial pressure greater than 65  DC IV fluids.  Give 40 mg IV Lasix x 1  Check troponin again this morning as it remains elevated.  Echocardiogram with no obvious wall motion abnormalities and normal ejection fraction.  Finish heparin drip today per ACS protocol.  Appreciate cardiology input  Continue aspirin and statin  Start tube feeds at goal per dietitian recommendations  Agree with Keppra.  EEG last night reviewed.  Once stable can transition from Keppra over to home primidone per neurology  Trend lactic acid until cleared  Trend renal  panel and electrolytes.  Replace potassium and phosphorus IV    Discussed with family at bedside    DVT prophylaxis:  Medical DVT prophylaxis orders are present.    CODE STATUS:   Level Of Support Discussed With: Health Care Surrogate  Code Status (Patient has no pulse and is not breathing): CPR (Attempt to Resuscitate)  Medical Interventions (Patient has pulse or is breathing): Full Support      The patient is critically ill in the ICU with septic shock, respiratory failure on ventilator, hypomagnesemia, acute kidney injury, aspiration pneumonia, NSTEMI. Multidisciplinary bedside critical care rounds were performed with nursing staff, respiratory therapy, pharmacy, nutritional services, social work. I have personally reviewed the chart, labs and any pertinent imaging available.  I have spent 35 minutes of critical care time, excluding procedures, in the care of this patient.    Electronically signed by Mikey Mahajan MD, 03/29/24, 12:59 PM EDT.

## 2024-03-29 NOTE — PROGRESS NOTES
[facility]   Hospitalist Progress Note    Date of admission: 3/27/2024  Patient Name: Davonte Marshall  1955  Date: 3/29/2024      Subjective     Chief Complaint   Patient presents with    Respiratory Distress       Interval Followup: Extubated today.  Patient on BiPAP still very tired and alternated Precedex drip to help with some agitation.  Updated family at bedside.  Patient unable to give answers to ROS questions    Objective     Vitals:   Temp:  [98.1 °F (36.7 °C)-100.8 °F (38.2 °C)] 100.8 °F (38.2 °C)  Heart Rate:  [] 93  Resp:  [15-35] 26  BP: ()/() 148/79  Flow (L/min):  [3-4] 3  FiO2 (%):  [30 %] 30 %    Physical Exam  Ill-appearing tired in bed  Rhonchorous with slightly diminished breath sounds in bases on BiPAP  Elevated rate regular rhythm trace edema  Obese abdomen not rigid positive bowel sounds  Not alert or oriented, is able to squeeze my hand with loud prompting and a lot of encouragement.  Does move extremities spontaneously    Result Review:  Vital signs, labs and recent relevant imaging reviewed.        acetaminophen    aluminum-magnesium hydroxide-simethicone    senna-docusate sodium **AND** polyethylene glycol **AND** bisacodyl **AND** bisacodyl    Diclofenac Sodium    haloperidol lactate    heparin    heparin    hydrOXYzine    labetalol    Lidocaine    melatonin    nicotine    nitroglycerin    ondansetron    prochlorperazine    sodium chloride    sodium chloride    sodium chloride    ampicillin-sulbactam, 3 g, Intravenous, Q6H  arformoterol, 15 mcg, Nebulization, BID - RT  aspirin, 81 mg, Nasogastric, Daily  atorvastatin, 20 mg, Nasogastric, Nightly  budesonide, 0.5 mg, Nebulization, BID - RT  chlorhexidine, 15 mL, Mouth/Throat, Q12H  [Held by provider] DULoxetine, 60 mg, Oral, BID  famotidine, 20 mg, Intravenous, Daily  [Held by provider] losartan, 100 mg, Oral, Q24H   And  [Held by provider] hydroCHLOROthiazide, 12.5 mg, Oral, Q24H  ipratropium-albuterol, 3 mL,  Nebulization, Q4H - RT  levETIRAcetam, 750 mg, Intravenous, Q12H  methylPREDNISolone sodium succinate, 40 mg, Intravenous, Q8H  potassium phosphate, 15 mmol, Intravenous, Q3H  [Held by provider] primidone, 250 mg, Nasogastric, TID  senna-docusate sodium, 2 tablet, Nasogastric, BID  sodium chloride, 10 mL, Intravenous, Q12H        XR Chest 1 View    Result Date: 3/27/2024  Impression:  1. Endotracheal tube in good position with the tip 3.5 cm above the rocío. 2. Minimal bibasilar airspace disease which may be secondary to pneumonia or atelectasis.   Electronically Signed By-Presley Baltazar MD On:3/27/2024 7:17 AM      CT Head Without Contrast    Result Date: 3/27/2024   No acute brain abnormality is appreciated. No definite acute infarct. No acute intracranial hemorrhage.  There is possible acute sinus disease, as detailed above.       Electronically Signed By-Rc Vann MD On:3/27/2024 5:23 AM       Assessment / Plan     Summary: 68-year-old male with hypertension, chronic pain on high dose of morphine who presented after having worsening lethargy and mental status apparently was found to still have food in his mouth from the day before, is midline responsive, prior to the CT department had hypoxia with sats in the 70s initially requiring BiPAP, had aspiration episode and required intubation for airway distress.  Treating for sepsis with multifocal pneumonia, possible cardiogenic shock, aspiration, hypoxemic hypercapnic respiratory failure, polypharmacy and oversedation and question positive benzodiazepines and barbiturates on talk screen that he is on prescribed.  Pulmonology and cardiology assisting    Assessment/Plan (clinically significant if listed here)  Sepsis with shock, multifactorial with pneumonia and possibly also cardiogenic shock  Aspiration pneumonia with Klebsiella, Streptococcus, MSSA and Moraxella  Acute hypoxemic and hypercapnic respiratory failure requiring mechanical intervention  Acute  metabolic encephalopathy in setting of polypharmacy and hypercapnia  Tox positive benzodiazepines and barbiturates not prescribed  Seizure activity on EEG  Lactic acidosis, severe  Metabolic acidosis  NSTEMI, suspect type II from demand/sepsis  DIONNA, secondary to sepsis/shock/poor perfusion  Age indeterminate sinusitis   Hx of seizures and Essential tremor - on primidone  Hyperlipidemia  Depression/anxiety on outpatient Cymbalta  Question underlying dementia with forgetfulness as outpatient  Polypharmacy and difficulty with medication adherence  Hypomagnesemia    Extubated to BiPAP, still requiring, monitor respiratory status closely  Nebulizers and respiratory hygiene  Continue on Unasyn for polymicrobial pneumonia still with some fevers but curve is improving overall.  White count decreasing.  Continue to monitor  EEG resulting with seizure activity, placed on Keppra last night, neurology consulted, recommends continuing IV Keppra for now and can convert over to primidone p.o. once able to safely take.  Will continue to monitor mental status closely IV Ativan if no further seizures.  Mental status is still somewhat variable but seems to be improving. Question if having unwitnessed seizure activity at home with postictal state   Question if had withdrawal issue from a home poor adherence to primidone as is 3 times daily dosing but seems to have missed the afternoon dose frequently.  Might benefit from bid dosing of alternative medicine  (may have issues with Keppra in the past unclear line, review of patient's outpatient neurology notes do not mention any issues with Keppra that I can see.  May have been using primidone more for the essential tremor symptoms.)  DIONNA decreasing creatinine continues to improve with supportive care, stop IV fluids and diuresing some today will hopefully help with respiratory status as well  Finishing heparin drip for ACS as per cardiology. Further evaluation/Possible stress test in  evaluation as per cardiology   Replace phosphorus monitor electrolytes tube feeds via NG/dietitian assisting  still unclear why his toxicology was positive for benzodiazepines and barbiturates is not on this as an outpatient.    (Outpatient 60mg morphine sulfate er bid, norco 10 tid, no benzos/barbituates on outpt labs.  Holding, needs adjusted regimen at time of discharge).  Wife is asking about resuming pain medication, will defer anything scheduled and use low-dose cautiously would favor waiting to resume until patient is able to wake up enough to ask for this  Holding duloxetine given renal failure/AMS, creatinine improving likely able to resume at lower dose tomorrow   Holding home losartan/HCTZ.  Last creatinine from 2022 was 0.9 no more recent for comparison.  Will need voiding trial once his catheter is eventually removed  Ct head negative apart from possible moderate age indetermine sinus disease right maxillary and right frontal paranasal in particular.  Less likely to be acute bacterial sinusitis but should be covered by above antibiotics regardless  Continue home aspirin and statin.    AST downtrending. Monitor suspect ischemic/hypotension picture contributing  PT/OT  Check a.m. CBC, BMP, magnesium, phosphorus, lfts, ptt monitoring  Continue hospitalization at current level of care in the icu, critically ill.        DVT prophylaxis:  Medical DVT prophylaxis orders are present.    Level Of Support Discussed With: Health Care Surrogate  Code Status (Patient has no pulse and is not breathing): CPR (Attempt to Resuscitate)  Medical Interventions (Patient has pulse or is breathing): Full Support    Patient is critically ill due to septic shock possibly cardiogenic shock, respiratory failure, acute renal failure, epileptogenic activity and multiple additional issues as above.  I have spent 33 minutes of critical care time reviewing documentation, pertinent labs, imaging studies, examining the patient,  modifying care plan, and discussing patient's condition and care plan with the patient's wife at bedside, pulmonology, nursing, social work.      CBC          3/27/2024    03:55 3/27/2024    12:09 3/28/2024    02:52 3/29/2024    05:48   CBC   WBC 29.90  15.05  27.31  14.44    RBC 5.44  4.99  4.76  3.58    Hemoglobin 16.4  14.8  14.3  10.8    Hematocrit 49.0  47.4  43.2  32.5    MCV 90.1  95.0  90.8  90.8    MCH 30.1  29.7  30.0  30.2    MCHC 33.5  31.2  33.1  33.2    RDW 13.8  13.7  14.2  13.9    Platelets 333  266  257  142        CMP          3/27/2024    03:47 3/27/2024    03:55 3/27/2024    07:22 3/27/2024    09:20 3/27/2024    12:12 3/28/2024    02:52 3/28/2024    07:24 3/29/2024    05:48   CMP   Glucose 148  154  187  169  126  124  125  129    BUN  29   31   40   31    Creatinine  2.46   2.62   2.11   1.06    EGFR  28.2   26.1   33.9   77.4    Sodium 139.1  137  141.4  139  138.9  138  137.8  137    Potassium  4.6   3.9   4.4   3.7    Chloride  96   100   100   101    Calcium  9.1   8.2   8.3   7.9    Total Protein  8.2   7.0   6.6   5.5    Albumin  4.3   3.4   3.2   2.6    Globulin  3.9   3.6   3.4   2.9    Total Bilirubin  0.4   0.4   0.6   0.4    Alkaline Phosphatase  153   132   82   58    AST (SGOT)  68   75   67   49    ALT (SGPT)  37   36   32   20    Albumin/Globulin Ratio  1.1   0.9   0.9   0.9    BUN/Creatinine Ratio  11.8   11.8   19.0   29.2    Anion Gap  20.7   25.4   17.4   12.9

## 2024-03-29 NOTE — PROGRESS NOTES
RT EQUIPMENT DEVICE RELATED - SKIN ASSESSMENT    RT Medical Equipment/Device:     NIV Mask:  Under-the-nose   size:  A    Skin Assessment:      Cheek:  Intact  Chin:  Intact  Ears:  Intact  Neck:  Intact  Nose:  Intact  Mouth:  Intact    Device Skin Pressure Protection:  Skin-to-device areas padded:  Other:  Patient not wearing bipap mask at this time.    Nurse Notification:  Dahiana Pedraza, CRT

## 2024-03-29 NOTE — PLAN OF CARE
Goal Outcome Evaluation:   Patient extubated and placed on Bipap today. Tolerated well. Taken off bipap and placed on 3 l/m nasal cannula. Encouraged to wake up, decreased sedation, and giving scheduled neb treatments. Will continue to monitor and adjust therapy as tolerated.

## 2024-03-29 NOTE — PLAN OF CARE
Goal Outcome Evaluation:  Plan of Care Reviewed With: patient        Progress: improving  Outcome Evaluation: Patient continues on PS 10/8 tolerating well.

## 2024-03-30 LAB
027 TOXIN: NORMAL
ALBUMIN SERPL-MCNC: 3.2 G/DL (ref 3.5–5.2)
ALBUMIN/GLOB SERPL: 1 G/DL
ALP SERPL-CCNC: 69 U/L (ref 39–117)
ALT SERPL W P-5'-P-CCNC: 22 U/L (ref 1–41)
ANION GAP SERPL CALCULATED.3IONS-SCNC: 13.5 MMOL/L (ref 5–15)
APTT PPP: 69.2 SECONDS (ref 78–95.9)
AST SERPL-CCNC: 48 U/L (ref 1–40)
BACTERIA SPEC AEROBE CULT: ABNORMAL
BASOPHILS # BLD AUTO: 0.07 10*3/MM3 (ref 0–0.2)
BASOPHILS NFR BLD AUTO: 0.4 % (ref 0–1.5)
BILIRUB SERPL-MCNC: 0.4 MG/DL (ref 0–1.2)
BUN SERPL-MCNC: 39 MG/DL (ref 8–23)
BUN/CREAT SERPL: 40.2 (ref 7–25)
C DIFF TOX GENS STL QL NAA+PROBE: NEGATIVE
CALCIUM SPEC-SCNC: 8.2 MG/DL (ref 8.6–10.5)
CHLORIDE SERPL-SCNC: 103 MMOL/L (ref 98–107)
CO2 SERPL-SCNC: 25.5 MMOL/L (ref 22–29)
CREAT SERPL-MCNC: 0.97 MG/DL (ref 0.76–1.27)
D-LACTATE SERPL-SCNC: 2.5 MMOL/L (ref 0.5–2)
DEPRECATED RDW RBC AUTO: 46.2 FL (ref 37–54)
EGFRCR SERPLBLD CKD-EPI 2021: 86.1 ML/MIN/1.73
EOSINOPHIL # BLD AUTO: 0.1 10*3/MM3 (ref 0–0.4)
EOSINOPHIL NFR BLD AUTO: 0.5 % (ref 0.3–6.2)
ERYTHROCYTE [DISTWIDTH] IN BLOOD BY AUTOMATED COUNT: 14.1 % (ref 12.3–15.4)
GLOBULIN UR ELPH-MCNC: 3.1 GM/DL
GLUCOSE BLDC GLUCOMTR-MCNC: 99 MG/DL (ref 70–99)
GLUCOSE SERPL-MCNC: 115 MG/DL (ref 65–99)
GRAM STN SPEC: ABNORMAL
HCT VFR BLD AUTO: 33.4 % (ref 37.5–51)
HGB BLD-MCNC: 11.1 G/DL (ref 13–17.7)
IMM GRANULOCYTES # BLD AUTO: 0.1 10*3/MM3 (ref 0–0.05)
IMM GRANULOCYTES NFR BLD AUTO: 0.5 % (ref 0–0.5)
LARGE PLATELETS: NORMAL
LYMPHOCYTES # BLD AUTO: 1.32 10*3/MM3 (ref 0.7–3.1)
LYMPHOCYTES NFR BLD AUTO: 7 % (ref 19.6–45.3)
MAGNESIUM SERPL-MCNC: 1.9 MG/DL (ref 1.6–2.4)
MCH RBC QN AUTO: 29.8 PG (ref 26.6–33)
MCHC RBC AUTO-ENTMCNC: 33.2 G/DL (ref 31.5–35.7)
MCV RBC AUTO: 89.8 FL (ref 79–97)
MONOCYTES # BLD AUTO: 0.52 10*3/MM3 (ref 0.1–0.9)
MONOCYTES NFR BLD AUTO: 2.7 % (ref 5–12)
NEUTROPHILS NFR BLD AUTO: 16.8 10*3/MM3 (ref 1.7–7)
NEUTROPHILS NFR BLD AUTO: 88.9 % (ref 42.7–76)
NRBC BLD AUTO-RTO: 0 /100 WBC (ref 0–0.2)
OVALOCYTES BLD QL SMEAR: NORMAL
PHOSPHATE SERPL-MCNC: 2 MG/DL (ref 2.5–4.5)
PLATELET # BLD AUTO: 145 10*3/MM3 (ref 140–450)
PMV BLD AUTO: 10.4 FL (ref 6–12)
POLYCHROMASIA BLD QL SMEAR: NORMAL
POTASSIUM SERPL-SCNC: 3.9 MMOL/L (ref 3.5–5.2)
PROCALCITONIN SERPL-MCNC: 3.59 NG/ML (ref 0–0.25)
PROT SERPL-MCNC: 6.3 G/DL (ref 6–8.5)
RBC # BLD AUTO: 3.72 10*6/MM3 (ref 4.14–5.8)
SMALL PLATELETS BLD QL SMEAR: ADEQUATE
SODIUM SERPL-SCNC: 142 MMOL/L (ref 136–145)
WBC MORPH BLD: NORMAL
WBC NRBC COR # BLD AUTO: 18.91 10*3/MM3 (ref 3.4–10.8)

## 2024-03-30 PROCEDURE — 99233 SBSQ HOSP IP/OBS HIGH 50: CPT | Performed by: INTERNAL MEDICINE

## 2024-03-30 PROCEDURE — 25010000002 ENOXAPARIN PER 10 MG: Performed by: NURSE PRACTITIONER

## 2024-03-30 PROCEDURE — 84100 ASSAY OF PHOSPHORUS: CPT | Performed by: INTERNAL MEDICINE

## 2024-03-30 PROCEDURE — 25810000003 SODIUM CHLORIDE 0.9 % SOLUTION: Performed by: NURSE PRACTITIONER

## 2024-03-30 PROCEDURE — 25010000002 METHYLPREDNISOLONE PER 40 MG: Performed by: INTERNAL MEDICINE

## 2024-03-30 PROCEDURE — 83605 ASSAY OF LACTIC ACID: CPT | Performed by: INTERNAL MEDICINE

## 2024-03-30 PROCEDURE — 94799 UNLISTED PULMONARY SVC/PX: CPT

## 2024-03-30 PROCEDURE — 85730 THROMBOPLASTIN TIME PARTIAL: CPT | Performed by: INTERNAL MEDICINE

## 2024-03-30 PROCEDURE — 25010000002 LEVETRIRACETAM PER 10 MG: Performed by: INTERNAL MEDICINE

## 2024-03-30 PROCEDURE — 99291 CRITICAL CARE FIRST HOUR: CPT | Performed by: INTERNAL MEDICINE

## 2024-03-30 PROCEDURE — 25010000002 MAGNESIUM SULFATE 2 GM/50ML SOLUTION: Performed by: NURSE PRACTITIONER

## 2024-03-30 PROCEDURE — 94664 DEMO&/EVAL PT USE INHALER: CPT

## 2024-03-30 PROCEDURE — 94761 N-INVAS EAR/PLS OXIMETRY MLT: CPT

## 2024-03-30 PROCEDURE — 85007 BL SMEAR W/DIFF WBC COUNT: CPT | Performed by: INTERNAL MEDICINE

## 2024-03-30 PROCEDURE — 92610 EVALUATE SWALLOWING FUNCTION: CPT

## 2024-03-30 PROCEDURE — 25010000002 HEPARIN (PORCINE) 25000-0.45 UT/250ML-% SOLUTION: Performed by: STUDENT IN AN ORGANIZED HEALTH CARE EDUCATION/TRAINING PROGRAM

## 2024-03-30 PROCEDURE — 25010000002 FUROSEMIDE PER 20 MG: Performed by: INTERNAL MEDICINE

## 2024-03-30 PROCEDURE — 99232 SBSQ HOSP IP/OBS MODERATE 35: CPT | Performed by: PSYCHIATRY & NEUROLOGY

## 2024-03-30 PROCEDURE — 82948 REAGENT STRIP/BLOOD GLUCOSE: CPT

## 2024-03-30 PROCEDURE — 94660 CPAP INITIATION&MGMT: CPT

## 2024-03-30 PROCEDURE — 25010000002 AMPICILLIN-SULBACTAM PER 1.5 G: Performed by: NURSE PRACTITIONER

## 2024-03-30 PROCEDURE — 80053 COMPREHEN METABOLIC PANEL: CPT | Performed by: INTERNAL MEDICINE

## 2024-03-30 PROCEDURE — 84145 PROCALCITONIN (PCT): CPT | Performed by: NURSE PRACTITIONER

## 2024-03-30 PROCEDURE — 85025 COMPLETE CBC W/AUTO DIFF WBC: CPT | Performed by: INTERNAL MEDICINE

## 2024-03-30 PROCEDURE — 83735 ASSAY OF MAGNESIUM: CPT | Performed by: INTERNAL MEDICINE

## 2024-03-30 PROCEDURE — 94669 MECHANICAL CHEST WALL OSCILL: CPT

## 2024-03-30 RX ORDER — FUROSEMIDE 10 MG/ML
40 INJECTION INTRAMUSCULAR; INTRAVENOUS
Status: DISCONTINUED | OUTPATIENT
Start: 2024-03-30 | End: 2024-04-16 | Stop reason: HOSPADM

## 2024-03-30 RX ORDER — METHYLPREDNISOLONE SODIUM SUCCINATE 40 MG/ML
40 INJECTION, POWDER, LYOPHILIZED, FOR SOLUTION INTRAMUSCULAR; INTRAVENOUS DAILY
Status: DISCONTINUED | OUTPATIENT
Start: 2024-03-31 | End: 2024-04-03

## 2024-03-30 RX ORDER — MAGNESIUM SULFATE HEPTAHYDRATE 40 MG/ML
2 INJECTION, SOLUTION INTRAVENOUS ONCE
Status: COMPLETED | OUTPATIENT
Start: 2024-03-30 | End: 2024-03-30

## 2024-03-30 RX ORDER — DOXYCYCLINE 100 MG/1
100 CAPSULE ORAL EVERY 12 HOURS SCHEDULED
Status: DISCONTINUED | OUTPATIENT
Start: 2024-03-30 | End: 2024-03-31

## 2024-03-30 RX ORDER — FENTANYL/ROPIVACAINE/NS/PF 2-625MCG/1
15 PLASTIC BAG, INJECTION (ML) EPIDURAL
Status: COMPLETED | OUTPATIENT
Start: 2024-03-30 | End: 2024-03-30

## 2024-03-30 RX ORDER — ENOXAPARIN SODIUM 100 MG/ML
40 INJECTION SUBCUTANEOUS EVERY 24 HOURS
Status: DISCONTINUED | OUTPATIENT
Start: 2024-03-30 | End: 2024-04-01

## 2024-03-30 RX ADMIN — METHYLPREDNISOLONE SODIUM SUCCINATE 40 MG: 40 INJECTION INTRAMUSCULAR; INTRAVENOUS at 04:23

## 2024-03-30 RX ADMIN — IPRATROPIUM BROMIDE AND ALBUTEROL SULFATE 3 ML: .5; 3 SOLUTION RESPIRATORY (INHALATION) at 06:45

## 2024-03-30 RX ADMIN — AMPICILLIN AND SULBACTAM 3 G: 2; 1 INJECTION, POWDER, FOR SOLUTION INTRAVENOUS at 12:01

## 2024-03-30 RX ADMIN — ASPIRIN 81 MG 81 MG: 81 TABLET ORAL at 09:47

## 2024-03-30 RX ADMIN — CHLORHEXIDINE GLUCONATE 15 ML: 1.2 RINSE ORAL at 09:48

## 2024-03-30 RX ADMIN — AMPICILLIN AND SULBACTAM 3 G: 2; 1 INJECTION, POWDER, FOR SOLUTION INTRAVENOUS at 00:39

## 2024-03-30 RX ADMIN — HEPARIN SODIUM 12 UNITS/KG/HR: 10000 INJECTION, SOLUTION INTRAVENOUS at 02:57

## 2024-03-30 RX ADMIN — DOXYCYCLINE 100 MG: 100 CAPSULE ORAL at 12:01

## 2024-03-30 RX ADMIN — BUDESONIDE 0.5 MG: 0.5 INHALANT ORAL at 06:45

## 2024-03-30 RX ADMIN — AMPICILLIN AND SULBACTAM 3 G: 2; 1 INJECTION, POWDER, FOR SOLUTION INTRAVENOUS at 18:24

## 2024-03-30 RX ADMIN — IPRATROPIUM BROMIDE AND ALBUTEROL SULFATE 3 ML: .5; 3 SOLUTION RESPIRATORY (INHALATION) at 12:56

## 2024-03-30 RX ADMIN — IPRATROPIUM BROMIDE AND ALBUTEROL SULFATE 3 ML: .5; 3 SOLUTION RESPIRATORY (INHALATION) at 03:27

## 2024-03-30 RX ADMIN — Medication 10 ML: at 21:16

## 2024-03-30 RX ADMIN — FUROSEMIDE 40 MG: 10 INJECTION, SOLUTION INTRAMUSCULAR; INTRAVENOUS at 12:01

## 2024-03-30 RX ADMIN — Medication 10 ML: at 09:48

## 2024-03-30 RX ADMIN — ACETAMINOPHEN 650 MG: 325 TABLET ORAL at 12:01

## 2024-03-30 RX ADMIN — BUDESONIDE 0.5 MG: 0.5 INHALANT ORAL at 18:16

## 2024-03-30 RX ADMIN — POTASSIUM PHOSPHATE, MONOBASIC POTASSIUM PHOSPHATE, DIBASIC 15 MMOL: 224; 236 INJECTION, SOLUTION, CONCENTRATE INTRAVENOUS at 12:02

## 2024-03-30 RX ADMIN — METHYLPREDNISOLONE SODIUM SUCCINATE 40 MG: 40 INJECTION INTRAMUSCULAR; INTRAVENOUS at 12:01

## 2024-03-30 RX ADMIN — LEVETIRACETAM 750 MG: 100 INJECTION, SOLUTION INTRAVENOUS at 21:16

## 2024-03-30 RX ADMIN — ENOXAPARIN SODIUM 40 MG: 100 INJECTION SUBCUTANEOUS at 12:01

## 2024-03-30 RX ADMIN — FUROSEMIDE 40 MG: 10 INJECTION, SOLUTION INTRAMUSCULAR; INTRAVENOUS at 18:07

## 2024-03-30 RX ADMIN — MAGNESIUM SULFATE HEPTAHYDRATE 2 G: 40 INJECTION, SOLUTION INTRAVENOUS at 10:43

## 2024-03-30 RX ADMIN — DOXYCYCLINE 100 MG: 100 CAPSULE ORAL at 21:10

## 2024-03-30 RX ADMIN — IPRATROPIUM BROMIDE AND ALBUTEROL SULFATE 3 ML: .5; 3 SOLUTION RESPIRATORY (INHALATION) at 15:04

## 2024-03-30 RX ADMIN — ARFORMOTEROL TARTRATE 15 MCG: 15 SOLUTION RESPIRATORY (INHALATION) at 18:16

## 2024-03-30 RX ADMIN — IPRATROPIUM BROMIDE AND ALBUTEROL SULFATE 3 ML: .5; 3 SOLUTION RESPIRATORY (INHALATION) at 18:16

## 2024-03-30 RX ADMIN — LEVETIRACETAM 750 MG: 100 INJECTION, SOLUTION INTRAVENOUS at 09:47

## 2024-03-30 RX ADMIN — POTASSIUM PHOSPHATE, MONOBASIC POTASSIUM PHOSPHATE, DIBASIC 15 MMOL: 224; 236 INJECTION, SOLUTION, CONCENTRATE INTRAVENOUS at 09:47

## 2024-03-30 RX ADMIN — ATORVASTATIN CALCIUM 20 MG: 40 TABLET, FILM COATED ORAL at 21:10

## 2024-03-30 RX ADMIN — IPRATROPIUM BROMIDE AND ALBUTEROL SULFATE 3 ML: .5; 3 SOLUTION RESPIRATORY (INHALATION) at 22:38

## 2024-03-30 RX ADMIN — ARFORMOTEROL TARTRATE 15 MCG: 15 SOLUTION RESPIRATORY (INHALATION) at 06:45

## 2024-03-30 RX ADMIN — FAMOTIDINE 20 MG: 10 INJECTION INTRAVENOUS at 09:47

## 2024-03-30 RX ADMIN — AMPICILLIN AND SULBACTAM 3 G: 2; 1 INJECTION, POWDER, FOR SOLUTION INTRAVENOUS at 05:33

## 2024-03-30 NOTE — PLAN OF CARE
Goal Outcome Evaluation:  Plan of Care Reviewed With: patient, spouse      ASSESSMENT/ PLAN OF CARE:  Pt presents with limitations, noted below, that impede his ability to swallow safely and maintain nutrition. The skills of a therapist will be required to safely and effectively implement the following treatment plan to restore maximal level of function.    PROBLEMS:  1.   Swallow delay, decreased general strength, risk of aspiration                          TREATMENT: Dysphagia therapy to address swallow function through exercises and education of strategies.     FREQUENCY/DURATION: Twice daily 5 times per week    REHAB POTENTIAL:  Pt has fair to good rehab potential.  The following limitations may influence improvement/ length of tx: Medical status.    RECOMMENDATIONS:   1.   DIET: Continue with NG as primary nutrition until p.o. diet can be reestablished.    2.  May have single small ice chips sparingly when sitting fully upright.            Anticipated Discharge Disposition (SLP): inpatient rehabilitation facility, skilled nursing facility

## 2024-03-30 NOTE — PROGRESS NOTES
Pulmonary / Critical Care Progress Note      Patient Name: Davonte Marshall  : 1955  MRN: [id number]  Attending:  Garret Shah MD   Date of admission: 3/27/2024    Subjective   Subjective   Patient critically ill with respiratory failure on ventilator    Patient extubated to NIPPV  Requiring NT suction, airway clearance therapies  Still having fever with Tmax 101.9  On 3 L nasal cannula this morning  Pulled De Los Santos catheter out overnight  Patient is awake, following commands, no acute distress  Have rhonchorous, congested cough  BAL culture with strep pneumo, MSSA and Klebsiella  On heparin drip  Core track, receiving tube feeds    Objective     Vitals:   Vital signs for last 24 hours:  Temp:  [98.8 °F (37.1 °C)-101.3 °F (38.5 °C)] 99.6 °F (37.6 °C)  Heart Rate:  [69-97] 72  Resp:  [24-43] 26  BP: (115-171)/() 150/92    Intake/Output last 3 shifts:  I/O last 3 completed shifts:  In: 9747.1 [I.V.:6216.1; Other:982; NG/GT:2549]  Out: 3550 [Urine:3550]    Physical Exam   Vital Signs Reviewed   General: Elderly male awake, on nasal cannula  HEENT: Pupils sluggish and minimally reactive, no extraocular movements.    Neck:  Supple, no JVD, no thyromegaly  Chest:  good aeration, coarse breath sounds bilaterally, secretions noted  CV: Sinus tachycardia, no MGR, pulses 2+, equal.  Abd:  Soft, NT, ND, + BS, no HSM  EXT:  no clubbing, no cyanosis, no edema  Neuro: Awake, alert and oriented x 2, no focal deficits  Skin: No rashes or lesions noted    Result Review    Result Review:  I have personally reviewed the results from the time of this admission to 3/30/2024 10:50 EDT and agree with these findings:  [x]  Laboratory  [x]  Microbiology  [x]  Radiology  [x]  EKG/Telemetry   [x]  Cardiology/Vascular   []  Pathology  []  Old records  []  Other:  Most notable findings include:       Lab 24  0600 24  0548 24  0724 24  0252 24  1212 24  1209 24  0920 24  0722  03/27/24  0355   WBC 18.91* 14.44*  --  27.31*  --  15.05*  --   --  29.90*   HEMOGLOBIN 11.1* 10.8*  --  14.3  --  14.8  --   --  16.4   HEMATOCRIT 33.4* 32.5*  --  43.2  --  47.4  --   --  49.0   PLATELETS 145 142  --  257  --  266  --   --  333   SODIUM 142 137  --  138  --   --  139  --  137   SODIUM, ARTERIAL  --   --  137.8  --  138.9  --   --  141.4  --    POTASSIUM 3.9 3.7  --  4.4  --   --  3.9  --  4.6   CHLORIDE 103 101  --  100  --   --  100  --  96*   CO2 25.5 23.1  --  20.6*  --   --  13.6*  --  20.3*   BUN 39* 31*  --  40*  --   --  31*  --  29*   CREATININE 0.97 1.06  --  2.11*  --   --  2.62*  --  2.46*   GLUCOSE 115* 129*  --  124*  --   --  169*  --  154*   GLUCOSE, ARTERIAL  --   --  125*  --  126*  --   --  187*  --    CALCIUM 8.2* 7.9*  --  8.3*  --   --  8.2*  --  9.1   PHOSPHORUS 2.0* 1.2*  --  4.2  --   --  6.8*  --   --    TOTAL PROTEIN 6.3 5.5*  --  6.6  --   --  7.0  --  8.2   ALBUMIN 3.2* 2.6*  --  3.2*  --   --  3.4*  --  4.3   GLOBULIN 3.1 2.9  --  3.4  --   --  3.6  --  3.9     Results for orders placed during the hospital encounter of 03/27/24    Adult Transthoracic Echo Limited W/ Cont if Necessary Per Protocol    Interpretation Summary    Extremely technically difficult study with very limited views.    The subcostal view is the only one where we can see myocardium and it looks like ejection fraction is greater than 50%.  Cannot rule out regional wall motion abnormalities.    No obvious significant valvular disease by Doppler.  The valves are not well-visualized.    Left ventricular diastolic function was indeterminate.    BAL culture with Streptococcus pneumonia, Klebsiella and MSSA    Assessment & Plan   Assessment / Plan     Active Hospital Problems:  Active Hospital Problems    Diagnosis     **Acute metabolic encephalopathy      Impression:  Septic shock  Aspiration pneumonia secondary to Klebsiella, strep pneumo, MSSA and Moraxella  Lactic acidosis, clinically  significant  Metabolic acidosis  Acute hypoxemic and hypercapnic respiratory failure require mechanical ventilation  Altered mental status  Toxic/metabolic encephalopathy  NSTEMI, type II from demand ischemia  Acute kidney injury secondary to prerenal etiology  Hypomagnesemia  Hypophosphatemia  Hypophosphatemia  Leukocytosis  Seizures/tremors.  On primidone at home     Plan:  --Continue supplemental oxygen with nasal cannula, okay to use NIPPV at night  -Continue airway clearance therapies, BPH, OS/IS, CPT  -Wean FiO2 for SpO2 greater than 90  -NT suction if needed  -Reviewed BAL culture, continue Unasyn day 4/10, will add doxycycline day 1 for MSSA coverage  -Continue Solu-Medrol, will reduce to daily day 4/5  -Okay to DC heparin drip, continue Lovenox for DVT prophylaxis  -Continue aspirin, statin  -Will start Lasix 40 mg IV twice daily  -SLP could not clear for diet, continue tube feeds per core track  -Increase activity, dangle at bedside  -Neurology following, continue Keppra  -Trend renal function, monitor replace electrolytes, replace potassium Phos and magnesium today    Discussed with family at bedside    Okay to transfer out of ICU    pepcid ulcer prophylaxis Pepcid  DVT prophylaxis: Lovenox  Medical DVT prophylaxis orders are present.    CODE STATUS:   Level Of Support Discussed With: Health Care Surrogate  Code Status (Patient has no pulse and is not breathing): CPR (Attempt to Resuscitate)  Medical Interventions (Patient has pulse or is breathing): Full Support      Patient is critically ill with  septic shock, respiratory failure on ventilator, hypomagnesemia, acute kidney injury, aspiration pneumonia, NSTEMI. We have personally reviewed all pertinent labs, imaging, microbiology and documentation. We have discussed care with the primary service as well as at multidisciplinary critical care rounds with the bedside nurse, respiratory therapist, pharmacist and all other ancillary services.37 minutes of  critical care time was spent managing this patient, excluding procedures. Of this time, I spent 22 minutes in accordance with split shared billing.    I, KEVIN Phillips, spent 15 minutes critical care time.    Electronically signed by KEVIN Basurto, 03/30/24, 3:59 PM EDT.  Electronically signed by Mikey Mahajan MD, 03/30/24, 5:39 PM EDT.

## 2024-03-30 NOTE — PROGRESS NOTES
RT EQUIPMENT DEVICE RELATED - SKIN ASSESSMENT    RT Medical Equipment/Device:     NIV Mask:  Under-the-nose   size:  C    Skin Assessment:      Cheek:  Intact  Nose:  Redness and Pressure injury  Mouth:  Intact    Device Skin Pressure Protection:  Positioning supports utilized    Nurse Notification:  Yes, notified RN KRISTEN Guillen, RRT

## 2024-03-30 NOTE — PLAN OF CARE
Goal Outcome Evaluation:      Patient wore bipap throughout the night and tolerated well. Patient is still requiring nt suctioning periodically, when off bipap. Patient is currently wearing a full face shield mask, as patient has a large leak with  ng tube as well as small breakdown, noted by nightshift RT on the nare.

## 2024-03-30 NOTE — PROGRESS NOTES
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  Despite abnormal EEG, he is responsive and following commands at this time.  I would resume IV Levetiracetam for now.  Whenever he is more stable, can consider transitioning back to his PO Primidone    Our recommendations are outlined below    DVT Prophylaxis :  Choice of Primary Team    Disposition :  Neurology will follow    Subjective  68 y.o. male was brought to care on 3/27/2024 for altered mental status, minimally responsive, he apparently had been minimally responsive since the morning of 3/26/204. When the EMS has reached home patient was noted to be saturating at 70% on room air and very minimally responsive and has been brought into the ED. Patient was very minimally responsive in the ED, was placed on BiPAP. He went into respiratory distress after Narcan administration and required intubation. Per nursing staff, no obvious seizure like semiology has been noted during his inpatient course in the hospital. Per his wife at bedside, he was being treated with Primidone as an outpatient. His regular neurologist also stopped Phenytoin in the past since the patient has a history of stroke.    Hospital Course  On BiPAP now, agitated last night so restrained overnight. No new or interim seizure like events.    Imaging  HEAD CT: No acute findings  EEG:  Abnormal EEG because of:  1. Frequent high-voltage epileptiform discharges which are generalized and synchronous consistent with primary generalized type epilepsy.  2. Slow background activity admixed with slower waves compatible  with moderate to marked diffuse encephalopathy.    Labs  UDS positive benzodiazepines and barbiturates not prescribed (Primidone may have triggered positive barbiturate)       Examination  BP(138/105), Pulse(87), Temp(99.9), Resp(24),    Neuro Exam:  General: Alert,Awake  on BiPAP    Speech: Nonverbal    Language: Nonverbal    Face: Symmetric:    Extraocular Movements: Intact:  Tracks nursing    Motor Exam: Purposeful movements in all limbs           Patient/Family was informed the Neurology Consult would happen via TeleHealth consult by way of interactive audio and video telecommunications and consented to receiving care in this manner.    Telehealth Neurology consultation was provided. I spent 20 minutes providing telehealth care. This includes time spent for face to face visit via telemedicine, review of medical records, imaging studies and discussion of findings with providers, the patient and/or family.      Dr Homar Long      [facility]  For Inpatient follow-up with [facility] physician please call [facility] [phone]. This is not an outpatient service. Post hospital discharge, please contact hospital directly.    Please do not communicate with [facility] physicians via secure chat. If you have any questions, Please contact [facility].  Please call or reconsult our service if there are any clinical or diagnostic changes.

## 2024-03-30 NOTE — PLAN OF CARE
Problem: Adult Inpatient Plan of Care  Goal: Plan of Care Review  Outcome: Ongoing, Progressing  Flowsheets (Taken 3/30/2024 2699)  Progress: improving  Plan of Care Reviewed With: patient  Outcome Evaluation: Report received from Yessenia. Patient transferred from ICU. Patient is on 4 liters nasal cannula and tolerating well. Wife at bedside. No complaints of pain or discomfort at this time. VSS. Patient alert to self. Continuing with plan of care.   Goal Outcome Evaluation:  Plan of Care Reviewed With: patient        Progress: improving  Outcome Evaluation: Report received from Yessenia. Patient transferred from ICU. Patient is on 4 liters nasal cannula and tolerating well. Wife at bedside. No complaints of pain or discomfort at this time. VSS. Patient alert to self. Continuing with plan of care.

## 2024-03-30 NOTE — THERAPY EVALUATION
"Acute Care - Speech Language Pathology   Swallow Initial Evaluation  [facility]     Patient Name: Davonte Marshall  : 1955  MRN: [id number]  Today's Date: 3/30/2024               Admit Date: 3/27/2024    Visit Dx:     ICD-10-CM ICD-9-CM   1. Acute respiratory failure with hypoxia  J96.01 518.81   2. Metabolic encephalopathy  G93.41 348.31   3. DIONNA (acute kidney injury)  N17.9 584.9   4. NSTEMI (non-ST elevated myocardial infarction)  I21.4 410.70   5. Oropharyngeal dysphagia  R13.12 787.22     Patient Active Problem List   Diagnosis    Anemia    Anxiety    Arthritis    Back pain    Chronic bronchitis    Complication of surgical procedure    Degeneration of lumbosacral intervertebral disc    Depression    Encounter for long-term (current) use of insulin    Essential tremor    Gall stones    Head injury    Hemorrhoids    Herniation of intervertebral disc    Hyperlipidemia    Leg pain    Neck pain    Renal insufficiency    Scoliosis    Seizure disorder    Smokes 1.5 packs of cigarettes per day    Vitamin D deficiency disease    Gross hematuria    BPH without obstruction/lower urinary tract symptoms    Urge incontinence of urine    Benign prostatic hyperplasia with urinary hesitancy    Sequelae, post-stroke    S/P TURP    Postoperative anemia    Postoperative hypoxia    Essential hypertension    Opioid dependence    Cervical dystonia    Acute metabolic encephalopathy     Past Medical History:   Diagnosis Date    Anemia     Anxiety     Arthritis     Benign essential hypertension     Chronic bronchitis     Depression     Enlarged prostate     Floaters in visual field     Gall stones     Generalized weakness     Head injury     Hemorrhoids     Hyperlipidemia     Leg pain     Numbness in both hands 2015    Seizures     last \"quite a while ago\"    Stroke 2020    left sided weakness     Past Surgical History:   Procedure Laterality Date    APPENDECTOMY      BACK SURGERY      4    CHOLECYSTECTOMY      CYSTOSCOPY " Bilateral     7/12/21    CYSTOSCOPY TRANSURETHRAL RESECTION OF PROSTATE N/A 7/12/2021    Procedure: CYSTOSCOPY TRANSURETHRAL RESECTION OF PROSTATE;  Surgeon: Penelope Fox MD;  [facility] MA[facility] OR;  Service: Urology;  Laterality: N/A;    HAND SURGERY Bilateral     thumb    LYMPH NODE DISSECTION      jaw line    TURP / TRANSURETHRAL INCISION / DRAINAGE PROSTATE  07/12/2021           Inpatient Speech Pathology Dysphagia Evaluation        PAIN SCALE: None indicated.    PRECAUTIONS/CONTRAINDICATIONS: Standard    SUSPECTED ABUSE/NEGLECT/EXPLOITATION: None indicated.    SOCIAL/PSYCHOLOGICAL NEEDS/BARRIERS: None indicated.    PAST SOCIAL HISTORY: 58-year-old male lives at home with his wife    PRIOR FUNCTION: Independent    PATIENT GOALS/EXPECTATIONS: Continue eating orally    HISTORY: 68-year-old male with the above diagnosis referred for speech therapy evaluation to assess for swallowing following extubation.  Patient was intubated 3/27 and extubated 3/29.  No previous speech pathology services are reported.  Patient has been on BiPAP, currently on nasal cannula.    CURRENT DIET LEVEL: N.p.o. with NG    OBJECTIVE:    TEST ADMINISTERED: Clinical dysphagia evaluation    COGNITION/SAFETY AWARENESS: Impaired    BEHAVIORAL OBSERVATIONS: Alert and cooperative    ORAL MOTOR EXAM: General decreased oral motor strength/range of motion 3+/5.    VOICE QUALITY: Soft but adequate    REFLEX EXAM: Patient demonstrating cough    POSTURE: Assisted sitting upright in bed    FEEDING/SWALLOWING FUNCTION: Assessed with nectar liquid,  puréed solids.    CLINICAL OBSERVATIONS: Ice chip given x 1 with delayed swallow, vocal quality remaining clear.  Nectar liquid by spoon with patient producing multiple swallow, minimal wet vocal quality noted.  Purée given by spoon with patient producing multiple swallow.  Patient produced delayed cough greater than 30 seconds following administration of substances.    DYSPHAGIA CRITERIA: Swallow  delay, risk of aspiration, decreased general strength.    FUNCTIONAL ASSESSMENT INSTRUMENT: Patient currently scored a level 3 of 7 on Functional Communication Measures for swallowing indicating a 60-79% limitation in function.    ASSESSMENT/ PLAN OF CARE:  Pt presents with limitations, noted below, that impede his ability to swallow safely and maintain nutrition. The skills of a therapist will be required to safely and effectively implement the following treatment plan to restore maximal level of function.    PROBLEMS:  1.   Swallow delay, decreased general strength, risk of aspiration   LTG 1: 30 days: Increase swallow function for initiation of p.o. intake.   STG 1a: 14 days: Patient will demonstrate swallow within 3 seconds of multimodalic stimulation in 8 of 10 trials.   STG 1b: 14 days: Patient will demonstrate swallow of nectar liquid in 8 of 10 trials with min to no signs or symptoms of aspiration.   STG 1c: 14 days: Patient will participate in oral motor and laryngeal exercises with strength /range of motion 4/5.      LTG 2: 30 days: Patient will tolerate diet mechanical soft solids and thin liquid utilizing appropriate positioning and strategies with minimal assistance.   STG 2a: 21 days: Patient will tolerate diet purée solid and nectar thick liquids with min to no signs or symptoms of aspiration.   STG 2b: 21 days: Patient will demonstrate adequate swallow in 8 of 10 trials of thin liquid with min to no signs or symptoms of aspiration.   STG 2c: 21 days: Patient/family education.                          TREATMENT: Dysphagia therapy to address swallow function through exercises and education of strategies.     FREQUENCY/DURATION: Twice daily 5 times per week    REHAB POTENTIAL:  Pt has fair to good rehab potential.  The following limitations may influence improvement/ length of tx: Medical status.    RECOMMENDATIONS:   1.   DIET: Continue with NG as primary nutrition until p.o. diet can be  reestablished.    2.  May have single small ice chips sparingly when sitting fully upright.    Pt/responsible party agrees with plan of care and has been informed of all alternatives, risks and benefits.                              Anticipated Discharge Disposition (SLP): inpatient rehabilitation facility, skilled nursing facility (03/30/24 1209)                                                               EDUCATION  The patient has been educated in the following areas:   NPO rationale.              Time Calculation:    Time Calculation- SLP       Row Name 03/30/24 1209             Time Calculation- SLP    SLP Start Time 1100  -TB      SLP Stop Time 1145  -TB      SLP Time Calculation (min) 45 min  -TB      SLP Received On 03/30/24  -TB         Untimed Charges    SLP Eval/Re-eval  ST Eval Oral Pharyng Swallow - 65380  -TB      90001-ZM Eval Oral Pharyng Swallow Minutes 45  -TB         Total Minutes    Untimed Charges Total Minutes 45  -TB       Total Minutes 45  -TB                User Key  (r) = Recorded By, (t) = Taken By, (c) = Cosigned By      Initials Name Provider Type    TB Floridalma Silva SLP Speech and Language Pathologist                    Therapy Charges for Today       Code Description Service Date Service Provider Modifiers Qty    [id number]  ST EVAL ORAL PHARYNG SWALLOW 3 3/30/2024 Floridalma Silva SLP GN 1                 PATRICA Oropeza  3/30/2024

## 2024-03-30 NOTE — THERAPY EVALUATION
RT EQUIPMENT DEVICE RELATED - SKIN ASSESSMENT    RT Medical Equipment/Device:     NIV Mask:  Full-face    size: shield    Skin Assessment:      Nares:  Abrasion    Device Skin Pressure Protection:  Pressure points protected    Nurse Notification:  Yes, notified by nightshift rt.    Charan Mc, RRT

## 2024-03-30 NOTE — CONSULTS
"Nutrition Services    Patient Name: Davonte Marshall  YOB: 1955  MRN: [id number]  Admission date: 3/27/2024      CLINICAL NUTRITION ASSESSMENT      Reason for Assessment  Follow Up   H&P:  Past Medical History:   Diagnosis Date    Anemia     Anxiety     Arthritis     Benign essential hypertension     Chronic bronchitis     Depression     Enlarged prostate     Floaters in visual field     Gall stones     Generalized weakness     Head injury     Hemorrhoids     Hyperlipidemia     Leg pain     Numbness in both hands 7/6/2015    Seizures     last \"quite a while ago\"    Stroke 11/2020    left sided weakness        Current Problems:   Active Hospital Problems    Diagnosis     **Acute metabolic encephalopathy         Nutrition/Diet History         Narrative   Pt extubated 3/29 @ 0927. RD to update estimated energy needs post extubation. Will modify enteral nutrition order to better meet these needs.      Anthropometrics        Current Height, Weight Height: 175.3 cm (69.02\")  Weight: 97 kg (213 lb 13.5 oz)   Current BMI Body mass index is 31.56 kg/m².   BMI Classification Obese Class I   % %   Adjusted Body Weight (ABW) 81 kg   Weight Hx  Wt Readings from Last 30 Encounters:   03/29/24 0551 97 kg (213 lb 13.5 oz)   03/27/24 0931 89 kg (196 lb 3.4 oz)   03/27/24 0842 89 kg (196 lb 3.4 oz)   03/27/24 0347 92.1 kg (203 lb 0.7 oz)   03/16/23 1526 103 kg (227 lb)   02/28/22 1429 97.8 kg (215 lb 8 oz)   09/21/21 1059 97.4 kg (214 lb 12.8 oz)   07/21/21 0911 96.2 kg (212 lb)   07/12/21 0611 96.3 kg (212 lb 4.9 oz)   07/06/21 0948 94.3 kg (208 lb)   06/30/21 1555 94.8 kg (209 lb)   06/15/21 1312 96.5 kg (212 lb 12.8 oz)   01/28/21 0000 95.3 kg (210 lb)   12/30/20 0000 94.5 kg (208 lb 6 oz)   10/29/20 0000 98.6 kg (217 lb 6 oz)   10/09/20 0000 97.5 kg (215 lb)   07/09/20 0000 103 kg (226 lb)   01/09/20 0000 98 kg (216 lb)   10/07/19 0000 96.6 kg (213 lb)   09/06/19 0000 94.8 kg (209 lb)          Wt Change " "Observation 31# weight loss in 1 year     Estimated/Assessed Needs  Estimated Needs based on: Adjusted Body Weight 81 kg       Energy Requirements 25-30 kcal/kg   EST Needs (kcal/day) 8413-3564 kcal       Protein Requirements 1.0 g/kg   EST Daily Needs (g/day) 81 g       Fluid Requirements 25 ml/kg    Estimated Needs (mL/day) 2025 ml     Labs/Medications Phos low, receiving IV potassium phosphate.        Pertinent Labs Reviewed.   Results from last 7 days   Lab Units 03/30/24  0600 03/29/24  0548 03/28/24  0724 03/28/24  0252   SODIUM mmol/L 142 137  --  138   SODIUM, ARTERIAL mmol/L  --   --  137.8  --    POTASSIUM mmol/L 3.9 3.7  --  4.4   CHLORIDE mmol/L 103 101  --  100   CO2 mmol/L 25.5 23.1  --  20.6*   BUN mg/dL 39* 31*  --  40*   CREATININE mg/dL 0.97 1.06  --  2.11*   CALCIUM mg/dL 8.2* 7.9*  --  8.3*   BILIRUBIN mg/dL 0.4 0.4  --  0.6   ALK PHOS U/L 69 58  --  82   ALT (SGPT) U/L 22 20  --  32   AST (SGOT) U/L 48* 49*  --  67*   GLUCOSE mg/dL 115* 129*  --  124*   GLUCOSE, ARTERIAL mg/dL  --   --  125*  --      Results from last 7 days   Lab Units 03/30/24  0600 03/29/24  0548 03/28/24  0252   MAGNESIUM mg/dL 1.9 2.1 1.5*   PHOSPHORUS mg/dL 2.0* 1.2* 4.2   HEMOGLOBIN g/dL 11.1* 10.8* 14.3   HEMATOCRIT % 33.4* 32.5* 43.2     COVID19   Date Value Ref Range Status   03/27/2024 Not Detected Not Detected - Ref. Range Final     No results found for: \"HGBA1C\"      Pertinent Medications Reviewed.     Malnutrition Severity Assessment              Nutrition Diagnosis         Nutrition Dx Problem 1 Inadequate oral Intake related to decreased ability to consume sufficient energy as evidenced by NPO.     Nutrition Intervention           Current Nutrition Orders & Evaluation of Intake       Current PO Diet NPO Diet NPO Type: Strict NPO   Supplement Orders Placed This Encounter      Diet, Tube Feeding Tube Feeding Formula: Fibersource HN; Tube Feeding Type: Continuous; Continuous Tube Feeding Start Rate (mL/hr): 25; Then " Advance Rate By (mL/hr): 25; Every __ Hours: 4; To Goal Rate of (mL/hr): 85           Nutrition Intervention/Prescription        Fibersource HN @ 85 ml/hr  FWF 65 ml q3h (520 ml)  Provides: 2244 kcal, 101 g pro, 2035 ml        Medical Nutrition Therapy/Nutrition Education          Learner     Readiness N/A  N/A     Method     Response N/A  N/A     Monitor/Evaluation        Monitor Per protocol, I&O, Pertinent labs, EN delivery/tolerance, GI status, POC/GOC     Nutrition Discharge Plan         To be determined     Electronically signed by:  Lisa Pitts RD  03/30/24 10:05 EDT

## 2024-03-30 NOTE — SIGNIFICANT NOTE
03/30/24 0540   OT Time and Intention   Session Not Performed other (see comments)  (restraints)

## 2024-03-30 NOTE — PROGRESS NOTES
[facility]   Hospitalist Progress Note    Date of admission: 3/27/2024  Patient Name: Davonte Marshall  1955  Date: 3/30/2024      Subjective     Chief Complaint   Patient presents with    Respiratory Distress       Interval Followup: Talking more interactive but still very lethargic.  Requiring acute prompting.  Denying acute trouble breathing still seems labored on exam.  Failed speech evaluation just now and having some coughing afterwards.  Continues with NG.  Discussed with patient and wife at bedside.  Has been requiring some intermittent NT suctioning.      Objective     Vitals:   Temp:  [98.6 °F (37 °C)-101.3 °F (38.5 °C)] 98.6 °F (37 °C)  Heart Rate:  [66-95] 74  Resp:  [18-43] 18  BP: (137-171)/() 142/69  Flow (L/min):  [4] 4    Physical Exam  Ill-appearing tired in bed, able to answer basic questions, takes about 30 seconds but ultimately is able to tell me that he is in the hospital cannot get the year currently  Has some difficulty with tracking slow to follow, can squeeze and follow basic commands otherwise  Rhonchorous with moderate aeration, conversational dyspnea on nasal cannula  RRR  Abdomen soft  Generalized weakness    Result Review:  Vital signs, labs and recent relevant imaging reviewed.        acetaminophen    [DISCONTINUED] senna-docusate sodium **AND** polyethylene glycol **AND** bisacodyl **AND** bisacodyl    Diclofenac Sodium    haloperidol lactate    hydrOXYzine    labetalol    Lidocaine    melatonin    nicotine    nitroglycerin    ondansetron    Pharmacy to Dose enoxaparin (LOVENOX)    prochlorperazine    sodium chloride    sodium chloride    sodium chloride    ampicillin-sulbactam, 3 g, Intravenous, Q6H  arformoterol, 15 mcg, Nebulization, BID - RT  aspirin, 81 mg, Nasogastric, Daily  atorvastatin, 20 mg, Nasogastric, Nightly  budesonide, 0.5 mg, Nebulization, BID - RT  chlorhexidine, 15 mL, Mouth/Throat, Q12H  doxycycline, 100 mg, Oral, Q12H  [Held by provider]  DULoxetine, 60 mg, Oral, BID  enoxaparin, 40 mg, Subcutaneous, Q24H  famotidine, 20 mg, Intravenous, Daily  furosemide, 40 mg, Intravenous, BID  [Held by provider] losartan, 100 mg, Oral, Q24H   And  [Held by provider] hydroCHLOROthiazide, 12.5 mg, Oral, Q24H  ipratropium-albuterol, 3 mL, Nebulization, Q4H - RT  levETIRAcetam, 750 mg, Intravenous, Q12H  [START ON 3/31/2024] methylPREDNISolone sodium succinate, 40 mg, Intravenous, Daily  [Held by provider] primidone, 250 mg, Nasogastric, TID  sodium chloride, 10 mL, Intravenous, Q12H        XR Chest 1 View    Result Date: 3/27/2024  Impression:  1. Endotracheal tube in good position with the tip 3.5 cm above the rocío. 2. Minimal bibasilar airspace disease which may be secondary to pneumonia or atelectasis.   Electronically Signed By-Presley Baltazar MD On:3/27/2024 7:17 AM      CT Head Without Contrast    Result Date: 3/27/2024   No acute brain abnormality is appreciated. No definite acute infarct. No acute intracranial hemorrhage.  There is possible acute sinus disease, as detailed above.       Electronically Signed By-Rc Vann MD On:3/27/2024 5:23 AM       Assessment / Plan     Summary: 68-year-old male with hypertension, chronic pain on high dose of morphine who presented after having worsening lethargy and mental status apparently was found to still have food in his mouth from the day before, is midline responsive, prior to the CT department had hypoxia with sats in the 70s initially requiring BiPAP, had aspiration episode and required intubation for airway distress.  Treating for sepsis with multifocal pneumonia, possible cardiogenic shock, aspiration, hypoxemic hypercapnic respiratory failure, polypharmacy and oversedation and question positive benzodiazepines and barbiturates on talk screen that he is on prescribed.  Pulmonology and cardiology assisting    Assessment/Plan (clinically significant if listed here)  Sepsis with shock, multifactorial with  pneumonia and possibly also cardiogenic shock  Aspiration pneumonia with Klebsiella, Streptococcus, MSSA and Moraxella  Acute hypoxemic and hypercapnic respiratory failure requiring mechanical intervention  Acute metabolic encephalopathy in setting of polypharmacy and hypercapnia  Tox positive benzodiazepines and barbiturates not prescribed  Seizure activity on EEG  Lactic acidosis, severe  Metabolic acidosis  NSTEMI, suspect type II from demand/sepsis  DIONNA, secondary to sepsis/shock/poor perfusion  Age indeterminate sinusitis   Hx of seizures and Essential tremor - on primidone  Hyperlipidemia  Depression/anxiety on outpatient Cymbalta  Question underlying dementia with forgetfulness as outpatient  Polypharmacy and difficulty with medication adherence  Hypomagnesemia    Continue wean oxygen, using nasal cannula, can use an NIPPV at night or if there is more lethargy  Continue aggressive airway clearance still needing NT suctioning as needed, airway clearance  Still some fevers question if this is related to atelectasis and persistent mucus clearance issues and some aspiration  Continue Unasyn 10-day course, doxycycline added today as well for MSSA  , Monitor  IV diuresis now monitor I's and O's renal function  Replace phosphorus magnesium monitor electrolytes  Off of heparin drip, using Lovenox DVT prophylaxis, defer timing for additional cardiac workup per cardiology.  Suspect respiratory status and mental status to be a bit better before proceeding with additional evaluation  Continue on Keppra for now IV twice daily, neurology recs reviewed  EEG resulting with seizure activity, patient was not taking his home primidone 3 times daily as prescribed, often missed the afternoon dose.    Might benefit from bid dosing of alternative medicine  (may have issues with Keppra in the past per wife but per review of patient's outpatient neurology notes I do not see any mention of Keppra use previously.  May be using  primidone for additional benefit with essential tremor symptoms.    Creatinine within normal limits, responded well to supportive care monitor  still unclear why his toxicology was positive for benzodiazepines and barbiturates is not on this as an outpatient.    Outpatient 60mg morphine sulfate er bid, norco 10 tid, no benzos/barbituates on outpt labs.  Holding, needs adjusted regimen at time of discharge).  Defer pain acutely for now, may need to resume on a lower dose of Norco but given degree of lethargy still would avoid.    Holding duloxetine given renal failure/AMS, possibly resume in the next 1 to 2 days  Holding home losartan/HCTZ.  Blood pressure improving reassess possibly resume tomorrow  De Los Santos catheter, voiding trial in the next couple days  Continue home aspirin and statin.    AST downtrending. Monitor suspect ischemic/hypotension picture contributing  PT/OT  Check a.m. CBC, BMP, magnesium, phosphorus, lfts, ptt monitoring  Transfer out of ICU continue close inpatient monitoring patient still critically ill. Discussed with pulmonology.      DVT prophylaxis:  Medical DVT prophylaxis orders are present.    Level Of Support Discussed With: Health Care Surrogate  Code Status (Patient has no pulse and is not breathing): CPR (Attempt to Resuscitate)  Medical Interventions (Patient has pulse or is breathing): Full Support        CBC          3/28/2024    02:52 3/29/2024    05:48 3/30/2024    06:00   CBC   WBC 27.31  14.44  18.91    RBC 4.76  3.58  3.72    Hemoglobin 14.3  10.8  11.1    Hematocrit 43.2  32.5  33.4    MCV 90.8  90.8  89.8    MCH 30.0  30.2  29.8    MCHC 33.1  33.2  33.2    RDW 14.2  13.9  14.1    Platelets 257  142  145        CMP          3/28/2024    02:52 3/28/2024    07:24 3/29/2024    05:48 3/30/2024    06:00   CMP   Glucose 124  125  129  115    BUN 40   31  39    Creatinine 2.11   1.06  0.97    EGFR 33.9   77.4  86.1    Sodium 138  137.8  137  142    Potassium 4.4   3.7  3.9    Chloride  100   101  103    Calcium 8.3   7.9  8.2    Total Protein 6.6   5.5  6.3    Albumin 3.2   2.6  3.2    Globulin 3.4   2.9  3.1    Total Bilirubin 0.6   0.4  0.4    Alkaline Phosphatase 82   58  69    AST (SGOT) 67   49  48    ALT (SGPT) 32   20  22    Albumin/Globulin Ratio 0.9   0.9  1.0    BUN/Creatinine Ratio 19.0   29.2  40.2    Anion Gap 17.4   12.9  13.5

## 2024-03-30 NOTE — PLAN OF CARE
Goal Outcome Evaluation:  Plan of Care Reviewed With: patient        Progress: improving  Outcome Evaluation: Patient has tolerated BIPAP 14/7 fio2 .30 tonight. Patient has small red spot on left nare from NG tube. Bipap masks have been rotated during the shift to prevent breakdown on nose.

## 2024-03-31 LAB
ALBUMIN SERPL-MCNC: 3.2 G/DL (ref 3.5–5.2)
ALBUMIN/GLOB SERPL: 1 G/DL
ALP SERPL-CCNC: 77 U/L (ref 39–117)
ALT SERPL W P-5'-P-CCNC: 36 U/L (ref 1–41)
ANION GAP SERPL CALCULATED.3IONS-SCNC: 12.4 MMOL/L (ref 5–15)
AST SERPL-CCNC: 54 U/L (ref 1–40)
BACTERIA SPEC RESP CULT: ABNORMAL
BASOPHILS # BLD AUTO: 0.06 10*3/MM3 (ref 0–0.2)
BASOPHILS NFR BLD AUTO: 0.4 % (ref 0–1.5)
BILIRUB SERPL-MCNC: 0.4 MG/DL (ref 0–1.2)
BUN SERPL-MCNC: 42 MG/DL (ref 8–23)
BUN/CREAT SERPL: 38.2 (ref 7–25)
CALCIUM SPEC-SCNC: 8.7 MG/DL (ref 8.6–10.5)
CHLORIDE SERPL-SCNC: 104 MMOL/L (ref 98–107)
CO2 SERPL-SCNC: 28.6 MMOL/L (ref 22–29)
CREAT SERPL-MCNC: 1.1 MG/DL (ref 0.76–1.27)
DEPRECATED RDW RBC AUTO: 47.4 FL (ref 37–54)
EGFRCR SERPLBLD CKD-EPI 2021: 74 ML/MIN/1.73
EOSINOPHIL # BLD AUTO: 0.03 10*3/MM3 (ref 0–0.4)
EOSINOPHIL NFR BLD AUTO: 0.2 % (ref 0.3–6.2)
ERYTHROCYTE [DISTWIDTH] IN BLOOD BY AUTOMATED COUNT: 14.5 % (ref 12.3–15.4)
GLOBULIN UR ELPH-MCNC: 3.3 GM/DL
GLUCOSE SERPL-MCNC: 123 MG/DL (ref 65–99)
GRAM STN SPEC: ABNORMAL
HCT VFR BLD AUTO: 36.6 % (ref 37.5–51)
HGB BLD-MCNC: 12.1 G/DL (ref 13–17.7)
IMM GRANULOCYTES # BLD AUTO: 0.2 10*3/MM3 (ref 0–0.05)
IMM GRANULOCYTES NFR BLD AUTO: 1.4 % (ref 0–0.5)
LYMPHOCYTES # BLD AUTO: 2.2 10*3/MM3 (ref 0.7–3.1)
LYMPHOCYTES NFR BLD AUTO: 15.6 % (ref 19.6–45.3)
MAGNESIUM SERPL-MCNC: 2 MG/DL (ref 1.6–2.4)
MCH RBC QN AUTO: 29.7 PG (ref 26.6–33)
MCHC RBC AUTO-ENTMCNC: 33.1 G/DL (ref 31.5–35.7)
MCV RBC AUTO: 89.7 FL (ref 79–97)
MONOCYTES # BLD AUTO: 1.03 10*3/MM3 (ref 0.1–0.9)
MONOCYTES NFR BLD AUTO: 7.3 % (ref 5–12)
NEUTROPHILS NFR BLD AUTO: 10.55 10*3/MM3 (ref 1.7–7)
NEUTROPHILS NFR BLD AUTO: 75.1 % (ref 42.7–76)
NRBC BLD AUTO-RTO: 0 /100 WBC (ref 0–0.2)
PHOSPHATE SERPL-MCNC: 2.6 MG/DL (ref 2.5–4.5)
PLATELET # BLD AUTO: 168 10*3/MM3 (ref 140–450)
PMV BLD AUTO: 10.1 FL (ref 6–12)
POTASSIUM SERPL-SCNC: 3.2 MMOL/L (ref 3.5–5.2)
PROT SERPL-MCNC: 6.5 G/DL (ref 6–8.5)
RBC # BLD AUTO: 4.08 10*6/MM3 (ref 4.14–5.8)
SODIUM SERPL-SCNC: 145 MMOL/L (ref 136–145)
WBC NRBC COR # BLD AUTO: 14.07 10*3/MM3 (ref 3.4–10.8)

## 2024-03-31 PROCEDURE — 94669 MECHANICAL CHEST WALL OSCILL: CPT

## 2024-03-31 PROCEDURE — 83735 ASSAY OF MAGNESIUM: CPT | Performed by: INTERNAL MEDICINE

## 2024-03-31 PROCEDURE — 99232 SBSQ HOSP IP/OBS MODERATE 35: CPT | Performed by: PSYCHIATRY & NEUROLOGY

## 2024-03-31 PROCEDURE — 97165 OT EVAL LOW COMPLEX 30 MIN: CPT

## 2024-03-31 PROCEDURE — 94799 UNLISTED PULMONARY SVC/PX: CPT

## 2024-03-31 PROCEDURE — 99233 SBSQ HOSP IP/OBS HIGH 50: CPT | Performed by: INTERNAL MEDICINE

## 2024-03-31 PROCEDURE — 25010000002 ENOXAPARIN PER 10 MG: Performed by: NURSE PRACTITIONER

## 2024-03-31 PROCEDURE — 25010000002 METHYLPREDNISOLONE PER 40 MG: Performed by: NURSE PRACTITIONER

## 2024-03-31 PROCEDURE — 25010000002 FUROSEMIDE PER 20 MG: Performed by: INTERNAL MEDICINE

## 2024-03-31 PROCEDURE — 94664 DEMO&/EVAL PT USE INHALER: CPT

## 2024-03-31 PROCEDURE — 25010000002 AMPICILLIN-SULBACTAM PER 1.5 G: Performed by: NURSE PRACTITIONER

## 2024-03-31 PROCEDURE — 94660 CPAP INITIATION&MGMT: CPT

## 2024-03-31 PROCEDURE — 85025 COMPLETE CBC W/AUTO DIFF WBC: CPT | Performed by: INTERNAL MEDICINE

## 2024-03-31 PROCEDURE — 25010000002 LEVETRIRACETAM PER 10 MG: Performed by: INTERNAL MEDICINE

## 2024-03-31 PROCEDURE — 84100 ASSAY OF PHOSPHORUS: CPT | Performed by: INTERNAL MEDICINE

## 2024-03-31 PROCEDURE — 80053 COMPREHEN METABOLIC PANEL: CPT | Performed by: INTERNAL MEDICINE

## 2024-03-31 PROCEDURE — 94761 N-INVAS EAR/PLS OXIMETRY MLT: CPT

## 2024-03-31 RX ORDER — DULOXETIN HYDROCHLORIDE 20 MG/1
40 CAPSULE, DELAYED RELEASE ORAL 2 TIMES DAILY
Status: DISCONTINUED | OUTPATIENT
Start: 2024-03-31 | End: 2024-04-08

## 2024-03-31 RX ORDER — LIDOCAINE 4 G/G
1 PATCH TOPICAL
Status: DISCONTINUED | OUTPATIENT
Start: 2024-03-31 | End: 2024-04-06

## 2024-03-31 RX ORDER — HYDROCODONE BITARTRATE AND ACETAMINOPHEN 5; 325 MG/1; MG/1
1 TABLET ORAL EVERY 6 HOURS PRN
Status: DISCONTINUED | OUTPATIENT
Start: 2024-03-31 | End: 2024-03-31

## 2024-03-31 RX ORDER — POTASSIUM CHLORIDE 1.5 G/1.58G
40 POWDER, FOR SOLUTION ORAL 2 TIMES DAILY
Status: COMPLETED | OUTPATIENT
Start: 2024-03-31 | End: 2024-04-01

## 2024-03-31 RX ORDER — HYDROCODONE BITARTRATE AND ACETAMINOPHEN 5; 325 MG/1; MG/1
1 TABLET ORAL EVERY 6 HOURS PRN
Status: DISCONTINUED | OUTPATIENT
Start: 2024-03-31 | End: 2024-04-04

## 2024-03-31 RX ORDER — AMLODIPINE BESYLATE 5 MG/1
5 TABLET ORAL
Status: DISCONTINUED | OUTPATIENT
Start: 2024-03-31 | End: 2024-04-07

## 2024-03-31 RX ORDER — HYDROCODONE BITARTRATE AND ACETAMINOPHEN 10; 325 MG/1; MG/1
1 TABLET ORAL EVERY 6 HOURS PRN
Status: DISCONTINUED | OUTPATIENT
Start: 2024-03-31 | End: 2024-03-31

## 2024-03-31 RX ORDER — HYDROCODONE BITARTRATE AND ACETAMINOPHEN 10; 325 MG/1; MG/1
1 TABLET ORAL EVERY 6 HOURS PRN
Status: DISCONTINUED | OUTPATIENT
Start: 2024-03-31 | End: 2024-04-04

## 2024-03-31 RX ORDER — DOXYCYCLINE 25 MG/5ML
100 POWDER, FOR SUSPENSION ORAL EVERY 12 HOURS SCHEDULED
Status: COMPLETED | OUTPATIENT
Start: 2024-03-31 | End: 2024-04-02

## 2024-03-31 RX ADMIN — HYDROCODONE BITARTRATE AND ACETAMINOPHEN 1 TABLET: 5; 325 TABLET ORAL at 11:14

## 2024-03-31 RX ADMIN — ACETAMINOPHEN 650 MG: 325 TABLET ORAL at 05:55

## 2024-03-31 RX ADMIN — ENOXAPARIN SODIUM 40 MG: 100 INJECTION SUBCUTANEOUS at 11:13

## 2024-03-31 RX ADMIN — AMPICILLIN AND SULBACTAM 3 G: 2; 1 INJECTION, POWDER, FOR SOLUTION INTRAVENOUS at 17:55

## 2024-03-31 RX ADMIN — AMPICILLIN AND SULBACTAM 3 G: 2; 1 INJECTION, POWDER, FOR SOLUTION INTRAVENOUS at 11:31

## 2024-03-31 RX ADMIN — ACETAMINOPHEN 650 MG: 325 TABLET ORAL at 22:04

## 2024-03-31 RX ADMIN — CHLORHEXIDINE GLUCONATE 15 ML: 1.2 RINSE ORAL at 11:31

## 2024-03-31 RX ADMIN — ASPIRIN 81 MG 81 MG: 81 TABLET ORAL at 08:49

## 2024-03-31 RX ADMIN — ARFORMOTEROL TARTRATE 15 MCG: 15 SOLUTION RESPIRATORY (INHALATION) at 18:35

## 2024-03-31 RX ADMIN — FAMOTIDINE 20 MG: 10 INJECTION INTRAVENOUS at 08:49

## 2024-03-31 RX ADMIN — Medication 10 ML: at 08:50

## 2024-03-31 RX ADMIN — ACETAMINOPHEN 650 MG: 325 TABLET ORAL at 15:08

## 2024-03-31 RX ADMIN — ATORVASTATIN CALCIUM 20 MG: 40 TABLET, FILM COATED ORAL at 22:04

## 2024-03-31 RX ADMIN — AMPICILLIN AND SULBACTAM 3 G: 2; 1 INJECTION, POWDER, FOR SOLUTION INTRAVENOUS at 23:55

## 2024-03-31 RX ADMIN — LEVETIRACETAM 750 MG: 100 INJECTION, SOLUTION INTRAVENOUS at 22:46

## 2024-03-31 RX ADMIN — BUDESONIDE 0.5 MG: 0.5 INHALANT ORAL at 18:35

## 2024-03-31 RX ADMIN — IPRATROPIUM BROMIDE AND ALBUTEROL SULFATE 3 ML: .5; 3 SOLUTION RESPIRATORY (INHALATION) at 11:24

## 2024-03-31 RX ADMIN — LEVETIRACETAM 750 MG: 100 INJECTION, SOLUTION INTRAVENOUS at 08:49

## 2024-03-31 RX ADMIN — METHYLPREDNISOLONE SODIUM SUCCINATE 40 MG: 40 INJECTION INTRAMUSCULAR; INTRAVENOUS at 11:10

## 2024-03-31 RX ADMIN — FUROSEMIDE 40 MG: 10 INJECTION, SOLUTION INTRAMUSCULAR; INTRAVENOUS at 08:49

## 2024-03-31 RX ADMIN — ARFORMOTEROL TARTRATE 15 MCG: 15 SOLUTION RESPIRATORY (INHALATION) at 06:34

## 2024-03-31 RX ADMIN — IPRATROPIUM BROMIDE AND ALBUTEROL SULFATE 3 ML: .5; 3 SOLUTION RESPIRATORY (INHALATION) at 18:35

## 2024-03-31 RX ADMIN — POTASSIUM CHLORIDE 40 MEQ: 1.5 POWDER, FOR SOLUTION ORAL at 22:04

## 2024-03-31 RX ADMIN — LIDOCAINE 1 PATCH: 4 PATCH TOPICAL at 11:31

## 2024-03-31 RX ADMIN — IPRATROPIUM BROMIDE AND ALBUTEROL SULFATE 3 ML: .5; 3 SOLUTION RESPIRATORY (INHALATION) at 15:43

## 2024-03-31 RX ADMIN — IPRATROPIUM BROMIDE AND ALBUTEROL SULFATE 3 ML: .5; 3 SOLUTION RESPIRATORY (INHALATION) at 02:58

## 2024-03-31 RX ADMIN — BUDESONIDE 0.5 MG: 0.5 INHALANT ORAL at 06:34

## 2024-03-31 RX ADMIN — AMLODIPINE BESYLATE 5 MG: 5 TABLET ORAL at 18:46

## 2024-03-31 RX ADMIN — AMPICILLIN AND SULBACTAM 3 G: 2; 1 INJECTION, POWDER, FOR SOLUTION INTRAVENOUS at 06:15

## 2024-03-31 RX ADMIN — DOXYCYCLINE 100 MG: 25 POWDER, FOR SUSPENSION ORAL at 10:12

## 2024-03-31 RX ADMIN — FUROSEMIDE 40 MG: 10 INJECTION, SOLUTION INTRAMUSCULAR; INTRAVENOUS at 17:55

## 2024-03-31 RX ADMIN — IPRATROPIUM BROMIDE AND ALBUTEROL SULFATE 3 ML: .5; 3 SOLUTION RESPIRATORY (INHALATION) at 06:34

## 2024-03-31 RX ADMIN — DOXYCYCLINE 100 MG: 25 POWDER, FOR SUSPENSION ORAL at 22:04

## 2024-03-31 RX ADMIN — Medication 10 ML: at 22:04

## 2024-03-31 RX ADMIN — POTASSIUM CHLORIDE 40 MEQ: 1.5 POWDER, FOR SOLUTION ORAL at 08:49

## 2024-03-31 RX ADMIN — DULOXETINE HYDROCHLORIDE 40 MG: 30 CAPSULE, DELAYED RELEASE ORAL at 22:04

## 2024-03-31 RX ADMIN — DULOXETINE HYDROCHLORIDE 40 MG: 30 CAPSULE, DELAYED RELEASE ORAL at 08:49

## 2024-03-31 RX ADMIN — CHLORHEXIDINE GLUCONATE 15 ML: 1.2 RINSE ORAL at 22:46

## 2024-03-31 RX ADMIN — AMPICILLIN AND SULBACTAM 3 G: 2; 1 INJECTION, POWDER, FOR SOLUTION INTRAVENOUS at 00:45

## 2024-03-31 NOTE — PLAN OF CARE
Goal Outcome Evaluation:  Plan of Care Reviewed With: patient, spouse        Progress: no change  Outcome Evaluation: Patient presents with balance, endurance and strength limitations that impede his/her ability to perform ADLS. The skills of a therapist are necessary to maximize independence with ADLs.      Anticipated Discharge Disposition (OT): inpatient rehabilitation facility

## 2024-03-31 NOTE — PROGRESS NOTES
[facility]   Hospitalist Progress Note    Date of admission: 3/27/2024  Patient Name: Davonte Marshall  1955  Date: 3/31/2024      Subjective     Chief Complaint   Patient presents with    Respiratory Distress       Interval Followup: More awake and conversant today.  Is asking for ice chips.  Discussed still have aspiration concerns does seem to have mild coughing after his wife, intermittent ice chips.  Continues with NG stated we would evaluate further again tomorrow.  Mild fever overnight.  Denies acute shortness of air.  No overt seizure activity        Objective     Vitals:   Temp:  [98 °F (36.7 °C)-100.8 °F (38.2 °C)] 98.8 °F (37.1 °C)  Heart Rate:  [66-82] 79  Resp:  [18-22] 20  BP: (131-171)/(65-79) 171/73  Flow (L/min):  [3-4] 3    Physical Exam  Ill-appearing tired in bed, more awake and conversant today but does nod off occasionally after reprompting.  Able to answer basic questions okay, thought the year was 1924 with some prompting is 2024, recognizes in the hospital 90.  Generalized weakness  Rhonchorous with moderate aeration, conversational dyspnea on nasal cannula, slightly improved  RRR trace lower extremity edema  Abdomen soft  Generalized weakness    Result Review:  Vital signs, labs and recent relevant imaging reviewed.        acetaminophen    [DISCONTINUED] senna-docusate sodium **AND** polyethylene glycol **AND** bisacodyl **AND** bisacodyl    Diclofenac Sodium    HYDROcodone-acetaminophen    HYDROcodone-acetaminophen    hydrOXYzine    Lidocaine    melatonin    nicotine    nitroglycerin    ondansetron    Pharmacy to Dose enoxaparin (LOVENOX)    prochlorperazine    sodium chloride    sodium chloride    sodium chloride    ampicillin-sulbactam, 3 g, Intravenous, Q6H  arformoterol, 15 mcg, Nebulization, BID - RT  aspirin, 81 mg, Nasogastric, Daily  atorvastatin, 20 mg, Nasogastric, Nightly  budesonide, 0.5 mg, Nebulization, BID - RT  chlorhexidine, 15 mL, Mouth/Throat,  Q12H  doxycycline, 100 mg, Nasogastric, Q12H  DULoxetine, 40 mg, Oral, BID  enoxaparin, 40 mg, Subcutaneous, Q24H  famotidine, 20 mg, Intravenous, Daily  furosemide, 40 mg, Intravenous, BID  [Held by provider] losartan, 100 mg, Oral, Q24H   And  [Held by provider] hydroCHLOROthiazide, 12.5 mg, Oral, Q24H  ipratropium-albuterol, 3 mL, Nebulization, Q4H - RT  levETIRAcetam, 750 mg, Intravenous, Q12H  Lidocaine, 1 patch, Transdermal, Q24H  methylPREDNISolone sodium succinate, 40 mg, Intravenous, Daily  potassium chloride, 40 mEq, Nasogastric, BID  [Held by provider] primidone, 250 mg, Nasogastric, TID  sodium chloride, 10 mL, Intravenous, Q12H        XR Chest 1 View    Result Date: 3/27/2024  Impression:  1. Endotracheal tube in good position with the tip 3.5 cm above the rocío. 2. Minimal bibasilar airspace disease which may be secondary to pneumonia or atelectasis.   Electronically Signed By-Presley Baltazar MD On:3/27/2024 7:17 AM      CT Head Without Contrast    Result Date: 3/27/2024   No acute brain abnormality is appreciated. No definite acute infarct. No acute intracranial hemorrhage.  There is possible acute sinus disease, as detailed above.       Electronically Signed By-Rc Vann MD On:3/27/2024 5:23 AM       Assessment / Plan     Summary: 68-year-old male with hypertension, chronic pain on high dose of morphine who presented after having worsening lethargy and mental status apparently was found to still have food in his mouth from the day before, is midline responsive, prior to the CT department had hypoxia with sats in the 70s initially requiring BiPAP, had aspiration episode and required intubation for airway distress.  Treating for sepsis with multifocal pneumonia, possible cardiogenic shock, aspiration, hypoxemic hypercapnic respiratory failure, polypharmacy and oversedation and question positive benzodiazepines and barbiturates on tox screen that he is not prescribed.  Pulmonology cards and neuro  assisting.  Extubated to bipap and slowly weaning.  SLP eval with aspiration, still requiring TF.  EEG with seizure activity, question withdrawal from home primidone with poor adherence to 3 times daily dosing at home.  Using IV Keppra for now.  Slowly resuming home medications at reduced dose    Assessment/Plan (clinically significant if listed here)  Sepsis with shock, multifactorial with pneumonia and possibly also cardiogenic shock  Aspiration pneumonia with Klebsiella, Streptococcus, MSSA and Moraxella  Acute hypoxemic and hypercapnic respiratory failure requiring mechanical intervention  Acute metabolic encephalopathy in setting of polypharmacy and hypercapnia  Tox positive benzodiazepines and barbiturates not prescribed  Seizure activity on EEG  Lactic acidosis, severe  Metabolic acidosis  NSTEMI, suspect type II from demand/sepsis  DIONNA, secondary to sepsis/shock/poor perfusion  Age indeterminate sinusitis   Hx of seizures and Essential tremor - on primidone  Hyperlipidemia  Depression/anxiety on outpatient Cymbalta  Question underlying dementia with forgetfulness as outpatient  Polypharmacy and difficulty with medication adherence  Hypomagnesemia    Continue wean oxygen, using nasal cannula, can use an NIPPV at night or if there is more lethargy  Continue aggressive airway clearance, chest vest therapy, concern patient still having some clinical/aspiration.  Speech is going to reevaluate tomorrow but I suspect she will still fail  Continue NG and tube feeds  Continue Unasyn x 7 days and doxycycline as ordered for pneumonia as ordered  Continue IV diuresis monitor renal function, potassium replace  Continue nebulizers/respiratory hygiene  Lovenox DVT prophylaxis, additional cardiac workup as per cardiology, respiratory status improving, suspect 1 current symptoms feel little better before proceeding with stress test.  Defer to reevaluation for timing  Continue on Keppra IV twice daily, defer timing of  resumption/transition to patient's home primidone to neurology.    EEG resulting with seizure activity, patient was not taking his home primidone 3 times daily as prescribed, often missed the afternoon dose.    Might benefit from bid dosing of alternative medicine  (may have issues with Keppra in the past per wife but per review of patient's outpatient neurology notes I do not see any mention of Keppra use previously.  May be using primidone for additional benefit with essential tremor symptoms)  DIONNA has improved, monitor renal function, needs renal dose adjustment of medications if further lability.    Resuming home duloxetine will use slightly lower dose.    Will start on low-dose Norco as needed, his outpatient regimen suspect his weight too high.  still unclear why his toxicology was positive for benzodiazepines and barbiturates is not on this as an outpatient.    Outpatient 60mg morphine sulfate er bid, norco 10 tid, no benzos/barbituates on outpt labs.    Holding home losartan/HCTZ.  Bp starting to be mildly elevated at times; concern is still at risk for dionna/dehydration and will start amlodipine and monitor for now   De Los Santos catheter, voiding trial in the next couple days  Continue home aspirin and statin.    Monitor lfts  PT/OT  Check a.m. CBC, BMP, magnesium, phosphorus, lfts, monitoring    Will be several days before stable for discharge.  Suspect will need rehab versus long-term care placement    DVT prophylaxis:  Medical DVT prophylaxis orders are present.    Level Of Support Discussed With: Health Care Surrogate  Code Status (Patient has no pulse and is not breathing): CPR (Attempt to Resuscitate)  Medical Interventions (Patient has pulse or is breathing): Full Support    Greater than 50 minutes on this encounter including review of labs, imaging, documentation, orders, vitals, monitoring and adjusting treatment and orders as indicated, discussion with the patient, pt's wife, pulm, and documenting.        CBC          3/29/2024    05:48 3/30/2024    06:00 3/31/2024    06:53   CBC   WBC 14.44  18.91  14.07    RBC 3.58  3.72  4.08    Hemoglobin 10.8  11.1  12.1    Hematocrit 32.5  33.4  36.6    MCV 90.8  89.8  89.7    MCH 30.2  29.8  29.7    MCHC 33.2  33.2  33.1    RDW 13.9  14.1  14.5    Platelets 142  145  168        CMP          3/29/2024    05:48 3/30/2024    06:00 3/31/2024    06:53   CMP   Glucose 129  115  123    BUN 31  39  42    Creatinine 1.06  0.97  1.10    EGFR 77.4  86.1  74.0    Sodium 137  142  145    Potassium 3.7  3.9  3.2    Chloride 101  103  104    Calcium 7.9  8.2  8.7    Total Protein 5.5  6.3  6.5    Albumin 2.6  3.2  3.2    Globulin 2.9  3.1  3.3    Total Bilirubin 0.4  0.4  0.4    Alkaline Phosphatase 58  69  77    AST (SGOT) 49  48  54    ALT (SGPT) 20  22  36    Albumin/Globulin Ratio 0.9  1.0  1.0    BUN/Creatinine Ratio 29.2  40.2  38.2    Anion Gap 12.9  13.5  12.4

## 2024-03-31 NOTE — THERAPY EVALUATION
"Patient Name: Davonte Marshall  : 1955    MRN: [id number]                              Today's Date: 3/31/2024       Admit Date: 3/27/2024    Visit Dx:     ICD-10-CM ICD-9-CM   1. Acute respiratory failure with hypoxia  J96.01 518.81   2. Metabolic encephalopathy  G93.41 348.31   3. DIONNA (acute kidney injury)  N17.9 584.9   4. NSTEMI (non-ST elevated myocardial infarction)  I21.4 410.70   5. Oropharyngeal dysphagia  R13.12 787.22   6. Impaired mobility and ADLs  Z74.09 V49.89    Z78.9      Patient Active Problem List   Diagnosis    Anemia    Anxiety    Arthritis    Back pain    Chronic bronchitis    Complication of surgical procedure    Degeneration of lumbosacral intervertebral disc    Depression    Encounter for long-term (current) use of insulin    Essential tremor    Gall stones    Head injury    Hemorrhoids    Herniation of intervertebral disc    Hyperlipidemia    Leg pain    Neck pain    Renal insufficiency    Scoliosis    Seizure disorder    Smokes 1.5 packs of cigarettes per day    Vitamin D deficiency disease    Gross hematuria    BPH without obstruction/lower urinary tract symptoms    Urge incontinence of urine    Benign prostatic hyperplasia with urinary hesitancy    Sequelae, post-stroke    S/P TURP    Postoperative anemia    Postoperative hypoxia    Essential hypertension    Opioid dependence    Cervical dystonia    Acute metabolic encephalopathy     Past Medical History:   Diagnosis Date    Anemia     Anxiety     Arthritis     Benign essential hypertension     Chronic bronchitis     Depression     Enlarged prostate     Floaters in visual field     Gall stones     Generalized weakness     Head injury     Hemorrhoids     Hyperlipidemia     Leg pain     Numbness in both hands 2015    Seizures     last \"quite a while ago\"    Stroke 2020    left sided weakness     Past Surgical History:   Procedure Laterality Date    APPENDECTOMY      BACK SURGERY      4    CHOLECYSTECTOMY      CYSTOSCOPY " Bilateral     7/12/21    CYSTOSCOPY TRANSURETHRAL RESECTION OF PROSTATE N/A 7/12/2021    Procedure: CYSTOSCOPY TRANSURETHRAL RESECTION OF PROSTATE;  Surgeon: Penelope Fox MD;  Location: [facility] MAIN OR;  Service: Urology;  Laterality: N/A;    HAND SURGERY Bilateral     thumb    LYMPH NODE DISSECTION      jaw line    TURP / TRANSURETHRAL INCISION / DRAINAGE PROSTATE  07/12/2021      General Information       Row Name 03/31/24 1011          OT Time and Intention    Document Type evaluation  -AC     Mode of Treatment individual therapy;occupational therapy  -       Row Name 03/31/24 1011          General Information    Patient Profile Reviewed yes  -AC     Prior Level of Function --  Patient reports independent with ADLs and used a cane for functional mobility.  He states he has a shower chair for bathing and grab bars around the commode.  Wife reports patient did not leave the house much.  -     Existing Precautions/Restrictions fall;oxygen therapy device and L/min  -     Barriers to Rehab none identified  -       Row Name 03/31/24 1011          Occupational Profile    Reason for Services/Referral (Occupational Profile) Pt. is a 68year old male admitted for the above diagnosis. Pt. referred to OT services to assess independence with ADLs and adl transfers/fx'l mobility. No previous OT services for current condition.  -       Row Name 03/31/24 1011          Living Environment    People in Home spouse  -       Row Name 03/31/24 1011          Cognition    Orientation Status (Cognition) oriented x 3  -       Row Name 03/31/24 1011          Safety Issues, Functional Mobility    Impairments Affecting Function (Mobility) balance;endurance/activity tolerance;strength;shortness of breath  -               User Key  (r) = Recorded By, (t) = Taken By, (c) = Cosigned By      Initials Name Provider Type    AC Missy Tejada OT Occupational Therapist                     Mobility/ADL's       Row Name 03/31/24  "1012          Bed Mobility    Bed Mobility bed mobility (all) activities  -     All Activities, [place] (Bed Mobility) maximum assist (25% patient effort);dependent (less than 25% patient effort);verbal cues  -     Bed Mobility, Safety Issues decreased use of arms for pushing/pulling;decreased use of legs for bridging/pushing;impaired trunk control for bed mobility  -     Assistive Device (Bed Mobility) draw sheet;head of bed elevated;bed rails  -       Row Name 03/31/24 1012          Sit-Stand Transfer    Sit-Stand [place] (Transfers) not tested  -       Row Name 03/31/24 1012          Activities of Daily Living    BADL Assessment/Intervention --  Patient is max/dependent for bathing/dressing, max/dependent for toileting, max assist for grooming, max assist for self-feeding  -               User Key  (r) = Recorded By, (t) = Taken By, (c) = Cosigned By      Initials Name Provider Type     Missy Tejada OT Occupational Therapist                   Obj/Interventions       Row Name 03/31/24 1013          Sensory Assessment (Somatosensory)    Sensory Assessment (Somatosensory) UE sensation intact  -[facility] Name 03/31/24 1013          Vision Assessment/Intervention    Visual Impairment/Limitations WFL  -[facility] Name 03/31/24 1013          Range of Motion Comprehensive    General Range of Motion bilateral upper extremity ROM [facility]     Comment, General Range of Motion Bilateral upper extremities within functional limits, wife states patient has had cervical weakness for \"quite some time\"  -       Row Name 03/31/24 1013          Strength Comprehensive (MMT)    Comment, General Manual Muscle Testing (MMT) Assessment 2 -/5  -       Row Name 03/31/24 1013          Motor Skills    Motor Skills coordination;functional endurance  -     Coordination WFL  -     Functional Endurance Poor for ADLs  -       Row Name 03/31/24 1013          Balance    Balance Assessment sitting static " balance  -AC     Static Sitting Balance minimal assist;contact guard  -AC     Position, Sitting Balance supported  -AC               User Key  (r) = Recorded By, (t) = Taken By, (c) = Cosigned By      Initials Name Provider Type    AC Missy Tejada OT Occupational Therapist                   Goals/Plan       Row Name 03/31/24 1015          Bed Mobility Goal 1 (OT)    Activity/Assistive Device (Bed Mobility Goal 1, OT) bed mobility activities, all  -AC     [place] Level/Cues Needed (Bed Mobility Goal 1, OT) modified independence  -AC     Time Frame (Bed Mobility Goal 1, OT) long term goal (LTG);10 days  -AC       Row Name 03/31/24 1015          Transfer Goal 1 (OT)    Activity/Assistive Device (Transfer Goal 1, OT) transfers, all  -AC     [place] Level/Cues Needed (Transfer Goal 1, OT) modified independence  -AC     Time Frame (Transfer Goal 1, OT) 10 days  -AC       Row Name 03/31/24 1015          Bathing Goal 1 (OT)    Activity/Device (Bathing Goal 1, OT) bathing skills, all  -AC     [place] Level/Cues Needed (Bathing Goal 1, OT) modified independence  -AC     Time Frame (Bathing Goal 1, OT) long term goal (LTG);10 days  -AC       Row Name 03/31/24 1015          Dressing Goal 1 (OT)    Activity/Device (Dressing Goal 1, OT) dressing skills, all  -AC     [place]/Cues Needed (Dressing Goal 1, OT) modified independence  -AC     Time Frame (Dressing Goal 1, OT) long term goal (LTG);10 days  -AC       Row Name 03/31/24 1015          Toileting Goal 1 (OT)    Activity/Device (Toileting Goal 1, OT) toileting skills, all  -AC     [place] Level/Cues Needed (Toileting Goal 1, OT) modified independence  -AC     Time Frame (Toileting Goal 1, OT) long term goal (LTG);10 days  -AC       Row Name 03/31/24 1015          Strength Goal 1 (OT)    Strength Goal 1 (OT) Patient will improve bilateral upper extremity strength to 3+/5  -AC     Time Frame (Strength Goal 1, OT) long term goal (LTG);10 days  -AC        Row Name 03/31/24 1015          Problem Specific Goal 1 (OT)    Problem Specific Goal 1 (OT) Patient will improve endurance to fair/fair plus for for ADLs.  -     Time Frame (Problem Specific Goal 1, OT) long term goal (LTG);10 days  -       Row Name 03/31/24 1015          Therapy Assessment/Plan (OT)    Planned Therapy Interventions (OT) activity tolerance training;functional balance retraining;occupation/activity based interventions;ROM/therapeutic exercise;transfer/mobility retraining;patient/caregiver education/training;BADL retraining  -               User Key  (r) = Recorded By, (t) = Taken By, (c) = Cosigned By      Initials Name Provider Type     Missy Tejada OT Occupational Therapist                   Clinical Impression       Row Name 03/31/24 1015          Pain Assessment    Pretreatment Pain Rating 0/10 - no pain  -     Posttreatment Pain Rating 0/10 - no pain  -       Row Name 03/31/24 1015          Plan of Care Review    Plan of Care Reviewed With patient;spouse  -     Progress no change  -     Outcome Evaluation Patient presents with balance, endurance and strength limitations that impede his/her ability to perform ADLS. The skills of a therapist are necessary to maximize independence with ADLs.  -       Row Name 03/31/24 1015          Therapy Assessment/Plan (OT)    Patient/Family Therapy Goal Statement (OT) Patient would like to maximize independence with adls.  -     Rehab Potential (OT) good, to achieve stated therapy goals  -     Criteria for Skilled Therapeutic Interventions Met (OT) yes;meets criteria;skilled treatment is necessary  -     Therapy Frequency (OT) 5 times/wk  -       Row Name 03/31/24 1015          Therapy Plan Review/Discharge Plan (OT)    Equipment Needs Upon Discharge (OT) walker, rolling;commode chair  -     Anticipated Discharge Disposition (OT) inpatient rehabilitation facility  -       Row Name 03/31/24 1015          Positioning and  Restraints    Pre-Treatment Position in bed  -AC     Post Treatment Position bed  -AC     In Bed fowlers;call light within reach;encouraged to call for assist;exit alarm on  -AC               User Key  (r) = Recorded By, (t) = Taken By, (c) = Cosigned By      Initials Name Provider Type    AC Missy Tejada OT Occupational Therapist                   Outcome Measures       Row Name 03/31/24 1016          How much help from another is currently needed...    Putting on and taking off regular lower body clothing? 1  -AC     Bathing (including washing, rinsing, and drying) 2  -AC     Toileting (which includes using toilet bed pan or urinal) 1  -AC     Putting on and taking off regular upper body clothing 2  -AC     Taking care of personal grooming (such as brushing teeth) 2  -AC     Eating meals 2  -AC     AM-PAC 6 Clicks Score (OT) 10  -AC       Row Name 03/31/24 0741          How much help from another person do you currently need...    Turning from your back to your side while in flat bed without using bedrails? 2  -AJ     Moving from lying on back to sitting on the side of a flat bed without bedrails? 1  -AJ     Moving to and from a bed to a chair (including a wheelchair)? 1  -AJ     Standing up from a chair using your arms (e.g., wheelchair, bedside chair)? 1  -AJ     Climbing 3-5 steps with a railing? 1  -AJ     To walk in hospital room? 1  -AJ     AM-PAC 6 Clicks Score (PT) 7  -AJ     Highest Level of Mobility Goal 2 --> Bed activities/dependent transfer  -AJ       Row Name 03/31/24 1016          Functional Assessment    Outcome Measure Options AM-PAC 6 Clicks Daily Activity (OT);Optimal Instrument  -AC       Row Name 03/31/24 1016          Optimal Instrument    Optimal Instrument Optimal - 3  -AC     Bending/Stooping 5  -AC     Standing 5  -AC     Reaching 3  -AC     From the list, choose the 3 activities you would most like to be able to do without any difficulty Bending/stooping;Standing;Reaching  -AC      Total Score Optimal - 3 13  -AC               User Key  (r) = Recorded By, (t) = Taken By, (c) = Cosigned By      Initials Name Provider Type    Reinier Luong, RN Registered Nurse    Missy Moyer OT Occupational Therapist                      OT Recommendation and Plan  Planned Therapy Interventions (OT): activity tolerance training, functional balance retraining, occupation/activity based interventions, ROM/therapeutic exercise, transfer/mobility retraining, patient/caregiver education/training, BADL retraining  Therapy Frequency (OT): 5 times/wk  Plan of Care Review  Plan of Care Reviewed With: patient, spouse  Progress: no change  Outcome Evaluation: Patient presents with balance, endurance and strength limitations that impede his/her ability to perform ADLS. The skills of a therapist are necessary to maximize independence with ADLs.     Time Calculation:   Evaluation Complexity (OT)  Review Occupational Profile/Medical/Therapy History Complexity: expanded/moderate complexity  Assessment, Occupational Performance/Identification of Deficit Complexity: 1-3 performance deficits  Clinical Decision Making Complexity (OT): problem focused assessment/low complexity  Overall Complexity of Evaluation (OT): low complexity     Time Calculation- OT       Row Name 03/31/24 1018             Time Calculation- OT    OT Received On 03/31/24  -AC      OT Goal Re-Cert Due Date 04/09/24  -AC         Untimed Charges    OT Eval/Re-eval Minutes 31  -AC         Total Minutes    Untimed Charges Total Minutes 31  -AC       Total Minutes 31  -AC                User Key  (r) = Recorded By, (t) = Taken By, (c) = Cosigned By      Initials Name Provider Type    Missy Moyer OT Occupational Therapist                  Therapy Charges for Today       Code Description Service Date Service Provider Modifiers Qty    [id number]  OT EVAL LOW COMPLEXITY 3 3/31/2024 Missy Tejada OT GO 1                 Missy Tejada OT  3/31/2024

## 2024-03-31 NOTE — PROGRESS NOTES
[facility]  [facility] Tele[facility]urology Consult Services    Routine Consult Follow-Up    Patient Name:   Davonte Marshall  YOB: 1955  Identification Number:   MRN - [id number]  Date of Service:   03/31/2024 08:36:35    Diagnosis        G40.901 - Nonintractable epilepsy with status epilepticus, unspecified epilepsy type (HCC)    Impression  History of seizures at baseline, complicated by respiratory distress and sepsis.  Despite abnormal EEG, he is responsive and following commands at this time, no clinical events in the interim.  Continue Levetiracetam for now.  Whenever he is more stable, can consider transitioning back to his PO Primidone    Our recommendations are outlined below    Seizure precautions :  Seizure precautions including no driving for state mandated time frame were discussed with patient with clear understanding    DVT Prophylaxis :  Choice of Primary Team    Disposition :  Neurology will follow    Subjective  68 y.o. male was brought to care on 3/27/2024 for altered mental status, minimally responsive, he apparently had been minimally responsive since the morning of 3/26/204. When the EMS has reached home patient was noted to be saturating at 70% on room air and very minimally responsive and has been brought into the ED. Patient was very minimally responsive in the ED, was placed on BiPAP. He went into respiratory distress after Narcan administration and required intubation. Per nursing staff, no obvious seizure like semiology has been noted during his inpatient course in the hospital. Per his wife at bedside, he was being treated with Primidone as an outpatient. His regular neurologist also stopped Phenytoin in the past since the patient has a history of stroke. Some interim agitation after extubation. He was on BiPAP whenever I had seen him yesterday.    Hospital Course  No changes today, wife notes that he is somewhat confused and difficult to understand.    Imaging  HEAD  CT: No acute findings  EEG:  Abnormal EEG because of:  1. Frequent high-voltage epileptiform discharges which are generalized and synchronous consistent with primary generalized type epilepsy.  2. Slow background activity admixed with slower waves compatible with moderate to marked diffuse encephalopathy.    Labs  UDS positive benzodiazepines and barbiturates not prescribed (Primidone may have triggered positive barbiturate       Examination  BP(145/69), Pulse(81), Temp(100.8), Resp(22),    Neuro Exam:  General: Alert,Awake, Person  Confused but follows commands    Speech: Fluent:    Language: Intact:    Face: Symmetric:    Extraocular Movements: Intact:    Motor Exam: No Drift:    Coordination: Intact:           Patient/Family was informed the Neurology Consult would happen via TeleHealth consult by way of interactive audio and video telecommunications and consented to receiving care in this manner.    Telehealth Neurology consultation was provided. I spent 25 minutes providing telehealth care. This includes time spent for face to face visit via telemedicine, review of medical records, imaging studies and discussion of findings with providers, the patient and/or family.      Dr Homar Long      [facility]  For Inpatient follow-up with [facility] physician please call [facility] [phone]. This is not an outpatient service. Post hospital discharge, please contact hospital directly.    Please do not communicate with [facility] physicians via secure chat. If you have any questions, Please contact [facility].  Please call or reconsult our service if there are any clinical or diagnostic changes.

## 2024-03-31 NOTE — PLAN OF CARE
Goal Outcome Evaluation:   VSS this shift. Patient is alert to self and place. Pain managed per MAR. Tube feeds are at goal and tolerating well. Patient was seen by tele neuro this AM and plan to resume home medication. Will continue with POC.

## 2024-03-31 NOTE — PROGRESS NOTES
RT EQUIPMENT DEVICE RELATED - SKIN ASSESSMENT    RT Medical Equipment/Device:     NIV Mask:  Under-the-nose   size:  B    Skin Assessment:      Nose:  Intact    Device Skin Pressure Protection:  Skin-to skin areas padded    Nurse Notification:  Dahiana Shen, RRT

## 2024-03-31 NOTE — PLAN OF CARE
Goal Outcome Evaluation:  Plan of Care Reviewed With: patient        Progress: no change  Outcome Evaluation: Patient is currently on the BiPAP 14/7. When off BiPAP he is on 3L NC. Patient has to have a close watch while on BiPAP bacause he is unable to remove mask if there is an emergency.

## 2024-03-31 NOTE — PROGRESS NOTES
RT EQUIPMENT DEVICE RELATED - SKIN ASSESSMENT    RT Medical Equipment/Device:     NIV Mask:  Under-the-nose   size:       Skin Assessment:      Cheek:  Intact  Neck:  Intact  Nose:  Intact  Mouth:  Intact    Device Skin Pressure Protection:  Skin-to-device areas padded:  None Required    Nurse Notification:  Dahiana Caldera, RRT

## 2024-03-31 NOTE — PROGRESS NOTES
Pulmonary / Critical Care Progress Note      Patient Name: Davonte Marshall  : 1955  MRN: [id number]  Attending:  Garret Shah MD   Date of admission: 3/27/2024    Subjective   Subjective   Follow-up for respiratory failure on ventilator, aspiration pneumonia secondary to Klebsiella, strep pneumo, MSSA and Moraxella     Over the last 24 hours: Out of ICU    No acute events overnight.  Wore NIPPV    This morning,  On 3 L nasal cannula with SpO2 of 96-99  Lying in bed  Reports feeling well today  Denies any chest pain or chest tightness  Strong productive cough  Tmax 100.8  Cortrak in place  Diuresing well  -3.8 L urine output    Objective     Vitals:   Vital signs for last 24 hours:  Temp:  [98.6 °F (37 °C)-100.8 °F (38.2 °C)] 100.8 °F (38.2 °C)  Heart Rate:  [66-83] 81  Resp:  [18-32] 22  BP: (131-163)/() 145/69    Physical Exam   Vital Signs Reviewed   General: Elderly male awake, on nasal cannula  HEENT: Pupils sluggish and minimally reactive, no extraocular movements.    Neck:  Supple, no JVD, no thyromegaly  Chest:  good aeration, coarse breath sounds bilaterally, secretions noted  CV: Sinus tachycardia, no MGR, pulses 2+, equal.  Abd:  Soft, NT, ND, + BS, no HSM  EXT:  no clubbing, no cyanosis, no edema  Neuro: Awake, alert and oriented x 2, no focal deficits  Skin: No rashes or lesions noted    Result Review    Result Review:  I have personally reviewed the results from the time of this admission to 3/31/2024 06:58 EDT and agree with these findings:  [x]  Laboratory  [x]  Microbiology  [x]  Radiology  [x]  EKG/Telemetry   [x]  Cardiology/Vascular   []  Pathology  []  Old records  []  Other:  Most notable findings include:       Lab 24  0600 24  0548 24  0724 24  0252 24  1212 24  1209 24  0920 24  0722 24  0355   WBC 18.91* 14.44*  --  27.31*  --  15.05*  --   --  29.90*   HEMOGLOBIN 11.1* 10.8*  --  14.3  --  14.8  --   --  16.4    HEMATOCRIT 33.4* 32.5*  --  43.2  --  47.4  --   --  49.0   PLATELETS 145 142  --  257  --  266  --   --  333   SODIUM 142 137  --  138  --   --  139  --  137   SODIUM, ARTERIAL  --   --  137.8  --  138.9  --   --  141.4  --    POTASSIUM 3.9 3.7  --  4.4  --   --  3.9  --  4.6   CHLORIDE 103 101  --  100  --   --  100  --  96*   CO2 25.5 23.1  --  20.6*  --   --  13.6*  --  20.3*   BUN 39* 31*  --  40*  --   --  31*  --  29*   CREATININE 0.97 1.06  --  2.11*  --   --  2.62*  --  2.46*   GLUCOSE 115* 129*  --  124*  --   --  169*  --  154*   GLUCOSE, ARTERIAL  --   --  125*  --  126*  --   --  187*  --    CALCIUM 8.2* 7.9*  --  8.3*  --   --  8.2*  --  9.1   PHOSPHORUS 2.0* 1.2*  --  4.2  --   --  6.8*  --   --    TOTAL PROTEIN 6.3 5.5*  --  6.6  --   --  7.0  --  8.2   ALBUMIN 3.2* 2.6*  --  3.2*  --   --  3.4*  --  4.3   GLOBULIN 3.1 2.9  --  3.4  --   --  3.6  --  3.9     Results for orders placed during the hospital encounter of 03/27/24    Adult Transthoracic Echo Limited W/ Cont if Necessary Per Protocol    Interpretation Summary    Extremely technically difficult study with very limited views.    The subcostal view is the only one where we can see myocardium and it looks like ejection fraction is greater than 50%.  Cannot rule out regional wall motion abnormalities.    No obvious significant valvular disease by Doppler.  The valves are not well-visualized.    Left ventricular diastolic function was indeterminate.    BAL culture with Streptococcus pneumonia, Klebsiella and MSSA    Assessment & Plan   Assessment / Plan     Active Hospital Problems:  Active Hospital Problems    Diagnosis     **Acute metabolic encephalopathy      Impression:  Septic shock  Aspiration pneumonia secondary to Klebsiella, strep pneumo, MSSA and Moraxella  Lactic acidosis, clinically significant  Metabolic acidosis  Acute hypoxemic and hypercapnic respiratory failure require mechanical ventilation  Altered mental  status  Toxic/metabolic encephalopathy  NSTEMI, type II from demand ischemia  Acute kidney injury secondary to prerenal etiology  Hypomagnesemia  Hypophosphatemia  Hypophosphatemia  Leukocytosis  Seizures/tremors.  On primidone at home     Plan:  -Continue supplemental oxygen with nasal cannula, okay to use NIPPV at night  -Continue Unasyn for total of 7 days and doxycycline for total of 3 days for pneumonia  -Continue Lasix 40 mg IV twice daily  -Continue to monitor renal panel electrolytes.  Replace potassium Cortrak  -Continue Brovana, Pulmicort, and DuoNebs  -Continue Solu-Medrol 40 mg IV daily  -Continue aspiration precautions  -Continue bronchopulmonary hygiene.  Encourage I-S  -Continue chest vest therapy  -Continue Keppra intravenously  -PT/OT/SLP on board.  Appreciate assistance  -Neurology following, appreciate input  -Cardiology following, appreciate input       DVT prophylaxis: Lovenox  Medical DVT prophylaxis orders are present.    CODE STATUS:   Level Of Support Discussed With: Health Care Surrogate  Code Status (Patient has no pulse and is not breathing): CPR (Attempt to Resuscitate)  Medical Interventions (Patient has pulse or is breathing): Full Support    Labs, managing, and medications personally reviewed  Discussed with primary and patient    I, Dr. Mikey Mahajan, have spent more than 50% of the total time managing the patient in this encounter today.  This included personally reviewing all pertinent labs, imaging, microbiology and documentation. Also discussing the case with the patient and any available family, the admitting physician and any available ancillary staff.    Electronically signed by KEVIN King, 03/31/24, 10:37 AM EDT.  Electronically signed by Mikey Mahajan MD, 03/31/24, 3:43 PM EDT.

## 2024-03-31 NOTE — PLAN OF CARE
Goal Outcome Evaluation:   Pt wore bipap all night, now on 3lnc

## 2024-04-01 LAB
ALBUMIN SERPL-MCNC: 3.5 G/DL (ref 3.5–5.2)
ALBUMIN/GLOB SERPL: 1 G/DL
ALP SERPL-CCNC: 86 U/L (ref 39–117)
ALT SERPL W P-5'-P-CCNC: 73 U/L (ref 1–41)
ANION GAP SERPL CALCULATED.3IONS-SCNC: 14.5 MMOL/L (ref 5–15)
APTT PPP: 24.8 SECONDS (ref 78–95.9)
AST SERPL-CCNC: 66 U/L (ref 1–40)
BACTERIA SPEC AEROBE CULT: NORMAL
BACTERIA SPEC AEROBE CULT: NORMAL
BASOPHILS # BLD AUTO: 0.1 10*3/MM3 (ref 0–0.2)
BASOPHILS NFR BLD AUTO: 0.9 % (ref 0–1.5)
BILIRUB SERPL-MCNC: 0.5 MG/DL (ref 0–1.2)
BUN SERPL-MCNC: 32 MG/DL (ref 8–23)
BUN/CREAT SERPL: 32.3 (ref 7–25)
CALCIUM SPEC-SCNC: 8.7 MG/DL (ref 8.6–10.5)
CHLORIDE SERPL-SCNC: 107 MMOL/L (ref 98–107)
CO2 SERPL-SCNC: 26.5 MMOL/L (ref 22–29)
CREAT SERPL-MCNC: 0.99 MG/DL (ref 0.76–1.27)
D-LACTATE SERPL-SCNC: 2.5 MMOL/L (ref 0.5–2)
DEPRECATED RDW RBC AUTO: 47.4 FL (ref 37–54)
EGFRCR SERPLBLD CKD-EPI 2021: 84 ML/MIN/1.73
EOSINOPHIL # BLD AUTO: 0.05 10*3/MM3 (ref 0–0.4)
EOSINOPHIL NFR BLD AUTO: 0.5 % (ref 0.3–6.2)
ERYTHROCYTE [DISTWIDTH] IN BLOOD BY AUTOMATED COUNT: 14.7 % (ref 12.3–15.4)
GEN 5 2HR TROPONIN T REFLEX: 1517 NG/L
GLOBULIN UR ELPH-MCNC: 3.4 GM/DL
GLUCOSE BLDC GLUCOMTR-MCNC: 132 MG/DL (ref 70–99)
GLUCOSE BLDC GLUCOMTR-MCNC: 142 MG/DL (ref 70–99)
GLUCOSE SERPL-MCNC: 143 MG/DL (ref 65–99)
HCT VFR BLD AUTO: 42 % (ref 37.5–51)
HGB BLD-MCNC: 14 G/DL (ref 13–17.7)
IMM GRANULOCYTES # BLD AUTO: 0.43 10*3/MM3 (ref 0–0.05)
IMM GRANULOCYTES NFR BLD AUTO: 3.9 % (ref 0–0.5)
INR PPP: 1.09 (ref 0.86–1.15)
LYMPHOCYTES # BLD AUTO: 2.46 10*3/MM3 (ref 0.7–3.1)
LYMPHOCYTES NFR BLD AUTO: 22.2 % (ref 19.6–45.3)
MAGNESIUM SERPL-MCNC: 1.8 MG/DL (ref 1.6–2.4)
MCH RBC QN AUTO: 29.9 PG (ref 26.6–33)
MCHC RBC AUTO-ENTMCNC: 33.3 G/DL (ref 31.5–35.7)
MCV RBC AUTO: 89.6 FL (ref 79–97)
MONOCYTES # BLD AUTO: 1.22 10*3/MM3 (ref 0.1–0.9)
MONOCYTES NFR BLD AUTO: 11 % (ref 5–12)
NEUTROPHILS NFR BLD AUTO: 6.83 10*3/MM3 (ref 1.7–7)
NEUTROPHILS NFR BLD AUTO: 61.5 % (ref 42.7–76)
NRBC BLD AUTO-RTO: 0.4 /100 WBC (ref 0–0.2)
PHOSPHATE SERPL-MCNC: 2.5 MG/DL (ref 2.5–4.5)
PLATELET # BLD AUTO: 209 10*3/MM3 (ref 140–450)
PMV BLD AUTO: 10.1 FL (ref 6–12)
POTASSIUM SERPL-SCNC: 3.4 MMOL/L (ref 3.5–5.2)
PROT SERPL-MCNC: 6.9 G/DL (ref 6–8.5)
PROTHROMBIN TIME: 14.3 SECONDS (ref 11.8–14.9)
RBC # BLD AUTO: 4.69 10*6/MM3 (ref 4.14–5.8)
SODIUM SERPL-SCNC: 148 MMOL/L (ref 136–145)
TROPONIN T DELTA: -5 NG/L
TROPONIN T SERPL HS-MCNC: 1522 NG/L
WBC NRBC COR # BLD AUTO: 11.09 10*3/MM3 (ref 3.4–10.8)

## 2024-04-01 PROCEDURE — 97161 PT EVAL LOW COMPLEX 20 MIN: CPT

## 2024-04-01 PROCEDURE — 80053 COMPREHEN METABOLIC PANEL: CPT | Performed by: INTERNAL MEDICINE

## 2024-04-01 PROCEDURE — 83605 ASSAY OF LACTIC ACID: CPT | Performed by: FAMILY MEDICINE

## 2024-04-01 PROCEDURE — 25010000002 FUROSEMIDE PER 20 MG: Performed by: INTERNAL MEDICINE

## 2024-04-01 PROCEDURE — 85610 PROTHROMBIN TIME: CPT | Performed by: FAMILY MEDICINE

## 2024-04-01 PROCEDURE — 83735 ASSAY OF MAGNESIUM: CPT | Performed by: INTERNAL MEDICINE

## 2024-04-01 PROCEDURE — 94799 UNLISTED PULMONARY SVC/PX: CPT

## 2024-04-01 PROCEDURE — 82948 REAGENT STRIP/BLOOD GLUCOSE: CPT

## 2024-04-01 PROCEDURE — 99231 SBSQ HOSP IP/OBS SF/LOW 25: CPT | Performed by: PSYCHIATRY & NEUROLOGY

## 2024-04-01 PROCEDURE — 93010 ELECTROCARDIOGRAM REPORT: CPT | Performed by: INTERNAL MEDICINE

## 2024-04-01 PROCEDURE — 93005 ELECTROCARDIOGRAM TRACING: CPT | Performed by: INTERNAL MEDICINE

## 2024-04-01 PROCEDURE — 85025 COMPLETE CBC W/AUTO DIFF WBC: CPT | Performed by: INTERNAL MEDICINE

## 2024-04-01 PROCEDURE — 84100 ASSAY OF PHOSPHORUS: CPT | Performed by: INTERNAL MEDICINE

## 2024-04-01 PROCEDURE — 99233 SBSQ HOSP IP/OBS HIGH 50: CPT | Performed by: INTERNAL MEDICINE

## 2024-04-01 PROCEDURE — 25010000002 ENOXAPARIN PER 10 MG: Performed by: NURSE PRACTITIONER

## 2024-04-01 PROCEDURE — 25810000003 LACTATED RINGERS SOLUTION: Performed by: FAMILY MEDICINE

## 2024-04-01 PROCEDURE — 94664 DEMO&/EVAL PT USE INHALER: CPT

## 2024-04-01 PROCEDURE — 99233 SBSQ HOSP IP/OBS HIGH 50: CPT | Performed by: STUDENT IN AN ORGANIZED HEALTH CARE EDUCATION/TRAINING PROGRAM

## 2024-04-01 PROCEDURE — 85730 THROMBOPLASTIN TIME PARTIAL: CPT | Performed by: FAMILY MEDICINE

## 2024-04-01 PROCEDURE — 94761 N-INVAS EAR/PLS OXIMETRY MLT: CPT

## 2024-04-01 PROCEDURE — 92526 ORAL FUNCTION THERAPY: CPT

## 2024-04-01 PROCEDURE — 25010000002 AMPICILLIN-SULBACTAM PER 1.5 G: Performed by: NURSE PRACTITIONER

## 2024-04-01 PROCEDURE — 94669 MECHANICAL CHEST WALL OSCILL: CPT

## 2024-04-01 PROCEDURE — 94660 CPAP INITIATION&MGMT: CPT

## 2024-04-01 PROCEDURE — 25010000002 METHYLPREDNISOLONE PER 40 MG: Performed by: NURSE PRACTITIONER

## 2024-04-01 PROCEDURE — 84484 ASSAY OF TROPONIN QUANT: CPT | Performed by: INTERNAL MEDICINE

## 2024-04-01 PROCEDURE — 25010000002 HEPARIN (PORCINE) 25000-0.45 UT/250ML-% SOLUTION: Performed by: FAMILY MEDICINE

## 2024-04-01 RX ORDER — HEPARIN SODIUM 10000 [USP'U]/100ML
12 INJECTION, SOLUTION INTRAVENOUS
Status: DISCONTINUED | OUTPATIENT
Start: 2024-04-01 | End: 2024-04-02

## 2024-04-01 RX ORDER — POTASSIUM CHLORIDE 1.5 G/1.58G
40 POWDER, FOR SOLUTION ORAL 2 TIMES DAILY
Status: COMPLETED | OUTPATIENT
Start: 2024-04-01 | End: 2024-04-02

## 2024-04-01 RX ADMIN — IPRATROPIUM BROMIDE AND ALBUTEROL SULFATE 3 ML: .5; 3 SOLUTION RESPIRATORY (INHALATION) at 22:48

## 2024-04-01 RX ADMIN — PRIMIDONE 250 MG: 250 TABLET ORAL at 20:48

## 2024-04-01 RX ADMIN — DOXYCYCLINE 100 MG: 25 POWDER, FOR SUSPENSION ORAL at 20:48

## 2024-04-01 RX ADMIN — CHLORHEXIDINE GLUCONATE 15 ML: 1.2 RINSE ORAL at 11:23

## 2024-04-01 RX ADMIN — AMPICILLIN AND SULBACTAM 3 G: 2; 1 INJECTION, POWDER, FOR SOLUTION INTRAVENOUS at 11:07

## 2024-04-01 RX ADMIN — ARFORMOTEROL TARTRATE 15 MCG: 15 SOLUTION RESPIRATORY (INHALATION) at 06:43

## 2024-04-01 RX ADMIN — BUDESONIDE 0.5 MG: 0.5 INHALANT ORAL at 18:30

## 2024-04-01 RX ADMIN — ENOXAPARIN SODIUM 40 MG: 100 INJECTION SUBCUTANEOUS at 11:23

## 2024-04-01 RX ADMIN — IPRATROPIUM BROMIDE AND ALBUTEROL SULFATE 3 ML: .5; 3 SOLUTION RESPIRATORY (INHALATION) at 03:41

## 2024-04-01 RX ADMIN — IPRATROPIUM BROMIDE AND ALBUTEROL SULFATE 3 ML: .5; 3 SOLUTION RESPIRATORY (INHALATION) at 06:43

## 2024-04-01 RX ADMIN — FAMOTIDINE 20 MG: 10 INJECTION INTRAVENOUS at 09:03

## 2024-04-01 RX ADMIN — ARFORMOTEROL TARTRATE 15 MCG: 15 SOLUTION RESPIRATORY (INHALATION) at 18:31

## 2024-04-01 RX ADMIN — DULOXETINE HYDROCHLORIDE 40 MG: 30 CAPSULE, DELAYED RELEASE ORAL at 20:48

## 2024-04-01 RX ADMIN — PRIMIDONE 250 MG: 250 TABLET ORAL at 15:43

## 2024-04-01 RX ADMIN — FUROSEMIDE 40 MG: 10 INJECTION, SOLUTION INTRAMUSCULAR; INTRAVENOUS at 09:03

## 2024-04-01 RX ADMIN — HYDROCODONE BITARTRATE AND ACETAMINOPHEN 1 TABLET: 5; 325 TABLET ORAL at 11:23

## 2024-04-01 RX ADMIN — IPRATROPIUM BROMIDE AND ALBUTEROL SULFATE 3 ML: .5; 3 SOLUTION RESPIRATORY (INHALATION) at 12:25

## 2024-04-01 RX ADMIN — METHYLPREDNISOLONE SODIUM SUCCINATE 40 MG: 40 INJECTION INTRAMUSCULAR; INTRAVENOUS at 10:50

## 2024-04-01 RX ADMIN — AMLODIPINE BESYLATE 5 MG: 5 TABLET ORAL at 09:03

## 2024-04-01 RX ADMIN — HEPARIN SODIUM 15 UNITS/KG/HR: 10000 INJECTION, SOLUTION INTRAVENOUS at 23:04

## 2024-04-01 RX ADMIN — IPRATROPIUM BROMIDE AND ALBUTEROL SULFATE 3 ML: .5; 3 SOLUTION RESPIRATORY (INHALATION) at 00:37

## 2024-04-01 RX ADMIN — CHLORHEXIDINE GLUCONATE 15 ML: 1.2 RINSE ORAL at 20:48

## 2024-04-01 RX ADMIN — ASPIRIN 81 MG 81 MG: 81 TABLET ORAL at 09:03

## 2024-04-01 RX ADMIN — POTASSIUM CHLORIDE 40 MEQ: 1.5 POWDER, FOR SOLUTION ORAL at 09:03

## 2024-04-01 RX ADMIN — SODIUM CHLORIDE, POTASSIUM CHLORIDE, SODIUM LACTATE AND CALCIUM CHLORIDE 500 ML: 600; 310; 30; 20 INJECTION, SOLUTION INTRAVENOUS at 20:47

## 2024-04-01 RX ADMIN — POTASSIUM CHLORIDE 40 MEQ: 1.5 POWDER, FOR SOLUTION ORAL at 20:48

## 2024-04-01 RX ADMIN — FUROSEMIDE 40 MG: 10 INJECTION, SOLUTION INTRAMUSCULAR; INTRAVENOUS at 17:31

## 2024-04-01 RX ADMIN — AMPICILLIN AND SULBACTAM 3 G: 2; 1 INJECTION, POWDER, FOR SOLUTION INTRAVENOUS at 05:04

## 2024-04-01 RX ADMIN — IPRATROPIUM BROMIDE AND ALBUTEROL SULFATE 3 ML: .5; 3 SOLUTION RESPIRATORY (INHALATION) at 18:30

## 2024-04-01 RX ADMIN — BUDESONIDE 0.5 MG: 0.5 INHALANT ORAL at 06:43

## 2024-04-01 RX ADMIN — AMPICILLIN AND SULBACTAM 3 G: 2; 1 INJECTION, POWDER, FOR SOLUTION INTRAVENOUS at 17:32

## 2024-04-01 RX ADMIN — LIDOCAINE 1 PATCH: 4 PATCH TOPICAL at 09:04

## 2024-04-01 RX ADMIN — METOPROLOL TARTRATE 5 MG: 1 INJECTION, SOLUTION INTRAVENOUS at 20:48

## 2024-04-01 RX ADMIN — PRIMIDONE 250 MG: 250 TABLET ORAL at 09:03

## 2024-04-01 RX ADMIN — DULOXETINE HYDROCHLORIDE 40 MG: 30 CAPSULE, DELAYED RELEASE ORAL at 09:03

## 2024-04-01 RX ADMIN — Medication 10 ML: at 20:49

## 2024-04-01 RX ADMIN — AMPICILLIN AND SULBACTAM 3 G: 2; 1 INJECTION, POWDER, FOR SOLUTION INTRAVENOUS at 23:09

## 2024-04-01 RX ADMIN — DOXYCYCLINE 100 MG: 25 POWDER, FOR SUSPENSION ORAL at 09:03

## 2024-04-01 RX ADMIN — ACETAMINOPHEN 650 MG: 325 TABLET ORAL at 15:42

## 2024-04-01 RX ADMIN — Medication 10 ML: at 09:04

## 2024-04-01 RX ADMIN — IPRATROPIUM BROMIDE AND ALBUTEROL SULFATE 3 ML: .5; 3 SOLUTION RESPIRATORY (INHALATION) at 15:22

## 2024-04-01 NOTE — PROGRESS NOTES
RT EQUIPMENT DEVICE RELATED - SKIN ASSESSMENT    RT Medical Equipment/Device:     NIV Mask:  Under-the-nose   size:  .    Skin Assessment:      Cheek:  Intact  Chin:  Intact  Nares:  Intact  Nose:  Intact    Device Skin Pressure Protection:  Positioning supports utilized    Nurse Notification:  Dahiana Robbins, RRT

## 2024-04-01 NOTE — PLAN OF CARE
Goal Outcome Evaluation:   Patient is alert and oriented. Titrated off oxygen, now on room air and sating in 90's. Had a temp of 100.8 treated per MAR. Temp improved. HR has been tachy in the 130's. MD made aware. Pain managed per MAR. Will continue with POC.

## 2024-04-01 NOTE — PROGRESS NOTES
[facility]   Hospitalist Progress Note  Date: 2024  Patient Name: Davonte Marshall  : 1955  MRN: [id number]  Date of admission: 3/27/2024      Subjective   Subjective     Chief Complaint: Follow up for somnolence    Summary: 68-year-old male with hypertension, chronic pain on high dose of morphine who presented after having worsening lethargy and mental status apparently was found to still have food in his mouth from the day before, is midline responsive, prior to the CT department had hypoxia with sats in the 70s initially requiring BiPAP, had aspiration episode and required intubation for airway distress.  Treating for sepsis with multifocal pneumonia, possible cardiogenic shock, aspiration, hypoxemic hypercapnic respiratory failure, polypharmacy and oversedation and question positive benzodiazepines and barbiturates on tox screen that he is not prescribed.  Pulmonology cards and neuro assisting.  Extubated to bipap and slowly weaning.  SLP eval with aspiration, still requiring TF.  EEG with seizure activity, question withdrawal from home primidone with poor adherence to 3 times daily dosing at home.  Using IV Keppra for now.  Slowly resuming home medications at reduced dose     Interval Followup:   Still having fevers; Tmax 100.8F  Hemodynamically stable.   Wean down to 2 L/min NC  Following simple commands appropriate  Per staff and wife mentation slowly improving  Still profoundly weak  Tolerating tube feeds    Review of Systems  Cardiovascular:  No Chest Pain  Respiratory:  No Dyspnea  Gastrointestinal:  No abdominal pain      Objective   Objective     Vitals:   Temp:  [98.1 °F (36.7 °C)-100.6 °F (38.1 °C)] 98.1 °F (36.7 °C)  Heart Rate:  [78-97] 94  Resp:  [20-30] 20  BP: (128-171)/() 128/109  Flow (L/min):  [2-4] 2  Physical Exam    Constitutional: Well-nourished male, awake, NAD   Eyes: Pupils equal and reactive, no conjunctival injections   HENT: NCAT, NG tube   Respiratory:  nonlabored respirations    Cardiovascular: RRR, trace pedal, edema   Gastrointestinal: Soft, nontender   Neurologic: Lethargic, following simple commands appropriately, cranial Nerves grossly intact   Skin: Extremities warm, no rashes    Result Review    Result Review:  I have personally reviewed the following over the last 24 hours (07:00 to 07:00) and agree with the following findings  [x]  Laboratory  CBC          3/30/2024    06:00 3/31/2024    06:53 4/1/2024    06:35   CBC   WBC 18.91  14.07  11.09    RBC 3.72  4.08  4.69    Hemoglobin 11.1  12.1  14.0    Hematocrit 33.4  36.6  42.0    MCV 89.8  89.7  89.6    MCH 29.8  29.7  29.9    MCHC 33.2  33.1  33.3    RDW 14.1  14.5  14.7    Platelets 145  168  209      BMP          3/30/2024    06:00 3/31/2024    06:53 4/1/2024    06:35   BMP   BUN 39  42  32    Creatinine 0.97  1.10  0.99    Sodium 142  145  148    Potassium 3.9  3.2  3.4    Chloride 103  104  107    CO2 25.5  28.6  26.5    Calcium 8.2  8.7  8.7      []  Microbiology  []  Radiology   [x]  EKG/Telemetry monitor personally reviewed: NSR/sinus tachycardia  []  Cardiology/Vascular   []  Pathology  []  Old records  [x]  Other:    Intake/Output Summary (Last 24 hours) at 4/1/2024 0805  Last data filed at 4/1/2024 0315  Gross per 24 hour   Intake 130 ml   Output 2425 ml   Net -2295 ml         Assessment & Plan   Assessment / Plan     Assessment/Plan:  Septic shock, multifactorial with pneumonia  Aspiration pneumonia with Klebsiella, Streptococcus, MSSA and Moraxella  Acute hypoxemic and hypercapnic respiratory failure requiring mechanical intervention  Acute metabolic encephalopathy in setting of polypharmacy and hypercapnia  Hx of seizures and Essential tremor - on primidone  Tox positive benzodiazepines and barbiturates; not prescribed  Hypernatremia  Hypokalemia  Transaminitis  NSTEMI, suspect type II from demand/sepsis  DIONNA, secondary to sepsis/shock. Resolved  Hyperlipidemia  Depression/anxiety on  outpatient Cymbalta  Chronic low back pain        White count improved to 11,000.  Still having fevers although they appear to be happening less frequently.    Continue antipyretics every 6 hours as needed  Continue IV Unasyn 3 g every 6 hours. Stop date 4/5/24  Continue doxycycline 100 mg every 12 hours.  To complete today  Appreciate pulmonology recommendations  Continue scheduled nebulizers  Continue IV Lasix 40 mg 2 times daily.  Trend renal function electrolytes with daily BMP  Continue KCl supplementation  Continue IV Solu-Medrol 40 mg daily  Wean oxygen, SpO2 goal >90%  Appreciate teleneurology recommendations  Keppra discontinued  Restarted on primidone 250 mg three times daily  Continue seizure precaution  Continue aspirin 81 mg daily  Hold statin given mild transaminitis.  Trend liver enzyme  Continue amlodipine 5 mg daily  Continue as needed Norco every 6 hours and lidocaine patch daily  Continue Cymbalta 40 mg two times daily  Continue tube feeds via NG tube.  Will need to increase free water tomorrow if sodium uptrends.   SLP following, appreciate assistance  VTE ppx: Lovenox 40 mg daily  CBC, CMP in AM    Discussed plan with RN.      DVT prophylaxis:  Medical DVT prophylaxis orders are present.        CODE STATUS:   Level Of Support Discussed With: Health Care Surrogate  Code Status (Patient has no pulse and is not breathing): CPR (Attempt to Resuscitate)  Medical Interventions (Patient has pulse or is breathing): Full Support      I spent greater than 50 minutes minutes of time for evaluation and management of this patient including review of chart, labs pertinent imaging available as well as medical decision making and discussion with physicians, staff and patient/family.      Electronically signed by Willy Tena DO, 04/01/24, 10:01 AM EDT.

## 2024-04-01 NOTE — PLAN OF CARE
Goal Outcome Evaluation:           Progress: no change  Outcome Evaluation: Patient wore bipap last night with sitter in room. He complies with chest vest therapy but unable to sit up on his own. On 2 L nasal cannula, saturations are 93%.

## 2024-04-01 NOTE — PROGRESS NOTES
Pulmonary / Critical Care Progress Note      Patient Name: Davonte Marshall  : 1955  MRN: [id number]  Attending:  Willy Tena DO   Date of admission: 3/27/2024    Subjective   Subjective   Follow-up for respiratory failure on ventilator, aspiration pneumonia secondary to Klebsiella, strep pneumo, MSSA and Moraxella     No acute events overnight.  Wore NIPPV    This morning,  On 2 L nasal cannula  Lying in bed  Patient states he feels well today  Wife states patient is bedbound at baseline  Denies any chest pain or chest tightness  Patient has strong productive cough, but dislikes chest vest therapy  Remains weak and fatigued  Tmax 100.8  Cortrak remains in place  Diuresing well  -2.4 L urine output    Objective     Vitals:   Vital signs for last 24 hours:  Temp:  [97.7 °F (36.5 °C)-100.8 °F (38.2 °C)] 100.8 °F (38.2 °C)  Heart Rate:  [] 98  Resp:  [20-30] 20  BP: (128-173)/() 159/91    Physical Exam   Vital Signs Reviewed   General: Elderly male awake, on nasal cannula  HEENT: Pupils sluggish and minimally reactive, no extraocular movements.    Neck:  Supple, no JVD, no thyromegaly  Chest:  good aeration, coarse breath sounds bilaterally, secretions noted  CV: Sinus tachycardia, no MGR, pulses 2+, equal.  Abd:  Soft, NT, ND, + BS, no HSM  EXT:  no clubbing, no cyanosis, no edema  Neuro: Awake, alert and oriented x 2, no focal deficits  Skin: No rashes or lesions noted    Result Review    Result Review:  I have personally reviewed the results from the time of this admission to 2024 16:54 EDT and agree with these findings:  [x]  Laboratory  [x]  Microbiology  [x]  Radiology  [x]  EKG/Telemetry   [x]  Cardiology/Vascular   []  Pathology  []  Old records  []  Other:  Most notable findings include:       Lab 24  0635 24  0653 24  0600 24  0548 24  0724 24  0252 24  1212 24  1209 24  0920 24  0722 24  0355   WBC 11.09* 14.07*  18.91* 14.44*  --  27.31*  --  15.05*  --   --  29.90*   HEMOGLOBIN 14.0 12.1* 11.1* 10.8*  --  14.3  --  14.8  --   --  16.4   HEMATOCRIT 42.0 36.6* 33.4* 32.5*  --  43.2  --  47.4  --   --  49.0   PLATELETS 209 168 145 142  --  257  --  266  --   --  333   SODIUM 148* 145 142 137  --  138  --   --  139  --  137   SODIUM, ARTERIAL  --   --   --   --  137.8  --  138.9  --   --    < >  --    POTASSIUM 3.4* 3.2* 3.9 3.7  --  4.4  --   --  3.9  --  4.6   CHLORIDE 107 104 103 101  --  100  --   --  100  --  96*   CO2 26.5 28.6 25.5 23.1  --  20.6*  --   --  13.6*  --  20.3*   BUN 32* 42* 39* 31*  --  40*  --   --  31*  --  29*   CREATININE 0.99 1.10 0.97 1.06  --  2.11*  --   --  2.62*  --  2.46*   GLUCOSE 143* 123* 115* 129*  --  124*  --   --  169*  --  154*   GLUCOSE, ARTERIAL  --   --   --   --  125*  --  126*  --   --    < >  --    CALCIUM 8.7 8.7 8.2* 7.9*  --  8.3*  --   --  8.2*  --  9.1   PHOSPHORUS 2.5 2.6 2.0* 1.2*  --  4.2  --   --  6.8*  --   --    TOTAL PROTEIN 6.9 6.5 6.3 5.5*  --  6.6  --   --  7.0  --  8.2   ALBUMIN 3.5 3.2* 3.2* 2.6*  --  3.2*  --   --  3.4*  --  4.3   GLOBULIN 3.4 3.3 3.1 2.9  --  3.4  --   --  3.6  --  3.9    < > = values in this interval not displayed.     Results for orders placed during the hospital encounter of 03/27/24    Adult Transthoracic Echo Limited W/ Cont if Necessary Per Protocol    Interpretation Summary    Extremely technically difficult study with very limited views.    The subcostal view is the only one where we can see myocardium and it looks like ejection fraction is greater than 50%.  Cannot rule out regional wall motion abnormalities.    No obvious significant valvular disease by Doppler.  The valves are not well-visualized.    Left ventricular diastolic function was indeterminate.    BAL culture with Streptococcus pneumonia, Klebsiella and MSSA    Assessment & Plan   Assessment / Plan     Active Hospital Problems:  Active Hospital Problems    Diagnosis      **Acute metabolic encephalopathy      Impression:  Septic shock  Aspiration pneumonia secondary to Klebsiella, strep pneumo, MSSA and Moraxella  Lactic acidosis, clinically significant  Metabolic acidosis  Acute hypoxemic and hypercapnic respiratory failure require mechanical ventilation  Altered mental status  Toxic/metabolic encephalopathy  NSTEMI, type II from demand ischemia  Acute kidney injury secondary to prerenal etiology  Hypomagnesemia  Hypophosphatemia  Hypophosphatemia  Leukocytosis  Seizures/tremors.  On primidone at home     Plan:  -Continue supplemental oxygen with nasal cannula, okay to use NIPPV at night  -Continue Unasyn for total of 7 days and doxycycline for total of 3 days for pneumonia  -Continue Lasix 40 mg IV twice daily  -Continue to monitor renal panel electrolytes.  Replace potassium Cortrak  -Continue Brovana, Pulmicort, and DuoNebs  -Continue Solu-Medrol 40 mg IV daily  -Continue aspiration precautions  -Continue bronchopulmonary hygiene.  Encourage I-S  -Continue chest vest therapy  -Continue Keppra intravenously  -PT/OT/SLP on board.  Appreciate assistance  -Neurology following, appreciate input  -Cardiology following, appreciate input  -Rehab referrals pending     Electronically signed by Greg Sweeney MD, 04/01/24, 6:00 PM EDT.     DVT prophylaxis: Lovenox  Medical DVT prophylaxis orders are present.    CODE STATUS:   Level Of Support Discussed With: Health Care Surrogate  Code Status (Patient has no pulse and is not breathing): CPR (Attempt to Resuscitate)  Medical Interventions (Patient has pulse or is breathing): Full Support    Labs, managing, and medications personally reviewed  Discussed with primary and patient    Electronically signed by KEVIN King, 04/01/24, 4:54 PM EDT.

## 2024-04-01 NOTE — THERAPY EVALUATION
"Acute Care - Physical Therapy Initial Evaluation   [facility]     Patient Name: Davonte Marshall  : 1955  MRN: [id number]  Today's Date: 2024      Visit Dx:     ICD-10-CM ICD-9-CM   1. Acute respiratory failure with hypoxia  J96.01 518.81   2. Metabolic encephalopathy  G93.41 348.31   3. DIONNA (acute kidney injury)  N17.9 584.9   4. NSTEMI (non-ST elevated myocardial infarction)  I21.4 410.70   5. Oropharyngeal dysphagia  R13.12 787.22   6. Impaired mobility and ADLs  Z74.09 V49.89    Z78.9    7. Difficulty in walking  R26.2 719.7     Patient Active Problem List   Diagnosis    Anemia    Anxiety    Arthritis    Back pain    Chronic bronchitis    Complication of surgical procedure    Degeneration of lumbosacral intervertebral disc    Depression    Encounter for long-term (current) use of insulin    Essential tremor    Gall stones    Head injury    Hemorrhoids    Herniation of intervertebral disc    Hyperlipidemia    Leg pain    Neck pain    Renal insufficiency    Scoliosis    Seizure disorder    Smokes 1.5 packs of cigarettes per day    Vitamin D deficiency disease    Gross hematuria    BPH without obstruction/lower urinary tract symptoms    Urge incontinence of urine    Benign prostatic hyperplasia with urinary hesitancy    Sequelae, post-stroke    S/P TURP    Postoperative anemia    Postoperative hypoxia    Essential hypertension    Opioid dependence    Cervical dystonia    Acute metabolic encephalopathy     Past Medical History:   Diagnosis Date    Anemia     Anxiety     Arthritis     Benign essential hypertension     Chronic bronchitis     Depression     Enlarged prostate     Floaters in visual field     Gall stones     Generalized weakness     Head injury     Hemorrhoids     Hyperlipidemia     Leg pain     Numbness in both hands 2015    Seizures     last \"quite a while ago\"    Stroke 2020    left sided weakness     Past Surgical History:   Procedure Laterality Date    APPENDECTOMY      BACK " SURGERY      4    CHOLECYSTECTOMY      CYSTOSCOPY Bilateral     7/12/21    CYSTOSCOPY TRANSURETHRAL RESECTION OF PROSTATE N/A 7/12/2021    Procedure: CYSTOSCOPY TRANSURETHRAL RESECTION OF PROSTATE;  Surgeon: Penelope Fox MD;  Location[facility] MAIN OR;  Service: Urology;  Laterality: N/A;    HAND SURGERY Bilateral     thumb    LYMPH NODE DISSECTION      jaw line    TURP / TRANSURETHRAL INCISION / DRAINAGE PROSTATE  07/12/2021     PT Assessment (Last 12 Hours)       PT Evaluation and Treatment       Row Name 04/01/24 1100          Physical Therapy Time and Intention    Subjective Information no complaints  -SANCHEZ     Document Type evaluation  -SANCHEZ     Mode of Treatment individual therapy;physical therapy  -SANCHEZ     Patient Effort good  -SANCHEZ       Row Name 04/01/24 1100          General Information    Patient Profile Reviewed yes  -SANCHEZ     Patient Observations alert;cooperative;agree to therapy  -SANCHEZ     Prior Level of Function independent:;all household mobility;ADL's;gait  -SANCHEZ     Equipment Currently Used at Home cane, straight  -SANCHEZ     Existing Precautions/Restrictions fall  -SANCHEZ     Barriers to Rehab none identified  -SANCHEZ       Row Name 04/01/24 1100          Living Environment    Current Living Arrangements home  -SANCHEZ     Home Accessibility stairs to enter home  -SANCHEZ     People in Home spouse  -SANCHEZ     Primary Care Provided by self  -SANCHEZ       Row Name 04/01/24 1100          Home Main Entrance    Number of Stairs, Main Entrance three  -SANCHEZ       Row Name 04/01/24 1100          Cognition    Cognitive Function WFL  -SANCHEZ       Row Name 04/01/24 1100          Range of Motion (ROM)    Range of Motion --  L LE WFL, R LE Unable AROM, WFL PROM  -SANCHEZ       Row Name 04/01/24 1100          Strength (Manual Muscle Testing)    Strength (Manual Muscle Testing) --  LLE 3+/5, R LE 2/5  -SANCHEZ       Row Name 04/01/24 1100          Bed Mobility    Bed Mobility rolling left;rolling right  -SANCHEZ     Rolling Left [place] (Bed Mobility) maximum  assist (25% patient effort)  -SANCHEZ     Rolling Right [place] (Bed Mobility) maximum assist (25% patient effort)  -SANCHEZ     Comment, (Bed Mobility) Supine>Longsit: MinAx1 2x for 5sec hold each  -SANCHEZ       Row Name 04/01/24 1100          Transfers    Transfers bed-chair transfer;sit-stand transfer  -SANCHEZ     Comment, (Transfers) Deferred  -SANCHEZ       Row Name 04/01/24 1100          Gait/Stairs (Locomotion)    Patient was able to Ambulate no, other medical factors prevent ambulation  -SANCHEZ     Reason Patient was unable to Ambulate Excessive Weakness  -SANCHEZ       Row Name 04/01/24 1100          Safety Issues, Functional Mobility    Impairments Affecting Function (Mobility) balance;strength;endurance/activity tolerance  -SANCHEZ       Row Name 04/01/24 1100          Balance    Balance Assessment sitting static balance  -SANCHEZ     Static Sitting Balance minimal assist  -SANCHEZ     Position, Sitting Balance long sitting  -SANCHEZ       Row Name 04/01/24 1100          Plan of Care Review    Plan of Care Reviewed With patient  -SANCHEZ     Outcome Evaluation Pt presents with decreased transfers and ambulation.  Skilled PT services will be required to address these mobitiy deficits.  -SANCHEZ       Row Name 04/01/24 1100          Therapy Assessment/Plan (PT)    Rehab Potential (PT) good, to achieve stated therapy goals  -SANCHEZ     Criteria for Skilled Interventions Met (PT) skilled treatment is necessary  -SANCHEZ     Therapy Frequency (PT) daily  -SANCHEZ     Predicted Duration of Therapy Intervention (PT) 10 days  -SANCHEZ     Problem List (PT) problems related to;balance;mobility  -SANCHEZ     Activity Limitations Related to Problem List (PT) unable to ambulate safely;unable to transfer safely  -SANCHEZ       Row Name 04/01/24 1100          PT Evaluation Complexity    History, PT Evaluation Complexity no personal factors and/or comorbidities  -SANCHEZ     Examination of Body Systems (PT Eval Complexity) total of 4 or more elements  -SANCHEZ     Clinical Presentation (PT Evaluation Complexity)  stable  -SANCHEZ     Clinical Decision Making (PT Evaluation Complexity) low complexity  -SANCHEZ     Overall Complexity (PT Evaluation Complexity) low complexity  -SANCHEZ       Row Name 04/01/24 1100          Therapy Plan Review/Discharge Plan (PT)    Therapy Plan Review (PT) evaluation/treatment results reviewed;participants voiced agreement with care plan;participants included;patient  -SANCHEZ       Row Name 04/01/24 1100          Physical Therapy Goals    Bed Mobility Goal Selection (PT) bed mobility, PT goal 1  -SANCHEZ     Transfer Goal Selection (PT) transfer, PT goal 1  -SANCHEZ     Gait Training Goal Selection (PT) gait training, PT goal 1  -SANCHEZ       Row Name 04/01/24 1100          Bed Mobility Goal 1 (PT)    Activity/Assistive Device (Bed Mobility Goal 1, PT) sit to supine/supine to sit  -SANCHEZ     [place] Level/Cues Needed (Bed Mobility Goal 1, PT) minimum assist (75% or more patient effort)  -SANCHEZ     Time Frame (Bed Mobility Goal 1, PT) 10 days  -SANCHEZ       Row Name 04/01/24 1100          Transfer Goal 1 (PT)    Activity/Assistive Device (Transfer Goal 1, PT) sit-to-stand/stand-to-sit  -SANCHEZ     [place] Level/Cues Needed (Transfer Goal 1, PT) minimum assist (75% or more patient effort)  -SANCHEZ     Time Frame (Transfer Goal 1, PT) long term goal (LTG);10 days  -SANCHEZ       Row Name 04/01/24 1100          Gait Training Goal 1 (PT)    Activity/Assistive Device (Gait Training Goal 1, PT) gait (walking locomotion);walker, rolling  -SANCHEZ     [place] Level (Gait Training Goal 1, PT) minimum assist (75% or more patient effort)  -SANCHEZ     Distance (Gait Training Goal 1, PT) 50  -SANCHEZ     Time Frame (Gait Training Goal 1, PT) long term goal (LTG);10 days  -SANCHEZ               User Key  (r) = Recorded By, (t) = Taken By, (c) = Cosigned By      Initials Name Provider Type    Elliot Jacques, PT Physical Therapist                      PT Recommendation and Plan  Anticipated Discharge Disposition (PT): inpatient rehabilitation facility  Planned  Therapy Interventions (PT): balance training, bed mobility training, gait training, home exercise program, stair training, strengthening, transfer training  Therapy Frequency (PT): daily  Plan of Care Reviewed With: patient  Outcome Evaluation: Pt presents with decreased transfers and ambulation.  Skilled PT services will be required to address these mobitiy deficits.   Outcome Measures       Row Name 04/01/24 1100             How much help from another person do you currently need...    Turning from your back to your side while in flat bed without using bedrails? 2  -SANCHEZ      Moving from lying on back to sitting on the side of a flat bed without bedrails? 2  -SANCHEZ      Moving to and from a bed to a chair (including a wheelchair)? 1  -SANCHEZ      Standing up from a chair using your arms (e.g., wheelchair, bedside chair)? 1  -SANCHEZ      Climbing 3-5 steps with a railing? 1  -SANCHEZ      To walk in hospital room? 1  -SANCHEZ      AM-PAC 6 Clicks Score (PT) 8  -SANCHEZ      Highest Level of Mobility Goal 3 --> Sit at edge of bed  -SANCHEZ         Functional Assessment    Outcome Measure Options AM-PAC 6 Clicks Basic Mobility (PT)  -SANCHEZ                User Key  (r) = Recorded By, (t) = Taken By, (c) = Cosigned By      Initials Name Provider Type    Elliot Jacques PT Physical Therapist                     Time Calculation:    PT Charges       Row Name 04/01/24 1159             Time Calculation    PT Received On 04/01/24  -SANCHEZ      PT Goal Re-Cert Due Date 04/10/24  -SANCHEZ         Untimed Charges    PT Eval/Re-eval Minutes 33  -SANCHEZ         Total Minutes    Untimed Charges Total Minutes 33  -SANCHEZ       Total Minutes 33  -SANCHEZ                User Key  (r) = Recorded By, (t) = Taken By, (c) = Cosigned By      Initials Name Provider Type    Elliot Jacques PT Physical Therapist                  Therapy Charges for Today       Code Description Service Date Service Provider Modifiers Qty    13077280474 HC PT EVAL LOW COMPLEXITY 3 4/1/2024 Elliot Reagan  J, PT GP 1            PT G-Codes  Outcome Measure Options: AM-PAC 6 Clicks Basic Mobility (PT)  AM-PAC 6 Clicks Score (PT): 8  AM-PAC 6 Clicks Score (OT): 10    Elliot Reagan, PT  4/1/2024

## 2024-04-01 NOTE — PLAN OF CARE
Goal Outcome Evaluation:  Plan of Care Reviewed With: patient           Outcome Evaluation: Pt presents with decreased transfers and ambulation.  Skilled PT services will be required to address these mobitiy deficits.      Anticipated Discharge Disposition (PT): inpatient rehabilitation facility

## 2024-04-01 NOTE — PLAN OF CARE
Goal Outcome Evaluation:  Plan of Care Reviewed With: patient     Patient is A&Ox3. VSS. Patient medicated for pain as needed per order. See MAR. No other questions, complaints, or concerns. No acute changes throughout shift. No change to plan of care.      Progress: no change

## 2024-04-01 NOTE — PLAN OF CARE
Goal Outcome Evaluation: Pt wore BIPAP tonight with no issues @ 14/7, 25 %. He is on 3 L when not on BIPAP.

## 2024-04-01 NOTE — PROGRESS NOTES
[facility]  [facility] Tele[facility]urology Consult Services    Routine Consult Follow-Up    Patient Name:   Davonte Marshall  YOB: 1955  Identification Number:   MRN - [id number]  Date of Service:   04/01/2024 07:36:51    Diagnosis        G40.901 - Nonintractable epilepsy with status epilepticus, unspecified epilepsy type (HCC)        G93.41 - Encephalopathy Metabolic    Impression  68 y.o. male PMH hypertension, chronic pain on high dose of morphine, remote stroke, seizures, essential tremor was brought to care on 3/27/2024 for altered mental status, minimally responsive, he apparently had been minimally responsive since the morning of 3/26/204. When the EMS has reached home patient was noted to be saturating at 70% on room air and very minimally responsive and has been brought into the ED. Patient was very minimally responsive in the ED, was placed on BiPAP. He went into respiratory distress after Narcan administration and required intubation. Admitted with sepsis from PNA, cardiogenic shock. UDS + benzo and barbiturates (primidone likely cause of + barbiturates as metabolized to phenobarbital). Per nursing staff, no obvious seizure like semiology has been noted during his inpatient course in the hospital. Recommend:    - Restart home primidone 250 mg TID  - D/C Keppra  - Seizure precautions  - Delirium precautions  - Toxic/metabolic w/up and supportive care per primary team  - PT/OT/rehab  - Outpatient f/up with neurology in 4 weeks  - Please call with questions    Our recommendations are outlined below    Nursing Recommendations :  Delirium precautions: Blinds open during the day, closed at night, frequent reorientation, minimize nighttime interruptions    Consultations :  Toxic metabolic work up per primary team    DVT Prophylaxis :  Choice of Primary Team    Disposition :  Outpatient Neurology follow up in 3-6 weeks    Subjective  No acute events overnight. Patient states overdoses on  "morphine at home prior to admission.    Hospital Course  68 y.o. male was brought to care on 3/27/2024 for altered mental status, minimally responsive, he apparently had been minimally responsive since the morning of 3/26/204. When the EMS has reached home patient was noted to be saturating at 70% on room air and very minimally responsive and has been brought into the ED. Patient was very minimally responsive in the ED, was placed on BiPAP. He went into respiratory distress after Narcan administration and required intubation. Per nursing staff, no obvious seizure like semiology has been noted during his inpatient course in the hospital. Per his wife at bedside, he was being treated with Primidone as an outpatient. His regular neurologist also stopped Phenytoin in the past since the patient has a history of stroke. Some interim agitation after extubation. He was on BiPAP whenever I had seen him yesterday.  No changes today, wife notes that he is somewhat confused and difficult to understand.    H&P: \"68-year-old male with hypertension, chronic pain on high dose of morphine who presented after having worsening lethargy and mental status apparently was found to still have food in his mouth from the day before, is midline responsive, prior to the CT department had hypoxia with sats in the 70s initially requiring BiPAP, had aspiration episode and required intubation for airway distress. Treating for sepsis with multifocal pneumonia, possible cardiogenic shock, aspiration, hypoxemic hypercapnic respiratory failure, polypharmacy and oversedation and question positive benzodiazepines and barbiturates on tox screen that he is not prescribed. Pulmonology cards and neuro assisting. Extubated to bipap and slowly weaning. SLP eval with aspiration, still requiring TF. EEG with seizure activity, question withdrawal from home primidone with poor adherence to 3 times daily dosing at home. Using IV Keppra for now. Slowly resuming " "home medications at reduced dose\" Admitted with sepsis from PNA, cardiogenic shock.    Imaging  HEAD CT: No acute findings  EEG:  Abnormal EEG because of:  1. Frequent high-voltage epileptiform discharges which are generalized and synchronous consistent with primary generalized type epilepsy.  2. Slow background activity admixed with slower waves compatible with moderate to marked diffuse encephalopathy.    Labs  UDS positive benzodiazepines and barbiturates not prescribed (Primidone may have triggered positive barbiturate      Examination  BP(156/78), Pulse(87), Temp(100.6), Resp(30),    Neuro Exam:  General: Alert,Awake, Oriented, Place, Person  2024    Speech: Dysarthric:    Language: Intact:    Face: Symmetric:    Extraocular Movements: Intact:    Motor Exam: No Drift: RUE, RLE, LUE  R>L weakness    Coordination: Intact:          Patient/Family was informed the Neurology Consult would happen via TeleHealth consult by way of interactive audio and video telecommunications and consented to receiving care in this manner.    Telehealth Neurology consultation was provided. I spent minutes providing telehealth care. This includes time spent for face to face visit via telemedicine, review of medical records, imaging studies and discussion of findings with providers, the patient and/or family.      Dr Vianey Moses      [facility]  For Inpatient follow-up with [facility] physician please call [facility] [phone]. This is not an outpatient service. Post hospital discharge, please contact hospital directly.    Please do not communicate with [facility] physicians via secure chat. If you have any questions, Please contact [facility].  Please call or reconsult our service if there are any clinical or diagnostic changes.    "

## 2024-04-01 NOTE — THERAPY TREATMENT NOTE
"Acute Care - Speech Language Pathology   Swallow Treatment Note [facility]     Patient Name: Davonte Marshall  : 1955  MRN: [id number]  Today's Date: 2024               Admit Date: 3/27/2024    Visit Dx:     ICD-10-CM ICD-9-CM   1. Acute respiratory failure with hypoxia  J96.01 518.81   2. Metabolic encephalopathy  G93.41 348.31   3. DIONNA (acute kidney injury)  N17.9 584.9   4. NSTEMI (non-ST elevated myocardial infarction)  I21.4 410.70   5. Oropharyngeal dysphagia  R13.12 787.22   6. Impaired mobility and ADLs  Z74.09 V49.89    Z78.9      Patient Active Problem List   Diagnosis    Anemia    Anxiety    Arthritis    Back pain    Chronic bronchitis    Complication of surgical procedure    Degeneration of lumbosacral intervertebral disc    Depression    Encounter for long-term (current) use of insulin    Essential tremor    Gall stones    Head injury    Hemorrhoids    Herniation of intervertebral disc    Hyperlipidemia    Leg pain    Neck pain    Renal insufficiency    Scoliosis    Seizure disorder    Smokes 1.5 packs of cigarettes per day    Vitamin D deficiency disease    Gross hematuria    BPH without obstruction/lower urinary tract symptoms    Urge incontinence of urine    Benign prostatic hyperplasia with urinary hesitancy    Sequelae, post-stroke    S/P TURP    Postoperative anemia    Postoperative hypoxia    Essential hypertension    Opioid dependence    Cervical dystonia    Acute metabolic encephalopathy     Past Medical History:   Diagnosis Date    Anemia     Anxiety     Arthritis     Benign essential hypertension     Chronic bronchitis     Depression     Enlarged prostate     Floaters in visual field     Gall stones     Generalized weakness     Head injury     Hemorrhoids     Hyperlipidemia     Leg pain     Numbness in both hands 2015    Seizures     last \"quite a while ago\"    Stroke 2020    left sided weakness     Past Surgical History:   Procedure Laterality Date    APPENDECTOMY      " BACK SURGERY      4    CHOLECYSTECTOMY      CYSTOSCOPY Bilateral     7/12/21    CYSTOSCOPY TRANSURETHRAL RESECTION OF PROSTATE N/A 7/12/2021    Procedure: CYSTOSCOPY TRANSURETHRAL RESECTION OF PROSTATE;  Surgeon: Penelope Fox MD;  Location: [facility] MAIN OR;  Service: Urology;  Laterality: N/A;    HAND SURGERY Bilateral     thumb    LYMPH NODE DISSECTION      jaw line    TURP / TRANSURETHRAL INCISION / DRAINAGE PROSTATE  07/12/2021       SPEECH PATHOLOGY DYSPHAGIA TREATMENT    Subjective/Behavioral Observations: Alert and cooperative, NG in place.      Day/time of Treatment: 4/1/2024      Current Diet: N.p.o. with NG, may have ice chips      Current Strategies: N/A      Treatment received: Dysphagia therapy to address swallow function through exercises and education of strategies.      Results of treatment: Patient given ice chips x 5 with swallows completed, vocal quality remaining clear.  Nectar liquid by spoon x 5 with decreased laryngeal elevation, vocal quality remained clear.  Trial of nectar liquid x 1 with controlled straw drink, swallow completed, minimal vocal wetness noted.      Progress toward goals: Adequate      Barriers to Achieving goals: Medical status      Plan of care:/changes in plan: Continue per current plan.  Continue with alternative feeding until p.o. diet can be reestablished.  May have single ice chips while sitting fully upright.                                                                                             Plan of Care Reviewed With: patient, spouse          EDUCATION  The patient has been educated in the following areas:   Modified Diet Instruction.              Time Calculation:    Time Calculation- SLP       Row Name 04/01/24 1057             Time Calculation- SLP    SLP Stop Time 1030  -TB      SLP Received On 04/01/24  -TB         Untimed Charges    [id number]-BW Treatment Swallow Minutes 40  -TB         Total Minutes    Untimed Charges Total Minutes 40  -TB       Total  Minutes 40  -TB                User Key  (r) = Recorded By, (t) = Taken By, (c) = Cosigned By      Initials Name Provider Type    TB Floridalma Silva SLP Speech and Language Pathologist                    Therapy Charges for Today       Code Description Service Date Service Provider Modifiers Qty    20196603233  ST TREATMENT SWALLOW 3 4/1/2024 Floridalma Silav SLP GN 1                 PATRICA Oropeza  4/1/2024

## 2024-04-01 NOTE — PROGRESS NOTES
12/7/2023    John Dumont  [address] [address]      To Whom It May Concern:    This is to certify John Dumont was evaluated at [facility] on 12/7/2023 and is excused from Work   and school today and tomorrow.    RESTRICTIONS:Must be fever free for 24 hours, without using fever reducers, prior to returning.        Electronically signed by:  Yareli Randolph MD  [facility]  [address] [place] [address]  Dept Phone: [phone]    RT EQUIPMENT DEVICE RELATED - SKIN ASSESSMENT    RT Medical Equipment/Device:     NIV Mask:  Under-the-nose   size:  C    Skin Assessment:      Cheek:  Intact  Chin:  Intact  Nares:  Intact  Nose:  Intact  Mouth:  Intact    Device Skin Pressure Protection:  Positioning supports utilized and Pressure points protected    Nurse Notification:  Dahiana Sampson, RRT

## 2024-04-01 NOTE — PROGRESS NOTES
"Nutrition Services    Patient Name: Davonte Marshall  YOB: 1955  MRN: [id number]  Admission date: 3/27/2024    PROGRESS NOTE      Encounter Information: EN Follow Up       PO Diet: NPO Diet NPO Type: Strict NPO   PO Supplements: NPO   PO Intake:  NPO       Current nutrition support: Fibersource HN @ 85 ml/hr  FWF 65 ml q3h (520 ml)  Provides: 2244 kcal, 101 g pro, 2035 ml fluid       Estimated Needs: 4948-0613 kcal, 81 g pro, 2025 ml fluid       Nutrition support review: No intolerances noted at this time.       Labs (reviewed below): Hypernatremic with FW deficit of 3.3L.  K low at 3.4. KLOR CON ordered for scheduled replacement.       GI Function:  Last BM noted 4/01.       Nutrition Intervention Updates: Increase FWF to 65 ml qh (1560 ml) for a total of 3075 ml daily fluid. Continue to adjust FWF as Na+ corrects.     Results from last 7 days   Lab Units 04/01/24  0635 03/31/24  0653 03/30/24  0600   SODIUM mmol/L 148* 145 142   POTASSIUM mmol/L 3.4* 3.2* 3.9   CHLORIDE mmol/L 107 104 103   CO2 mmol/L 26.5 28.6 25.5   BUN mg/dL 32* 42* 39*   CREATININE mg/dL 0.99 1.10 0.97   CALCIUM mg/dL 8.7 8.7 8.2*   BILIRUBIN mg/dL 0.5 0.4 0.4   ALK PHOS U/L 86 77 69   ALT (SGPT) U/L 73* 36 22   AST (SGOT) U/L 66* 54* 48*   GLUCOSE mg/dL 143* 123* 115*     Results from last 7 days   Lab Units 04/01/24  0635 03/31/24  0653 03/30/24  0600   MAGNESIUM mg/dL 1.8 2.0 1.9   PHOSPHORUS mg/dL 2.5 2.6 2.0*   HEMOGLOBIN g/dL 14.0 12.1* 11.1*   HEMATOCRIT % 42.0 36.6* 33.4*     COVID19   Date Value Ref Range Status   03/27/2024 Not Detected Not Detected - Ref. Range Final     No results found for: \"HGBA1C\"    RD to follow up per protocol.    Electronically signed by:  Lisa Pitts RD  04/01/24 15:25 EDT    "

## 2024-04-02 ENCOUNTER — APPOINTMENT (OUTPATIENT)
Dept: GENERAL RADIOLOGY | Facility: HOSPITAL | Age: 69
DRG: 871 | End: 2024-04-02
Payer: MEDICARE

## 2024-04-02 LAB
ALBUMIN SERPL-MCNC: 3.6 G/DL (ref 3.5–5.2)
ALBUMIN/GLOB SERPL: 1.1 G/DL
ALP SERPL-CCNC: 82 U/L (ref 39–117)
ALT SERPL W P-5'-P-CCNC: 77 U/L (ref 1–41)
ANION GAP SERPL CALCULATED.3IONS-SCNC: 14.6 MMOL/L (ref 5–15)
APTT PPP: 74.6 SECONDS (ref 78–95.9)
AST SERPL-CCNC: 65 U/L (ref 1–40)
BACTERIA UR QL AUTO: ABNORMAL /HPF
BASOPHILS # BLD AUTO: 0.07 10*3/MM3 (ref 0–0.2)
BASOPHILS NFR BLD AUTO: 0.5 % (ref 0–1.5)
BILIRUB SERPL-MCNC: 0.5 MG/DL (ref 0–1.2)
BILIRUB UR QL STRIP: NEGATIVE
BUN SERPL-MCNC: 36 MG/DL (ref 8–23)
BUN/CREAT SERPL: 35.6 (ref 7–25)
CALCIUM SPEC-SCNC: 9 MG/DL (ref 8.6–10.5)
CHLORIDE SERPL-SCNC: 107 MMOL/L (ref 98–107)
CLARITY UR: ABNORMAL
CO2 SERPL-SCNC: 24.4 MMOL/L (ref 22–29)
COLOR UR: YELLOW
CREAT SERPL-MCNC: 1.01 MG/DL (ref 0.76–1.27)
DEPRECATED RDW RBC AUTO: 48.6 FL (ref 37–54)
EGFRCR SERPLBLD CKD-EPI 2021: 82 ML/MIN/1.73
EOSINOPHIL # BLD AUTO: 0.22 10*3/MM3 (ref 0–0.4)
EOSINOPHIL NFR BLD AUTO: 1.6 % (ref 0.3–6.2)
ERYTHROCYTE [DISTWIDTH] IN BLOOD BY AUTOMATED COUNT: 15 % (ref 12.3–15.4)
GLOBULIN UR ELPH-MCNC: 3.3 GM/DL
GLUCOSE BLDC GLUCOMTR-MCNC: 130 MG/DL (ref 70–99)
GLUCOSE SERPL-MCNC: 140 MG/DL (ref 65–99)
GLUCOSE UR STRIP-MCNC: NEGATIVE MG/DL
HCT VFR BLD AUTO: 42.1 % (ref 37.5–51)
HGB BLD-MCNC: 13.9 G/DL (ref 13–17.7)
HGB UR QL STRIP.AUTO: ABNORMAL
HYALINE CASTS UR QL AUTO: ABNORMAL /LPF
IMM GRANULOCYTES # BLD AUTO: 0.38 10*3/MM3 (ref 0–0.05)
IMM GRANULOCYTES NFR BLD AUTO: 2.7 % (ref 0–0.5)
KETONES UR QL STRIP: NEGATIVE
LEUKOCYTE ESTERASE UR QL STRIP.AUTO: NEGATIVE
LYMPHOCYTES # BLD AUTO: 3.65 10*3/MM3 (ref 0.7–3.1)
LYMPHOCYTES NFR BLD AUTO: 26 % (ref 19.6–45.3)
MAGNESIUM SERPL-MCNC: 1.8 MG/DL (ref 1.6–2.4)
MCH RBC QN AUTO: 30 PG (ref 26.6–33)
MCHC RBC AUTO-ENTMCNC: 33 G/DL (ref 31.5–35.7)
MCV RBC AUTO: 90.9 FL (ref 79–97)
MONOCYTES # BLD AUTO: 1.6 10*3/MM3 (ref 0.1–0.9)
MONOCYTES NFR BLD AUTO: 11.4 % (ref 5–12)
NEUTROPHILS NFR BLD AUTO: 57.8 % (ref 42.7–76)
NEUTROPHILS NFR BLD AUTO: 8.1 10*3/MM3 (ref 1.7–7)
NITRITE UR QL STRIP: NEGATIVE
NRBC BLD AUTO-RTO: 0.1 /100 WBC (ref 0–0.2)
PH UR STRIP.AUTO: 7 [PH] (ref 5–8)
PHOSPHATE SERPL-MCNC: 3.4 MG/DL (ref 2.5–4.5)
PLATELET # BLD AUTO: 267 10*3/MM3 (ref 140–450)
PMV BLD AUTO: 10.4 FL (ref 6–12)
POTASSIUM SERPL-SCNC: 3.4 MMOL/L (ref 3.5–5.2)
PROCALCITONIN SERPL-MCNC: 0.81 NG/ML (ref 0–0.25)
PROT SERPL-MCNC: 6.9 G/DL (ref 6–8.5)
PROT UR QL STRIP: NEGATIVE
RBC # BLD AUTO: 4.63 10*6/MM3 (ref 4.14–5.8)
RBC # UR STRIP: ABNORMAL /HPF
REF LAB TEST METHOD: ABNORMAL
SODIUM SERPL-SCNC: 146 MMOL/L (ref 136–145)
SP GR UR STRIP: 1.01 (ref 1–1.03)
SQUAMOUS #/AREA URNS HPF: ABNORMAL /HPF
T-UPTAKE NFR SERPL: 0.98 TBI (ref 0.8–1.3)
T4 SERPL-MCNC: 6.59 MCG/DL (ref 4.5–11.7)
TSH SERPL DL<=0.05 MIU/L-ACNC: 2.13 UIU/ML (ref 0.27–4.2)
UROBILINOGEN UR QL STRIP: ABNORMAL
WBC # UR STRIP: ABNORMAL /HPF
WBC NRBC COR # BLD AUTO: 14.02 10*3/MM3 (ref 3.4–10.8)

## 2024-04-02 PROCEDURE — 25010000002 FUROSEMIDE PER 20 MG: Performed by: INTERNAL MEDICINE

## 2024-04-02 PROCEDURE — 85025 COMPLETE CBC W/AUTO DIFF WBC: CPT | Performed by: INTERNAL MEDICINE

## 2024-04-02 PROCEDURE — 94761 N-INVAS EAR/PLS OXIMETRY MLT: CPT

## 2024-04-02 PROCEDURE — 97110 THERAPEUTIC EXERCISES: CPT

## 2024-04-02 PROCEDURE — 94660 CPAP INITIATION&MGMT: CPT

## 2024-04-02 PROCEDURE — 74230 X-RAY XM SWLNG FUNCJ C+: CPT

## 2024-04-02 PROCEDURE — 94799 UNLISTED PULMONARY SVC/PX: CPT

## 2024-04-02 PROCEDURE — 99233 SBSQ HOSP IP/OBS HIGH 50: CPT | Performed by: INTERNAL MEDICINE

## 2024-04-02 PROCEDURE — 99232 SBSQ HOSP IP/OBS MODERATE 35: CPT | Performed by: STUDENT IN AN ORGANIZED HEALTH CARE EDUCATION/TRAINING PROGRAM

## 2024-04-02 PROCEDURE — 92526 ORAL FUNCTION THERAPY: CPT

## 2024-04-02 PROCEDURE — 71045 X-RAY EXAM CHEST 1 VIEW: CPT

## 2024-04-02 PROCEDURE — 25010000002 AMPICILLIN-SULBACTAM PER 1.5 G: Performed by: NURSE PRACTITIONER

## 2024-04-02 PROCEDURE — 84100 ASSAY OF PHOSPHORUS: CPT | Performed by: INTERNAL MEDICINE

## 2024-04-02 PROCEDURE — 94664 DEMO&/EVAL PT USE INHALER: CPT

## 2024-04-02 PROCEDURE — 92611 MOTION FLUOROSCOPY/SWALLOW: CPT

## 2024-04-02 PROCEDURE — 84145 PROCALCITONIN (PCT): CPT | Performed by: INTERNAL MEDICINE

## 2024-04-02 PROCEDURE — 81001 URINALYSIS AUTO W/SCOPE: CPT | Performed by: INTERNAL MEDICINE

## 2024-04-02 PROCEDURE — 25010000002 KETOROLAC TROMETHAMINE PER 15 MG: Performed by: FAMILY MEDICINE

## 2024-04-02 PROCEDURE — 84479 ASSAY OF THYROID (T3 OR T4): CPT | Performed by: INTERNAL MEDICINE

## 2024-04-02 PROCEDURE — 84443 ASSAY THYROID STIM HORMONE: CPT | Performed by: INTERNAL MEDICINE

## 2024-04-02 PROCEDURE — 83735 ASSAY OF MAGNESIUM: CPT | Performed by: INTERNAL MEDICINE

## 2024-04-02 PROCEDURE — 84436 ASSAY OF TOTAL THYROXINE: CPT | Performed by: INTERNAL MEDICINE

## 2024-04-02 PROCEDURE — 25010000002 HYDRALAZINE PER 20 MG: Performed by: FAMILY MEDICINE

## 2024-04-02 PROCEDURE — 25010000002 METHYLPREDNISOLONE PER 40 MG: Performed by: NURSE PRACTITIONER

## 2024-04-02 PROCEDURE — 85730 THROMBOPLASTIN TIME PARTIAL: CPT | Performed by: INTERNAL MEDICINE

## 2024-04-02 PROCEDURE — 99232 SBSQ HOSP IP/OBS MODERATE 35: CPT | Performed by: INTERNAL MEDICINE

## 2024-04-02 PROCEDURE — 82948 REAGENT STRIP/BLOOD GLUCOSE: CPT

## 2024-04-02 PROCEDURE — 80053 COMPREHEN METABOLIC PANEL: CPT | Performed by: INTERNAL MEDICINE

## 2024-04-02 RX ORDER — HYDRALAZINE HYDROCHLORIDE 20 MG/ML
10 INJECTION INTRAMUSCULAR; INTRAVENOUS EVERY 6 HOURS PRN
Status: DISCONTINUED | OUTPATIENT
Start: 2024-04-02 | End: 2024-04-16 | Stop reason: HOSPADM

## 2024-04-02 RX ORDER — CLOPIDOGREL BISULFATE 75 MG/1
75 TABLET ORAL DAILY
Status: DISCONTINUED | OUTPATIENT
Start: 2024-04-03 | End: 2024-04-12

## 2024-04-02 RX ORDER — IPRATROPIUM BROMIDE AND ALBUTEROL SULFATE 2.5; .5 MG/3ML; MG/3ML
3 SOLUTION RESPIRATORY (INHALATION) EVERY 6 HOURS PRN
Status: DISCONTINUED | OUTPATIENT
Start: 2024-04-02 | End: 2024-04-16 | Stop reason: HOSPADM

## 2024-04-02 RX ORDER — CLOPIDOGREL BISULFATE 75 MG/1
75 TABLET ORAL DAILY
Status: DISCONTINUED | OUTPATIENT
Start: 2024-04-02 | End: 2024-04-02

## 2024-04-02 RX ORDER — DEXTROSE MONOHYDRATE, SODIUM CHLORIDE, AND POTASSIUM CHLORIDE 50; 1.49; 4.5 G/1000ML; G/1000ML; G/1000ML
50 INJECTION, SOLUTION INTRAVENOUS CONTINUOUS
Status: ACTIVE | OUTPATIENT
Start: 2024-04-02 | End: 2024-04-03

## 2024-04-02 RX ORDER — KETOROLAC TROMETHAMINE 30 MG/ML
15 INJECTION, SOLUTION INTRAMUSCULAR; INTRAVENOUS ONCE
Status: COMPLETED | OUTPATIENT
Start: 2024-04-02 | End: 2024-04-02

## 2024-04-02 RX ORDER — METOPROLOL SUCCINATE 25 MG/1
25 TABLET, EXTENDED RELEASE ORAL
Status: DISCONTINUED | OUTPATIENT
Start: 2024-04-02 | End: 2024-04-02

## 2024-04-02 RX ADMIN — AMPICILLIN AND SULBACTAM 3 G: 2; 1 INJECTION, POWDER, FOR SOLUTION INTRAVENOUS at 18:09

## 2024-04-02 RX ADMIN — METHYLPREDNISOLONE SODIUM SUCCINATE 40 MG: 40 INJECTION INTRAMUSCULAR; INTRAVENOUS at 09:02

## 2024-04-02 RX ADMIN — BARIUM SULFATE 50 ML: 400 SUSPENSION ORAL at 15:17

## 2024-04-02 RX ADMIN — ASPIRIN 81 MG 81 MG: 81 TABLET ORAL at 09:03

## 2024-04-02 RX ADMIN — HYDRALAZINE HYDROCHLORIDE 10 MG: 20 INJECTION, SOLUTION INTRAMUSCULAR; INTRAVENOUS at 20:11

## 2024-04-02 RX ADMIN — CLOPIDOGREL BISULFATE 75 MG: 75 TABLET ORAL at 15:37

## 2024-04-02 RX ADMIN — AMPICILLIN AND SULBACTAM 3 G: 2; 1 INJECTION, POWDER, FOR SOLUTION INTRAVENOUS at 15:36

## 2024-04-02 RX ADMIN — ARFORMOTEROL TARTRATE 15 MCG: 15 SOLUTION RESPIRATORY (INHALATION) at 18:24

## 2024-04-02 RX ADMIN — PRIMIDONE 250 MG: 250 TABLET ORAL at 09:03

## 2024-04-02 RX ADMIN — BUDESONIDE 0.5 MG: 0.5 INHALANT ORAL at 18:24

## 2024-04-02 RX ADMIN — BARIUM SULFATE 1 TEASPOON(S): 0.6 CREAM ORAL at 15:17

## 2024-04-02 RX ADMIN — Medication 10 ML: at 20:11

## 2024-04-02 RX ADMIN — CHLORHEXIDINE GLUCONATE 15 ML: 1.2 RINSE ORAL at 09:15

## 2024-04-02 RX ADMIN — LIDOCAINE 1 PATCH: 4 PATCH TOPICAL at 08:59

## 2024-04-02 RX ADMIN — ARFORMOTEROL TARTRATE 15 MCG: 15 SOLUTION RESPIRATORY (INHALATION) at 06:30

## 2024-04-02 RX ADMIN — IPRATROPIUM BROMIDE AND ALBUTEROL SULFATE 3 ML: .5; 3 SOLUTION RESPIRATORY (INHALATION) at 06:30

## 2024-04-02 RX ADMIN — AMLODIPINE BESYLATE 5 MG: 5 TABLET ORAL at 09:03

## 2024-04-02 RX ADMIN — POTASSIUM CHLORIDE 40 MEQ: 1.5 POWDER, FOR SOLUTION ORAL at 09:05

## 2024-04-02 RX ADMIN — FUROSEMIDE 40 MG: 10 INJECTION, SOLUTION INTRAMUSCULAR; INTRAVENOUS at 18:09

## 2024-04-02 RX ADMIN — AMPICILLIN AND SULBACTAM 3 G: 2; 1 INJECTION, POWDER, FOR SOLUTION INTRAVENOUS at 05:12

## 2024-04-02 RX ADMIN — DOXYCYCLINE 100 MG: 25 POWDER, FOR SUSPENSION ORAL at 09:06

## 2024-04-02 RX ADMIN — Medication 10 ML: at 09:05

## 2024-04-02 RX ADMIN — BUDESONIDE 0.5 MG: 0.5 INHALANT ORAL at 06:30

## 2024-04-02 RX ADMIN — PRIMIDONE 250 MG: 250 TABLET ORAL at 15:37

## 2024-04-02 RX ADMIN — IPRATROPIUM BROMIDE AND ALBUTEROL SULFATE 3 ML: .5; 3 SOLUTION RESPIRATORY (INHALATION) at 02:32

## 2024-04-02 RX ADMIN — FUROSEMIDE 40 MG: 10 INJECTION, SOLUTION INTRAMUSCULAR; INTRAVENOUS at 09:02

## 2024-04-02 RX ADMIN — DULOXETINE HYDROCHLORIDE 40 MG: 30 CAPSULE, DELAYED RELEASE ORAL at 09:05

## 2024-04-02 RX ADMIN — POTASSIUM CHLORIDE, DEXTROSE MONOHYDRATE AND SODIUM CHLORIDE 50 ML/HR: 150; 5; 450 INJECTION, SOLUTION INTRAVENOUS at 20:11

## 2024-04-02 RX ADMIN — METOPROLOL TARTRATE 12.5 MG: 25 TABLET, FILM COATED ORAL at 18:09

## 2024-04-02 RX ADMIN — IPRATROPIUM BROMIDE AND ALBUTEROL SULFATE 3 ML: .5; 3 SOLUTION RESPIRATORY (INHALATION) at 11:26

## 2024-04-02 RX ADMIN — FAMOTIDINE 20 MG: 10 INJECTION INTRAVENOUS at 09:02

## 2024-04-02 RX ADMIN — KETOROLAC TROMETHAMINE 15 MG: 30 INJECTION, SOLUTION INTRAMUSCULAR; INTRAVENOUS at 20:11

## 2024-04-02 NOTE — THERAPY TREATMENT NOTE
"Acute Care - Speech Language Pathology   Swallow Treatment Note [facility]     Patient Name: Davonte Marshall  : 1955  MRN: [id number]  Today's Date: 2024               Admit Date: 3/27/2024    Visit Dx:     ICD-10-CM ICD-9-CM   1. Acute respiratory failure with hypoxia  J96.01 518.81   2. Metabolic encephalopathy  G93.41 348.31   3. DIONNA (acute kidney injury)  N17.9 584.9   4. NSTEMI (non-ST elevated myocardial infarction)  I21.4 410.70   5. Oropharyngeal dysphagia  R13.12 787.22   6. Impaired mobility and ADLs  Z74.09 V49.89    Z78.9    7. Difficulty in walking  R26.2 719.7     Patient Active Problem List   Diagnosis    Anemia    Anxiety    Arthritis    Back pain    Chronic bronchitis    Complication of surgical procedure    Degeneration of lumbosacral intervertebral disc    Depression    Encounter for long-term (current) use of insulin    Essential tremor    Gall stones    Head injury    Hemorrhoids    Herniation of intervertebral disc    Hyperlipidemia    Leg pain    Neck pain    Renal insufficiency    Scoliosis    Seizure disorder    Smokes 1.5 packs of cigarettes per day    Vitamin D deficiency disease    Gross hematuria    BPH without obstruction/lower urinary tract symptoms    Urge incontinence of urine    Benign prostatic hyperplasia with urinary hesitancy    Sequelae, post-stroke    S/P TURP    Postoperative anemia    Postoperative hypoxia    Essential hypertension    Opioid dependence    Cervical dystonia    Acute metabolic encephalopathy     Past Medical History:   Diagnosis Date    Anemia     Anxiety     Arthritis     Benign essential hypertension     Chronic bronchitis     Depression     Enlarged prostate     Floaters in visual field     Gall stones     Generalized weakness     Head injury     Hemorrhoids     Hyperlipidemia     Leg pain     Numbness in both hands 2015    Seizures     last \"quite a while ago\"    Stroke 2020    left sided weakness     Past Surgical History:   Procedure " Laterality Date    APPENDECTOMY      BACK SURGERY      4    CHOLECYSTECTOMY      CYSTOSCOPY Bilateral     7/12/21    CYSTOSCOPY TRANSURETHRAL RESECTION OF PROSTATE N/A 7/12/2021    Procedure: CYSTOSCOPY TRANSURETHRAL RESECTION OF PROSTATE;  Surgeon: Penelope Fox MD;  Location: [facility] MAIN OR;  Service: Urology;  Laterality: N/A;    HAND SURGERY Bilateral     thumb    LYMPH NODE DISSECTION      jaw line    TURP / TRANSURETHRAL INCISION / DRAINAGE PROSTATE  07/12/2021       SPEECH PATHOLOGY DYSPHAGIA TREATMENT     Subjective/Behavioral Observations: Alert and cooperative, NG in place.        Day/time of Treatment: 4/2/2024        Current Diet: N.p.o. with NG, may have ice chips        Current Strategies: N/A        Treatment received: Dysphagia therapy to address swallow function through exercises and education of strategies.        Results of treatment: Patient given ice chips x 3 with swallows completed, vocal quality remaining clear.  Nectar liquid by spoon x 3 with decreased laryngeal elevation, vocal quality remained clear.  Trial of nectar liquid x 5 with controlled straw drink, swallow completed with minimal delay, vocal quality remaining clear.  Trials of purée solids x 5 with minimal delay of swallow, vocal quality remaining clear.  Patient demonstrates difficulty to keep head up, requiring one-to-one assistance for feeding.        Progress toward goals: Adequate        Barriers to Achieving goals: Medical status        Plan of care:/changes in plan: Continue per current plan.    Plan modified barium swallow study this date.  Diet recommendation to follow.                                                                                    Plan of Care Reviewed With: patient, spouse          EDUCATION  The patient has been educated in the following areas:   Modified Diet Instruction.              Time Calculation:    Time Calculation- SLP       Row Name 04/02/24 1149             Time Calculation- SLP     SLP Stop Time 1045  -TB      SLP Received On 04/02/24  -TB         Untimed Charges    02745-KR Treatment Swallow Minutes 45  -TB         Total Minutes    Untimed Charges Total Minutes 45  -TB       Total Minutes 45  -TB                User Key  (r) = Recorded By, (t) = Taken By, (c) = Cosigned By      Initials Name Provider Type    TB Floridalma Silva SLP Speech and Language Pathologist                    Therapy Charges for Today       Code Description Service Date Service Provider Modifiers Qty    79185537582 HC ST TREATMENT SWALLOW 3 4/1/2024 Floridalma Silva SLP GN 1    87029366053 HC ST TREATMENT SWALLOW 3 4/2/2024 Floridalma Silva SLP GN 1                 PATRICA Oropeza  4/2/2024

## 2024-04-02 NOTE — PROGRESS NOTES
[facility]   Hospitalist Progress Note  Date: 2024  Patient Name: Davonte Marshall  : 1955  MRN: [id number]  Date of admission: 3/27/2024      Subjective   Subjective     Chief Complaint: Follow up for somnolence    Summary: 68-year-old male with hypertension, chronic pain on high dose of morphine who presented after having worsening lethargy and mental status apparently was found to still have food in his mouth from the day before, is midline responsive, prior to the CT department had hypoxia with sats in the 70s initially requiring BiPAP, had aspiration episode and required intubation for airway distress.  Treating for sepsis with multifocal pneumonia, possible cardiogenic shock, aspiration, hypoxemic hypercapnic respiratory failure, polypharmacy and oversedation and question positive benzodiazepines and barbiturates on tox screen that he is not prescribed.  Pulmonology cards and neuro assisting.  Extubated to bipap and slowly weaning.  SLP eval with aspiration, still requiring TF.  EEG with seizure activity, question withdrawal from home primidone with poor adherence to 3 times daily dosing at home.  Using IV Keppra for now.  Slowly resuming home medications at reduced dose     Interval Followup:   Reportedly had chest pain overnight, troponin checked and came back elevated at 1522 with negative delta.  Was restarted on heparin drip    Still having fevers; Tmax 100.9F  Hemodynamically stable.   On 2 L/min NC    More alert today.  Recognize me.  Following commands and answering questions.  Remains very weak, barely able to lift arms up for more than a few seconds  Wife states that he was ambulatory prior to admission    Denied chest pain/pressure      Review of Systems  Cardiovascular:  No Chest Pain  Respiratory:  No Dyspnea  Gastrointestinal:  No abdominal pain      Objective   Objective     Vitals:   Temp:  [98.4 °F (36.9 °C)-100.9 °F (38.3 °C)] 98.5 °F (36.9 °C)  Heart Rate:  []  98  Resp:  [18-38] 18  BP: (143-162)/(78-97) 155/97  Flow (L/min):  [2] 2  Physical Exam    Constitutional: Well-nourished male, awake, NAD   Eyes: Pupils equal and reactive, no conjunctival injections   Neck: Shoulder weakness, neck flexed difficulty keeping it upright   HENT: NCAT, NG tube   Respiratory: nonlabored respirations    Cardiovascular: RRR, trace pedal, edema   Gastrointestinal: Soft, nontender   Neurologic: Alert and oriented, following simple commands appropriately, cranial Nerves grossly intact, muscle strength 3 out of 5 in the upper extremities bilaterally and 2 out of 5 in the lower extremities   Skin: Extremities warm, no rashes    Result Review    Result Review:  I have personally reviewed the following over the last 24 hours (07:00 to 07:00) and agree with the following findings  [x]  Laboratory  CBC          3/31/2024    06:53 4/1/2024    06:35 4/2/2024    04:40   CBC   WBC 14.07  11.09  14.02    RBC 4.08  4.69  4.63    Hemoglobin 12.1  14.0  13.9    Hematocrit 36.6  42.0  42.1    MCV 89.7  89.6  90.9    MCH 29.7  29.9  30.0    MCHC 33.1  33.3  33.0    RDW 14.5  14.7  15.0    Platelets 168  209  267      BMP          3/31/2024    06:53 4/1/2024    06:35 4/2/2024    04:40   BMP   BUN 42  32  36    Creatinine 1.10  0.99  1.01    Sodium 145  148  146    Potassium 3.2  3.4  3.4    Chloride 104  107  107    CO2 28.6  26.5  24.4    Calcium 8.7  8.7  9.0      []  Microbiology  []  Radiology   [x]  EKG/Telemetry monitor personally reviewed: NSR/sinus tachycardia  []  Cardiology/Vascular   []  Pathology  []  Old records  [x]  Other:    Intake/Output Summary (Last 24 hours) at 4/2/2024 1243  Last data filed at 4/2/2024 0300  Gross per 24 hour   Intake --   Output 2625 ml   Net -2625 ml         Assessment & Plan   Assessment / Plan     Assessment/Plan:  Aspiration pneumonia with Klebsiella, Streptococcus, MSSA and Moraxella  Acute hypoxemic and hypercapnic respiratory failure requiring mechanical  intervention  Acute metabolic encephalopathy in setting of polypharmacy and hypercapnia - improving  Hx of seizures and Essential tremor - on primidone  Hypernatremia  Hypokalemia  Transaminitis  NSTEMI, suspect type II from demand/sepsis  Lactic acidosis, POA.  Clinically significant  Hx of Hyperlipidemia  Hx of Depression/anxiety on outpatient Cymbalta  Hx of Chronic low back pain 2/2 lumbosacral degenerative disc disease  Hx of anterocollis and cervical dystonia  Hx of left corona radiata stroke 2020 with no residual deficits  DIONNA, secondary to sepsis/shock. Resolved  Septic shock 2/2 pneumonia. Resolved        WBC increased to 14,000, Still having fevers. Initial Bcx remain negative.  Check UA via indwelling conti catheter, CXR, repeat Pro-Giorgio.  Continue IV Unasyn 3 g every 6 hours. Stop date 4/5/24  Continue antipyretics every 6 hours as needed  Appreciate pulmonology recommendations  Continue scheduled nebulizers  Continue IV Lasix 40 mg 2 times daily.  Trend renal function electrolytes with daily BMP  Continue IV Solu-Medrol 40 mg daily  Wean oxygen, SpO2 goal >90%  Appreciate teleneurology recommendations  Keppra discontinued  Continue primidone 250 mg three times daily  Continue seizure precaution  Appreciate cardiology recommendations.  Will stop heparin drip.  Plavix and beta-blocker added.  Continue baby aspirin.  Cardiac catheterization recommended once more stable  Hold statin given mild transaminitis.  Trend liver enzymes  Continue amlodipine 5 mg daily  He continues to have profound weakness in his extremities.  According to family he was ambulating with assistance prior to the hospital albeit did have some gait issues. He has chronic weakness in neck which has been attributed to cervical dystonia which he follows with neurology outpatient for.  Ideally would like to get MRI of the brain but patient states he cannot tolerate MRIs due to inability to lie flat secondary to pain.  Continue as needed  Norco every 6 hours and lidocaine patch daily  Continue Cymbalta 40 mg two times daily  SLP following; MBS planned for today.  Diet recommendations to follow  Continue tube feeds via NG tube.    VTE ppx: Lovenox 40 mg daily  CBC, CMP in AM  Continue PT/OT.  Will need rehab    Discussed plan with RN.  Discussed with Dr Light      DVT prophylaxis:  No DVT prophylaxis order currently exists.        CODE STATUS:   Level Of Support Discussed With: Health Care Surrogate  Code Status (Patient has no pulse and is not breathing): CPR (Attempt to Resuscitate)  Medical Interventions (Patient has pulse or is breathing): Full Support      I spent greater than 50 minutes minutes of time for evaluation and management of this patient including review of chart, labs pertinent imaging available as well as medical decision making and discussion with physicians, staff and patient/family.      Electronically signed by Willy Tena DO, 04/02/24, 12:43 PM EDT.

## 2024-04-02 NOTE — THERAPY TREATMENT NOTE
"Acute Care - Physical Therapy Treatment Note  [facility]     Patient Name: Davonte Marshall  : 1955  MRN: [id number]  Today's Date: 2024      Visit Dx:     ICD-10-CM ICD-9-CM   1. Acute respiratory failure with hypoxia  J96.01 518.81   2. Metabolic encephalopathy  G93.41 348.31   3. DIONNA (acute kidney injury)  N17.9 584.9   4. NSTEMI (non-ST elevated myocardial infarction)  I21.4 410.70   5. Oropharyngeal dysphagia  R13.12 787.22   6. Impaired mobility and ADLs  Z74.09 V49.89    Z78.9    7. Difficulty in walking  R26.2 719.7     Patient Active Problem List   Diagnosis    Anemia    Anxiety    Arthritis    Back pain    Chronic bronchitis    Complication of surgical procedure    Degeneration of lumbosacral intervertebral disc    Depression    Encounter for long-term (current) use of insulin    Essential tremor    Gall stones    Head injury    Hemorrhoids    Herniation of intervertebral disc    Hyperlipidemia    Leg pain    Neck pain    Renal insufficiency    Scoliosis    Seizure disorder    Smokes 1.5 packs of cigarettes per day    Vitamin D deficiency disease    Gross hematuria    BPH without obstruction/lower urinary tract symptoms    Urge incontinence of urine    Benign prostatic hyperplasia with urinary hesitancy    Sequelae, post-stroke    S/P TURP    Postoperative anemia    Postoperative hypoxia    Essential hypertension    Opioid dependence    Cervical dystonia    Acute metabolic encephalopathy     Past Medical History:   Diagnosis Date    Anemia     Anxiety     Arthritis     Benign essential hypertension     Chronic bronchitis     Depression     Enlarged prostate     Floaters in visual field     Gall stones     Generalized weakness     Head injury     Hemorrhoids     Hyperlipidemia     Leg pain     Numbness in both hands 2015    Seizures     last \"quite a while ago\"    Stroke 2020    left sided weakness     Past Surgical History:   Procedure Laterality Date    APPENDECTOMY      BACK SURGERY   "    4    CHOLECYSTECTOMY      CYSTOSCOPY Bilateral     7/12/21    CYSTOSCOPY TRANSURETHRAL RESECTION OF PROSTATE N/A 7/12/2021    Procedure: CYSTOSCOPY TRANSURETHRAL RESECTION OF PROSTATE;  Surgeon: Penelope Fox MD;  Location: [facility] MAIN OR;  Service: Urology;  Laterality: N/A;    HAND SURGERY Bilateral     thumb    LYMPH NODE DISSECTION      jaw line    TURP / TRANSURETHRAL INCISION / DRAINAGE PROSTATE  07/12/2021     PT Assessment (Last 12 Hours)       PT Evaluation and Treatment       Row Name 04/02/24 1430          Physical Therapy Time and Intention    Subjective Information no complaints (P)   -NM     Document Type therapy note (daily note) (P)   -NM     Mode of Treatment individual therapy;physical therapy (P)   -NM     Patient Effort good (P)   -NM     Symptoms Noted During/After Treatment fatigue (P)   -NM       Row Name 04/02/24 1430          General Information    Patient Profile Reviewed yes (P)   -NM     Patient Observations cooperative;agree to therapy (P)   -NM     Barriers to Rehab none identified (P)   -NM       Row Name 04/02/24 1430          Pain    Pretreatment Pain Rating 0/10 - no pain (P)   -NM     Posttreatment Pain Rating 0/10 - no pain (P)   -NM       Row Name 04/02/24 1430          Bed Mobility    All Activities, [place] (Bed Mobility) moderate assist (50% patient effort) (P)   -NM     Rolling Left [place] (Bed Mobility) moderate assist (50% patient effort) (P)   -NM     Rolling Right [place] (Bed Mobility) moderate assist (50% patient effort) (P)   -NM     Supine-Sit [place] (Bed Mobility) moderate assist (50% patient effort) (P)   -NM     Bed Mobility, Safety Issues decreased use of arms for pushing/pulling;decreased use of legs for bridging/pushing;impaired trunk control for bed mobility (P)   -NM     Assistive Device (Bed Mobility) draw sheet;head of bed elevated;bed rails (P)   -NM       Row Name 04/02/24 1430          Sit-Stand Transfer    Sit-Stand  [place] (Transfers) not tested (P)   -NM       Row Name 04/02/24 1430          Gait/Stairs (Locomotion)    Patient was able to Ambulate no, other medical factors prevent ambulation (P)   -NM     Reason Patient was unable to Ambulate Excessive Weakness (P)   -NM       Row Name 04/02/24 1430          Safety Issues, Functional Mobility    Impairments Affecting Function (Mobility) balance;strength;endurance/activity tolerance (P)   -NM       Row Name 04/02/24 1430          Balance    Balance Assessment sitting dynamic balance (P)   -NM     Dynamic Sitting Balance moderate assist (P)   -NM       Row Name 04/02/24 1430          Motor Skills    Therapeutic Exercise hip;knee;ankle (P)   ankle pumps, glute sets, SLR, quad sets, hip ABD  -NM       Row Name 04/02/24 1430          Positioning and Restraints    Pre-Treatment Position in bed (P)   -NM     Post Treatment Position bed (P)   -NM     In Bed supine;call light within reach;encouraged to call for assist;exit alarm on (P)   -NM       Row Name 04/02/24 1430          Progress Summary (PT)    Progress Toward Functional Goals (PT) progress toward functional goals as expected (P)   -NM     Daily Progress Summary (PT) pt performed exercises in bed and tolerated them well. Pt presents with weakness and difficulty with transfers. Pt will continue to benefit from skilled PT to address ongoing deficits. (P)   -NM               User Key  (r) = Recorded By, (t) = Taken By, (c) = Cosigned By      Initials Name Provider Type    Isreal Jane, PT Student PT Student                      PT Recommendation and Plan     Progress Summary (PT)  Progress Toward Functional Goals (PT): (P) progress toward functional goals as expected  Daily Progress Summary (PT): (P) pt performed exercises in bed and tolerated them well. Pt presents with weakness and difficulty with transfers. Pt will continue to benefit from skilled PT to address ongoing deficits.   Outcome Measures       Row Name  04/02/24 1400 04/01/24 1100          How much help from another person do you currently need...    Turning from your back to your side while in flat bed without using bedrails? 2 (P)   -NM 2  -SANCHEZ     Moving from lying on back to sitting on the side of a flat bed without bedrails? 2 (P)   -NM 2  -SANCHEZ     Moving to and from a bed to a chair (including a wheelchair)? 1 (P)   -NM 1  -SANCHEZ     Standing up from a chair using your arms (e.g., wheelchair, bedside chair)? 1 (P)   -NM 1  -SANCHEZ     Climbing 3-5 steps with a railing? 1 (P)   -NM 1  -SANCHEZ     To walk in hospital room? 1 (P)   -NM 1  -SANCHEZ     AM-PAC 6 Clicks Score (PT) 8 (P)   -NM 8  -SANCHEZ     Highest Level of Mobility Goal 3 --> Sit at edge of bed (P)   -NM 3 --> Sit at edge of bed  -SANCHEZ        Functional Assessment    Outcome Measure Options -- AM-PAC 6 Clicks Basic Mobility (PT)  -SANCHEZ               User Key  (r) = Recorded By, (t) = Taken By, (c) = Cosigned By      Initials Name Provider Type    SANCHEZ Elliot Reagan, PT Physical Therapist    Isreal Jane, PT Student PT Student                     Time Calculation:    PT Charges       Row Name 04/02/24 1429             Time Calculation    PT Received On 04/02/24 (P)   -NM         Timed Charges    68276 - PT Therapeutic Exercise Minutes 12 (P)   -NM      30393 - PT Therapeutic Activity Minutes 8 (P)   -NM         Total Minutes    Timed Charges Total Minutes 20 (P)   -NM       Total Minutes 20 (P)   -NM                User Key  (r) = Recorded By, (t) = Taken By, (c) = Cosigned By      Initials Name Provider Type    NM Isreal Wiggins, PT Student PT Student                  Therapy Charges for Today       Code Description Service Date Service Provider Modifiers Qty    68753448736 HC PT THER PROC EA 15 MIN 4/2/2024 Isreal Wiggins, PT Student GP 1            PT G-Codes  Outcome Measure Options: AM-PAC 6 Clicks Basic Mobility (PT)  AM-PAC 6 Clicks Score (PT): (P) 8  AM-PAC 6 Clicks Score (OT): 10    Isreal  Feliciano, PT Student  4/2/2024

## 2024-04-02 NOTE — PROGRESS NOTES
Pulmonary / Critical Care Progress Note      Patient Name: Davonte Marshall  : 1955  MRN: [id number]  Attending:  Willy Tena DO   Date of admission: 3/27/2024    Subjective   Subjective   Follow-up for respiratory failure on ventilator, aspiration pneumonia secondary to Klebsiella, strep pneumo, MSSA and Moraxella     Overnight, patient reported chest pain.  Labs were obtained demonstrating troponin of 1522.  ACS protocol was initiated    This morning,  Remains on 2 L nasal cannula  Lying in bed with wife at bedside  Denies any chest pain or chest tightness  Cough continues to improve  Tmax 100.9  Patient remains bedbound  Remains weak and fatigued  Cortrak in place  Diuresing well  -2.6 L urine output    Objective     Vitals:   Vital signs for last 24 hours:  Temp:  [98.4 °F (36.9 °C)-100.9 °F (38.3 °C)] 98.5 °F (36.9 °C)  Heart Rate:  [] 98  Resp:  [18-38] 18  BP: (143-162)/(78-97) 155/97    Physical Exam   Vital Signs Reviewed   General: Elderly male awake, on nasal cannula  HEENT: Pupils sluggish and minimally reactive, no extraocular movements.    Neck:  Supple, no JVD, no thyromegaly  Chest:  good aeration, coarse breath sounds bilaterally, secretions noted  CV: Sinus tachycardia, no MGR, pulses 2+, equal.  Abd:  Soft, NT, ND, + BS, no HSM  EXT:  no clubbing, no cyanosis, no edema  Neuro: Awake, alert and oriented x 2, no focal deficits  Skin: No rashes or lesions noted    Result Review    Result Review:  I have personally reviewed the results from the time of this admission to 2024 13:48 EDT and agree with these findings:  [x]  Laboratory  [x]  Microbiology  [x]  Radiology  [x]  EKG/Telemetry   [x]  Cardiology/Vascular   []  Pathology  []  Old records  []  Other:  Most notable findings include:       Lab 24  0440 24  0635 24  0653 24  0600 24  0548 24  0724 24  0252 24  1212 24  1209 24  0920   WBC 14.02* 11.09* 14.07* 18.91*  14.44*  --  27.31*  --  15.05*  --    HEMOGLOBIN 13.9 14.0 12.1* 11.1* 10.8*  --  14.3  --  14.8  --    HEMATOCRIT 42.1 42.0 36.6* 33.4* 32.5*  --  43.2  --  47.4  --    PLATELETS 267 209 168 145 142  --  257  --  266  --    SODIUM 146* 148* 145 142 137  --  138  --   --  139   SODIUM, ARTERIAL  --   --   --   --   --  137.8  --    < >  --   --    POTASSIUM 3.4* 3.4* 3.2* 3.9 3.7  --  4.4  --   --  3.9   CHLORIDE 107 107 104 103 101  --  100  --   --  100   CO2 24.4 26.5 28.6 25.5 23.1  --  20.6*  --   --  13.6*   BUN 36* 32* 42* 39* 31*  --  40*  --   --  31*   CREATININE 1.01 0.99 1.10 0.97 1.06  --  2.11*  --   --  2.62*   GLUCOSE 140* 143* 123* 115* 129*  --  124*  --   --  169*   GLUCOSE, ARTERIAL  --   --   --   --   --  125*  --    < >  --   --    CALCIUM 9.0 8.7 8.7 8.2* 7.9*  --  8.3*  --   --  8.2*   PHOSPHORUS 3.4 2.5 2.6 2.0* 1.2*  --  4.2  --   --  6.8*   TOTAL PROTEIN 6.9 6.9 6.5 6.3 5.5*  --  6.6  --   --  7.0   ALBUMIN 3.6 3.5 3.2* 3.2* 2.6*  --  3.2*  --   --  3.4*   GLOBULIN 3.3 3.4 3.3 3.1 2.9  --  3.4  --   --  3.6    < > = values in this interval not displayed.     Results for orders placed during the hospital encounter of 03/27/24    Adult Transthoracic Echo Limited W/ Cont if Necessary Per Protocol    Interpretation Summary    Extremely technically difficult study with very limited views.    The subcostal view is the only one where we can see myocardium and it looks like ejection fraction is greater than 50%.  Cannot rule out regional wall motion abnormalities.    No obvious significant valvular disease by Doppler.  The valves are not well-visualized.    Left ventricular diastolic function was indeterminate.    BAL culture with Streptococcus pneumonia, Klebsiella and MSSA    Assessment & Plan   Assessment / Plan     Active Hospital Problems:  Active Hospital Problems    Diagnosis     **Acute metabolic encephalopathy      Impression:  Septic shock  Aspiration pneumonia secondary to Klebsiella,  strep pneumo, MSSA and Moraxella  Lactic acidosis, clinically significant  Metabolic acidosis  Acute hypoxemic and hypercapnic respiratory failure require mechanical ventilation  Altered mental status  Toxic/metabolic encephalopathy  NSTEMI  Acute kidney injury secondary to prerenal etiology  Hypomagnesemia  Hypophosphatemia  Hypophosphatemia  Leukocytosis  Seizures/tremors.  On primidone at home     Plan:  -Continue supplemental oxygen with nasal cannula, okay to use NIPPV at night  -Continue Unasyn for total of 7 days and doxycycline for total of 3 days for pneumonia  -Continue Lasix 40 mg IV twice daily  -Continue to monitor renal panel electrolytes.  Replace potassium Cortrak  -Continue Brovana, Pulmicort, and DuoNebs  -Continue Solu-Medrol 40 mg IV daily  -Continue aspiration precautions  -Continue bronchopulmonary hygiene.  Encourage I-S  -Continue chest vest therapy  -Continue Keppra intravenously  -PT/OT/SLP on board.  Appreciate assistance  -Neurology following, appreciate input  -NSTEMI, cardiology following, appreciate input  -Rehab referrals pending    Electronically signed by Greg Sweeney MD, 04/02/24, 2:44 PM EDT.    DVT prophylaxis: Lovenox  No DVT prophylaxis order currently exists.    CODE STATUS:   Level Of Support Discussed With: Health Care Surrogate  Code Status (Patient has no pulse and is not breathing): CPR (Attempt to Resuscitate)  Medical Interventions (Patient has pulse or is breathing): Full Support    Labs, managing, and medications personally reviewed  Discussed with primary and patient    Electronically signed by KEVIN King, 04/02/24, 1:50 PM EDT.

## 2024-04-02 NOTE — PLAN OF CARE
Goal Outcome Evaluation:  Plan of Care Reviewed With: patient     Patient is A&Ox3. VSS. Patient had elevated troponin and heparin was started. No other questions, complaints, or concerns. Cardiology has been consulted.      Progress: no change

## 2024-04-02 NOTE — PROGRESS NOTES
RT EQUIPMENT DEVICE RELATED - SKIN ASSESSMENT    RT Medical Equipment/Device:     NIV Mask:  Under-the-nose   size:       Skin Assessment:      Cheek:  Intact  Chin:  Intact  Nose:  Intact  Mouth:  Intact    Device Skin Pressure Protection:  Pressure points protected    Nurse Notification:  Dahiana De Santiago, RRT

## 2024-04-02 NOTE — PLAN OF CARE
Goal Outcome Evaluation:   VSS, NSR. Wife at bedside. NGT patient, tolerating tube feedings well. Barium swallow done today and patient did not do well, so will continue with tube feedings for now. Continue with current POC.

## 2024-04-02 NOTE — MBS/VFSS/FEES
"Acute Care - Speech Language Pathology   Swallow  Modified Barium Swallow Study   [facility]     Patient Name: Davonte Marshall  : 1955  MRN: [id number]  Today's Date: 2024               Admit Date: 3/27/2024    Visit Dx:     ICD-10-CM ICD-9-CM   1. Acute respiratory failure with hypoxia  J96.01 518.81   2. Metabolic encephalopathy  G93.41 348.31   3. DIONNA (acute kidney injury)  N17.9 584.9   4. NSTEMI (non-ST elevated myocardial infarction)  I21.4 410.70   5. Oropharyngeal dysphagia  R13.12 787.22   6. Impaired mobility and ADLs  Z74.09 V49.89    Z78.9    7. Difficulty in walking  R26.2 719.7     Patient Active Problem List   Diagnosis    Anemia    Anxiety    Arthritis    Back pain    Chronic bronchitis    Complication of surgical procedure    Degeneration of lumbosacral intervertebral disc    Depression    Encounter for long-term (current) use of insulin    Essential tremor    Gall stones    Head injury    Hemorrhoids    Herniation of intervertebral disc    Hyperlipidemia    Leg pain    Neck pain    Renal insufficiency    Scoliosis    Seizure disorder    Smokes 1.5 packs of cigarettes per day    Vitamin D deficiency disease    Gross hematuria    BPH without obstruction/lower urinary tract symptoms    Urge incontinence of urine    Benign prostatic hyperplasia with urinary hesitancy    Sequelae, post-stroke    S/P TURP    Postoperative anemia    Postoperative hypoxia    Essential hypertension    Opioid dependence    Cervical dystonia    Acute metabolic encephalopathy     Past Medical History:   Diagnosis Date    Anemia     Anxiety     Arthritis     Benign essential hypertension     Chronic bronchitis     Depression     Enlarged prostate     Floaters in visual field     Gall stones     Generalized weakness     Head injury     Hemorrhoids     Hyperlipidemia     Leg pain     Numbness in both hands 2015    Seizures     last \"quite a while ago\"    Stroke 2020    left sided weakness     Past Surgical " History:   Procedure Laterality Date    APPENDECTOMY      BACK SURGERY      4    CHOLECYSTECTOMY      CYSTOSCOPY Bilateral     7/12/21    CYSTOSCOPY TRANSURETHRAL RESECTION OF PROSTATE N/A 7/12/2021    Procedure: CYSTOSCOPY TRANSURETHRAL RESECTION OF PROSTATE;  Surgeon: Penelope Fox MD;  Location: [facility] OR;  Service: Urology;  Laterality: N/A;    HAND SURGERY Bilateral     thumb    LYMPH NODE DISSECTION      jaw line    TURP / TRANSURETHRAL INCISION / DRAINAGE PROSTATE  07/12/2021         MODIFIED BARIUM SWALLOW STUDY: SPEECH PATHOLOGY REPORT        DATE OF SERVICE: 4/2/2024    PERTINENT INFORMATION:  Mr. Marshall is a 68year old male with diagnosis of dysphagia.    He was referred for an MBSS by Dr. Tena to rule out aspiration as well as to determine appropriate treatment plan for this patient.      PROCEDURE:    Mr. Marshall was alert and cooperative.  The patient was viewed in lateral plane.  The following Ba consistencies were administered:  nectar thick liquids, barium mixed with applesauce,  barium mixed with cracker. The following compensatory swallowing strategies were performed: Bolus modification, cyclic ingestion.    RESULTS:    1. Nectar liquid by spoon with maximum spillage to the vallecula and into the laryngeal vestibule, incomplete swallow.  Swallow then completed with deep laryngeal penetration with residues to the level of the vocal cords.  Additional swallow with aspiration occurring with no cough.    2. Nectar liquid by straw with swallow completed with laryngeal penetration occurring.  3. Purée with spillage to the vallecula, swallow completed with minimal laryngeal penetration.  Residues remaining at the vallecula and piriform sinus, multiple swallows with further laryngeal penetration of residues.  Additional swallow clearing vallecula with residue is remaining in the laryngeal vestibule.  Patient cued for cough which does not clear.  4. Solid results with very small bite, extended  chewing, multiple swallows with residues remaining in the pharynx.    5.  Nectar liquid by straw with maximum spillage to the pharynx, swallow completed with laryngeal penetration.      IMPRESSIONS:    Mr. Marshall demonstrated severe oropharyngeal dysphagia characterized by swallow delay, decreased tongue base strength, decreased laryngeal closure and decreased laryngeal elevation.  Silent aspiration noted with nectar liquid.  Patient is considered at risk of aspiration with all consistencies due laryngeal penetration of residues.  Cough was not effective to clear.      RECOMMENDATIONS:   1.  Continue with alternative feeding as primary nutrition.  Patient to be n.p.o. except single small ice chips with nursing.  2.  Continue with speech pathology services for dysphagia.      Yes, patient/responsible party agrees with the plan of care and has been informed of all alternatives, risks and benefits.    Thank you for this referral.                                                                               Plan of Care Reviewed With: patient, spouse          EDUCATION  The patient has been educated in the following areas:   Modified Diet Instruction.              Time Calculation:    Time Calculation- SLP       Row Name 04/02/24 1505 04/02/24 1149          Time Calculation- SLP    SLP Stop Time -- 1045  -TB     SLP Received On 04/02/24  -TB 04/02/24  -TB        Untimed Charges    SLP Eval/Re-eval  ST Motion Fluoro Eval Swallow - 23780  -TB --     87222-UO Motion Fluoro Eval Swallow Minutes 90  -TB --     61806-WP Treatment Swallow Minutes -- 45  -TB        Total Minutes    Untimed Charges Total Minutes 90  -TB 45  -TB      Total Minutes 90  -TB 45  -TB               User Key  (r) = Recorded By, (t) = Taken By, (c) = Cosigned By      Initials Name Provider Type    Floridalma Shabazz SLP Speech and Language Pathologist                    Therapy Charges for Today       Code Description Service Date Service Provider  Modifiers Qty    84877968236 HC ST TREATMENT SWALLOW 3 4/1/2024 Floridalma Silva, PATRICA GN 1    34161526575 HC ST TREATMENT SWALLOW 3 4/2/2024 Floridalma Silva, PATRICA GN 1    [id number] HC ST MOTION FLUORO EVAL SWALLOW 6 4/2/2024 Floridalma Silva, PATRICA GN 1                 PATRICA Oropeza  4/2/2024

## 2024-04-02 NOTE — PLAN OF CARE
Goal Outcome Evaluation:  Plan of Care Reviewed With: patient        Progress: no change  Outcome Evaluation: Patient wore BiPAP 14/7 tonight with a saftey sitter. When off BiPAP he is on 2L NC.

## 2024-04-02 NOTE — PLAN OF CARE
Goal Outcome Evaluation:  Plan of Care Reviewed With: patient        Progress: no change  Outcome Evaluation: pt wore bipap last night with saftey sitter. V60 is now on standbyat bedside. pt currently on a 2L nasal cannula resting at this time.

## 2024-04-02 NOTE — PROGRESS NOTES
[facility]     Cardiology Progress Note    Patient Name: Davonte Marshall  : 1955  MRN: [id number]  Primary Care Physician:  Ivanna Low APRN  Date of admission: 3/27/2024    Subjective   Subjective     Patient had troponin checked last night for unclear reasons.  His troponin was elevated to 1522 with negative delta.  Subsequently, he was started on heparin infusion.  He has anterior chest discomfort which is pleuritic.  It is exacerbated by breathing.  He reports slight improvement of his shortness of breath.  He continues to be tired and fatigued.  He has no other complaints.    Review of Systems   All systems were reviewed and negative except as above    Objective   Objective     Vitals:   Temp:  [97.7 °F (36.5 °C)-100.9 °F (38.3 °C)] 100 °F (37.8 °C)  Heart Rate:  [] 97  Resp:  [20-38] 20  BP: (143-173)/(78-92) 162/87  Flow (L/min):  [2] 2  Physical Exam                  Constitutional: Laying in bed, no apparent distress              Eyes: sclerae anicteric, no conjunctival injection              HENT: NCAT, mucous membranes moist              Neck: Supple, no thyromegaly, no lymphadenopathy, trachea midline              Respiratory: Scattered rhonchi, nonlabored respirations               Cardiovascular: RRR, no murmurs, rubs, or gallops, palpable pedal pulses bilaterally              Gastrointestinal: Obese, soft, nontender, nondistended              Musculoskeletal: Extremities are cold, trace bilateral ankle edema, no clubbing or cyanosis to extremities              Neurologic: Alert and oriented x 3, nonfocal              Skin: No rashes    Scheduled Meds:amLODIPine, 5 mg, Nasogastric, Q24H  ampicillin-sulbactam, 3 g, Intravenous, Q6H  arformoterol, 15 mcg, Nebulization, BID - RT  aspirin, 81 mg, Nasogastric, Daily  [Held by provider] atorvastatin, 20 mg, Nasogastric, Nightly  budesonide, 0.5 mg, Nebulization, BID - RT  chlorhexidine, 15 mL, Mouth/Throat, Q12H  DULoxetine, 40  mg, Oral, BID  famotidine, 20 mg, Intravenous, Daily  furosemide, 40 mg, Intravenous, BID  [Held by provider] losartan, 100 mg, Oral, Q24H   And  [Held by provider] hydroCHLOROthiazide, 12.5 mg, Oral, Q24H  ipratropium-albuterol, 3 mL, Nebulization, Q4H - RT  Lidocaine, 1 patch, Transdermal, Q24H  methylPREDNISolone sodium succinate, 40 mg, Intravenous, Daily  primidone, 250 mg, Nasogastric, TID  sodium chloride, 10 mL, Intravenous, Q12H      Continuous Infusions:heparin, 12 Units/kg/hr, Last Rate: 16 Units/kg/hr (04/02/24 0547)           Result Review    Result Review:  I have personally reviewed the results from the time of this admission to 4/2/2024 09:51 EDT and agree with these findings:  [x]  Laboratory  []  Microbiology  [x]  Radiology  [x]  EKG/Telemetry   [x]  Cardiology/Vascular   []  Pathology  []  Old records  []  Other:  Most notable findings include:     CBC          3/31/2024    06:53 4/1/2024    06:35 4/2/2024    04:40   CBC   WBC 14.07  11.09  14.02    RBC 4.08  4.69  4.63    Hemoglobin 12.1  14.0  13.9    Hematocrit 36.6  42.0  42.1    MCV 89.7  89.6  90.9    MCH 29.7  29.9  30.0    MCHC 33.1  33.3  33.0    RDW 14.5  14.7  15.0    Platelets 168  209  267      CMP          3/31/2024    06:53 4/1/2024    06:35 4/2/2024    04:40   CMP   Glucose 123  143  140    BUN 42  32  36    Creatinine 1.10  0.99  1.01    EGFR 74.0  84.0  82.0    Sodium 145  148  146    Potassium 3.2  3.4  3.4    Chloride 104  107  107    Calcium 8.7  8.7  9.0    Total Protein 6.5  6.9  6.9    Albumin 3.2  3.5  3.6    Globulin 3.3  3.4  3.3    Total Bilirubin 0.4  0.5  0.5    Alkaline Phosphatase 77  86  82    AST (SGOT) 54  66  65    ALT (SGPT) 36  73  77    Albumin/Globulin Ratio 1.0  1.0  1.1    BUN/Creatinine Ratio 38.2  32.3  35.6    Anion Gap 12.4  14.5  14.6       CARDIAC LABS:      Lab 04/01/24  2122 04/01/24  1928 03/29/24  1459 03/29/24  0548 03/28/24  0252 03/27/24  0920 03/27/24  0355   PROBNP  --   --   --   --   --    --  2,457.0*   HSTROP T 1,517* 1,522* 781* 784* 1,311*   < > 715*   PROTIME 14.3  --   --   --   --   --  14.9   INR 1.09  --   --   --   --   --  1.15    < > = values in this interval not displayed.        Assessment & Plan   Assessment / Plan     Brief Patient Summary:  Davonte Marshall is a 68 y.o. male with:     Elevated troponin with flat trajectory not consistent with acute coronary syndrome.  ECG is abnormal demonstrating sinus tachycardia with possible old anterior and inferior infarct, which is similar to previous ECG from 2021.  Echo was technically difficult, however, demonstrates normal LV systolic function without significant valvular pathology.  Pleuritic chest discomfort, more likely related to pneumonia.  Patient continues to be febrile.  Aspiration pneumonia  Septic shock, resolved  Acute hypoxic and hypercapnic respiratory failure, requiring mechanical ventilation, status post extubation.  Remains on supplemental oxygen.  Lactic acidosis, improved  Acute kidney injury, resolved  Mixed dyslipidemia  History of seizure disorder.    Plan:   Troponin rise is more likely related to demand ischemia in the setting of respiratory failure, sepsis/pneumonia.  His EKG suggest underlying coronary artery disease but there is no evidence of acute coronary syndrome.  Patient will be treated for presumed coronary artery disease.   He will be started on aspirin, Plavix, metoprolol.  Atorvastatin was held for mild transaminitis.  This medication will be resumed.  Continue to monitor AST/ALT levels.  Heparin infusion may be discontinued.  Patient will need cardiac catheterization after resolution of current medical issues.  This can be done in the outpatient setting but will be considered sooner if clinically indicated.    Thank you for the consultation.  The patient will be followed peripherally.  Please call with changes in clinical status or if questions arise.    CODE STATUS:   Level Of Support Discussed With:  Health Care Surrogate  Code Status (Patient has no pulse and is not breathing): CPR (Attempt to Resuscitate)  Medical Interventions (Patient has pulse or is breathing): Full Support      Electronically signed by Era Light MD, 04/02/24, 9:51 AM EDT.

## 2024-04-03 ENCOUNTER — APPOINTMENT (OUTPATIENT)
Dept: GENERAL RADIOLOGY | Facility: HOSPITAL | Age: 69
DRG: 871 | End: 2024-04-03
Payer: MEDICARE

## 2024-04-03 ENCOUNTER — APPOINTMENT (OUTPATIENT)
Dept: MRI IMAGING | Facility: HOSPITAL | Age: 69
DRG: 871 | End: 2024-04-03
Payer: MEDICARE

## 2024-04-03 ENCOUNTER — APPOINTMENT (OUTPATIENT)
Dept: CT IMAGING | Facility: HOSPITAL | Age: 69
DRG: 871 | End: 2024-04-03
Payer: MEDICARE

## 2024-04-03 LAB
ALBUMIN SERPL-MCNC: 3.8 G/DL (ref 3.5–5.2)
ALBUMIN/GLOB SERPL: 1 G/DL
ALP SERPL-CCNC: 83 U/L (ref 39–117)
ALT SERPL W P-5'-P-CCNC: 82 U/L (ref 1–41)
ANION GAP SERPL CALCULATED.3IONS-SCNC: 16 MMOL/L (ref 5–15)
ARTERIAL PATENCY WRIST A: POSITIVE
AST SERPL-CCNC: 55 U/L (ref 1–40)
BASE EXCESS BLDA CALC-SCNC: 5 MMOL/L (ref -2–2)
BASOPHILS # BLD AUTO: 0.09 10*3/MM3 (ref 0–0.2)
BASOPHILS NFR BLD AUTO: 0.5 % (ref 0–1.5)
BDY SITE: ABNORMAL
BILIRUB SERPL-MCNC: 0.6 MG/DL (ref 0–1.2)
BUN SERPL-MCNC: 41 MG/DL (ref 8–23)
BUN/CREAT SERPL: 34.5 (ref 7–25)
CA-I BLDA-SCNC: 1.09 MMOL/L (ref 1.13–1.32)
CALCIUM SPEC-SCNC: 9 MG/DL (ref 8.6–10.5)
CHLORIDE BLDA-SCNC: 105 MMOL/L (ref 98–106)
CHLORIDE SERPL-SCNC: 105 MMOL/L (ref 98–107)
CO2 SERPL-SCNC: 24 MMOL/L (ref 22–29)
COHGB MFR BLD: 0.6 % (ref 0–1.5)
CREAT SERPL-MCNC: 1.19 MG/DL (ref 0.76–1.27)
D-LACTATE SERPL-SCNC: 1.9 MMOL/L (ref 0.5–2)
DEPRECATED RDW RBC AUTO: 48.8 FL (ref 37–54)
EGFRCR SERPLBLD CKD-EPI 2021: 67.4 ML/MIN/1.73
EOSINOPHIL # BLD AUTO: 0.37 10*3/MM3 (ref 0–0.4)
EOSINOPHIL NFR BLD AUTO: 1.9 % (ref 0.3–6.2)
ERYTHROCYTE [DISTWIDTH] IN BLOOD BY AUTOMATED COUNT: 15.9 % (ref 12.3–15.4)
FHHB: 5.9 % (ref 0–5)
GAS FLOW AIRWAY: ABNORMAL L/MIN
GLOBULIN UR ELPH-MCNC: 3.7 GM/DL
GLUCOSE BLDA-MCNC: 129 MG/DL (ref 70–99)
GLUCOSE BLDC GLUCOMTR-MCNC: 145 MG/DL (ref 70–99)
GLUCOSE SERPL-MCNC: 132 MG/DL (ref 65–99)
HCO3 BLDA-SCNC: 25 MMOL/L (ref 22–26)
HCT VFR BLD AUTO: 44 % (ref 37.5–51)
HGB BLD-MCNC: 14.7 G/DL (ref 13–17.7)
HGB BLDA-MCNC: 15.9 G/DL (ref 13.8–16.4)
HOLD SPECIMEN: NORMAL
IMM GRANULOCYTES # BLD AUTO: 0.27 10*3/MM3 (ref 0–0.05)
IMM GRANULOCYTES NFR BLD AUTO: 1.4 % (ref 0–0.5)
INHALED O2 CONCENTRATION: 28 %
LACTATE BLDA-SCNC: 2.34 MMOL/L (ref 0.5–2)
LYMPHOCYTES # BLD AUTO: 4.73 10*3/MM3 (ref 0.7–3.1)
LYMPHOCYTES NFR BLD AUTO: 24.7 % (ref 19.6–45.3)
MAGNESIUM SERPL-MCNC: 2 MG/DL (ref 1.6–2.4)
MCH RBC QN AUTO: 29.9 PG (ref 26.6–33)
MCHC RBC AUTO-ENTMCNC: 33.4 G/DL (ref 31.5–35.7)
MCV RBC AUTO: 89.6 FL (ref 79–97)
METHGB BLD QL: 0.2 % (ref 0–1.5)
MODALITY: ABNORMAL
MONOCYTES # BLD AUTO: 1.87 10*3/MM3 (ref 0.1–0.9)
MONOCYTES NFR BLD AUTO: 9.8 % (ref 5–12)
MRSA DNA SPEC QL NAA+PROBE: NORMAL
NEUTROPHILS NFR BLD AUTO: 11.82 10*3/MM3 (ref 1.7–7)
NEUTROPHILS NFR BLD AUTO: 61.7 % (ref 42.7–76)
NOTE: ABNORMAL
NRBC BLD AUTO-RTO: 0.1 /100 WBC (ref 0–0.2)
NT-PROBNP SERPL-MCNC: 5186 PG/ML (ref 0–900)
OXYHGB MFR BLDV: 93.3 % (ref 94–99)
PCO2 BLDA: 25.7 MM HG (ref 35–45)
PH BLDA: 7.61 PH UNITS (ref 7.35–7.45)
PHOSPHATE SERPL-MCNC: 4.7 MG/DL (ref 2.5–4.5)
PLATELET # BLD AUTO: 396 10*3/MM3 (ref 140–450)
PMV BLD AUTO: 10.3 FL (ref 6–12)
PO2 BLD: 232 MM[HG] (ref 0–500)
PO2 BLDA: 65 MM HG (ref 80–100)
POTASSIUM BLDA-SCNC: 3.77 MMOL/L (ref 3.5–5)
POTASSIUM SERPL-SCNC: 3.8 MMOL/L (ref 3.5–5.2)
PROT SERPL-MCNC: 7.5 G/DL (ref 6–8.5)
QT INTERVAL: 302 MS
QTC INTERVAL: 401 MS
RBC # BLD AUTO: 4.91 10*6/MM3 (ref 4.14–5.8)
SAO2 % BLDCOA: 94.1 % (ref 95–99)
SODIUM BLDA-SCNC: 144 MMOL/L (ref 136–146)
SODIUM SERPL-SCNC: 145 MMOL/L (ref 136–145)
VANCOMYCIN SERPL-MCNC: 25.91 MCG/ML (ref 5–40)
WBC NRBC COR # BLD AUTO: 19.15 10*3/MM3 (ref 3.4–10.8)
WHOLE BLOOD HOLD COAG: NORMAL

## 2024-04-03 PROCEDURE — 25010000002 ACETAMINOPHEN 10 MG/ML SOLUTION: Performed by: FAMILY MEDICINE

## 2024-04-03 PROCEDURE — 71045 X-RAY EXAM CHEST 1 VIEW: CPT

## 2024-04-03 PROCEDURE — 83050 HGB METHEMOGLOBIN QUAN: CPT | Performed by: FAMILY MEDICINE

## 2024-04-03 PROCEDURE — 25010000002 VANCOMYCIN 5 G RECONSTITUTED SOLUTION: Performed by: STUDENT IN AN ORGANIZED HEALTH CARE EDUCATION/TRAINING PROGRAM

## 2024-04-03 PROCEDURE — 82805 BLOOD GASES W/O2 SATURATION: CPT | Performed by: FAMILY MEDICINE

## 2024-04-03 PROCEDURE — 94761 N-INVAS EAR/PLS OXIMETRY MLT: CPT

## 2024-04-03 PROCEDURE — 82375 ASSAY CARBOXYHB QUANT: CPT | Performed by: FAMILY MEDICINE

## 2024-04-03 PROCEDURE — 82948 REAGENT STRIP/BLOOD GLUCOSE: CPT

## 2024-04-03 PROCEDURE — 70553 MRI BRAIN STEM W/O & W/DYE: CPT

## 2024-04-03 PROCEDURE — 83605 ASSAY OF LACTIC ACID: CPT | Performed by: STUDENT IN AN ORGANIZED HEALTH CARE EDUCATION/TRAINING PROGRAM

## 2024-04-03 PROCEDURE — 83880 ASSAY OF NATRIURETIC PEPTIDE: CPT | Performed by: INTERNAL MEDICINE

## 2024-04-03 PROCEDURE — 80053 COMPREHEN METABOLIC PANEL: CPT | Performed by: STUDENT IN AN ORGANIZED HEALTH CARE EDUCATION/TRAINING PROGRAM

## 2024-04-03 PROCEDURE — 94664 DEMO&/EVAL PT USE INHALER: CPT

## 2024-04-03 PROCEDURE — 0 GADOBENATE DIMEGLUMINE 529 MG/ML SOLUTION: Performed by: INTERNAL MEDICINE

## 2024-04-03 PROCEDURE — 25010000002 FUROSEMIDE PER 20 MG: Performed by: INTERNAL MEDICINE

## 2024-04-03 PROCEDURE — 94799 UNLISTED PULMONARY SVC/PX: CPT

## 2024-04-03 PROCEDURE — 74176 CT ABD & PELVIS W/O CONTRAST: CPT

## 2024-04-03 PROCEDURE — 25010000002 PIPERACILLIN SOD-TAZOBACTAM PER 1 G: Performed by: STUDENT IN AN ORGANIZED HEALTH CARE EDUCATION/TRAINING PROGRAM

## 2024-04-03 PROCEDURE — 25810000003 SODIUM CHLORIDE 0.9 % SOLUTION: Performed by: STUDENT IN AN ORGANIZED HEALTH CARE EDUCATION/TRAINING PROGRAM

## 2024-04-03 PROCEDURE — 36600 WITHDRAWAL OF ARTERIAL BLOOD: CPT | Performed by: FAMILY MEDICINE

## 2024-04-03 PROCEDURE — 25010000002 MORPHINE PER 10 MG: Performed by: INTERNAL MEDICINE

## 2024-04-03 PROCEDURE — 99291 CRITICAL CARE FIRST HOUR: CPT | Performed by: STUDENT IN AN ORGANIZED HEALTH CARE EDUCATION/TRAINING PROGRAM

## 2024-04-03 PROCEDURE — 84100 ASSAY OF PHOSPHORUS: CPT | Performed by: STUDENT IN AN ORGANIZED HEALTH CARE EDUCATION/TRAINING PROGRAM

## 2024-04-03 PROCEDURE — 83735 ASSAY OF MAGNESIUM: CPT | Performed by: STUDENT IN AN ORGANIZED HEALTH CARE EDUCATION/TRAINING PROGRAM

## 2024-04-03 PROCEDURE — 71250 CT THORAX DX C-: CPT

## 2024-04-03 PROCEDURE — 80202 ASSAY OF VANCOMYCIN: CPT | Performed by: STUDENT IN AN ORGANIZED HEALTH CARE EDUCATION/TRAINING PROGRAM

## 2024-04-03 PROCEDURE — 87641 MR-STAPH DNA AMP PROBE: CPT | Performed by: STUDENT IN AN ORGANIZED HEALTH CARE EDUCATION/TRAINING PROGRAM

## 2024-04-03 PROCEDURE — A9577 INJ MULTIHANCE: HCPCS | Performed by: INTERNAL MEDICINE

## 2024-04-03 PROCEDURE — 87040 BLOOD CULTURE FOR BACTERIA: CPT | Performed by: STUDENT IN AN ORGANIZED HEALTH CARE EDUCATION/TRAINING PROGRAM

## 2024-04-03 PROCEDURE — 85025 COMPLETE CBC W/AUTO DIFF WBC: CPT | Performed by: STUDENT IN AN ORGANIZED HEALTH CARE EDUCATION/TRAINING PROGRAM

## 2024-04-03 PROCEDURE — 25010000002 METHYLPREDNISOLONE PER 40 MG: Performed by: NURSE PRACTITIONER

## 2024-04-03 PROCEDURE — 99233 SBSQ HOSP IP/OBS HIGH 50: CPT | Performed by: INTERNAL MEDICINE

## 2024-04-03 RX ORDER — PREDNISONE 20 MG/1
40 TABLET ORAL
Status: DISCONTINUED | OUTPATIENT
Start: 2024-04-04 | End: 2024-04-08

## 2024-04-03 RX ORDER — ACETAMINOPHEN 10 MG/ML
1000 INJECTION, SOLUTION INTRAVENOUS ONCE
Status: COMPLETED | OUTPATIENT
Start: 2024-04-03 | End: 2024-04-03

## 2024-04-03 RX ORDER — VANCOMYCIN 2 GRAM/500 ML IN 0.9 % SODIUM CHLORIDE INTRAVENOUS
20 ONCE
Qty: 500 ML | Refills: 0 | Status: COMPLETED | OUTPATIENT
Start: 2024-04-03 | End: 2024-04-03

## 2024-04-03 RX ADMIN — PIPERACILLIN SODIUM AND TAZOBACTAM SODIUM 3.38 G: 3; .375 INJECTION, POWDER, LYOPHILIZED, FOR SOLUTION INTRAVENOUS at 08:51

## 2024-04-03 RX ADMIN — PIPERACILLIN SODIUM AND TAZOBACTAM SODIUM 3.38 G: 3; .375 INJECTION, POWDER, LYOPHILIZED, FOR SOLUTION INTRAVENOUS at 23:35

## 2024-04-03 RX ADMIN — BUDESONIDE 0.5 MG: 0.5 INHALANT ORAL at 06:21

## 2024-04-03 RX ADMIN — ARFORMOTEROL TARTRATE 15 MCG: 15 SOLUTION RESPIRATORY (INHALATION) at 18:29

## 2024-04-03 RX ADMIN — METOPROLOL TARTRATE 12.5 MG: 25 TABLET, FILM COATED ORAL at 23:35

## 2024-04-03 RX ADMIN — BUDESONIDE 0.5 MG: 0.5 INHALANT ORAL at 18:29

## 2024-04-03 RX ADMIN — ASPIRIN 81 MG 81 MG: 81 TABLET ORAL at 11:13

## 2024-04-03 RX ADMIN — PRIMIDONE 250 MG: 250 TABLET ORAL at 11:14

## 2024-04-03 RX ADMIN — CHLORHEXIDINE GLUCONATE 15 ML: 1.2 RINSE ORAL at 23:34

## 2024-04-03 RX ADMIN — METHYLPREDNISOLONE SODIUM SUCCINATE 40 MG: 40 INJECTION INTRAMUSCULAR; INTRAVENOUS at 08:51

## 2024-04-03 RX ADMIN — PRIMIDONE 250 MG: 250 TABLET ORAL at 23:35

## 2024-04-03 RX ADMIN — VANCOMYCIN HYDROCHLORIDE 2000 MG: 5 INJECTION, POWDER, LYOPHILIZED, FOR SOLUTION INTRAVENOUS at 02:08

## 2024-04-03 RX ADMIN — PIPERACILLIN AND TAZOBACTAM 3.38 G: 3; .375 INJECTION, POWDER, FOR SOLUTION INTRAVENOUS at 02:08

## 2024-04-03 RX ADMIN — NYSTATIN 500000 UNITS: 100000 SUSPENSION ORAL at 23:34

## 2024-04-03 RX ADMIN — FUROSEMIDE 40 MG: 10 INJECTION, SOLUTION INTRAMUSCULAR; INTRAVENOUS at 08:50

## 2024-04-03 RX ADMIN — HYDROCODONE BITARTRATE AND ACETAMINOPHEN 1 TABLET: 10; 325 TABLET ORAL at 15:11

## 2024-04-03 RX ADMIN — METOPROLOL TARTRATE 5 MG: 1 INJECTION, SOLUTION INTRAVENOUS at 00:05

## 2024-04-03 RX ADMIN — AMLODIPINE BESYLATE 5 MG: 5 TABLET ORAL at 11:12

## 2024-04-03 RX ADMIN — Medication 10 ML: at 23:35

## 2024-04-03 RX ADMIN — CLOPIDOGREL BISULFATE 75 MG: 75 TABLET ORAL at 11:13

## 2024-04-03 RX ADMIN — ARFORMOTEROL TARTRATE 15 MCG: 15 SOLUTION RESPIRATORY (INHALATION) at 06:21

## 2024-04-03 RX ADMIN — METOPROLOL TARTRATE 12.5 MG: 25 TABLET, FILM COATED ORAL at 11:13

## 2024-04-03 RX ADMIN — FUROSEMIDE 40 MG: 10 INJECTION, SOLUTION INTRAMUSCULAR; INTRAVENOUS at 17:20

## 2024-04-03 RX ADMIN — GADOBENATE DIMEGLUMINE 20 ML: 529 INJECTION, SOLUTION INTRAVENOUS at 22:39

## 2024-04-03 RX ADMIN — FAMOTIDINE 20 MG: 10 INJECTION INTRAVENOUS at 08:50

## 2024-04-03 RX ADMIN — MORPHINE SULFATE 4 MG: 4 INJECTION, SOLUTION INTRAMUSCULAR; INTRAVENOUS at 12:23

## 2024-04-03 RX ADMIN — ACETAMINOPHEN 1000 MG: 10 INJECTION INTRAVENOUS at 00:26

## 2024-04-03 RX ADMIN — PRIMIDONE 250 MG: 250 TABLET ORAL at 17:12

## 2024-04-03 RX ADMIN — HYDROCODONE BITARTRATE AND ACETAMINOPHEN 1 TABLET: 10; 325 TABLET ORAL at 21:28

## 2024-04-03 RX ADMIN — PIPERACILLIN SODIUM AND TAZOBACTAM SODIUM 3.38 G: 3; .375 INJECTION, POWDER, LYOPHILIZED, FOR SOLUTION INTRAVENOUS at 17:18

## 2024-04-03 RX ADMIN — LIDOCAINE 1 PATCH: 4 PATCH TOPICAL at 08:49

## 2024-04-03 RX ADMIN — CHLORHEXIDINE GLUCONATE 15 ML: 1.2 RINSE ORAL at 11:11

## 2024-04-03 RX ADMIN — Medication 10 ML: at 08:51

## 2024-04-03 RX ADMIN — NYSTATIN 500000 UNITS: 100000 SUSPENSION ORAL at 11:13

## 2024-04-03 NOTE — PROGRESS NOTES
"[facility] Clinical Pharmacy Services: Vancomycin Monitoring Note    Davonte Marshall is a 68 y.o. male who is on day 1 of pharmacy to dose vancomycin for Pneumonia and Sepsis.    Previous Vancomycin Dose:   2000 mg IV administered 4/3/24@0208 (loading dose)  Imaging Reviewed?: Yes  Updated Cultures and Sensitivities:   4/3 MRSA PCR: Not detected  3/29 Clostridioides difficile toxin, PCR: negative  3/27 BAL cx: >100,000CFU Streptococcus pneumoniae, 25,000 CFU Klebsiella pneumoniae, 25,000 CFU MSSA    Vitals/Labs  Ht: 175.3 cm (69.02\"); Wt: 97 kg (213 lb 13.5 oz)   Temp (24hrs), Av.4 °F (38 °C), Min:98.5 °F (36.9 °C), Max:102.9 °F (39.4 °C)   Estimated Creatinine Clearance: 70.1 mL/min (by C-G formula based on SCr of 1.19 mg/dL).     Results from last 7 days   Lab Units 24  0010 24  0440 24  0635   CREATININE mg/dL 1.19 1.01 0.99   WBC 10*3/mm3 19.15* 14.02* 11.09*     Assessment/Plan    Current Vancomycin Dose:  1250 mg IV every 24 hours; which provides the following predicted parameters:  AUC24,ss: 504 mg/L.hr  PAUC*: 90 %  Ctrough,ss: 12.3 mg/L  Pconc*: 6 %  Tox.: 8 %  Next Vanc Random ordered for  at 1300  We will continue to monitor patient changes and renal function     Thank you for involving pharmacy in this patient's care. Please contact pharmacy with any questions or concerns.    Adelita Magallanes [facility]  Clinical Pharmacist  "

## 2024-04-03 NOTE — PROGRESS NOTES
Pulmonary / Critical Care Progress Note      Patient Name: Davonte Marshall  : 1955  MRN: [id number]  Attending:  Willy Tena DO   Date of admission: 3/27/2024    Subjective   Subjective   Follow-up for respiratory failure on ventilator, aspiration pneumonia secondary to Klebsiella, strep pneumo, MSSA and Moraxella     Overnight, RRT called due to tachypnea and tachycardia, rectal temp 102.9, antibiotics broadened to Vanco and Zosyn.    This morning,  Lying in bed  Currently on 3 L nasal cannula  Decreased to 2 L nasal cannula  Overall feeling better  Remains febrile with temp 100.8  Tachypnea and tachycardia improved  Cough and dyspnea improving  Core track in place  Diuresing well  Remains weak and fatigued  Wife at bedside      Objective     Vitals:   Vital signs for last 24 hours:  Temp:  [98.6 °F (37 °C)-102.9 °F (39.4 °C)] 100.8 °F (38.2 °C)  Heart Rate:  [] 80  Resp:  [18-46] 18  BP: (142-163)/(79-99) 156/81    Physical Exam   Vital Signs Reviewed   General: Elderly male, awake, NAD, lying in bed   Neck:  Supple, no JVD, no thyromegaly  Chest:  good aeration, coarse breath sounds bilaterally, no work of breathing noted on 2 L nasal cannula  CV: RRR, no MGR, pulses 2+, equal.  Abd:  Soft, NT, ND, + BS, no HSM  EXT:  no clubbing, no cyanosis, no edema  Neuro: Awake, AAO x 2-3, no focal deficits  Skin: No rashes or lesions noted    Result Review    Result Review:  I have personally reviewed the results from the time of this admission to 4/3/2024 13:07 EDT and agree with these findings:  [x]  Laboratory  [x]  Microbiology  [x]  Radiology  [x]  EKG/Telemetry   [x]  Cardiology/Vascular   []  Pathology  []  Old records  []  Other:  Most notable findings include:       Lab 24  0010 24  0004 24  0440 24  0635 24  0653 24  0600 24  0548 24  0724 24  0252   WBC 19.15*  --  14.02* 11.09* 14.07* 18.91* 14.44*  --  27.31*   HEMOGLOBIN 14.7  --  13.9  14.0 12.1* 11.1* 10.8*  --  14.3   HEMATOCRIT 44.0  --  42.1 42.0 36.6* 33.4* 32.5*  --  43.2   PLATELETS 396  --  267 209 168 145 142  --  257   SODIUM 145  --  146* 148* 145 142 137  --  138   SODIUM, ARTERIAL  --  144.0  --   --   --   --   --    < >  --    POTASSIUM 3.8  --  3.4* 3.4* 3.2* 3.9 3.7  --  4.4   CHLORIDE 105  --  107 107 104 103 101  --  100   CO2 24.0  --  24.4 26.5 28.6 25.5 23.1  --  20.6*   BUN 41*  --  36* 32* 42* 39* 31*  --  40*   CREATININE 1.19  --  1.01 0.99 1.10 0.97 1.06  --  2.11*   GLUCOSE 132*  --  140* 143* 123* 115* 129*  --  124*   GLUCOSE, ARTERIAL  --  129*  --   --   --   --   --    < >  --    CALCIUM 9.0  --  9.0 8.7 8.7 8.2* 7.9*  --  8.3*   PHOSPHORUS 4.7*  --  3.4 2.5 2.6 2.0* 1.2*  --  4.2   TOTAL PROTEIN 7.5  --  6.9 6.9 6.5 6.3 5.5*  --  6.6   ALBUMIN 3.8  --  3.6 3.5 3.2* 3.2* 2.6*  --  3.2*   GLOBULIN 3.7  --  3.3 3.4 3.3 3.1 2.9  --  3.4    < > = values in this interval not displayed.     Results for orders placed during the hospital encounter of 03/27/24    Adult Transthoracic Echo Limited W/ Cont if Necessary Per Protocol    Interpretation Summary    Extremely technically difficult study with very limited views.    The subcostal view is the only one where we can see myocardium and it looks like ejection fraction is greater than 50%.  Cannot rule out regional wall motion abnormalities.    No obvious significant valvular disease by Doppler.  The valves are not well-visualized.    Left ventricular diastolic function was indeterminate.    BAL culture with Streptococcus pneumonia, Klebsiella and MSSA    Assessment & Plan   Assessment / Plan     Active Hospital Problems:  Active Hospital Problems    Diagnosis     **Acute metabolic encephalopathy      Impression:  Septic shock, resolved  Aspiration pneumonia secondary to Klebsiella, strep pneumo, MSSA and Moraxella  Lactic acidosis, clinically significant  Metabolic acidosis  Acute hypoxemic and hypercapnic respiratory  failure require mechanical ventilation  Altered mental status  Toxic/metabolic encephalopathy  NSTEMI  Acute kidney injury secondary to prerenal etiology  Hypomagnesemia  Hypophosphatemia  Hypophosphatemia  Leukocytosis  Seizures/tremors.  On primidone at home     Plan:  -Continue to wean O2 to maintain SpO2 greater than 90%  -Continue NIPPV at night and as needed days with naps  -New fevers last night, RRT called.  Restarted broad-spectrum antibiotics with vancomycin and Zosyn for now  -Agree with CT abdomen, pelvis and chest given persistent fevers  -Continue Lasix 40 mg IV twice daily  -Continue to monitor renal panel electrolytes.  Replace electrolytes as needed.  -Continue Brovana, Pulmicort, and DuoNebs  -Solu-Medrol transition to prednisone 40 mg daily  -Continue aspiration precautions.  Elevate head of bed.  -Continue bronchopulmonary hygiene.  Encourage I-S  -Continue chest vest therapy  -Continue Keppra intravenously  -PT/OT/SLP on board.  Appreciate assistance  -Neurology following, appreciate input  -NSTEMI, cardiology following, appreciate input  -Rehab referrals pending    Electronically signed by Greg Sweeney MD, 04/03/24, 2:25 PM EDT.    DVT prophylaxis: Lovenox  No DVT prophylaxis order currently exists.    CODE STATUS:   Level Of Support Discussed With: Health Care Surrogate  Code Status (Patient has no pulse and is not breathing): CPR (Attempt to Resuscitate)  Medical Interventions (Patient has pulse or is breathing): Full Support    Labs, managing, and medications personally reviewed  Discussed with primary and patient    Electronically signed by KEVIN Palmer, 04/03/24, 1:07 PM EDT.

## 2024-04-03 NOTE — PLAN OF CARE
Goal Outcome Evaluation:   VSS. Temp 97.3 this afternoon. Patient dislodged temp. Probe having a BM this afternoon and with temp. Being 97.3, cooling blanket discontinued. Cortrak feeding tube inserted this morning, tube feeding restarted, patient tolerating well. MRI screening sheet filled out, awaiting to go for MRI.

## 2024-04-03 NOTE — PLAN OF CARE
Goal Outcome Evaluation:   Pt placed on bipap and caused pt to have increased RR into the mid 40s. RRT called on pt for multiple reasons, high rectal temp high HR and high RR. Dr Downey ordered to remove pt from bipap. Pt stated that was much better to be off. Pt also had an abg with normal values except for low Co2.

## 2024-04-03 NOTE — PLAN OF CARE
Goal Outcome Evaluation:  Plan of Care Reviewed With: patient     Patient is A&Ox3. An RRT was called during shift due to patient's HR maintaining above 130 at rest, RR above 40 on Bi-pap, and a rectal temp of 102.9. Refer to chart for results. Antibiotics changed. Patient removed NG tube at beginning of shift as well. No change to plan of care.      Progress: no change

## 2024-04-03 NOTE — PROGRESS NOTES
[facility]   Hospitalist Progress Note  Date: 4/3/2024  Patient Name: Davonte Marshall  : 1955  MRN: [id number]  Date of admission: 3/27/2024      Subjective   Subjective     Chief Complaint: Follow up for somnolence    Summary: 68-year-old male with hypertension, chronic pain on high dose of morphine who presented after having worsening lethargy and mental status apparently was found to still have food in his mouth from the day before, is midline responsive, prior to the CT department had hypoxia with sats in the 70s initially requiring BiPAP, had aspiration episode and required intubation for airway distress.  Treating for sepsis with multifocal pneumonia, possible cardiogenic shock, aspiration, hypoxemic hypercapnic respiratory failure, polypharmacy and oversedation and question positive benzodiazepines and barbiturates on tox screen that he is not prescribed.  Pulmonology cards and neuro assisting.  Extubated to bipap and slowly weaning.  SLP eval with aspiration, still requiring TF.  EEG with seizure activity, question withdrawal from home primidone with poor adherence to 3 times daily dosing at home.  Using IV Keppra for now.  Slowly resuming home medications at reduced dose     Interval Followup:   Overnight, antibiotics broadened back to vancomycin and Zosyn due to persistent fevers and tachycardia. No hypotension    This morning remains alert and conversant, states he is actually feeling better, answering questions and following commands appropriately, weakness in his upper extremities is a little bit better    Denies chills or night sweats  No worsening cough or dyspnea  No chest pain or palpitation  Denies abdominal pain.  No diarrhea  No joint pain or swelling    Patient agreeable to attempting MRI if able to get pain medication prior  Was also agreeable to Syed    Review of Systems  Cardiovascular:  No Chest Pain  Respiratory:  No worsening Dyspnea  Gastrointestinal:  No abdominal  pain      Objective   Objective     Vitals:   Temp:  [98.6 °F (37 °C)-102.9 °F (39.4 °C)] 100.8 °F (38.2 °C)  Heart Rate:  [] 80  Resp:  [18-46] 18  BP: (142-163)/(79-99) 156/81  Flow (L/min):  [3] 3  Physical Exam    Constitutional: Well-nourished male, awake, NAD   Eyes: Pupils equal and reactive, no conjunctival injections   Neck: Shoulder weakness, neck flexed difficulty keeping it upright   HENT: NCAT, Cortrak tube   Respiratory: nonlabored respirations    Cardiovascular: RRR, trace pedal, edema   Gastrointestinal: Soft, nontender   Neurologic: Alert and oriented, following simple commands appropriately, cranial Nerves grossly intact, resting hand tremor right, muscle strength 3 out of 5 in the upper extremity   Skin: Extremities warm, no rashes    Result Review    Result Review:  I have personally reviewed the following over the last 24 hours (07:00 to 07:00) and agree with the following findings  [x]  Laboratory  CBC          4/1/2024    06:35 4/2/2024    04:40 4/3/2024    00:10   CBC   WBC 11.09  14.02  19.15    RBC 4.69  4.63  4.91    Hemoglobin 14.0  13.9  14.7    Hematocrit 42.0  42.1  44.0    MCV 89.6  90.9  89.6    MCH 29.9  30.0  29.9    MCHC 33.3  33.0  33.4    RDW 14.7  15.0  15.9    Platelets 209  267  396      BMP          4/1/2024    06:35 4/2/2024    04:40 4/3/2024    00:04 4/3/2024    00:10   BMP   BUN 32  36   41    Creatinine 0.99  1.01   1.19    Sodium 148  146  144.0  145    Potassium 3.4  3.4   3.8    Chloride 107  107   105    CO2 26.5  24.4   24.0    Calcium 8.7  9.0   9.0      []  Microbiology  []  Radiology   [x]  EKG/Telemetry monitor personally reviewed: NSR/sinus tachycardia  []  Cardiology/Vascular   []  Pathology  []  Old records  [x]  Other:    Intake/Output Summary (Last 24 hours) at 4/3/2024 1159  Last data filed at 4/2/2024 1823  Gross per 24 hour   Intake --   Output 1300 ml   Net -1300 ml         Assessment & Plan   Assessment / Plan     Assessment/Plan:    Aspiration  pneumonia with Klebsiella, Streptococcus, MSSA and Moraxella  Acute hypoxemic and hypercapnic respiratory failure requiring mechanical intervention  Acute metabolic encephalopathy in setting of polypharmacy and hypercapnia - improving  Hx of seizures and Essential tremor - on primidone  Hypernatremia  Hypokalemia  Transaminitis  NSTEMI, suspect type II from demand/sepsis  Lactic acidosis, POA.  Clinically significant  Hx of Hyperlipidemia  Hx of Depression/anxiety on outpatient Cymbalta  Hx of Chronic low back pain 2/2 lumbosacral degenerative disc disease  Hx of anterocollis and cervical dystonia  Hx of left corona radiata stroke 2020 with no residual deficits  DIONNA, secondary to sepsis/shock. Resolved  Septic shock 2/2 pneumonia. Resolved        Antibiotics broadened to IV vancomycin and Zosyn due to persistent fevers and uptrending white count; will continue.   CXR with persistent  LLL PNA. UA not consistent with infection.  Pro-Giorgio improved to 0.81.  Repeat blood cultures pending  Obtain CT chest/abdomen/pelvis without contrast to assess for empyema/occult infection  Continue antipyretics every 6 hours as needed  Appreciate pulmonology recommendations  Continue scheduled nebulizers  proBNP increased to 5,186.  Creatinine 1.19.  Continue IV Lasix 40 mg two times daily.  Trend renal function electrolytes with daily BMP  Continue De Los Santos catheter  Switch Solu-Medrol to prednisone 40 mg daily.  Wean oxygen, SpO2 goal >90%  Wife reports he has a shuffling gait and in the setting of his cervical dystonia, hand tremor, profound weakness and new dysphagia; will obtain MRI of the brain with and without contrast.  Family history of Parkinson's in father   Dose of IV morphine ordered to be given prior to MRI to assist with compliance  Will reconsult teleneurology to assess for trial of carbidopa/ levodopa  Continue primidone 250 mg three times daily. Seizure precaution  Appreciate cardiology recommendations.  Continue baby  aspirin, Plavix, beta-blocker. Cardiac catheterization recommended once more stable  Statin on hold given mild transaminitis.  Trend liver enzymes  Blood pressure reasonable.  Continue amlodipine 5 mg daily  Continue as needed Norco every 6 hours and lidocaine patch daily  SLP following; not cleared for diet. Cortrak placed.  Continue tube feeds.  Per dietitian  VTE ppx: Lovenox 40 mg daily  CBC, CMP in AM  Continue PT/OT.  Will need rehab    Discussed plan with RN.  Discussed with wife at bedside      DVT prophylaxis:  No DVT prophylaxis order currently exists.      CODE STATUS:   Level Of Support Discussed With: Health Care Surrogate  Code Status (Patient has no pulse and is not breathing): CPR (Attempt to Resuscitate)  Medical Interventions (Patient has pulse or is breathing): Full Support      I spent greater than 50 minutes minutes of time for evaluation and management of this patient including review of chart, labs pertinent imaging available as well as medical decision making and discussion with physicians, staff and patient/family.    Electronically signed by Willy Tena DO, 04/03/24, 11:59 AM EDT.

## 2024-04-03 NOTE — PLAN OF CARE
Goal Outcome Evaluation:  Plan of Care Reviewed With: patient        Progress: no change  Outcome Evaluation: Patient remains on 3lpm nasal cannula this morning. Very good, effective cough. Patient was not tolerating chest vest well, we switched to flutter therapy, and he performed extreemly well. Patient is clear/diminished this morning. V60 was worn last night 14/7.

## 2024-04-03 NOTE — PROGRESS NOTES
"[facility] Clinical Pharmacy Services: Vancomycin Pharmacokinetic Initial Consult Note    Davonte Marshall is a 68 y.o. male who is on day 1 of pharmacy to dose vancomycin for Pneumonia and Sepsis.    Consult Information  Consulting Provider: Clint  Planned Duration of Therapy: 7 days   Was Patient Receiving Prior to Admission/Consult?: No  Loading Dose Ordered or Given: 2000 mg on 4/3/24 at 0200  PK/PD Target: -600 mg/L.hr   Relevant ID History:   >100,000 CFU/mL Streptococcus pneumoniae Abnormal        25,000 CFU/mL Klebsiella pneumoniae Abnormal      25,000 CFU/mL Staphylococcus aureus Abnormal   on 3/30/24     Other Antimicrobials: Piperacillin/Tazobactam    Imaging Reviewed?: Yes    Microbiology Data  MRSA PCR performed: 24; Result: Pending  Culture/Source:       Vitals/Labs  Ht: 175.3 cm (69.02\"); Wt: 97 kg (213 lb 13.5 oz)  Temp (24hrs), Av.9 °F (37.7 °C), Min:98.4 °F (36.9 °C), Max:102.9 °F (39.4 °C)   Estimated Creatinine Clearance: 70.1 mL/min (by C-G formula based on SCr of 1.19 mg/dL).       Results from last 7 days   Lab Units 24  0010 24  0440 24  0635   CREATININE mg/dL 1.19 1.01 0.99   WBC 10*3/mm3 19.15* 14.02* 11.09*     Assessment/Plan:    Vancomycin Dose:  1750 mg IV every 24 hours; which provides the following predicted parameters:  Exposure target: AUC24 (range)400-600 mg/L.hr   AUC24,ss: 508 mg/L.hr  PAUC*: 75 %  Ctrough,ss: 14.6 mg/L  Pconc*: 25 %  Tox.: 10 %  Vanc Random ordered for 24 at 1000  Patient has order for Complete Metabolic Panel    Pharmacy will follow patient's kidney function and will adjust doses and obtain levels as necessary. Thank you for involving pharmacy in this patient's care. Please contact pharmacy with any questions or concerns.                           Silvano Lunsford [facility]  Clinical Pharmacist   "

## 2024-04-03 NOTE — PROGRESS NOTES
RT EQUIPMENT DEVICE RELATED - SKIN ASSESSMENT    RT Medical Equipment/Device:     NIV Mask:  Under-the-nose   size:  C    Skin Assessment:      Cheek:  Intact  Nose:  Intact    Device Skin Pressure Protection:  Pressure points protected    Nurse Notification:  Dahiana Pressley, RRT

## 2024-04-03 NOTE — PROGRESS NOTES
RT EQUIPMENT DEVICE RELATED - SKIN ASSESSMENT    RT Medical Equipment/Device:     NIV Mask:  Under-the-nose   size:  c    Skin Assessment:      Chin:  Intact  Nares:  Intact  Nose:  Intact    Device Skin Pressure Protection:  Pressure points protected    Nurse Notification:  Dahiana Sanchez RRT

## 2024-04-03 NOTE — PROGRESS NOTES
Respiratory Therapist Broncho-Pulmonary Hygiene Progress Note      Patient Name:  Davonte Marshall  YOB: 1955    Davonte Marshall meets the qualification for Level 2 of the Bronco-Pulmonary Hygiene Protocol. This was based on my daily patient assessment and includes review of chest x-ray results, cough ability and quality, oxygenation, secretions or risk for secretion development and patient mobility.     Broncho-Pulmonary Hygiene Assessment:    Level of Movement: Actively changing positions-requires assistance  Disoriented/Follows Commands    Breath Sounds: Clear to slightly diminished    Cough: Strong, effective    Chest X-Ray: Possible signs of consolidation and/or atelectasis or clear.     Sputum Productions: None or small amount of thin or watery secretions with effective cough    History and Physical: None    SpO2 to Oxygen Need: greater than 92% on room air or  less than 3L nasal canula    Current SpO2 is: 96% on 3lpm NC    Based on this information, I have completed the following interventions: Aerobika with bronchodialtor medication or TID. Patient has an excellent cough this morning and is able to effectively complete Flutter therapy.       Electronically signed by Kayla Pressley RRT, 04/03/24, 6:29 AM EDT.

## 2024-04-04 ENCOUNTER — APPOINTMENT (OUTPATIENT)
Dept: MRI IMAGING | Facility: HOSPITAL | Age: 69
DRG: 871 | End: 2024-04-04
Payer: MEDICARE

## 2024-04-04 LAB
ALBUMIN SERPL-MCNC: 3.4 G/DL (ref 3.5–5.2)
ALBUMIN/GLOB SERPL: 1 G/DL
ALP SERPL-CCNC: 78 U/L (ref 39–117)
ALT SERPL W P-5'-P-CCNC: 118 U/L (ref 1–41)
ANION GAP SERPL CALCULATED.3IONS-SCNC: 12.4 MMOL/L (ref 5–15)
AST SERPL-CCNC: 55 U/L (ref 1–40)
BASOPHILS # BLD AUTO: 0.09 10*3/MM3 (ref 0–0.2)
BASOPHILS NFR BLD AUTO: 0.6 % (ref 0–1.5)
BILIRUB SERPL-MCNC: 0.6 MG/DL (ref 0–1.2)
BUN SERPL-MCNC: 46 MG/DL (ref 8–23)
BUN/CREAT SERPL: 39 (ref 7–25)
CALCIUM SPEC-SCNC: 8.7 MG/DL (ref 8.6–10.5)
CHLORIDE SERPL-SCNC: 107 MMOL/L (ref 98–107)
CHOLEST SERPL-MCNC: 127 MG/DL (ref 0–200)
CO2 SERPL-SCNC: 24.6 MMOL/L (ref 22–29)
CREAT SERPL-MCNC: 1.18 MG/DL (ref 0.76–1.27)
DEPRECATED RDW RBC AUTO: 50.5 FL (ref 37–54)
EGFRCR SERPLBLD CKD-EPI 2021: 68.1 ML/MIN/1.73
EOSINOPHIL # BLD AUTO: 0.64 10*3/MM3 (ref 0–0.4)
EOSINOPHIL NFR BLD AUTO: 4.3 % (ref 0.3–6.2)
ERYTHROCYTE [DISTWIDTH] IN BLOOD BY AUTOMATED COUNT: 15.5 % (ref 12.3–15.4)
GLOBULIN UR ELPH-MCNC: 3.4 GM/DL
GLUCOSE BLDC GLUCOMTR-MCNC: 133 MG/DL (ref 70–99)
GLUCOSE SERPL-MCNC: 116 MG/DL (ref 65–99)
HCT VFR BLD AUTO: 41 % (ref 37.5–51)
HDLC SERPL-MCNC: 26 MG/DL (ref 40–60)
HGB BLD-MCNC: 13 G/DL (ref 13–17.7)
IMM GRANULOCYTES # BLD AUTO: 0.12 10*3/MM3 (ref 0–0.05)
IMM GRANULOCYTES NFR BLD AUTO: 0.8 % (ref 0–0.5)
LDLC SERPL CALC-MCNC: 73 MG/DL (ref 0–100)
LDLC/HDLC SERPL: 2.67 {RATIO}
LYMPHOCYTES # BLD AUTO: 2.78 10*3/MM3 (ref 0.7–3.1)
LYMPHOCYTES NFR BLD AUTO: 18.8 % (ref 19.6–45.3)
MAGNESIUM SERPL-MCNC: 2.1 MG/DL (ref 1.6–2.4)
MCH RBC QN AUTO: 29.5 PG (ref 26.6–33)
MCHC RBC AUTO-ENTMCNC: 31.7 G/DL (ref 31.5–35.7)
MCV RBC AUTO: 93 FL (ref 79–97)
MONOCYTES # BLD AUTO: 0.85 10*3/MM3 (ref 0.1–0.9)
MONOCYTES NFR BLD AUTO: 5.7 % (ref 5–12)
NEUTROPHILS NFR BLD AUTO: 10.31 10*3/MM3 (ref 1.7–7)
NEUTROPHILS NFR BLD AUTO: 69.8 % (ref 42.7–76)
NRBC BLD AUTO-RTO: 0 /100 WBC (ref 0–0.2)
PHOSPHATE SERPL-MCNC: 3.3 MG/DL (ref 2.5–4.5)
PLATELET # BLD AUTO: 309 10*3/MM3 (ref 140–450)
PMV BLD AUTO: 10.8 FL (ref 6–12)
POTASSIUM SERPL-SCNC: 3.4 MMOL/L (ref 3.5–5.2)
PROT SERPL-MCNC: 6.8 G/DL (ref 6–8.5)
QT INTERVAL: 287 MS
QTC INTERVAL: 443 MS
RBC # BLD AUTO: 4.41 10*6/MM3 (ref 4.14–5.8)
SODIUM SERPL-SCNC: 144 MMOL/L (ref 136–145)
TRIGL SERPL-MCNC: 158 MG/DL (ref 0–150)
VLDLC SERPL-MCNC: 28 MG/DL (ref 5–40)
WBC NRBC COR # BLD AUTO: 14.79 10*3/MM3 (ref 3.4–10.8)

## 2024-04-04 PROCEDURE — 25010000002 PIPERACILLIN SOD-TAZOBACTAM PER 1 G: Performed by: STUDENT IN AN ORGANIZED HEALTH CARE EDUCATION/TRAINING PROGRAM

## 2024-04-04 PROCEDURE — 70547 MR ANGIOGRAPHY NECK W/O DYE: CPT

## 2024-04-04 PROCEDURE — 97530 THERAPEUTIC ACTIVITIES: CPT

## 2024-04-04 PROCEDURE — 99233 SBSQ HOSP IP/OBS HIGH 50: CPT | Performed by: INTERNAL MEDICINE

## 2024-04-04 PROCEDURE — 82948 REAGENT STRIP/BLOOD GLUCOSE: CPT

## 2024-04-04 PROCEDURE — 70544 MR ANGIOGRAPHY HEAD W/O DYE: CPT

## 2024-04-04 PROCEDURE — 94799 UNLISTED PULMONARY SVC/PX: CPT

## 2024-04-04 PROCEDURE — 94664 DEMO&/EVAL PT USE INHALER: CPT

## 2024-04-04 PROCEDURE — 99231 SBSQ HOSP IP/OBS SF/LOW 25: CPT | Performed by: PSYCHIATRY & NEUROLOGY

## 2024-04-04 PROCEDURE — 63710000001 PREDNISONE PER 1 MG: Performed by: INTERNAL MEDICINE

## 2024-04-04 PROCEDURE — 97110 THERAPEUTIC EXERCISES: CPT

## 2024-04-04 PROCEDURE — 25010000002 VANCOMYCIN 5 G RECONSTITUTED SOLUTION: Performed by: STUDENT IN AN ORGANIZED HEALTH CARE EDUCATION/TRAINING PROGRAM

## 2024-04-04 PROCEDURE — 80061 LIPID PANEL: CPT | Performed by: INTERNAL MEDICINE

## 2024-04-04 PROCEDURE — 94761 N-INVAS EAR/PLS OXIMETRY MLT: CPT

## 2024-04-04 PROCEDURE — 80053 COMPREHEN METABOLIC PANEL: CPT | Performed by: INTERNAL MEDICINE

## 2024-04-04 PROCEDURE — 25010000002 FUROSEMIDE PER 20 MG: Performed by: INTERNAL MEDICINE

## 2024-04-04 PROCEDURE — 83735 ASSAY OF MAGNESIUM: CPT | Performed by: INTERNAL MEDICINE

## 2024-04-04 PROCEDURE — 84100 ASSAY OF PHOSPHORUS: CPT | Performed by: INTERNAL MEDICINE

## 2024-04-04 PROCEDURE — 85025 COMPLETE CBC W/AUTO DIFF WBC: CPT | Performed by: INTERNAL MEDICINE

## 2024-04-04 PROCEDURE — 92526 ORAL FUNCTION THERAPY: CPT

## 2024-04-04 PROCEDURE — 25010000002 MORPHINE PER 10 MG: Performed by: INTERNAL MEDICINE

## 2024-04-04 PROCEDURE — 25810000003 SODIUM CHLORIDE 0.9 % SOLUTION: Performed by: STUDENT IN AN ORGANIZED HEALTH CARE EDUCATION/TRAINING PROGRAM

## 2024-04-04 RX ORDER — HYDROCODONE BITARTRATE AND ACETAMINOPHEN 10; 325 MG/1; MG/1
1 TABLET ORAL EVERY 4 HOURS PRN
Status: DISCONTINUED | OUTPATIENT
Start: 2024-04-04 | End: 2024-04-06

## 2024-04-04 RX ORDER — HYDROCODONE BITARTRATE AND ACETAMINOPHEN 5; 325 MG/1; MG/1
1 TABLET ORAL EVERY 4 HOURS PRN
Status: DISCONTINUED | OUTPATIENT
Start: 2024-04-04 | End: 2024-04-06

## 2024-04-04 RX ORDER — AMOXICILLIN 250 MG
1 CAPSULE ORAL NIGHTLY
Status: DISCONTINUED | OUTPATIENT
Start: 2024-04-04 | End: 2024-04-12

## 2024-04-04 RX ADMIN — PREDNISONE 40 MG: 20 TABLET ORAL at 08:19

## 2024-04-04 RX ADMIN — LIDOCAINE 1 PATCH: 4 PATCH TOPICAL at 08:21

## 2024-04-04 RX ADMIN — PIPERACILLIN SODIUM AND TAZOBACTAM SODIUM 3.38 G: 3; .375 INJECTION, POWDER, LYOPHILIZED, FOR SOLUTION INTRAVENOUS at 16:21

## 2024-04-04 RX ADMIN — BUDESONIDE 0.5 MG: 0.5 INHALANT ORAL at 19:14

## 2024-04-04 RX ADMIN — PIPERACILLIN SODIUM AND TAZOBACTAM SODIUM 3.38 G: 3; .375 INJECTION, POWDER, LYOPHILIZED, FOR SOLUTION INTRAVENOUS at 23:09

## 2024-04-04 RX ADMIN — DOCUSATE SODIUM 50MG AND SENNOSIDES 8.6MG 1 TABLET: 8.6; 5 TABLET, FILM COATED ORAL at 21:19

## 2024-04-04 RX ADMIN — ASPIRIN 81 MG 81 MG: 81 TABLET ORAL at 08:19

## 2024-04-04 RX ADMIN — HYDROCODONE BITARTRATE AND ACETAMINOPHEN 1 TABLET: 10; 325 TABLET ORAL at 17:13

## 2024-04-04 RX ADMIN — NYSTATIN 500000 UNITS: 100000 SUSPENSION ORAL at 18:22

## 2024-04-04 RX ADMIN — AMLODIPINE BESYLATE 5 MG: 5 TABLET ORAL at 08:19

## 2024-04-04 RX ADMIN — CHLORHEXIDINE GLUCONATE 15 ML: 1.2 RINSE ORAL at 08:20

## 2024-04-04 RX ADMIN — FUROSEMIDE 40 MG: 10 INJECTION, SOLUTION INTRAMUSCULAR; INTRAVENOUS at 08:21

## 2024-04-04 RX ADMIN — NYSTATIN 500000 UNITS: 100000 SUSPENSION ORAL at 12:47

## 2024-04-04 RX ADMIN — PIPERACILLIN SODIUM AND TAZOBACTAM SODIUM 3.38 G: 3; .375 INJECTION, POWDER, LYOPHILIZED, FOR SOLUTION INTRAVENOUS at 06:36

## 2024-04-04 RX ADMIN — FUROSEMIDE 40 MG: 10 INJECTION, SOLUTION INTRAMUSCULAR; INTRAVENOUS at 17:13

## 2024-04-04 RX ADMIN — METOPROLOL TARTRATE 12.5 MG: 25 TABLET, FILM COATED ORAL at 08:19

## 2024-04-04 RX ADMIN — BUDESONIDE 0.5 MG: 0.5 INHALANT ORAL at 06:23

## 2024-04-04 RX ADMIN — HYDROCODONE BITARTRATE AND ACETAMINOPHEN 1 TABLET: 10; 325 TABLET ORAL at 23:09

## 2024-04-04 RX ADMIN — ARFORMOTEROL TARTRATE 15 MCG: 15 SOLUTION RESPIRATORY (INHALATION) at 06:23

## 2024-04-04 RX ADMIN — Medication 10 ML: at 21:20

## 2024-04-04 RX ADMIN — Medication 10 ML: at 08:21

## 2024-04-04 RX ADMIN — MORPHINE SULFATE 4 MG: 4 INJECTION, SOLUTION INTRAMUSCULAR; INTRAVENOUS at 21:19

## 2024-04-04 RX ADMIN — LIDOCAINE 1 PATCH: 4 PATCH TOPICAL at 13:15

## 2024-04-04 RX ADMIN — NYSTATIN 500000 UNITS: 100000 SUSPENSION ORAL at 21:19

## 2024-04-04 RX ADMIN — Medication 5 MG: at 23:09

## 2024-04-04 RX ADMIN — HYDROCODONE BITARTRATE AND ACETAMINOPHEN 1 TABLET: 10; 325 TABLET ORAL at 12:47

## 2024-04-04 RX ADMIN — METOPROLOL TARTRATE 12.5 MG: 25 TABLET, FILM COATED ORAL at 21:19

## 2024-04-04 RX ADMIN — FAMOTIDINE 20 MG: 10 INJECTION INTRAVENOUS at 08:21

## 2024-04-04 RX ADMIN — NYSTATIN 500000 UNITS: 100000 SUSPENSION ORAL at 08:20

## 2024-04-04 RX ADMIN — ARFORMOTEROL TARTRATE 15 MCG: 15 SOLUTION RESPIRATORY (INHALATION) at 19:15

## 2024-04-04 RX ADMIN — VANCOMYCIN HYDROCHLORIDE 1250 MG: 5 INJECTION, POWDER, LYOPHILIZED, FOR SOLUTION INTRAVENOUS at 02:01

## 2024-04-04 RX ADMIN — PRIMIDONE 250 MG: 250 TABLET ORAL at 17:13

## 2024-04-04 RX ADMIN — CLOPIDOGREL BISULFATE 75 MG: 75 TABLET ORAL at 08:19

## 2024-04-04 RX ADMIN — PRIMIDONE 250 MG: 250 TABLET ORAL at 21:19

## 2024-04-04 RX ADMIN — PRIMIDONE 250 MG: 250 TABLET ORAL at 08:20

## 2024-04-04 NOTE — PROGRESS NOTES
RT EQUIPMENT DEVICE RELATED - SKIN ASSESSMENT    RT Medical Equipment/Device:     Nasal Cannula    Skin Assessment:      Cheek:  Intact  Ears:  Intact  Nares:  Intact    Device Skin Pressure Protection:  Positioning supports utilized and Pressure points protected    Nurse Notification:  Dahiana Sampson, RRT

## 2024-04-04 NOTE — PLAN OF CARE
Goal Outcome Evaluation:      Went for MRI tonight, see MD note. Vitals stable. No signs of distress at this time.

## 2024-04-04 NOTE — THERAPY TREATMENT NOTE
"Acute Care - Physical Therapy Treatment Note  [facility]     Patient Name: Davonte Marshall  : 1955  MRN: [id number]  Today's Date: 2024      Visit Dx:     ICD-10-CM ICD-9-CM   1. Acute respiratory failure with hypoxia  J96.01 518.81   2. Metabolic encephalopathy  G93.41 348.31   3. DIONNA (acute kidney injury)  N17.9 584.9   4. NSTEMI (non-ST elevated myocardial infarction)  I21.4 410.70   5. Oropharyngeal dysphagia  R13.12 787.22   6. Impaired mobility and ADLs  Z74.09 V49.89    Z78.9    7. Difficulty in walking  R26.2 719.7     Patient Active Problem List   Diagnosis    Anemia    Anxiety    Arthritis    Back pain    Chronic bronchitis    Complication of surgical procedure    Degeneration of lumbosacral intervertebral disc    Depression    Encounter for long-term (current) use of insulin    Essential tremor    Gall stones    Head injury    Hemorrhoids    Herniation of intervertebral disc    Hyperlipidemia    Leg pain    Neck pain    Renal insufficiency    Scoliosis    Seizure disorder    Smokes 1.5 packs of cigarettes per day    Vitamin D deficiency disease    Gross hematuria    BPH without obstruction/lower urinary tract symptoms    Urge incontinence of urine    Benign prostatic hyperplasia with urinary hesitancy    Sequelae, post-stroke    S/P TURP    Postoperative anemia    Postoperative hypoxia    Essential hypertension    Opioid dependence    Cervical dystonia    Acute metabolic encephalopathy     Past Medical History:   Diagnosis Date    Anemia     Anxiety     Arthritis     Benign essential hypertension     Chronic bronchitis     Depression     Enlarged prostate     Floaters in visual field     Gall stones     Generalized weakness     Head injury     Hemorrhoids     Hyperlipidemia     Leg pain     Numbness in both hands 2015    Seizures     last \"quite a while ago\"    Stroke 2020    left sided weakness     Past Surgical History:   Procedure Laterality Date    APPENDECTOMY      BACK SURGERY   "    4    CHOLECYSTECTOMY      CYSTOSCOPY Bilateral     7/12/21    CYSTOSCOPY TRANSURETHRAL RESECTION OF PROSTATE N/A 7/12/2021    Procedure: CYSTOSCOPY TRANSURETHRAL RESECTION OF PROSTATE;  Surgeon: Penelope Fox MD;  Location: [facility] MAIN OR;  Service: Urology;  Laterality: N/A;    HAND SURGERY Bilateral     thumb    LYMPH NODE DISSECTION      jaw line    TURP / TRANSURETHRAL INCISION / DRAINAGE PROSTATE  07/12/2021     PT Assessment (Last 12 Hours)       PT Evaluation and Treatment       Row Name 04/04/24 1013          Physical Therapy Time and Intention    Subjective Information complains of;weakness;fatigue (P)   -     Document Type therapy note (daily note) (P)   -     Mode of Treatment individual therapy;physical therapy (P)   -     Patient Effort good (P)   -     Symptoms Noted During/After Treatment fatigue (P)   -       Row Name 04/04/24 1013          Bed Mobility    Bed Mobility supine-sit;sit-supine (P)   -     Supine-Sit [place] (Bed Mobility) moderate assist (50% patient effort) (P)   -     Sit-Supine [place] (Bed Mobility) minimum assist (75% patient effort) (P)   -     Bed Mobility, Safety Issues decreased use of legs for bridging/pushing (P)   -     Assistive Device (Bed Mobility) head of bed elevated (P)   -       Row Name 04/04/24 1013          Safety Issues, Functional Mobility    Impairments Affecting Function (Mobility) balance;strength;endurance/activity tolerance (P)   -       Row Name 04/04/24 1013          Balance    Balance Assessment sitting static balance;sitting dynamic balance (P)   -     Static Sitting Balance minimal assist (P)   -     Dynamic Sitting Balance minimal assist (P)   -     Position, Sitting Balance sitting edge of bed (P)   -     Comment, Balance Pt with low extensor tone, slumped posture throughout EOB exercises. (P)   -       Row Name 04/04/24 1013          Motor Skills    Therapeutic Exercise hip;knee;ankle (P)   seated  marching, LAQ, attempted ankle pumps, heel slides. 10x 1 set per . Pt unable to perform ankle pumps limited by edema and pain. Pt tolerates all other exercises well.  -       Row Name 04/04/24 1044          Positioning and Restraints    Pre-Treatment Position in bed (P)   -     Post Treatment Position bed (P)   -     In Bed supine;with family/caregiver;call light within reach;exit alarm on (P)   -       Row Name 04/04/24 1044          Progress Summary (PT)    Progress Toward Functional Goals (PT) progress toward functional goals is fair (P)   -     Daily Progress Summary (PT) Pt progressing, trunk control and pain limiting functional performance at this time. Continue to treat per tolerance. (P)   -               User Key  (r) = Recorded By, (t) = Taken By, (c) = Cosigned By      Initials Name Provider Type    Homar Suazo, PT Student PT Student                      PT Recommendation and Plan     Progress Summary (PT)  Progress Toward Functional Goals (PT): (P) progress toward functional goals is fair  Daily Progress Summary (PT): (P) Pt progressing, trunk control and pain limiting functional performance at this time. Continue to treat per tolerance.   Outcome Measures       Row Name 04/02/24 1400 04/01/24 1100          How much help from another person do you currently need...    Turning from your back to your side while in flat bed without using bedrails? 2  -SANCHEZ (r) NM (t) SANCHEZ (c) 2  -SANCHEZ     Moving from lying on back to sitting on the side of a flat bed without bedrails? 2  -SANCHEZ (r) NM (t) SANCHEZ (c) 2  -SANCHEZ     Moving to and from a bed to a chair (including a wheelchair)? 1  -SANCHEZ (r) NM (t) SANCHEZ (c) 1  -SANCHEZ     Standing up from a chair using your arms (e.g., wheelchair, bedside chair)? 1  -SANCHEZ (r) NM (t) SANCHEZ (c) 1  -SANCHEZ     Climbing 3-5 steps with a railing? 1  -SANCHEZ (r) NM (t) SANCHEZ (c) 1  -SANCHEZ     To walk in hospital room? 1  -SANCHEZ (r) NM (t) SANCHEZ (c) 1  -SANCHEZ     AM-PAC 6 Clicks Score (PT) 8  -SANCHEZ (r) NM (t) 8  -SANCHEZ      Highest Level of Mobility Goal 3 --> Sit at edge of bed  -SANCHEZ (r) NM (t) 3 --> Sit at edge of bed  -SANCHEZ        Functional Assessment    Outcome Measure Options -- AM-PAC 6 Clicks Basic Mobility (PT)  -SANHCEZ               User Key  (r) = Recorded By, (t) = Taken By, (c) = Cosigned By      Initials Name Provider Type    SANCHEZ Elliot Reagan, PT Physical Therapist    NM Isreal Wiggins, PT Student PT Student                     Time Calculation:    PT Charges       Row Name 04/04/24 1039             Time Calculation    PT Received On 04/04/24 (P)   -         Timed Charges    68063 - PT Therapeutic Exercise Minutes 14 (P)   -      17390 - PT Therapeutic Activity Minutes 12 (P)   -         Total Minutes    Timed Charges Total Minutes 26 (P)   -       Total Minutes 26 (P)   -                User Key  (r) = Recorded By, (t) = Taken By, (c) = Cosigned By      Initials Name Provider Type     Homar Rajput, PT Student PT Student                  Therapy Charges for [facility]       Code Description Service Date Service Provider Modifiers Qty    62207024086  PT THER PROC EA 15 MIN 4/4/2024 Homar Rajput, PT Student GP 1    38159085792  PT THERAPEUTIC ACT EA 15 MIN 4/4/2024 Homar Rajput, PT Student GP 1            PT G-Codes  Outcome Measure Options: AM-PAC 6 Clicks Basic Mobility (PT)  AM-PAC 6 Clicks Score (PT): 11  AM-PAC 6 Clicks Score (OT): 10    Homar Rajput PT Student  4/4/2024

## 2024-04-04 NOTE — PROGRESS NOTES
"Nutrition Services    Patient Name: Davonte Marshall  YOB: 1955  MRN: [id number]  Admission date: 3/27/2024      CLINICAL NUTRITION ASSESSMENT      Reason for Assessment  Follow Up     H&P:  Past Medical History:   Diagnosis Date    Anemia     Anxiety     Arthritis     Benign essential hypertension     Chronic bronchitis     Depression     Enlarged prostate     Floaters in visual field     Gall stones     Generalized weakness     Head injury     Hemorrhoids     Hyperlipidemia     Leg pain     Numbness in both hands 7/6/2015    Seizures     last \"quite a while ago\"    Stroke 11/2020    left sided weakness        Current Problems:   Active Hospital Problems    Diagnosis     **Acute metabolic encephalopathy         Nutrition/Diet History         Narrative   Pt resting at my visit. Family at bedside, no issues reported. Tube feedings running as ordered. BM today. Speech therapy recommends alternative feeding.      Anthropometrics        Current Height, Weight Height: 175.3 cm (69.02\")  Weight: 97 kg (213 lb 13.5 oz)   Current BMI Body mass index is 31.56 kg/m².   BMI Classification Obese Class I   % %    Adjusted Body Weight (ABW)    Weight Hx  Wt Readings from Last 30 Encounters:   03/29/24 0551 97 kg (213 lb 13.5 oz)   03/27/24 0931 89 kg (196 lb 3.4 oz)   03/27/24 0842 89 kg (196 lb 3.4 oz)   03/27/24 0347 92.1 kg (203 lb 0.7 oz)   03/16/23 1526 103 kg (227 lb)   02/28/22 1429 97.8 kg (215 lb 8 oz)   09/21/21 1059 97.4 kg (214 lb 12.8 oz)   07/21/21 0911 96.2 kg (212 lb)   07/12/21 0611 96.3 kg (212 lb 4.9 oz)   07/06/21 0948 94.3 kg (208 lb)   06/30/21 1555 94.8 kg (209 lb)   06/15/21 1312 96.5 kg (212 lb 12.8 oz)   01/28/21 0000 95.3 kg (210 lb)   12/30/20 0000 94.5 kg (208 lb 6 oz)   10/29/20 0000 98.6 kg (217 lb 6 oz)   10/09/20 0000 97.5 kg (215 lb)   07/09/20 0000 103 kg (226 lb)   01/09/20 0000 98 kg (216 lb)   10/07/19 0000 96.6 kg (213 lb)   09/06/19 0000 94.8 kg (209 lb)          Wt " "Change Observation 5% gain throughout admission      Estimated/Assessed Needs  Estimated Needs based on: Ideal Body Weight       Energy Requirements 25-30 kcal/kg   EST Needs (kcal/day) 6351-8780 kcal/day        Protein Requirements 1.0-1.2 g/kg   EST Daily Needs (g/day) 71-85 g/day        Fluid Requirements 1 ml/kcal    Estimated Needs (mL/day) 2900-4262 ml/day      Labs/Medications         Pertinent Labs Reviewed.   Results from last 7 days   Lab Units 04/04/24  0506 04/03/24  0010 04/03/24  0004 04/02/24  0440   SODIUM mmol/L 144 145  --  146*   SODIUM, ARTERIAL mmol/L  --   --  144.0  --    POTASSIUM mmol/L 3.4* 3.8  --  3.4*   CHLORIDE mmol/L 107 105  --  107   CO2 mmol/L 24.6 24.0  --  24.4   BUN mg/dL 46* 41*  --  36*   CREATININE mg/dL 1.18 1.19  --  1.01   CALCIUM mg/dL 8.7 9.0  --  9.0   BILIRUBIN mg/dL 0.6 0.6  --  0.5   ALK PHOS U/L 78 83  --  82   ALT (SGPT) U/L 118* 82*  --  77*   AST (SGOT) U/L 55* 55*  --  65*   GLUCOSE mg/dL 116* 132*  --  140*   GLUCOSE, ARTERIAL mg/dL  --   --  129*  --      Results from last 7 days   Lab Units 04/04/24  0506 04/03/24  0010 04/02/24  0440   MAGNESIUM mg/dL 2.1 2.0 1.8   PHOSPHORUS mg/dL 3.3 4.7* 3.4   HEMOGLOBIN g/dL 13.0 14.7 13.9   HEMATOCRIT % 41.0 44.0 42.1     COVID19   Date Value Ref Range Status   03/27/2024 Not Detected Not Detected - Ref. Range Final     No results found for: \"HGBA1C\"      Pertinent Medications Reviewed.     Malnutrition Severity Assessment              Nutrition Diagnosis         Nutrition Dx Problem 1 Swallowing difficulty related to  dysphagia  as evidenced by  need for NPO/tube feedings      Nutrition Intervention           Current Nutrition Orders & Evaluation of Intake       Current PO Diet NPO Diet NPO Type: Strict NPO   Supplement Orders Placed This Encounter      Diet, Tube Feeding Tube Feeding Formula: Fibersource HN; Tube Feeding Type: Continuous; Continuous Tube Feeding Start Rate (mL/hr): 25; Then Advance Rate By (mL/hr): 25; " Every __ Hours: 4; To Goal Rate of (mL/hr): 85      Feeding Tube Insertion - Cortrak System           Nutrition Intervention/Prescription        Fibersource at 85 ml/hr over 22 hours. 65 ml water flush q1hr.  This provides 2244 kcal/day, 101g protein/day, and 1515 ml water (3075 ml with water flush) (sodium still on upper end at 144)         Medical Nutrition Therapy/Nutrition Education          Learner     Readiness Family  Acceptance     Method     Response Explanation  Verbalizes understanding     Monitor/Evaluation        Monitor Pertinent labs, EN delivery/tolerance, Swallow function     Nutrition Discharge Plan         To be determined     Electronically signed by:  Shannan Hoff RD  04/04/24 08:46 EDT

## 2024-04-04 NOTE — PLAN OF CARE
Goal Outcome Evaluation:           Progress: improving  Outcome Evaluation: Patient is on 3 L nasal cannula, refused to wear BIPAP last night. Patient able to produce good loose cough and perform efficiently on aerobika PEP therapy. Pateint appears more alert and comfortable than 3 days prior.

## 2024-04-04 NOTE — PROGRESS NOTES
[facility]  [facility] Tele[facility]urology Consult Services    Routine Consult Follow-Up    Patient Name:   Davonte Marshall  YOB: 1955  Identification Number:   MRN - [id number]  Date of Service:   04/04/2024 07:43:59    Diagnosis        G40.901 - Nonintractable epilepsy with status epilepticus, unspecified epilepsy type (HCC)        I63.89 - Cerebrovascular accident (CVA) due to other mechanism ([facility])    Impression  68 y.o. male PMH hypertension, chronic pain on high dose of morphine, remote stroke, seizures, essential tremor was brought to care on 3/27/2024 for altered mental status, minimally responsive, he apparently had been minimally responsive since the morning of 3/26/204. When the EMS has reached home patient was noted to be saturating at 70% on room air and very minimally responsive and has been brought into the ED. Patient was very minimally responsive in the ED, was placed on BiPAP. He went into respiratory distress after Narcan administration and required intubation. Admitted with sepsis from PNA, cardiogenic shock. UDS + benzo and barbiturates (primidone likely cause of + barbiturates as metabolized to phenobarbital). Per nursing staff, no obvious seizure like semiology has been noted during his inpatient course in the hospital. Neurology reconsulted 4/4/24 for small right frontal stroke on MRI brain. Doubt Parkinson's given medical history and essential tremor. Recommend:    - DAPT  - MRA head/neck  - High intensity statin  - LDL<70, HbA1c<7  - Continue home primidone  - Seizure precautions  - Delirium precautions  - Toxic/metabolic w/up and supportive care per primary team  - PT/OT/rehab  - Outpatient f/up with neurology in 4 weeks  - Neurology will follow    Our recommendations are outlined below    Diagnostic Studies :  Holter/Loop Recorder as outpatient with cardiology follow up    Antithrombotic Medication :  Aspirin 81 mg PO dailyClopidogrel 75 mg dailyStatins for LDL goal  "less than 70    Nursing Recommendations :  IV Fluids, avoid dextrose containing fluids, Maintain euglycemiaNeuro checks q4 hrs x 24 hrs and then per shiftHead of bed 30 degreesContinue with Telemetry    Consultations :  Recommend Speech therapy if failed dysphagia screenPhysical therapy/Occupational therapyInpatient rehab if recommended by physical/occupational therapy    Disposition :  Neurology will followOutpatient Neurology follow up in 1-3 weeks    Subjective  Neurology reconsulted for Parkinsonism sx, MRI brain + stroke.    Hospital Course  68 y.o. male was brought to care on 3/27/2024 for altered mental status, minimally responsive, he apparently had been minimally responsive since the morning of 3/26/204. When the EMS has reached home patient was noted to be saturating at 70% on room air and very minimally responsive and has been brought into the ED. Patient was very minimally responsive in the ED, was placed on BiPAP. He went into respiratory distress after Narcan administration and required intubation. Per nursing staff, no obvious seizure like semiology has been noted during his inpatient course in the hospital. Per his wife at bedside, he was being treated with Primidone as an outpatient. His regular neurologist also stopped Phenytoin in the past since the patient has a history of stroke. Some interim agitation after extubation. He was on BiPAP whenever I had seen him yesterday.  No changes today, wife notes that he is somewhat confused and difficult to understand.    H&P: \"68-year-old male with hypertension, chronic pain on high dose of morphine who presented after having worsening lethargy and mental status apparently was found to still have food in his mouth from the day before, is midline responsive, prior to the CT department had hypoxia with sats in the 70s initially requiring BiPAP, had aspiration episode and required intubation for airway distress. Treating for sepsis with multifocal pneumonia, " "possible cardiogenic shock, aspiration, hypoxemic hypercapnic respiratory failure, polypharmacy and oversedation and question positive benzodiazepines and barbiturates on tox screen that he is not prescribed. Pulmonology cards and neuro assisting. Extubated to bipap and slowly weaning. SLP eval with aspiration, still requiring TF. EEG with seizure activity, question withdrawal from home primidone with poor adherence to 3 times daily dosing at home. Using IV Keppra for now. Slowly resuming home medications at reduced dose\" Admitted with sepsis from PNA, cardiogenic shock.    Imaging  HEAD CT: No acute findings  EEG:  Abnormal EEG because of:  1. Frequent high-voltage epileptiform discharges which are generalized and synchronous consistent with primary generalized type epilepsy.  2. Slow background activity admixed with slower waves compatible with moderate to marked diffuse encephalopathy.    MRI brain w/ and w/o cont 4/3/24:  1. 6 mm focus of restricted diffusion in the right frontal cortex  compatible with an acute infarct.  2. Atrophy with chronic small vessel ischemic disease in the white  matter. No acute hemorrhage.  3. No masses or pathologic contrast enhancement.    TTE:  Extremely technically difficult study with very limited views.   The subcostal view is the only one where we can see myocardium and it looks like ejection fraction is greater than 50%. Cannot rule out regional wall motion abnormalities.   No obvious significant valvular disease by Doppler. The valves are not well-visualized.   Left ventricular diastolic function was indeterminate.      Labs  UDS positive benzodiazepines and barbiturates not prescribed (Primidone may have triggered positive barbiturate      Examination  BP(125/72), Pulse(79), Temp(98.2), Resp(18),  1A: Level of Consciousness - Alert; keenly responsive + 0  1B: Ask Month and Age - Both Questions Right + 0  1C: Blink Eyes & Squeeze Hands - Performs Both Tasks + 0  2: Test " Horizontal Extraocular Movements - Normal + 0  3: Test Visual Fields - No Visual Loss + 0  4: Test Facial Palsy (Use Grimace if Obtunded) - Normal symmetry + 0  5A: Test Left Arm Motor Drift - No Drift for 10 Seconds + 0  5B: Test Right Arm Motor Drift - No Drift for 10 Seconds + 0  6A: Test Left Leg Motor Drift - No Drift for 5 Seconds + 0  6B: Test Right Leg Motor Drift - No Drift for 5 Seconds + 0  7: Test Limb Ataxia (FNF/Heel-Shin) - No Ataxia + 0  8: Test Sensation - Normal; No sensory loss + 0  9: Test Language/Aphasia - Normal; No aphasia + 0  10: Test Dysarthria - Normal + 0  11: Test Extinction/Inattention - No abnormality + 0    NIHSS Score: 0  NIHSS Free Text : AAOx April 6, 2025, [facility], name          Patient/Family was informed the Neurology Consult would happen via TeleHealth consult by way of interactive audio and video telecommunications and consented to receiving care in this manner.    Telehealth Neurology consultation was provided. I spent minutes providing telehealth care. This includes time spent for face to face visit via telemedicine, review of medical records, imaging studies and discussion of findings with providers, the patient and/or family.      Dr Vianey Moses      [facility]  For Inpatient follow-up with [facility] physician please call [facility] [phone]. This is not an outpatient service. Post hospital discharge, please contact hospital directly.    Please do not communicate with [facility] physicians via secure chat. If you have any questions, Please contact [facility].  Please call or reconsult our service if there are any clinical or diagnostic changes.

## 2024-04-04 NOTE — THERAPY TREATMENT NOTE
"Acute Care - Speech Language Pathology   Swallow Treatment Note [facility]     Patient Name: Davonte Marshall  : 1955  MRN: [id number]  Today's Date: 2024               Admit Date: 3/27/2024    Visit Dx:     ICD-10-CM ICD-9-CM   1. Acute respiratory failure with hypoxia  J96.01 518.81   2. Metabolic encephalopathy  G93.41 348.31   3. DIONNA (acute kidney injury)  N17.9 584.9   4. NSTEMI (non-ST elevated myocardial infarction)  I21.4 410.70   5. Oropharyngeal dysphagia  R13.12 787.22   6. Impaired mobility and ADLs  Z74.09 V49.89    Z78.9    7. Difficulty in walking  R26.2 719.7     Patient Active Problem List   Diagnosis    Anemia    Anxiety    Arthritis    Back pain    Chronic bronchitis    Complication of surgical procedure    Degeneration of lumbosacral intervertebral disc    Depression    Encounter for long-term (current) use of insulin    Essential tremor    Gall stones    Head injury    Hemorrhoids    Herniation of intervertebral disc    Hyperlipidemia    Leg pain    Neck pain    Renal insufficiency    Scoliosis    Seizure disorder    Smokes 1.5 packs of cigarettes per day    Vitamin D deficiency disease    Gross hematuria    BPH without obstruction/lower urinary tract symptoms    Urge incontinence of urine    Benign prostatic hyperplasia with urinary hesitancy    Sequelae, post-stroke    S/P TURP    Postoperative anemia    Postoperative hypoxia    Essential hypertension    Opioid dependence    Cervical dystonia    Acute metabolic encephalopathy     Past Medical History:   Diagnosis Date    Anemia     Anxiety     Arthritis     Benign essential hypertension     Chronic bronchitis     Depression     Enlarged prostate     Floaters in visual field     Gall stones     Generalized weakness     Head injury     Hemorrhoids     Hyperlipidemia     Leg pain     Numbness in both hands 2015    Seizures     last \"quite a while ago\"    Stroke 2020    left sided weakness     Past Surgical History:   Procedure " Laterality Date    APPENDECTOMY      BACK SURGERY      4    CHOLECYSTECTOMY      CYSTOSCOPY Bilateral     7/12/21    CYSTOSCOPY TRANSURETHRAL RESECTION OF PROSTATE N/A 7/12/2021    Procedure: CYSTOSCOPY TRANSURETHRAL RESECTION OF PROSTATE;  Surgeon: Penelope Fox MD;  Location: [facility] MAIN OR;  Service: Urology;  Laterality: N/A;    HAND SURGERY Bilateral     thumb    LYMPH NODE DISSECTION      jaw line    TURP / TRANSURETHRAL INCISION / DRAINAGE PROSTATE  07/12/2021     SPEECH PATHOLOGY DYSPHAGIA TREATMENT     Subjective/Behavioral Observations: Alert and cooperative, NG in place.  On entry patient lying back in bed with cup of ice chips, laryngeal wetness noted.        Day/time of Treatment: 4/4/2024        Current Diet: N.p.o. with coretrak.        Current Strategies: N/A        Treatment received: Dysphagia therapy to address swallow function through exercises and education of strategies.        Results of treatment: At beginning of session patient encouraged to cough and produce hard swallow.  Patient educated for oral motor exercise program, exercises completed addressing tongue base strength, laryngeal elevation and closure.  Patient requiring moderate cueing for exercises.  Patient educated for results of modified barium swallow study.        Progress toward goals: Adequate        Barriers to Achieving goals: Medical status        Plan of care:/changes in plan: Continue per current plan.  Recommend utilizing swab with water to freshen oral cavity.  Hold ice chips.  Patient will likely require alternative feeding method for participation in rehab.                                                                                   Plan of Care Reviewed With: patient, spouse          EDUCATION  The patient has been educated in the following areas:   Dysphagia (Swallowing Impairment) NPO rationale.              Time Calculation:    Time Calculation- SLP       Row Name 04/04/24 1257             Time  Calculation- SLP    SLP Stop Time 1100  -TB      SLP Received On 04/04/24  -TB         Untimed Charges    61279-GT Treatment Swallow Minutes 45  -TB         Total Minutes    Untimed Charges Total Minutes 45  -TB       Total Minutes 45  -TB                User Key  (r) = Recorded By, (t) = Taken By, (c) = Cosigned By      Initials Name Provider Type    Floridalma Shabazz SLP Speech and Language Pathologist                    Therapy Charges for Today       Code Description Service Date Service Provider Modifiers Qty    [id number]  ST TREATMENT SWALLOW 3 4/4/2024 Floridalma Silva SLP GN 1                 PATRICA Oropeza  4/4/2024

## 2024-04-04 NOTE — PROGRESS NOTES
Respiratory Therapist Broncho-Pulmonary Hygiene Progress Note      Patient Name:  Davonte Marshall  YOB: 1955    Davonte Marshall meets the qualification for Level 2 of the Bronco-Pulmonary Hygiene Protocol. This was based on my daily patient assessment and includes review of chest x-ray results, cough ability and quality, oxygenation, secretions or risk for secretion development and patient mobility.     Broncho-Pulmonary Hygiene Assessment:    Level of Movement: Actively changing positions without assistance  Alert/ oriented/ cooperative    Breath Sounds: Diminished and/or coarse rhonchi    Cough: Strong, effective    Chest X-Ray: Possible signs of consolidation and/or atelectasis or clear.     Sputum Productions: None or small amount of thin or watery secretions with effective cough    History and Physical: None    SpO2 to Oxygen Need: greater than 92% on room air or  less than 3L nasal canula    Current SpO2 is: 97 on 1.5 L nasal cannula    Based on this information, I have completed the following interventions: Teach/Instruct patient on cough and deep breathe and Aerobika with bronchodialtor medication or TID    Patient able to cough effectively and use flutter    Electronically signed by Monique Sampson RRT, 04/04/24, 6:26 AM EDT.

## 2024-04-04 NOTE — PROGRESS NOTES
[facility]   Hospitalist Progress Note  Date: 2024  Patient Name: Davonte Marshall  : 1955  MRN: [id number]  Date of admission: 3/27/2024      Subjective   Subjective     Chief Complaint: Follow up for somnolence    Summary: 68-year-old male with hypertension, chronic pain on high dose of morphine who presented after having worsening lethargy and mental status apparently was found to still have food in his mouth from the day before, is midline responsive, prior to the CT department had hypoxia with sats in the 70s initially requiring BiPAP, had aspiration episode and required intubation for airway distress.  Treating for sepsis with multifocal pneumonia, possible cardiogenic shock, aspiration, hypoxemic hypercapnic respiratory failure, polypharmacy and oversedation and question positive benzodiazepines and barbiturates on tox screen that he is not prescribed.  Pulmonology cards and neuro assisting.  Extubated to bipap and slowly weaning.  Keppra discontinued and back on primidone.  Failed MBS badly, Cortrak in place.  Had persistent fevers, white count; CT chest showed left lower lobe collapse with bronchopneumonia.  CT abdomen pelvis no acute process      Interval Followup:   Overnight, MRI of the brain reported acute infarct involving right frontal cortex.  Not a candidate for thrombolytic  Seen by teleneurology; recommended DAPT  MRA head and neck pending  Seen in PCU.  Remains alert and conversant.   Denies worsening weakness.no issues with speech.  Tolerating tube feeds.  Complaining of increased neck and back pain.  No muscle spasms.       Review of Systems  Cardiovascular:  No Chest Pain, No palpitatinos  Respiratory:  No worsening Dyspnea, No productive cough  Gastrointestinal:  No abdominal pain, no vomiting, No diarrhea      Objective   Objective     Vitals:   Temp:  [97.3 °F (36.3 °C)-99 °F (37.2 °C)] 98.6 °F (37 °C)  Heart Rate:  [79-91] 91  Resp:  [18] 18  BP: (108-174)/(58-81)  150/75  Flow (L/min):  [1.5-3] 1.5  Physical Exam    Constitutional: Chronically ill-appearing male, conversant, NAD   Eyes: Pupils equal and reactive, no conjunctival injections   Neck: Shoulder weakness, neck flexed difficulty keeping it upright   HENT: NCAT, Cortrak tube   Respiratory: nonlabored respirations    Cardiovascular: RRR, trace pedal edema   Gastrointestinal: Soft, nontender   Neurologic: Alert and oriented x3, CN grossly intact, 3-5 muscle strength upper extremities, speech clear   Skin: Extremities warm, no rashes    Result Review    Result Review:  I have personally reviewed the following over the last 24 hours (07:00 to 07:00) and agree with the following findings  [x]  Laboratory  CBC          4/2/2024    04:40 4/3/2024    00:10 4/4/2024    05:06   CBC   WBC 14.02  19.15  14.79    RBC 4.63  4.91  4.41    Hemoglobin 13.9  14.7  13.0    Hematocrit 42.1  44.0  41.0    MCV 90.9  89.6  93.0    MCH 30.0  29.9  29.5    MCHC 33.0  33.4  31.7    RDW 15.0  15.9  15.5    Platelets 267  396  309      BMP          4/2/2024    04:40 4/3/2024    00:04 4/3/2024    00:10 4/4/2024    05:06   BMP   BUN 36   41  46    Creatinine 1.01   1.19  1.18    Sodium 146  144.0  145  144    Potassium 3.4   3.8  3.4    Chloride 107   105  107    CO2 24.4   24.0  24.6    Calcium 9.0   9.0  8.7      []  Microbiology  []  Radiology   [x]  EKG/Telemetry monitor personally reviewed: NSR/sinus tachycardia  [x]  Cardiology/Vascular   TTE  Left Ventricle Left ventricle not well visualized. Left ventricular diastolic function was indeterminate.   Right Ventricle Right ventricle not well visualized. Normal right ventricular cavity size noted.   Left Atrium Left atrium not well visualized. Normal left atrial cavity size noted.   Right Atrium Right atrium not well visualized.   Aortic Valve The aortic valve is not well visualized. No significant aortic valve regurgitation is present. No hemodynamically significant aortic valve stenosis is  present.   Mitral Valve Mitral valve is not well visualized. No significant mitral valve regurgitation is present. No significant mitral valve stenosis is present.   Tricuspid Valve Tricuspid valve not well visualized. No significant tricuspid valve regurgitation or significant stenosis present.   Pulmonic Valve The pulmonic valve is not well visualized. There is no significant pulmonic valve regurgitation present. There is no pulmonic valve stenosis present.   Pericardium There is no evidence of pericardial effusion. .          []  Pathology  []  Old records  [x]  Other:    Intake/Output Summary (Last 24 hours) at 4/4/2024 1634  Last data filed at 4/4/2024 1621  Gross per 24 hour   Intake 30 ml   Output 3100 ml   Net -3070 ml         Assessment & Plan   Assessment / Plan     Assessment/Plan:  Acute infarct involving right frontal cortex  Aspiration pneumonia with Klebsiella, Streptococcus, MSSA and Moraxella  Acute hypoxemic and hypercapnic respiratory failure requiring mechanical intervention  Acute metabolic encephalopathy in setting of polypharmacy and hypercapnia - improving  Hx of seizures and Essential tremor - on primidone  Hypernatremia  Hypokalemia  Transaminitis  NSTEMI, suspect type II from demand/sepsis  Lactic acidosis, POA.  Clinically significant  Hx of Hyperlipidemia  Hx of Depression/anxiety on outpatient Cymbalta  Hx of Chronic low back pain 2/2 lumbosacral degenerative disc disease  Hx of anterocollis and cervical dystonia  Hx of left corona radiata stroke 2020 with no residual deficits  DIONNA, secondary to sepsis/shock. Resolved  Septic shock 2/2 pneumonia. Resolved        Transfer to PCU.  Continue neurochecks every 4 hour  Appreciate teleneurology recommendations  DAPT recommended which he is already on for cardiac issues  MRA head and neck pending  Had TTE last week; will not repeat  Statin on hold due to transaminitis.  Obtain lipid panel  Continue primidone 250 mg three times daily. Seizure  precaution  Repeat blood cultures no growth.  Fever trend coming down.  MRSA DNA PCR negative.  White count improving.  Stop IV Vancomycin.  Continue Zosyn.  Continue antipyretics every 6 hours as needed  Appreciate pulmonology recommendations  Continue scheduled nebulizers  proBNP increased to 5,186.  Creatinine 1.18.  Continue IV Lasix 40 mg two times daily.  Continue De Los Santos catheter, strict I/Os, trend renal function and electrolytes  Continue prednisone 40 mg daily. Day 1  Wean oxygen, SpO2 goal >90%  Cardiology following peripherally.  Medical management for now with DAPT, statin, BB. Cardiac catheterization recommended once more stable  Blood pressure reasonable.  Continue amlodipine 5 mg daily and metoprolol tartrate 12.5 mg q12 hours  Adjust Norco to every 4 hour prn   SLP following; not cleared for diet.  Continue core track/tube feeds.  Per dietitian  Discussed option of feeding tube if unable to be cleared for oral intake which patient states he would want if needed.   VTE ppx: Lovenox 40 mg daily  CBC, CMP in AM  Continue PT/OT.  Will need rehab    Discussed plan with RN.  Discussed with wife at bedside      DVT prophylaxis:  No DVT prophylaxis order currently exists.      CODE STATUS:   Level Of Support Discussed With: Health Care Surrogate  Code Status (Patient has no pulse and is not breathing): CPR (Attempt to Resuscitate)  Medical Interventions (Patient has pulse or is breathing): Full Support      I spent greater than 50 minutes minutes of time for evaluation and management of this patient including review of chart, labs pertinent imaging available as well as medical decision making and discussion with physicians, staff and patient/family.    Electronically signed by Willy Tena DO, 04/04/24, 4:44 PM EDT.

## 2024-04-04 NOTE — SIGNIFICANT NOTE
Radiology called with results from MRI which showed a 6 mm acute right frontal cortex infarct.  Patient on appropriate medications, PT and OT ordered.  Neurology following

## 2024-04-05 ENCOUNTER — APPOINTMENT (OUTPATIENT)
Dept: GENERAL RADIOLOGY | Facility: HOSPITAL | Age: 69
DRG: 871 | End: 2024-04-05
Payer: MEDICARE

## 2024-04-05 LAB
ALBUMIN SERPL-MCNC: 3.3 G/DL (ref 3.5–5.2)
ALBUMIN/GLOB SERPL: 1.1 G/DL
ALP SERPL-CCNC: 74 U/L (ref 39–117)
ALT SERPL W P-5'-P-CCNC: 114 U/L (ref 1–41)
ANION GAP SERPL CALCULATED.3IONS-SCNC: 13.4 MMOL/L (ref 5–15)
AST SERPL-CCNC: 62 U/L (ref 1–40)
BASOPHILS # BLD AUTO: 0.07 10*3/MM3 (ref 0–0.2)
BASOPHILS NFR BLD AUTO: 0.6 % (ref 0–1.5)
BILIRUB SERPL-MCNC: 0.5 MG/DL (ref 0–1.2)
BUN SERPL-MCNC: 40 MG/DL (ref 8–23)
BUN/CREAT SERPL: 41.7 (ref 7–25)
CALCIUM SPEC-SCNC: 8.6 MG/DL (ref 8.6–10.5)
CHLORIDE SERPL-SCNC: 103 MMOL/L (ref 98–107)
CO2 SERPL-SCNC: 24.6 MMOL/L (ref 22–29)
CREAT SERPL-MCNC: 0.96 MG/DL (ref 0.76–1.27)
DEPRECATED RDW RBC AUTO: 49.5 FL (ref 37–54)
EGFRCR SERPLBLD CKD-EPI 2021: 87.2 ML/MIN/1.73
EOSINOPHIL # BLD AUTO: 0.3 10*3/MM3 (ref 0–0.4)
EOSINOPHIL NFR BLD AUTO: 2.5 % (ref 0.3–6.2)
ERYTHROCYTE [DISTWIDTH] IN BLOOD BY AUTOMATED COUNT: 15.2 % (ref 12.3–15.4)
GLOBULIN UR ELPH-MCNC: 2.9 GM/DL
GLUCOSE SERPL-MCNC: 97 MG/DL (ref 65–99)
HCT VFR BLD AUTO: 37.3 % (ref 37.5–51)
HGB BLD-MCNC: 12.1 G/DL (ref 13–17.7)
IMM GRANULOCYTES # BLD AUTO: 0.07 10*3/MM3 (ref 0–0.05)
IMM GRANULOCYTES NFR BLD AUTO: 0.6 % (ref 0–0.5)
LYMPHOCYTES # BLD AUTO: 2.42 10*3/MM3 (ref 0.7–3.1)
LYMPHOCYTES NFR BLD AUTO: 19.8 % (ref 19.6–45.3)
MAGNESIUM SERPL-MCNC: 2.2 MG/DL (ref 1.6–2.4)
MCH RBC QN AUTO: 29.8 PG (ref 26.6–33)
MCHC RBC AUTO-ENTMCNC: 32.4 G/DL (ref 31.5–35.7)
MCV RBC AUTO: 91.9 FL (ref 79–97)
MONOCYTES # BLD AUTO: 0.73 10*3/MM3 (ref 0.1–0.9)
MONOCYTES NFR BLD AUTO: 6 % (ref 5–12)
NEUTROPHILS NFR BLD AUTO: 70.5 % (ref 42.7–76)
NEUTROPHILS NFR BLD AUTO: 8.64 10*3/MM3 (ref 1.7–7)
NRBC BLD AUTO-RTO: 0 /100 WBC (ref 0–0.2)
PHOSPHATE SERPL-MCNC: 3.8 MG/DL (ref 2.5–4.5)
PLATELET # BLD AUTO: 292 10*3/MM3 (ref 140–450)
PMV BLD AUTO: 11 FL (ref 6–12)
POTASSIUM SERPL-SCNC: 3.1 MMOL/L (ref 3.5–5.2)
PROT SERPL-MCNC: 6.2 G/DL (ref 6–8.5)
RBC # BLD AUTO: 4.06 10*6/MM3 (ref 4.14–5.8)
SODIUM SERPL-SCNC: 141 MMOL/L (ref 136–145)
WBC NRBC COR # BLD AUTO: 12.23 10*3/MM3 (ref 3.4–10.8)

## 2024-04-05 PROCEDURE — 94799 UNLISTED PULMONARY SVC/PX: CPT

## 2024-04-05 PROCEDURE — 71045 X-RAY EXAM CHEST 1 VIEW: CPT

## 2024-04-05 PROCEDURE — 63710000001 PREDNISONE PER 1 MG: Performed by: INTERNAL MEDICINE

## 2024-04-05 PROCEDURE — 83735 ASSAY OF MAGNESIUM: CPT | Performed by: INTERNAL MEDICINE

## 2024-04-05 PROCEDURE — 25010000002 HYDROMORPHONE 1 MG/ML SOLUTION: Performed by: STUDENT IN AN ORGANIZED HEALTH CARE EDUCATION/TRAINING PROGRAM

## 2024-04-05 PROCEDURE — 99233 SBSQ HOSP IP/OBS HIGH 50: CPT | Performed by: INTERNAL MEDICINE

## 2024-04-05 PROCEDURE — 80053 COMPREHEN METABOLIC PANEL: CPT | Performed by: INTERNAL MEDICINE

## 2024-04-05 PROCEDURE — G0406 INPT/TELE FOLLOW UP 15: HCPCS | Performed by: PSYCHIATRY & NEUROLOGY

## 2024-04-05 PROCEDURE — 25010000002 PIPERACILLIN SOD-TAZOBACTAM PER 1 G: Performed by: STUDENT IN AN ORGANIZED HEALTH CARE EDUCATION/TRAINING PROGRAM

## 2024-04-05 PROCEDURE — 25010000002 FUROSEMIDE PER 20 MG: Performed by: INTERNAL MEDICINE

## 2024-04-05 PROCEDURE — 85025 COMPLETE CBC W/AUTO DIFF WBC: CPT | Performed by: INTERNAL MEDICINE

## 2024-04-05 PROCEDURE — 94664 DEMO&/EVAL PT USE INHALER: CPT

## 2024-04-05 PROCEDURE — 94761 N-INVAS EAR/PLS OXIMETRY MLT: CPT

## 2024-04-05 PROCEDURE — 84100 ASSAY OF PHOSPHORUS: CPT | Performed by: INTERNAL MEDICINE

## 2024-04-05 PROCEDURE — 74018 RADEX ABDOMEN 1 VIEW: CPT

## 2024-04-05 RX ORDER — GINSENG 100 MG
1 CAPSULE ORAL 2 TIMES DAILY
Status: DISCONTINUED | OUTPATIENT
Start: 2024-04-05 | End: 2024-04-16 | Stop reason: HOSPADM

## 2024-04-05 RX ORDER — POTASSIUM CHLORIDE 1.5 G/1.58G
40 POWDER, FOR SOLUTION ORAL DAILY
Status: DISCONTINUED | OUTPATIENT
Start: 2024-04-05 | End: 2024-04-07

## 2024-04-05 RX ADMIN — BACITRACIN 0.9 G: 500 OINTMENT TOPICAL at 20:52

## 2024-04-05 RX ADMIN — Medication 5 MG: at 20:52

## 2024-04-05 RX ADMIN — PRIMIDONE 250 MG: 250 TABLET ORAL at 17:11

## 2024-04-05 RX ADMIN — BUDESONIDE 0.5 MG: 0.5 INHALANT ORAL at 07:11

## 2024-04-05 RX ADMIN — HYDROXYZINE HYDROCHLORIDE 25 MG: 25 TABLET, FILM COATED ORAL at 20:52

## 2024-04-05 RX ADMIN — POTASSIUM CHLORIDE 40 MEQ: 1.5 POWDER, FOR SOLUTION ORAL at 12:56

## 2024-04-05 RX ADMIN — LIDOCAINE 1 PATCH: 4 PATCH TOPICAL at 08:49

## 2024-04-05 RX ADMIN — PIPERACILLIN SODIUM AND TAZOBACTAM SODIUM 3.38 G: 3; .375 INJECTION, POWDER, LYOPHILIZED, FOR SOLUTION INTRAVENOUS at 23:16

## 2024-04-05 RX ADMIN — BUDESONIDE 0.5 MG: 0.5 INHALANT ORAL at 18:32

## 2024-04-05 RX ADMIN — ASPIRIN 81 MG 81 MG: 81 TABLET ORAL at 12:58

## 2024-04-05 RX ADMIN — ARFORMOTEROL TARTRATE 15 MCG: 15 SOLUTION RESPIRATORY (INHALATION) at 07:11

## 2024-04-05 RX ADMIN — HYDROXYZINE HYDROCHLORIDE 25 MG: 25 TABLET, FILM COATED ORAL at 12:59

## 2024-04-05 RX ADMIN — HYDROCODONE BITARTRATE AND ACETAMINOPHEN 1 TABLET: 10; 325 TABLET ORAL at 12:59

## 2024-04-05 RX ADMIN — CLOPIDOGREL BISULFATE 75 MG: 75 TABLET ORAL at 12:57

## 2024-04-05 RX ADMIN — HYDROCODONE BITARTRATE AND ACETAMINOPHEN 1 TABLET: 10; 325 TABLET ORAL at 20:52

## 2024-04-05 RX ADMIN — FUROSEMIDE 40 MG: 10 INJECTION, SOLUTION INTRAMUSCULAR; INTRAVENOUS at 08:50

## 2024-04-05 RX ADMIN — HYDROCODONE BITARTRATE AND ACETAMINOPHEN 1 TABLET: 10; 325 TABLET ORAL at 17:11

## 2024-04-05 RX ADMIN — ARFORMOTEROL TARTRATE 15 MCG: 15 SOLUTION RESPIRATORY (INHALATION) at 18:32

## 2024-04-05 RX ADMIN — PRIMIDONE 250 MG: 250 TABLET ORAL at 13:01

## 2024-04-05 RX ADMIN — NYSTATIN 500000 UNITS: 100000 SUSPENSION ORAL at 17:11

## 2024-04-05 RX ADMIN — PIPERACILLIN SODIUM AND TAZOBACTAM SODIUM 3.38 G: 3; .375 INJECTION, POWDER, LYOPHILIZED, FOR SOLUTION INTRAVENOUS at 06:18

## 2024-04-05 RX ADMIN — FAMOTIDINE 20 MG: 10 INJECTION INTRAVENOUS at 08:50

## 2024-04-05 RX ADMIN — PIPERACILLIN SODIUM AND TAZOBACTAM SODIUM 3.38 G: 3; .375 INJECTION, POWDER, LYOPHILIZED, FOR SOLUTION INTRAVENOUS at 17:11

## 2024-04-05 RX ADMIN — FUROSEMIDE 40 MG: 10 INJECTION, SOLUTION INTRAMUSCULAR; INTRAVENOUS at 17:11

## 2024-04-05 RX ADMIN — NYSTATIN 500000 UNITS: 100000 SUSPENSION ORAL at 12:55

## 2024-04-05 RX ADMIN — HYDROMORPHONE HYDROCHLORIDE 0.5 MG: 1 INJECTION, SOLUTION INTRAMUSCULAR; INTRAVENOUS; SUBCUTANEOUS at 03:35

## 2024-04-05 RX ADMIN — AMLODIPINE BESYLATE 5 MG: 5 TABLET ORAL at 12:57

## 2024-04-05 RX ADMIN — PRIMIDONE 250 MG: 250 TABLET ORAL at 20:52

## 2024-04-05 RX ADMIN — DOCUSATE SODIUM 50MG AND SENNOSIDES 8.6MG 1 TABLET: 8.6; 5 TABLET, FILM COATED ORAL at 20:53

## 2024-04-05 RX ADMIN — METOPROLOL TARTRATE 12.5 MG: 25 TABLET, FILM COATED ORAL at 20:52

## 2024-04-05 RX ADMIN — PREDNISONE 40 MG: 20 TABLET ORAL at 12:56

## 2024-04-05 RX ADMIN — Medication 10 ML: at 20:52

## 2024-04-05 RX ADMIN — NYSTATIN 500000 UNITS: 100000 SUSPENSION ORAL at 20:53

## 2024-04-05 RX ADMIN — Medication 10 ML: at 08:52

## 2024-04-05 RX ADMIN — METOPROLOL TARTRATE 12.5 MG: 25 TABLET, FILM COATED ORAL at 12:58

## 2024-04-05 NOTE — PROGRESS NOTES
Pulmonary / Critical Care Progress Note      Patient Name: Davonte Marshall  : 1955  MRN: [id number]  Attending:  Willy Tena DO   Date of admission: 3/27/2024    Subjective   Subjective   Follow-up for respiratory failure on ventilator, aspiration pneumonia secondary to Klebsiella, strep pneumo, MSSA and Moraxella     Resting comfortably in bed  CT scan from 4/3/2024 showed persistent left lower lobe collapse with fluid and debris in the left mainstem bronchus      Objective     Vitals:   Vital signs for last 24 hours:  Temp:  [97.7 °F (36.5 °C)-98.6 °F (37 °C)] 97.7 °F (36.5 °C)  Heart Rate:  [73-93] 93  Resp:  [18-22] 18  BP: (117-155)/(65-78) 133/68    Physical Exam   Vital Signs Reviewed   Elderly male  Diminished breath sounds at bases  Scattered rhonchi present  He is alert and oriented    Result Review    Result Review:  I have personally reviewed the results from the time of this admission to 2024 17:31 EDT and agree with these findings:  [x]  Laboratory  [x]  Microbiology  [x]  Radiology  [x]  EKG/Telemetry   [x]  Cardiology/Vascular   []  Pathology  []  Old records  []  Other:  Most notable findings include:       Lab 24  0414 24  0506 24  0010 24  0004 24  0440 24  0635 24  0653 24  0600   WBC 12.23* 14.79* 19.15*  --  14.02* 11.09* 14.07* 18.91*   HEMOGLOBIN 12.1* 13.0 14.7  --  13.9 14.0 12.1* 11.1*   HEMATOCRIT 37.3* 41.0 44.0  --  42.1 42.0 36.6* 33.4*   PLATELETS 292 309 396  --  267 209 168 145   SODIUM 141 144 145  --  146* 148* 145 142   SODIUM, ARTERIAL  --   --   --  144.0  --   --   --   --    POTASSIUM 3.1* 3.4* 3.8  --  3.4* 3.4* 3.2* 3.9   CHLORIDE 103 107 105  --  107 107 104 103   CO2 24.6 24.6 24.0  --  24.4 26.5 28.6 25.5   BUN 40* 46* 41*  --  36* 32* 42* 39*   CREATININE 0.96 1.18 1.19  --  1.01 0.99 1.10 0.97   GLUCOSE 97 116* 132*  --  140* 143* 123* 115*   GLUCOSE, ARTERIAL  --   --   --  129*  --   --   --   --     CALCIUM 8.6 8.7 9.0  --  9.0 8.7 8.7 8.2*   PHOSPHORUS 3.8 3.3 4.7*  --  3.4 2.5 2.6 2.0*   TOTAL PROTEIN 6.2 6.8 7.5  --  6.9 6.9 6.5 6.3   ALBUMIN 3.3* 3.4* 3.8  --  3.6 3.5 3.2* 3.2*   GLOBULIN 2.9 3.4 3.7  --  3.3 3.4 3.3 3.1     Results for orders placed during the hospital encounter of 03/27/24    Adult Transthoracic Echo Limited W/ Cont if Necessary Per Protocol    Interpretation Summary    Extremely technically difficult study with very limited views.    The subcostal view is the only one where we can see myocardium and it looks like ejection fraction is greater than 50%.  Cannot rule out regional wall motion abnormalities.    No obvious significant valvular disease by Doppler.  The valves are not well-visualized.    Left ventricular diastolic function was indeterminate.    BAL culture with Streptococcus pneumonia, Klebsiella and MSSA    Assessment & Plan   Assessment / Plan     Active Hospital Problems:  Active Hospital Problems    Diagnosis     **Acute metabolic encephalopathy      Impression:  Septic shock, resolved  Aspiration pneumonia secondary to Klebsiella, strep pneumo, MSSA and Moraxella  Lactic acidosis, clinically significant  Metabolic acidosis  Acute hypoxemic and hypercapnic respiratory failure require mechanical ventilation  Altered mental status  Toxic/metabolic encephalopathy  NSTEMI  Acute kidney injury secondary to prerenal etiology  Hypomagnesemia  Hypophosphatemia  Hypophosphatemia  Leukocytosis  Seizures/tremors.  On primidone at home     Plan:  CT scan from 4/3/2024 reviewed patient does have significant fluid and debris in the left lower lobe bronchus with complete collapse of the left lower lobe  Will start patient airway clearance to try to see if we can open up this lower lobe  Repeat chest x-ray in the morning  The patient has persistent left lower lobe collapse may need bronchoscopy  Discontinue DuoNebs  Breathing treatments changed to albuterol  Continue LABA  Continue  Pulmicort  Continue antibiotics  Continue chest vest      DVT prophylaxis: Lovenox  No DVT prophylaxis order currently exists.    CODE STATUS:   Level Of Support Discussed With: Health Care Surrogate  Code Status (Patient has no pulse and is not breathing): CPR (Attempt to Resuscitate)  Medical Interventions (Patient has pulse or is breathing): Full Support    Labs, managing, and medications personally reviewed  Discussed with primary and patient  Electronically signed by Shelton Bernal DO, 04/05/24, 5:34 PM EDT.

## 2024-04-05 NOTE — PROGRESS NOTES
[facility]   Hospitalist Progress Note  Date: 2024  Patient Name: Davonte Marshall  : 1955  MRN: [id number]  Date of admission: 3/27/2024      Subjective   Subjective     Chief Complaint: Follow up for somnolence    Summary: 68-year-old male with hypertension, chronic pain on high dose of morphine who presented after having worsening lethargy and mental status apparently was found to still have food in his mouth from the day before, is midline responsive, prior to the CT department had hypoxia with sats in the 70s initially requiring BiPAP, had aspiration episode and required intubation for airway distress.  Treating for sepsis with multifocal pneumonia, possible cardiogenic shock, aspiration, hypoxemic hypercapnic respiratory failure, polypharmacy and oversedation and question positive benzodiazepines and barbiturates on tox screen that he is not prescribed.  Pulmonology cards and neuro assisting.  Extubated to bipap and slowly weaning.  Keppra discontinued and back on primidone.  Failed MBS badly, Cortrak in place.  Had persistent fevers, white count; CT chest showed left lower lobe collapse with bronchopneumonia.  CT abdomen pelvis no acute process      Interval Followup:   Accidentally pulled out Cortrak last night  Last fever 4/3. hemodynamically stable, oxygen requirements down to 1 to 2 L  Remains alert, answering questions and following commands appropriate  Agreeable to have Cortrak replaced  Neck/back pain under better control  Was able to sit at the edge of the bed with PT    Review of Systems  Cardiovascular:  No Chest Pain  Respiratory:  No Dyspnea, No productive cough  Gastrointestinal:  No abdominal pain, no vomiting, No diarrhea      Objective   Objective     Vitals:   Temp:  [97.9 °F (36.6 °C)-98.6 °F (37 °C)] 98.2 °F (36.8 °C)  Heart Rate:  [73-84] 82  Resp:  [18-22] 20  BP: (117-155)/(65-78) 142/78  Flow (L/min):  [1.5] 1.5  Physical Exam    Constitutional: Chronically  ill-appearing male, conversant, NAD   Eyes: Pupils equal and reactive, no conjunctival injections   Neck: Shoulder weakness, neck flexed difficulty keeping it upright   HENT: NCAT, Cortrak tube   Respiratory: nonlabored respirations    Cardiovascular: RRR, trace pedal edema   Gastrointestinal: Soft, nontender   Neurologic: Alert and oriented x3, CN grossly intact, 3-5 muscle strength upper extremities, speech clear   Skin: Extremities warm, no rashes    Result Review    Result Review:  I have personally reviewed the following over the last 24 hours (07:00 to 07:00) and agree with the following findings  [x]  Laboratory  CBC          4/3/2024    00:10 4/4/2024    05:06 4/5/2024    04:14   CBC   WBC 19.15  14.79  12.23    RBC 4.91  4.41  4.06    Hemoglobin 14.7  13.0  12.1    Hematocrit 44.0  41.0  37.3    MCV 89.6  93.0  91.9    MCH 29.9  29.5  29.8    MCHC 33.4  31.7  32.4    RDW 15.9  15.5  15.2    Platelets 396  309  292      BMP          4/3/2024    00:04 4/3/2024    00:10 4/4/2024    05:06 4/5/2024    04:14   BMP   BUN  41  46  40    Creatinine  1.19  1.18  0.96    Sodium 144.0  145  144  141    Potassium  3.8  3.4  3.1    Chloride  105  107  103    CO2  24.0  24.6  24.6    Calcium  9.0  8.7  8.6      [x]  Microbiology  Blood cultures 4/3 NGTD  []  Radiology   [x]  EKG/Telemetry monitor personally reviewed: NSR/sinus tachycardia  []  Cardiology/Vascular   []  Pathology  []  Old records  [x]  Other:    Intake/Output Summary (Last 24 hours) at 4/5/2024 1510  Last data filed at 4/5/2024 1346  Gross per 24 hour   Intake 90 ml   Output 4000 ml   Net -3910 ml         Assessment & Plan   Assessment / Plan     Assessment/Plan:  Acute infarct involving right frontal cortex  Aspiration pneumonia with Klebsiella, Streptococcus, MSSA and Moraxella  Acute hypoxemic and hypercapnic respiratory failure requiring mechanical intervention  Acute metabolic encephalopathy in setting of polypharmacy and hypercapnia - improving  Hx  of seizures and Essential tremor - on primidone  Hypernatremia  Hypokalemia  Transaminitis  NSTEMI, suspect type II from demand/sepsis  Lactic acidosis, POA.  Clinically significant  Hx of Hyperlipidemia  Hx of Depression/anxiety on outpatient Cymbalta  Hx of Chronic low back pain 2/2 lumbosacral degenerative disc disease  Hx of anterocollis and cervical dystonia  Hx of left corona radiata stroke 2020 with no residual deficits  DIONNA, secondary to sepsis/shock. Resolved  Septic shock 2/2 pneumonia. Resolved        Teleneurology recommendations reviewed  MRA head and neck reporting less than 50% left ICA stenosis otherwise no major findings  Continue DAPT  AST/ALT still elevated.  Continue to hold statin  Continue primidone 250 mg three times daily. Seizure precaution  Fever trend coming down.  White count downtrending. Repeat blood cultures no growth.  Continue Zosyn, day 3  Continue antipyretics every 6 hours as needed  Appreciate pulmonology recommendations  Continue scheduled nebulizers  Continue prednisone 40 mg daily. Day 2  Wean oxygen, SpO2 goal >90%  Good response to diuretics.  Creatinine stable.  Continue IV Lasix 40 mg two times daily.  Trend renal function electrolytes with daily BMP  P.o. potassium chloride 40 mEq daily  Discontinue De Los Santos catheter.  Voiding trial  Cardiology following peripherally.  Medical management for now with DAPT, statin, BB. Cardiac catheterization recommended once more stable  Blood pressure reasonable.  Continue amlodipine 5 mg daily and metoprolol tartrate 12.5 mg q12 hours  Continue Norco every 4 hour prn   SLP following; not cleared for diet and likely will need to proceed with PEG in order to go to rehab.  Will engage GI so that he can get this next week.  Cortrak replaced. Continue tube feeds.  Per dietitian  VTE ppx: Lovenox 40 mg daily  CBC, CMP in AM  Continue PT/OT.  Will need rehab    Discussed plan with RN.  Discussed with wife at bedside      DVT prophylaxis:  No DVT  prophylaxis order currently exists.      CODE STATUS:   Level Of Support Discussed With: Health Care Surrogate  Code Status (Patient has no pulse and is not breathing): CPR (Attempt to Resuscitate)  Medical Interventions (Patient has pulse or is breathing): Full Support      I spent greater than 50 minutes minutes of time for evaluation and management of this patient including review of chart, labs pertinent imaging available as well as medical decision making and discussion with physicians, staff and patient/family.    Electronically signed by Willy Tena DO, 04/05/24, 3:10 PM EDT.

## 2024-04-05 NOTE — PROGRESS NOTES
[facility]  [facility] Tele[facility] Consult [facility]    Routine Consult Follow-Up    Patient Name:   Davonte Marshall  YOB: 1955  Identification Number:   MRN - [id number]  Date of Service:   04/05/2024 09:14:00    Diagnosis        G40.901 - Nonintractable epilepsy with status epilepticus, unspecified epilepsy type (HCC)        I63.89 - Cerebrovascular accident (CVA) due to other mechanism ([facility])    Impression  68 y.o. male PMH hypertension, chronic pain on high dose of morphine, remote stroke, seizures, essential tremor was brought to care on 3/27/2024 for altered mental status, minimally responsive, he apparently had been minimally responsive since the morning of 3/26/204. When the EMS has reached home patient was noted to be saturating at 70% on room air and very minimally responsive and has been brought into the ED. Patient was very minimally responsive in the ED, was placed on BiPAP. He went into respiratory distress after Narcan administration and required intubation. Admitted with sepsis from PNA, cardiogenic shock. UDS + benzo and barbiturates (primidone likely cause of + barbiturates as metabolized to phenobarbital). Per nursing staff, no obvious seizure like semiology has been noted during his inpatient course in the hospital. Neurology reconsulted 4/4/24 for small right frontal stroke on MRI brain. Doubt Parkinson's given medical history and essential tremor. Recommend:    - From stroke point of view, DAPT x 21 dyas then Plavix monotherapy (per cards note, should be on DAPT from their point of view)  - High intensity statin  - LDL<70, HbA1c<7  - Continue home primidone  - Seizure precautions  - Delirium precautions  - Toxic/metabolic w/up and supportive care per primary team  - PT/OT/rehab  - Outpatient f/up with neurology in 4 weeks  - Outpatient cardiac monitoring  - Please call with any questions.    Our recommendations are outlined below    Diagnostic Studies :  Holter/Loop  "Recorder as outpatient with cardiology follow up    Antithrombotic Medication :  Aspirin 81 mg PO dailyClopidogrel 75 mg dailyStatins for LDL goal less than 70    Nursing Recommendations :  IV Fluids, avoid dextrose containing fluids, Maintain euglycemiaNeuro checks q4 hrs x 24 hrs and then per shiftHead of bed 30 degreesContinue with Telemetry    Consultations :  Recommend Speech therapy if failed dysphagia screenPhysical therapy/Occupational therapyInpatient rehab if recommended by physical/occupational therapy    Disposition :  No further recommendations  Will signoff please contact for any questions  Outpatient Neurology follow up in 1-3 weeks    Subjective  No acute events overnight.    Hospital Course  68 y.o. male was brought to care on 3/27/2024 for altered mental status, minimally responsive, he apparently had been minimally responsive since the morning of 3/26/204. When the EMS has reached home patient was noted to be saturating at 70% on room air and very minimally responsive and has been brought into the ED. Patient was very minimally responsive in the ED, was placed on BiPAP. He went into respiratory distress after Narcan administration and required intubation. Per nursing staff, no obvious seizure like semiology has been noted during his inpatient course in the hospital. Per his wife at bedside, he was being treated with Primidone as an outpatient. His regular neurologist also stopped Phenytoin in the past since the patient has a history of stroke. Some interim agitation after extubation. He was on BiPAP whenever I had seen him yesterday.  No changes today, wife notes that he is somewhat confused and difficult to understand.    H&P: \"68-year-old male with hypertension, chronic pain on high dose of morphine who presented after having worsening lethargy and mental status apparently was found to still have food in his mouth from the day before, is midline responsive, prior to the CT department had hypoxia " "with sats in the 70s initially requiring BiPAP, had aspiration episode and required intubation for airway distress. Treating for sepsis with multifocal pneumonia, possible cardiogenic shock, aspiration, hypoxemic hypercapnic respiratory failure, polypharmacy and oversedation and question positive benzodiazepines and barbiturates on tox screen that he is not prescribed. Pulmonology cards and neuro assisting. Extubated to bipap and slowly weaning. SLP eval with aspiration, still requiring TF. EEG with seizure activity, question withdrawal from home primidone with poor adherence to 3 times daily dosing at home. Using IV Keppra for now. Slowly resuming home medications at reduced dose\" Admitted with sepsis from PNA, cardiogenic shock.    Imaging  HEAD CT: No acute findings  EEG:  Abnormal EEG because of:  1. Frequent high-voltage epileptiform discharges which are generalized and synchronous consistent with primary generalized type epilepsy.  2. Slow background activity admixed with slower waves compatible with moderate to marked diffuse encephalopathy.    MRI brain w/ and w/o cont 4/3/24:  1. 6 mm focus of restricted diffusion in the right frontal cortex  compatible with an acute infarct.  2. Atrophy with chronic small vessel ischemic disease in the white  matter. No acute hemorrhage.  3. No masses or pathologic contrast enhancement.    TTE:  Extremely technically difficult study with very limited views.   The subcostal view is the only one where we can see myocardium and it looks like ejection fraction is greater than 50%. Cannot rule out regional wall motion abnormalities.   No obvious significant valvular disease by Doppler. The valves are not well-visualized.   Left ventricular diastolic function was indeterminate.    MRA head/neck:  Negative intracranial MRA allowing for some technical artifact on the  reformatted images.  1. Proximal left ICA stenosis measuring less than 50%.  2. Otherwise negative MRA of the " neck.        Labs  UDS positive benzodiazepines and barbiturates not prescribed (Primidone may have triggered positive barbiturate      Examination  BP(117/65), Pulse(77), Temp(97.9), Resp(18),  1A: Level of Consciousness - Alert; keenly responsive + 0  1B: Ask Month and Age - Both Questions Right + 0  1C: Blink Eyes & Squeeze Hands - Performs Both Tasks + 0  2: Test Horizontal Extraocular Movements - Normal + 0  3: Test Visual Fields - No Visual Loss + 0  4: Test Facial Palsy (Use Grimace if Obtunded) - Normal symmetry + 0  5A: Test Left Arm Motor Drift - Drift, but doesn't hit bed + 1  5B: Test Right Arm Motor Drift - Drift, but doesn't hit bed + 1  6A: Test Left Leg Motor Drift - No Drift for 5 Seconds + 0  6B: Test Right Leg Motor Drift - No Drift for 5 Seconds + 0  7: Test Limb Ataxia (FNF/Heel-Shin) - No Ataxia + 0  8: Test Sensation - Normal; No sensory loss + 0  9: Test Language/Aphasia - Normal; No aphasia + 0  10: Test Dysarthria - Normal + 0  11: Test Extinction/Inattention - No abnormality + 0    NIHSS Score: 2  NIHSS Free Text : AAOx April 7, 2024, [facility], name          Patient/Family was informed the Neurology Consult would happen via TeleHealth consult by way of interactive audio and video telecommunications and consented to receiving care in this manner.    Telehealth Neurology consultation was provided. I spent minutes providing telehealth care. This includes time spent for face to face visit via telemedicine, review of medical records, imaging studies and discussion of findings with providers, the patient and/or family.      Dr Vianey Moses      [facility]  For Inpatient follow-up with [facility] physician please call [facility] [phone]. This is not an outpatient service. Post hospital discharge, please contact hospital directly.    Please do not communicate with [facility] physicians via secure chat. If you have any questions, Please contact [facility].  Please call or reconsult  our service if there are any clinical or diagnostic changes.

## 2024-04-05 NOTE — SIGNIFICANT NOTE
" Wound Eval / Progress Noted     [facility]     Patient Name: Davonte Marshall  : 1955  MRN: [id number]  Today's Date: 2024                 Admit Date: 3/27/2024    Visit Dx:    ICD-10-CM ICD-9-CM   1. Acute respiratory failure with hypoxia  J96.01 518.81   2. Metabolic encephalopathy  G93.41 348.31   3. DIONNA (acute kidney injury)  N17.9 584.9   4. NSTEMI (non-ST elevated myocardial infarction)  I21.4 410.70   5. Oropharyngeal dysphagia  R13.12 787.22   6. Impaired mobility and ADLs  Z74.09 V49.89    Z78.9    7. Difficulty in walking  R26.2 719.7         Acute metabolic encephalopathy        Past Medical History:   Diagnosis Date    Anemia     Anxiety     Arthritis     Benign essential hypertension     Chronic bronchitis     Depression     Enlarged prostate     Floaters in visual field     Gall stones     Generalized weakness     Head injury     Hemorrhoids     Hyperlipidemia     Leg pain     Numbness in both hands 2015    Seizures     last \"quite a while ago\"    Stroke 2020    left sided weakness        Past Surgical History:   Procedure Laterality Date    APPENDECTOMY      BACK SURGERY      4    CHOLECYSTECTOMY      CYSTOSCOPY Bilateral     21    CYSTOSCOPY TRANSURETHRAL RESECTION OF PROSTATE N/A 2021    Procedure: CYSTOSCOPY TRANSURETHRAL RESECTION OF PROSTATE;  Surgeon: Penelope Fox MD;  Location: [facility];  Service: Urology;  Laterality: N/A;    HAND SURGERY Bilateral     thumb    LYMPH NODE DISSECTION      jaw line    TURP / TRANSURETHRAL INCISION / DRAINAGE PROSTATE  2021         Physical Assessment:  Wound 24 1200 Right anterior second toe (Active)   Dressing Appearance open to air 24 1200   Closure None 24 1200   Base red;scab 24 1200   Periwound dry;intact 24 1200   Periwound Temperature warm 24 1200   Periwound Skin Turgor soft 24 1200   Edges open 24 1200   Drainage Amount none 24 1200   Care, Wound " cleansed with;sterile normal saline 04/05/24 1200   Dressing Care open to air 04/05/24 1200       Wound 04/04/24 1200 Right anterior third toe (Active)   Dressing Appearance open to air 04/05/24 1200   Closure None 04/05/24 1200   Base dry;red;scab 04/05/24 1200   Periwound dry;intact 04/05/24 1200   Periwound Temperature warm 04/05/24 1200   Periwound Skin Turgor firm;soft 04/05/24 1200   Edges open 04/05/24 1200   Drainage Amount none 04/05/24 1200   Dressing Care open to air 04/05/24 1200       Wound 04/04/24 1200 Left anterior great toe (Active)   Dressing Appearance open to air 04/05/24 1200   Closure None 04/05/24 1200   Base dry;red;scab 04/05/24 1200   Periwound dry;intact 04/05/24 1200   Periwound Temperature warm 04/05/24 1200   Periwound Skin Turgor soft 04/05/24 1200   Edges open 04/05/24 1200   Drainage Amount none 04/05/24 1200   Dressing Care open to air 04/05/24 1200       Wound 04/04/24 1200 Left anterior second toe (Active)   Dressing Appearance open to air 04/05/24 1200   Closure None 04/05/24 1200   Base dry;red;scab 04/05/24 1200   Periwound dry;intact 04/05/24 1200   Periwound Temperature warm 04/05/24 1200   Periwound Skin Turgor soft 04/05/24 1200   Edges open 04/05/24 1200   Drainage Amount none 04/05/24 1200   Dressing Care open to air 04/05/24 1200     Left foot     Right foot    Wound Check / Follow-up: Patient seen today for wound consult. Patient is awake and alert at time of visit. He is agreeable to assessment. Patient's wife is present at bedside.    Cortrak is in place to right nare. Tube feeds not running at time of visit. No evidence of pressure injury from tube at this time.    Scattered reddened, scabbed areas are present to bilateral toes. Patient's wife reports that these areas occurred as a result of friction / trauma when patient was in SICU. No drainage is present. Recommending topical treatment with application of bacitracin ointment.     Blanchable redness is noted to  bilateral gluteal aspects. Recommending skin care / skin protection with application of barrier cream.    Heels intact without discoloration.    Recommending to implement every two hour turns, offload heels, keep patient free from moisture.      Impression: Scattered scabbing to bilateral toes. Blanchable redness to bilateral gluteal aspects.      Short term goals:  Regain skin integrity, skin protection, moisture prevention, pressure reduction, topical treatment, skin care.    Halie Holbrook RN    4/5/2024    17:06 EDT

## 2024-04-06 LAB
ALBUMIN SERPL-MCNC: 3.6 G/DL (ref 3.5–5.2)
ALBUMIN/GLOB SERPL: 1 G/DL
ALP SERPL-CCNC: 103 U/L (ref 39–117)
ALT SERPL W P-5'-P-CCNC: 142 U/L (ref 1–41)
ANION GAP SERPL CALCULATED.3IONS-SCNC: 12.3 MMOL/L (ref 5–15)
AST SERPL-CCNC: 75 U/L (ref 1–40)
BASOPHILS # BLD AUTO: 0.07 10*3/MM3 (ref 0–0.2)
BASOPHILS NFR BLD AUTO: 0.5 % (ref 0–1.5)
BILIRUB SERPL-MCNC: 0.7 MG/DL (ref 0–1.2)
BUN SERPL-MCNC: 40 MG/DL (ref 8–23)
BUN/CREAT SERPL: 36 (ref 7–25)
CALCIUM SPEC-SCNC: 8.9 MG/DL (ref 8.6–10.5)
CHLORIDE SERPL-SCNC: 103 MMOL/L (ref 98–107)
CO2 SERPL-SCNC: 25.7 MMOL/L (ref 22–29)
CREAT SERPL-MCNC: 1.11 MG/DL (ref 0.76–1.27)
DEPRECATED RDW RBC AUTO: 50.2 FL (ref 37–54)
EGFRCR SERPLBLD CKD-EPI 2021: 73.2 ML/MIN/1.73
EOSINOPHIL # BLD AUTO: 0.24 10*3/MM3 (ref 0–0.4)
EOSINOPHIL NFR BLD AUTO: 1.8 % (ref 0.3–6.2)
ERYTHROCYTE [DISTWIDTH] IN BLOOD BY AUTOMATED COUNT: 15.1 % (ref 12.3–15.4)
GLOBULIN UR ELPH-MCNC: 3.7 GM/DL
GLUCOSE BLDC GLUCOMTR-MCNC: 109 MG/DL (ref 70–99)
GLUCOSE BLDC GLUCOMTR-MCNC: 117 MG/DL (ref 70–99)
GLUCOSE SERPL-MCNC: 132 MG/DL (ref 65–99)
HCT VFR BLD AUTO: 40.1 % (ref 37.5–51)
HGB BLD-MCNC: 12.9 G/DL (ref 13–17.7)
IMM GRANULOCYTES # BLD AUTO: 0.06 10*3/MM3 (ref 0–0.05)
IMM GRANULOCYTES NFR BLD AUTO: 0.4 % (ref 0–0.5)
LYMPHOCYTES # BLD AUTO: 2.27 10*3/MM3 (ref 0.7–3.1)
LYMPHOCYTES NFR BLD AUTO: 16.9 % (ref 19.6–45.3)
MAGNESIUM SERPL-MCNC: 2.5 MG/DL (ref 1.6–2.4)
MCH RBC QN AUTO: 29.9 PG (ref 26.6–33)
MCHC RBC AUTO-ENTMCNC: 32.2 G/DL (ref 31.5–35.7)
MCV RBC AUTO: 93 FL (ref 79–97)
MONOCYTES # BLD AUTO: 0.76 10*3/MM3 (ref 0.1–0.9)
MONOCYTES NFR BLD AUTO: 5.6 % (ref 5–12)
NEUTROPHILS NFR BLD AUTO: 10.06 10*3/MM3 (ref 1.7–7)
NEUTROPHILS NFR BLD AUTO: 74.8 % (ref 42.7–76)
NRBC BLD AUTO-RTO: 0 /100 WBC (ref 0–0.2)
PHOSPHATE SERPL-MCNC: 3 MG/DL (ref 2.5–4.5)
PLATELET # BLD AUTO: 342 10*3/MM3 (ref 140–450)
PMV BLD AUTO: 11.1 FL (ref 6–12)
POTASSIUM SERPL-SCNC: 3.2 MMOL/L (ref 3.5–5.2)
PROT SERPL-MCNC: 7.3 G/DL (ref 6–8.5)
RBC # BLD AUTO: 4.31 10*6/MM3 (ref 4.14–5.8)
SODIUM SERPL-SCNC: 141 MMOL/L (ref 136–145)
WBC NRBC COR # BLD AUTO: 13.46 10*3/MM3 (ref 3.4–10.8)

## 2024-04-06 PROCEDURE — 25010000002 ONDANSETRON PER 1 MG: Performed by: INTERNAL MEDICINE

## 2024-04-06 PROCEDURE — 94664 DEMO&/EVAL PT USE INHALER: CPT

## 2024-04-06 PROCEDURE — 85025 COMPLETE CBC W/AUTO DIFF WBC: CPT | Performed by: INTERNAL MEDICINE

## 2024-04-06 PROCEDURE — 99232 SBSQ HOSP IP/OBS MODERATE 35: CPT | Performed by: INTERNAL MEDICINE

## 2024-04-06 PROCEDURE — 82948 REAGENT STRIP/BLOOD GLUCOSE: CPT

## 2024-04-06 PROCEDURE — 83735 ASSAY OF MAGNESIUM: CPT | Performed by: INTERNAL MEDICINE

## 2024-04-06 PROCEDURE — 94799 UNLISTED PULMONARY SVC/PX: CPT

## 2024-04-06 PROCEDURE — 25010000002 FUROSEMIDE PER 20 MG: Performed by: INTERNAL MEDICINE

## 2024-04-06 PROCEDURE — 80053 COMPREHEN METABOLIC PANEL: CPT | Performed by: INTERNAL MEDICINE

## 2024-04-06 PROCEDURE — 94668 MNPJ CHEST WALL SBSQ: CPT

## 2024-04-06 PROCEDURE — 25010000002 PIPERACILLIN SOD-TAZOBACTAM PER 1 G: Performed by: STUDENT IN AN ORGANIZED HEALTH CARE EDUCATION/TRAINING PROGRAM

## 2024-04-06 PROCEDURE — 99233 SBSQ HOSP IP/OBS HIGH 50: CPT | Performed by: INTERNAL MEDICINE

## 2024-04-06 PROCEDURE — 25010000002 AMPICILLIN-SULBACTAM PER 1.5 G: Performed by: INTERNAL MEDICINE

## 2024-04-06 PROCEDURE — 63710000001 PREDNISONE PER 1 MG: Performed by: INTERNAL MEDICINE

## 2024-04-06 PROCEDURE — 84100 ASSAY OF PHOSPHORUS: CPT | Performed by: INTERNAL MEDICINE

## 2024-04-06 RX ORDER — OXYCODONE HYDROCHLORIDE 5 MG/1
10 TABLET ORAL EVERY 4 HOURS
Status: DISCONTINUED | OUTPATIENT
Start: 2024-04-06 | End: 2024-04-12

## 2024-04-06 RX ORDER — OXYCODONE HYDROCHLORIDE 5 MG/1
20 TABLET ORAL EVERY 4 HOURS
Status: DISCONTINUED | OUTPATIENT
Start: 2024-04-06 | End: 2024-04-06

## 2024-04-06 RX ORDER — OXYCODONE HYDROCHLORIDE 5 MG/1
10 TABLET ORAL EVERY 6 HOURS PRN
Status: DISCONTINUED | OUTPATIENT
Start: 2024-04-06 | End: 2024-04-06

## 2024-04-06 RX ORDER — NALOXONE HCL 0.4 MG/ML
1 VIAL (ML) INJECTION ONCE AS NEEDED
Status: DISCONTINUED | OUTPATIENT
Start: 2024-04-06 | End: 2024-04-16 | Stop reason: HOSPADM

## 2024-04-06 RX ORDER — HYDROCODONE BITARTRATE AND ACETAMINOPHEN 10; 325 MG/1; MG/1
1 TABLET ORAL EVERY 4 HOURS
Status: DISCONTINUED | OUTPATIENT
Start: 2024-04-06 | End: 2024-04-06

## 2024-04-06 RX ORDER — FAMOTIDINE 20 MG/1
20 TABLET, FILM COATED ORAL DAILY
Status: DISCONTINUED | OUTPATIENT
Start: 2024-04-07 | End: 2024-04-13

## 2024-04-06 RX ORDER — LIDOCAINE 4 G/G
2 PATCH TOPICAL DAILY
Status: DISCONTINUED | OUTPATIENT
Start: 2024-04-06 | End: 2024-04-16 | Stop reason: HOSPADM

## 2024-04-06 RX ADMIN — BACITRACIN 0.9 G: 500 OINTMENT TOPICAL at 08:46

## 2024-04-06 RX ADMIN — PRIMIDONE 250 MG: 250 TABLET ORAL at 08:46

## 2024-04-06 RX ADMIN — HYDROCODONE BITARTRATE AND ACETAMINOPHEN 1 TABLET: 10; 325 TABLET ORAL at 08:47

## 2024-04-06 RX ADMIN — Medication 10 ML: at 21:07

## 2024-04-06 RX ADMIN — NYSTATIN 500000 UNITS: 100000 SUSPENSION ORAL at 17:46

## 2024-04-06 RX ADMIN — METOPROLOL TARTRATE 12.5 MG: 25 TABLET, FILM COATED ORAL at 21:07

## 2024-04-06 RX ADMIN — PIPERACILLIN SODIUM AND TAZOBACTAM SODIUM 3.38 G: 3; .375 INJECTION, POWDER, LYOPHILIZED, FOR SOLUTION INTRAVENOUS at 06:15

## 2024-04-06 RX ADMIN — HYDROXYZINE HYDROCHLORIDE 25 MG: 25 TABLET, FILM COATED ORAL at 23:13

## 2024-04-06 RX ADMIN — CLOPIDOGREL BISULFATE 75 MG: 75 TABLET ORAL at 08:46

## 2024-04-06 RX ADMIN — HYDROXYZINE HYDROCHLORIDE 25 MG: 25 TABLET, FILM COATED ORAL at 08:47

## 2024-04-06 RX ADMIN — FAMOTIDINE 20 MG: 10 INJECTION INTRAVENOUS at 08:47

## 2024-04-06 RX ADMIN — HYDROCODONE BITARTRATE AND ACETAMINOPHEN 1 TABLET: 5; 325 TABLET ORAL at 02:28

## 2024-04-06 RX ADMIN — BUDESONIDE 0.5 MG: 0.5 INHALANT ORAL at 19:07

## 2024-04-06 RX ADMIN — Medication 5 MG: at 21:08

## 2024-04-06 RX ADMIN — PRIMIDONE 250 MG: 250 TABLET ORAL at 21:08

## 2024-04-06 RX ADMIN — ASPIRIN 81 MG 81 MG: 81 TABLET ORAL at 08:46

## 2024-04-06 RX ADMIN — ARFORMOTEROL TARTRATE 15 MCG: 15 SOLUTION RESPIRATORY (INHALATION) at 07:18

## 2024-04-06 RX ADMIN — NYSTATIN 500000 UNITS: 100000 SUSPENSION ORAL at 08:47

## 2024-04-06 RX ADMIN — NYSTATIN 500000 UNITS: 100000 SUSPENSION ORAL at 21:07

## 2024-04-06 RX ADMIN — FUROSEMIDE 40 MG: 10 INJECTION, SOLUTION INTRAMUSCULAR; INTRAVENOUS at 17:46

## 2024-04-06 RX ADMIN — POTASSIUM CHLORIDE 40 MEQ: 1.5 POWDER, FOR SOLUTION ORAL at 08:45

## 2024-04-06 RX ADMIN — OXYCODONE 20 MG: 5 TABLET ORAL at 12:24

## 2024-04-06 RX ADMIN — LIDOCAINE 1 PATCH: 4 PATCH TOPICAL at 08:45

## 2024-04-06 RX ADMIN — NYSTATIN 500000 UNITS: 100000 SUSPENSION ORAL at 12:24

## 2024-04-06 RX ADMIN — FUROSEMIDE 40 MG: 10 INJECTION, SOLUTION INTRAMUSCULAR; INTRAVENOUS at 08:47

## 2024-04-06 RX ADMIN — ONDANSETRON 4 MG: 2 INJECTION INTRAMUSCULAR; INTRAVENOUS at 19:36

## 2024-04-06 RX ADMIN — DOCUSATE SODIUM 50MG AND SENNOSIDES 8.6MG 1 TABLET: 8.6; 5 TABLET, FILM COATED ORAL at 21:07

## 2024-04-06 RX ADMIN — BACITRACIN 0.9 G: 500 OINTMENT TOPICAL at 21:07

## 2024-04-06 RX ADMIN — Medication 10 ML: at 08:47

## 2024-04-06 RX ADMIN — BUDESONIDE 0.5 MG: 0.5 INHALANT ORAL at 07:18

## 2024-04-06 RX ADMIN — AMPICILLIN AND SULBACTAM 3 G: 2; 1 INJECTION, POWDER, FOR SOLUTION INTRAVENOUS at 17:46

## 2024-04-06 RX ADMIN — OXYCODONE 20 MG: 5 TABLET ORAL at 16:51

## 2024-04-06 RX ADMIN — PRIMIDONE 250 MG: 250 TABLET ORAL at 16:51

## 2024-04-06 RX ADMIN — OXYCODONE 10 MG: 5 TABLET ORAL at 21:07

## 2024-04-06 RX ADMIN — PREDNISONE 40 MG: 20 TABLET ORAL at 08:47

## 2024-04-06 RX ADMIN — METOPROLOL TARTRATE 12.5 MG: 25 TABLET, FILM COATED ORAL at 08:46

## 2024-04-06 RX ADMIN — AMPICILLIN AND SULBACTAM 3 G: 2; 1 INJECTION, POWDER, FOR SOLUTION INTRAVENOUS at 23:13

## 2024-04-06 RX ADMIN — LIDOCAINE 2 PATCH: 4 PATCH TOPICAL at 12:25

## 2024-04-06 RX ADMIN — ARFORMOTEROL TARTRATE 15 MCG: 15 SOLUTION RESPIRATORY (INHALATION) at 19:07

## 2024-04-06 RX ADMIN — AMLODIPINE BESYLATE 5 MG: 5 TABLET ORAL at 08:47

## 2024-04-06 NOTE — PROGRESS NOTES
Respiratory Therapist Broncho-Pulmonary Hygiene Progress Note      Patient Name:  Davonte Marshall  YOB: 1955    Davonte Marshall meets the qualification for Level 4 of the Bronco-Pulmonary Hygiene Protocol. This was based on my daily patient assessment and includes review of chest x-ray results, cough ability and quality, oxygenation, secretions or risk for secretion development and patient mobility.     Broncho-Pulmonary Hygiene Assessment:    Level of Movement: Actively changing positions-requires assistance  Disoriented/Follows Commands    Breath Sounds: Bilateral localized decreased air exchange and/or coarse rhonchi    Cough: Strong, effective    Chest X-Ray: Severe atelectasis or severe consolidation    Sputum Productions: Able to produce thick, tenacious secreations with difficulty    History and Physical: New onset of bronchitis or mucus plugging and Chronic condition    SpO2 to Oxygen Need: greater than 92% on room air or  less than 3L nasal canula    Current SpO2 is: 94% on 1.5L    Based on this information, I have completed the following interventions: CPT (precussor)      Electronically signed by Gilda Swanson RRT, 04/06/24, 8:16 AM EDT.

## 2024-04-06 NOTE — PROGRESS NOTES
[facility]   Hospitalist Progress Note  Date: 2024  Patient Name: Davonte Marshall  : 1955  MRN: [id number]  Date of admission: 3/27/2024      Subjective   Subjective     Chief Complaint: Follow up for somnolence    Summary: 68-year-old male with hypertension, chronic pain on high dose of morphine who presented after having worsening lethargy and mental status apparently was found to still have food in his mouth from the day before, is midline responsive, prior to the CT department had hypoxia with sats in the 70s initially requiring BiPAP, had aspiration episode and required intubation for airway distress.  Treating for sepsis with multifocal pneumonia, possible cardiogenic shock, aspiration, hypoxemic hypercapnic respiratory failure, polypharmacy and oversedation and question positive benzodiazepines and barbiturates on tox screen that he is not prescribed.  Pulmonology cards and neuro assisting.  Extubated to bipap and slowly weaning.  Keppra discontinued and back on primidone.  Failed MBS badly, Cortrak in place.  Had persistent fevers, white count; CT chest showed left lower lobe collapse with bronchopneumonia.  CT abdomen pelvis no acute process      Interval Followup:   No acute events overnight  Remains afebrile.  Blood pressure controlled.  On 1 to 2 L of oxygen  Denies worsening dyspnea or cough.  No fever or chills  Tolerating tube feeds  Voided after [place] catheter removal  Reporting uncontrolled neck and back pain.  Asking to go back on his extended release morphine. Asked for Norco 10 mg 3 times over 24 hours yesterday.        Review of Systems  Cardiovascular:  No Chest Pain  Gastrointestinal:  No abdominal pain, no vomiting      Objective   Objective     Vitals:   Temp:  [97.7 °F (36.5 °C)-99.1 °F (37.3 °C)] 98.1 °F (36.7 °C)  Heart Rate:  [77-93] 83  Resp:  [18-22] 20  BP: (125-151)/(68-89) 142/85  Flow (L/min):  [1.5] 1.5  Physical Exam    Constitutional: Chronically ill-appearing  male, conversant, NAD   Eyes: Pupils equal and reactive, no conjunctival injections   Neck: Shoulder weakness, neck flexed difficulty keeping it upright   HENT: NCAT, Cortrak tube   Respiratory: nonlabored respirations    Cardiovascular: RRR, trace pedal edema   Gastrointestinal: Soft, nontender   Neurologic: Alert and oriented x3, CN grossly intact, 3-5 muscle strength upper extremities, speech clear   Skin: Extremities warm, no rashes    Result Review    Result Review:  I have personally reviewed the following over the last 24 hours (07:00 to 07:00) and agree with the following findings  [x]  Laboratory  CBC          4/4/2024    05:06 4/5/2024    04:14 4/6/2024    04:07   CBC   WBC 14.79  12.23  13.46    RBC 4.41  4.06  4.31    Hemoglobin 13.0  12.1  12.9    Hematocrit 41.0  37.3  40.1    MCV 93.0  91.9  93.0    MCH 29.5  29.8  29.9    MCHC 31.7  32.4  32.2    RDW 15.5  15.2  15.1    Platelets 309  292  342      BMP          4/4/2024    05:06 4/5/2024    04:14 4/6/2024    04:07   BMP   BUN 46  40  40    Creatinine 1.18  0.96  1.11    Sodium 144  141  141    Potassium 3.4  3.1  3.2    Chloride 107  103  103    CO2 24.6  24.6  25.7    Calcium 8.7  8.6  8.9      [x]  Microbiology  Blood cultures 4/3 NGTD  []  Radiology   [x]  EKG/Telemetry monitor personally reviewed: NSR/sinus tachycardia  []  Cardiology/Vascular   []  Pathology  []  Old records  [x]  Other:    Intake/Output Summary (Last 24 hours) at 4/6/2024 1408  Last data filed at 4/6/2024 1115  Gross per 24 hour   Intake 1140 ml   Output 1475 ml   Net -335 ml         Assessment & Plan   Assessment / Plan     Assessment/Plan:  Dysphagia/silent aspiration  Acute infarct involving right frontal cortex  Aspiration pneumonia with Klebsiella, Streptococcus, MSSA and Moraxella  Acute hypoxemic and hypercapnic respiratory failure requiring mechanical intervention  Acute metabolic encephalopathy in setting of polypharmacy and hypercapnia - improving  Hx of seizures and  Essential tremor - on primidone  Hypernatremia  Hypokalemia  Transaminitis  NSTEMI, suspect type II from demand/sepsis  Lactic acidosis, POA.  Clinically significant  Hx of Hyperlipidemia  Hx of Depression/anxiety on outpatient Cymbalta  Hx of Chronic low back pain 2/2 lumbosacral degenerative disc disease  Hx of anterocollis and cervical dystonia  Hx of left corona radiata stroke 2020 with no residual deficits  DIONNA, secondary to sepsis/shock. Resolved  Septic shock 2/2 pneumonia. Resolved        SLP following; not cleared for diet and will need PEG in order to go to rehab.    GI consulted.  Discussed with Dr Haque who will assess.  Plavix held  TeleNeurology recommending DAPT for 21 days followed my monotherapy with aspirin  AST/ALT still elevated.  Continue to hold statin  Continue primidone 250 mg three times daily. Seizure precaution  Afebrile for >48 hours.  White count 13.4. Repeat blood cultures no growth.  De-escalate back to Unasyn  Appreciate pulmonology recommendations.  Will attempt bronchopulmonary hygiene protocol but if no improvement may need bronchoscopy for LLL collapse  Continue scheduled nebulizers  Continue prednisone 40 mg daily. Day 3  Wean oxygen, SpO2 goal >90%  Good response to diuretics.  Creatinine stable.  Continue IV Lasix 40 mg two times daily.  Trend renal function electrolytes with daily BMP  Continue oral potassium chloride 40 mEq daily  Cardiology following peripherally.  Medical management for now with DAPT, statin, BB. Cardiac catheterization recommended once more stable  Blood pressure reasonable.  Continue amlodipine 5 mg daily and metoprolol tartrate 12.5 mg q12 hours  Reported uncontrolled MSK pain.  His previous regimen was 120 mg oMEDD and extended release form.  He is getting much less the neck currently.  Unable to use MS Contin because unable to crush.  His liver enzymes are increasing so want to avoid further Tylenol.  Will switch to Oxy IR 20 mg and scheduled every 4  hours with instructions to hold for or oversedation/sleeping  Continue tube feeds.  Per dietitian  VTE ppx: Lovenox 40 mg daily  CBC, CMP in AM  Continue PT/OT.  Will need rehab    Discussed plan with RN.  Discussed with wife at bedside      DVT prophylaxis:  No DVT prophylaxis order currently exists.      CODE STATUS:   Level Of Support Discussed With: Health Care Surrogate  Code Status (Patient has no pulse and is not breathing): CPR (Attempt to Resuscitate)  Medical Interventions (Patient has pulse or is breathing): Full Support      I spent greater than 50 minutes minutes of time for evaluation and management of this patient including review of chart, labs pertinent imaging available as well as medical decision making and discussion with physicians, staff and patient/family.    Electronically signed by Willy Tena DO, 04/06/24, 2:19 PM EDT.

## 2024-04-06 NOTE — PROGRESS NOTES
Pulmonary / Critical Care Progress Note      Patient Name: Davonte Marshall  : 1955  MRN: [id number]  Attending:  Willy Tena DO   Date of admission: 3/27/2024    Subjective   Subjective   Follow-up for respiratory failure on ventilator, aspiration pneumonia secondary to Klebsiella, strep pneumo, MSSA and Moraxella     Resting comfortably in bed  CT scan from 4/3/2024 showed persistent left lower lobe collapse with fluid and debris in the left mainstem bronchus  Chest x-ray from 2024 showed improvement aeration left lower lobe  Overall patient feeling better    Objective     Vitals:   Vital signs for last 24 hours:  Temp:  [97.7 °F (36.5 °C)-99.1 °F (37.3 °C)] 98.1 °F (36.7 °C)  Heart Rate:  [77-85] 83  Resp:  [18-22] 20  BP: (125-151)/(69-89) 142/85    Physical Exam   Vital Signs Reviewed   Elderly male  Diminished breath sounds at bases  Scattered rhonchi present  He is alert and oriented    Result Review    Result Review:  I have personally reviewed the results from the time of this admission to 2024 16:43 EDT and agree with these findings:  [x]  Laboratory  [x]  Microbiology  [x]  Radiology  [x]  EKG/Telemetry   [x]  Cardiology/Vascular   []  Pathology  []  Old records  []  Other:  Most notable findings include:       Lab 24  0407 24  0414 24  0506 24  0010 24  0004 24  0440 24  0635 24  0653   WBC 13.46* 12.23* 14.79* 19.15*  --  14.02* 11.09* 14.07*   HEMOGLOBIN 12.9* 12.1* 13.0 14.7  --  13.9 14.0 12.1*   HEMATOCRIT 40.1 37.3* 41.0 44.0  --  42.1 42.0 36.6*   PLATELETS 342 292 309 396  --  267 209 168   SODIUM 141 141 144 145  --  146* 148* 145   SODIUM, ARTERIAL  --   --   --   --  144.0  --   --   --    POTASSIUM 3.2* 3.1* 3.4* 3.8  --  3.4* 3.4* 3.2*   CHLORIDE 103 103 107 105  --  107 107 104   CO2 25.7 24.6 24.6 24.0  --  24.4 26.5 28.6   BUN 40* 40* 46* 41*  --  36* 32* 42*   CREATININE 1.11 0.96 1.18 1.19  --  1.01 0.99 1.10   GLUCOSE  132* 97 116* 132*  --  140* 143* 123*   GLUCOSE, ARTERIAL  --   --   --   --  129*  --   --   --    CALCIUM 8.9 8.6 8.7 9.0  --  9.0 8.7 8.7   PHOSPHORUS 3.0 3.8 3.3 4.7*  --  3.4 2.5 2.6   TOTAL PROTEIN 7.3 6.2 6.8 7.5  --  6.9 6.9 6.5   ALBUMIN 3.6 3.3* 3.4* 3.8  --  3.6 3.5 3.2*   GLOBULIN 3.7 2.9 3.4 3.7  --  3.3 3.4 3.3     Results for orders placed during the hospital encounter of 03/27/24    Adult Transthoracic Echo Limited W/ Cont if Necessary Per Protocol    Interpretation Summary    Extremely technically difficult study with very limited views.    The subcostal view is the only one where we can see myocardium and it looks like ejection fraction is greater than 50%.  Cannot rule out regional wall motion abnormalities.    No obvious significant valvular disease by Doppler.  The valves are not well-visualized.    Left ventricular diastolic function was indeterminate.    BAL culture with Streptococcus pneumonia, Klebsiella and MSSA    Assessment & Plan   Assessment / Plan     Active Hospital Problems:  Active Hospital Problems    Diagnosis     **Acute metabolic encephalopathy      Impression:  Septic shock, resolved  Aspiration pneumonia secondary to Klebsiella, strep pneumo, MSSA and Moraxella  Lactic acidosis, clinically significant  Metabolic acidosis  Acute hypoxemic and hypercapnic respiratory failure require mechanical ventilation  Altered mental status  Toxic/metabolic encephalopathy  NSTEMI  Acute kidney injury secondary to prerenal etiology  Hypomagnesemia  Hypophosphatemia  Hypophosphatemia  Leukocytosis  Seizures/tremors.  On primidone at home     Plan:  Chest x-ray from 4/5/2024 reviewed showing significant improvement in the aeration of the left lower lobe  Patient improving will continue bronchopulmonary hygiene  Continue Pulmicort  Continue antibiotics  Continue chest vest      DVT prophylaxis: Lovenox  No DVT prophylaxis order currently exists.    CODE STATUS:   Level Of Support Discussed With:  Health Care Surrogate  Code Status (Patient has no pulse and is not breathing): CPR (Attempt to Resuscitate)  Medical Interventions (Patient has pulse or is breathing): Full Support    Labs, managing, and medications personally reviewed  Discussed with primary and patient    Electronically signed by Shelton Bernal DO, 04/06/24, 4:43 PM EDT.

## 2024-04-07 ENCOUNTER — APPOINTMENT (OUTPATIENT)
Dept: GENERAL RADIOLOGY | Facility: HOSPITAL | Age: 69
DRG: 871 | End: 2024-04-07
Payer: MEDICARE

## 2024-04-07 LAB
ALBUMIN SERPL-MCNC: 3.5 G/DL (ref 3.5–5.2)
ALBUMIN/GLOB SERPL: 1.1 G/DL
ALP SERPL-CCNC: 89 U/L (ref 39–117)
ALT SERPL W P-5'-P-CCNC: 116 U/L (ref 1–41)
ANION GAP SERPL CALCULATED.3IONS-SCNC: 12.5 MMOL/L (ref 5–15)
AST SERPL-CCNC: 56 U/L (ref 1–40)
BASOPHILS # BLD AUTO: 0.12 10*3/MM3 (ref 0–0.2)
BASOPHILS NFR BLD AUTO: 0.9 % (ref 0–1.5)
BILIRUB SERPL-MCNC: 0.4 MG/DL (ref 0–1.2)
BUN SERPL-MCNC: 37 MG/DL (ref 8–23)
BUN/CREAT SERPL: 40.7 (ref 7–25)
CALCIUM SPEC-SCNC: 8.8 MG/DL (ref 8.6–10.5)
CHLORIDE SERPL-SCNC: 103 MMOL/L (ref 98–107)
CO2 SERPL-SCNC: 24.5 MMOL/L (ref 22–29)
CREAT SERPL-MCNC: 0.91 MG/DL (ref 0.76–1.27)
DEPRECATED RDW RBC AUTO: 50.5 FL (ref 37–54)
EGFRCR SERPLBLD CKD-EPI 2021: 93 ML/MIN/1.73
EOSINOPHIL # BLD AUTO: 0.32 10*3/MM3 (ref 0–0.4)
EOSINOPHIL NFR BLD AUTO: 2.3 % (ref 0.3–6.2)
ERYTHROCYTE [DISTWIDTH] IN BLOOD BY AUTOMATED COUNT: 15 % (ref 12.3–15.4)
GLOBULIN UR ELPH-MCNC: 3.1 GM/DL
GLUCOSE SERPL-MCNC: 114 MG/DL (ref 65–99)
HCT VFR BLD AUTO: 37.6 % (ref 37.5–51)
HGB BLD-MCNC: 12.1 G/DL (ref 13–17.7)
IMM GRANULOCYTES # BLD AUTO: 0.08 10*3/MM3 (ref 0–0.05)
IMM GRANULOCYTES NFR BLD AUTO: 0.6 % (ref 0–0.5)
LYMPHOCYTES # BLD AUTO: 2.53 10*3/MM3 (ref 0.7–3.1)
LYMPHOCYTES NFR BLD AUTO: 18.2 % (ref 19.6–45.3)
MAGNESIUM SERPL-MCNC: 2.3 MG/DL (ref 1.6–2.4)
MCH RBC QN AUTO: 29.9 PG (ref 26.6–33)
MCHC RBC AUTO-ENTMCNC: 32.2 G/DL (ref 31.5–35.7)
MCV RBC AUTO: 92.8 FL (ref 79–97)
MONOCYTES # BLD AUTO: 0.96 10*3/MM3 (ref 0.1–0.9)
MONOCYTES NFR BLD AUTO: 6.9 % (ref 5–12)
NEUTROPHILS NFR BLD AUTO: 71.1 % (ref 42.7–76)
NEUTROPHILS NFR BLD AUTO: 9.89 10*3/MM3 (ref 1.7–7)
NRBC BLD AUTO-RTO: 0 /100 WBC (ref 0–0.2)
PHOSPHATE SERPL-MCNC: 3.3 MG/DL (ref 2.5–4.5)
PLATELET # BLD AUTO: 356 10*3/MM3 (ref 140–450)
PMV BLD AUTO: 11.3 FL (ref 6–12)
POTASSIUM SERPL-SCNC: 3.3 MMOL/L (ref 3.5–5.2)
PROT SERPL-MCNC: 6.6 G/DL (ref 6–8.5)
RBC # BLD AUTO: 4.05 10*6/MM3 (ref 4.14–5.8)
SODIUM SERPL-SCNC: 140 MMOL/L (ref 136–145)
WBC NRBC COR # BLD AUTO: 13.9 10*3/MM3 (ref 3.4–10.8)

## 2024-04-07 PROCEDURE — 99233 SBSQ HOSP IP/OBS HIGH 50: CPT | Performed by: INTERNAL MEDICINE

## 2024-04-07 PROCEDURE — 99222 1ST HOSP IP/OBS MODERATE 55: CPT | Performed by: INTERNAL MEDICINE

## 2024-04-07 PROCEDURE — 84100 ASSAY OF PHOSPHORUS: CPT | Performed by: INTERNAL MEDICINE

## 2024-04-07 PROCEDURE — 25010000002 AMPICILLIN-SULBACTAM PER 1.5 G: Performed by: INTERNAL MEDICINE

## 2024-04-07 PROCEDURE — 85025 COMPLETE CBC W/AUTO DIFF WBC: CPT | Performed by: INTERNAL MEDICINE

## 2024-04-07 PROCEDURE — 71045 X-RAY EXAM CHEST 1 VIEW: CPT

## 2024-04-07 PROCEDURE — 97164 PT RE-EVAL EST PLAN CARE: CPT

## 2024-04-07 PROCEDURE — 94668 MNPJ CHEST WALL SBSQ: CPT

## 2024-04-07 PROCEDURE — 94799 UNLISTED PULMONARY SVC/PX: CPT

## 2024-04-07 PROCEDURE — 94664 DEMO&/EVAL PT USE INHALER: CPT

## 2024-04-07 PROCEDURE — 94761 N-INVAS EAR/PLS OXIMETRY MLT: CPT

## 2024-04-07 PROCEDURE — 97530 THERAPEUTIC ACTIVITIES: CPT

## 2024-04-07 PROCEDURE — 80053 COMPREHEN METABOLIC PANEL: CPT | Performed by: INTERNAL MEDICINE

## 2024-04-07 PROCEDURE — 83735 ASSAY OF MAGNESIUM: CPT | Performed by: INTERNAL MEDICINE

## 2024-04-07 PROCEDURE — 63710000001 PREDNISONE PER 1 MG: Performed by: INTERNAL MEDICINE

## 2024-04-07 PROCEDURE — 99232 SBSQ HOSP IP/OBS MODERATE 35: CPT | Performed by: INTERNAL MEDICINE

## 2024-04-07 PROCEDURE — 25010000002 FUROSEMIDE PER 20 MG: Performed by: INTERNAL MEDICINE

## 2024-04-07 RX ORDER — POTASSIUM CHLORIDE 1.5 G/1.58G
40 POWDER, FOR SOLUTION ORAL 2 TIMES DAILY
Status: DISCONTINUED | OUTPATIENT
Start: 2024-04-07 | End: 2024-04-08

## 2024-04-07 RX ORDER — SACCHAROMYCES BOULARDII 250 MG
250 CAPSULE ORAL 2 TIMES DAILY
Status: DISCONTINUED | OUTPATIENT
Start: 2024-04-07 | End: 2024-04-12

## 2024-04-07 RX ADMIN — AMPICILLIN AND SULBACTAM 3 G: 2; 1 INJECTION, POWDER, FOR SOLUTION INTRAVENOUS at 17:01

## 2024-04-07 RX ADMIN — METOPROLOL TARTRATE 12.5 MG: 25 TABLET, FILM COATED ORAL at 08:13

## 2024-04-07 RX ADMIN — Medication 5 MG: at 20:19

## 2024-04-07 RX ADMIN — NYSTATIN 500000 UNITS: 100000 SUSPENSION ORAL at 20:17

## 2024-04-07 RX ADMIN — PRIMIDONE 250 MG: 250 TABLET ORAL at 16:54

## 2024-04-07 RX ADMIN — PREDNISONE 40 MG: 20 TABLET ORAL at 08:14

## 2024-04-07 RX ADMIN — ASPIRIN 81 MG 81 MG: 81 TABLET ORAL at 08:14

## 2024-04-07 RX ADMIN — PRIMIDONE 250 MG: 250 TABLET ORAL at 20:17

## 2024-04-07 RX ADMIN — PRIMIDONE 250 MG: 250 TABLET ORAL at 08:14

## 2024-04-07 RX ADMIN — BUDESONIDE 0.5 MG: 0.5 INHALANT ORAL at 20:10

## 2024-04-07 RX ADMIN — AMPICILLIN AND SULBACTAM 3 G: 2; 1 INJECTION, POWDER, FOR SOLUTION INTRAVENOUS at 05:09

## 2024-04-07 RX ADMIN — ARFORMOTEROL TARTRATE 15 MCG: 15 SOLUTION RESPIRATORY (INHALATION) at 20:10

## 2024-04-07 RX ADMIN — NYSTATIN 500000 UNITS: 100000 SUSPENSION ORAL at 12:07

## 2024-04-07 RX ADMIN — NYSTATIN 500000 UNITS: 100000 SUSPENSION ORAL at 08:14

## 2024-04-07 RX ADMIN — ARFORMOTEROL TARTRATE 15 MCG: 15 SOLUTION RESPIRATORY (INHALATION) at 06:30

## 2024-04-07 RX ADMIN — POTASSIUM CHLORIDE 40 MEQ: 1.5 POWDER, FOR SOLUTION ORAL at 20:17

## 2024-04-07 RX ADMIN — AMPICILLIN AND SULBACTAM 3 G: 2; 1 INJECTION, POWDER, FOR SOLUTION INTRAVENOUS at 12:07

## 2024-04-07 RX ADMIN — LIDOCAINE 2 PATCH: 4 PATCH TOPICAL at 08:12

## 2024-04-07 RX ADMIN — POTASSIUM CHLORIDE 40 MEQ: 1.5 POWDER, FOR SOLUTION ORAL at 08:14

## 2024-04-07 RX ADMIN — Medication 10 ML: at 20:19

## 2024-04-07 RX ADMIN — NYSTATIN 500000 UNITS: 100000 SUSPENSION ORAL at 17:01

## 2024-04-07 RX ADMIN — FUROSEMIDE 40 MG: 10 INJECTION, SOLUTION INTRAMUSCULAR; INTRAVENOUS at 08:16

## 2024-04-07 RX ADMIN — BUDESONIDE 0.5 MG: 0.5 INHALANT ORAL at 06:30

## 2024-04-07 RX ADMIN — FAMOTIDINE 20 MG: 20 TABLET ORAL at 08:14

## 2024-04-07 RX ADMIN — OXYCODONE 10 MG: 5 TABLET ORAL at 00:09

## 2024-04-07 RX ADMIN — Medication 10 ML: at 08:14

## 2024-04-07 RX ADMIN — METOPROLOL TARTRATE 12.5 MG: 25 TABLET, FILM COATED ORAL at 20:18

## 2024-04-07 RX ADMIN — AMLODIPINE BESYLATE 5 MG: 5 TABLET ORAL at 08:13

## 2024-04-07 RX ADMIN — Medication 250 MG: at 20:18

## 2024-04-07 RX ADMIN — BACITRACIN 0.9 G: 500 OINTMENT TOPICAL at 08:14

## 2024-04-07 RX ADMIN — OXYCODONE 10 MG: 5 TABLET ORAL at 20:17

## 2024-04-07 RX ADMIN — DOCUSATE SODIUM 50MG AND SENNOSIDES 8.6MG 1 TABLET: 8.6; 5 TABLET, FILM COATED ORAL at 20:17

## 2024-04-07 RX ADMIN — FUROSEMIDE 40 MG: 10 INJECTION, SOLUTION INTRAMUSCULAR; INTRAVENOUS at 17:01

## 2024-04-07 RX ADMIN — BACITRACIN 0.9 G: 500 OINTMENT TOPICAL at 20:17

## 2024-04-07 RX ADMIN — OXYCODONE 10 MG: 5 TABLET ORAL at 04:18

## 2024-04-07 RX ADMIN — OXYCODONE 10 MG: 5 TABLET ORAL at 12:07

## 2024-04-07 RX ADMIN — OXYCODONE 10 MG: 5 TABLET ORAL at 16:54

## 2024-04-07 RX ADMIN — OXYCODONE 10 MG: 5 TABLET ORAL at 08:13

## 2024-04-07 NOTE — PLAN OF CARE
Goal Outcome Evaluation:      No acute events during shift. VSS. Patient resting comfortably at end of shift. Call light in reach.

## 2024-04-07 NOTE — PROGRESS NOTES
Pulmonary / Critical Care Progress Note      Patient Name: Davonte Marshall  : 1955  MRN: [id number]  Attending:  Willy Tena DO   Date of admission: 3/27/2024    Subjective   Subjective   Follow-up for respiratory failure on ventilator, aspiration pneumonia secondary to Klebsiella, strep pneumo, MSSA and Moraxella     Resting comfortably in bed  CT scan from 4/3/2024 showed persistent left lower lobe collapse with fluid and debris in the left mainstem bronchus  Chest x-ray from 2024 showed improvement aeration left lower lobe   Chest x-ray from 2024 shows no active process    Objective     Vitals:   Vital signs for last 24 hours:  Temp:  [97.2 °F (36.2 °C)-98.8 °F (37.1 °C)] 98.4 °F (36.9 °C)  Heart Rate:  [74-93] 89  Resp:  [20-21] 20  BP: (100-145)/(62-77) 111/70    Physical Exam   Vital Signs Reviewed   Elderly male  Diminished breath sounds at bases but improved aeration  Scattered rhonchi present  He is alert and oriented    Result Review    Result Review:  I have personally reviewed the results from the time of this admission to 2024 17:33 EDT and agree with these findings:  [x]  Laboratory  [x]  Microbiology  [x]  Radiology  [x]  EKG/Telemetry   [x]  Cardiology/Vascular   []  Pathology  []  Old records  []  Other:  Most notable findings include:       Lab 24  0442 24  0407 24  0414 24  0506 24  0010 24  0004 24  0440 24  0635   WBC 13.90* 13.46* 12.23* 14.79* 19.15*  --  14.02* 11.09*   HEMOGLOBIN 12.1* 12.9* 12.1* 13.0 14.7  --  13.9 14.0   HEMATOCRIT 37.6 40.1 37.3* 41.0 44.0  --  42.1 42.0   PLATELETS 356 342 292 309 396  --  267 209   SODIUM 140 141 141 144 145  --  146* 148*   SODIUM, ARTERIAL  --   --   --   --   --  144.0  --   --    POTASSIUM 3.3* 3.2* 3.1* 3.4* 3.8  --  3.4* 3.4*   CHLORIDE 103 103 103 107 105  --  107 107   CO2 24.5 25.7 24.6 24.6 24.0  --  24.4 26.5   BUN 37* 40* 40* 46* 41*  --  36* 32*   CREATININE 0.91 1.11  0.96 1.18 1.19  --  1.01 0.99   GLUCOSE 114* 132* 97 116* 132*  --  140* 143*   GLUCOSE, ARTERIAL  --   --   --   --   --  129*  --   --    CALCIUM 8.8 8.9 8.6 8.7 9.0  --  9.0 8.7   PHOSPHORUS 3.3 3.0 3.8 3.3 4.7*  --  3.4 2.5   TOTAL PROTEIN 6.6 7.3 6.2 6.8 7.5  --  6.9 6.9   ALBUMIN 3.5 3.6 3.3* 3.4* 3.8  --  3.6 3.5   GLOBULIN 3.1 3.7 2.9 3.4 3.7  --  3.3 3.4     Results for orders placed during the hospital encounter of 03/27/24    Adult Transthoracic Echo Limited W/ Cont if Necessary Per Protocol    Interpretation Summary    Extremely technically difficult study with very limited views.    The subcostal view is the only one where we can see myocardium and it looks like ejection fraction is greater than 50%.  Cannot rule out regional wall motion abnormalities.    No obvious significant valvular disease by Doppler.  The valves are not well-visualized.    Left ventricular diastolic function was indeterminate.    BAL culture with Streptococcus pneumonia, Klebsiella and MSSA    Assessment & Plan   Assessment / Plan     Active Hospital Problems:  Active Hospital Problems    Diagnosis     **Acute metabolic encephalopathy      Impression:  Septic shock, resolved  Aspiration pneumonia secondary to Klebsiella, strep pneumo, MSSA and Moraxella  Lactic acidosis, clinically significant  Metabolic acidosis  Acute hypoxemic and hypercapnic respiratory failure require mechanical ventilation  Altered mental status  Toxic/metabolic encephalopathy  NSTEMI  Acute kidney injury secondary to prerenal etiology  Hypomagnesemia  Hypophosphatemia  Hypophosphatemia  Leukocytosis  Seizures/tremors.  On primidone at home     Plan:  Chest x-ray from 4/7/2024 reviewed showing significant improvement in the aeration of the left lower lobe with no active disease  We will try to wean patient off oxygen  Patient improving will continue bronchopulmonary hygiene  Continue Pulmicort  Continue antibiotics  Continue chest vest  Okay from a  standpoint for patient to proceed with endoscopy if needed for PEG tube placement      DVT prophylaxis: Lovenox  No DVT prophylaxis order currently exists.    CODE STATUS:   Level Of Support Discussed With: Health Care Surrogate  Code Status (Patient has no pulse and is not breathing): CPR (Attempt to Resuscitate)  Medical Interventions (Patient has pulse or is breathing): Full Support    Labs, managing, and medications personally reviewed  Discussed with primary and patient    Electronically signed by Shelton Bernal DO, 04/07/24, 5:33 PM EDT.

## 2024-04-07 NOTE — CONSULTS
"Chief Complaint  Respiratory Distress    History of Present Illness  Davonte Marshall is a 68 y.o. male with history of anxiety, osteoarthritis, chronic bronchitis, depression, enlarged prostate, gallstones, hemorrhoids, hyperlipidemia, seizures, stroke who presents to [facility] on referral from No ref. provider found for a gastroenterology evaluation of trouble swallowing.  Patient has oropharyngeal dysphagia secondary to stroke and currently is being fed by nasoduodenal feeding tube.  Patient need to alternative feeding for long-term and I been asked to see the patient for PEG tube placement.  Patient denies any other GI symptoms.        Labs Result Review Imaging    Past Medical History:   Diagnosis Date    Anemia     Anxiety     Arthritis     Benign essential hypertension     Chronic bronchitis     Depression     Enlarged prostate     Floaters in visual field     Gall stones     Generalized weakness     Head injury     Hemorrhoids     Hyperlipidemia     Leg pain     Numbness in both hands 7/6/2015    Seizures     last \"quite a while ago\"    Stroke 11/2020    left sided weakness       Past Surgical History:   Procedure Laterality Date    APPENDECTOMY      BACK SURGERY      4    CHOLECYSTECTOMY      CYSTOSCOPY Bilateral     7/12/21    CYSTOSCOPY TRANSURETHRAL RESECTION OF PROSTATE N/A 7/12/2021    Procedure: CYSTOSCOPY TRANSURETHRAL RESECTION OF PROSTATE;  Surgeon: Penelope Fox MD;  Location: formerly [facility] MA[facility] OR;  Service: Urology;  Laterality: N/A;    HAND SURGERY Bilateral     thumb    LYMPH NODE DISSECTION      jaw line    TURP / TRANSURETHRAL INCISION / DRAINAGE PROSTATE  07/12/2021         Current Facility-Administered Medications:     ampicillin-sulbactam 3 g/100 mL 0.9% NS IVPB, 3 g, Intravenous, Q6H, Troyer, Willy, DO, 3 g at 04/07/24 1701    arformoterol (BROVANA) nebulizer solution 15 mcg, 15 mcg, Nebulization, BID - RT, Sofiya Pascual Emperatriz, APRN, 15 mcg at 04/07/24 " 0630    aspirin chewable tablet 81 mg, 81 mg, Nasogastric, Daily, Garret Shah MD, 81 mg at 04/07/24 0814    [Held by provider] atorvastatin (LIPITOR) tablet 20 mg, 20 mg, Nasogastric, Nightly, Era Light MD, 20 mg at 03/31/24 2204    bacitracin 500 UNIT/GM ointment 0.9 g, 1 Application, Topical, BID, Willy Tena DO, 0.9 g at 04/07/24 0814    [DISCONTINUED] sennosides-docusate (PERICOLACE) 8.6-50 MG per tablet 2 tablet, 2 tablet, Nasogastric, BID, 2 tablet at 03/28/24 2048 **AND** polyethylene glycol (MIRALAX) packet 17 g, 17 g, Nasogastric, Daily PRN **AND** [DISCONTINUED] bisacodyl (DULCOLAX) EC tablet 5 mg, 5 mg, Oral, Daily PRN **AND** bisacodyl (DULCOLAX) suppository 10 mg, 10 mg, Rectal, Daily PRN, Garret Shah MD    budesonide (PULMICORT) nebulizer solution 0.5 mg, 0.5 mg, Nebulization, BID - RT, Sofiya Pascual, APRN, 0.5 mg at 04/07/24 0630    [Held by provider] clopidogrel (PLAVIX) tablet 75 mg, 75 mg, Nasogastric, Daily, Willy Tena DO, 75 mg at 04/06/24 0846    Diclofenac Sodium (VOLTAREN) 1 % gel 2 g, 2 g, Topical, 4x Daily PRN, Garret Shah MD    [Held by provider] DULoxetine (CYMBALTA) DR capsule 40 mg, 40 mg, Oral, BID, Garret Shah MD, 40 mg at 04/02/24 0905    famotidine (PEPCID) tablet 20 mg, 20 mg, Nasogastric, Daily, Willy Tena DO, 20 mg at 04/07/24 0814    furosemide (LASIX) injection 40 mg, 40 mg, Intravenous, BID, Mikey Mahajan MD, 40 mg at 04/07/24 1701    hydrALAZINE (APRESOLINE) injection 10 mg, 10 mg, Intravenous, Q6H PRN, Fausto Downey MD, 10 mg at 04/02/24 2011    hydrOXYzine (ATARAX) tablet 25 mg, 25 mg, Nasogastric, TID PRN, Garret Shah MD, 25 mg at 04/06/24 2313    ipratropium-albuterol (DUO-NEB) nebulizer solution 3 mL, 3 mL, Nebulization, Q6H PRN, Willy Tena DO    Lidocaine 4 % 2 patch, 2 patch, Transdermal, Daily, Willy Tena DO, 2 patch at 04/07/24 0812    melatonin tablet 5 mg, 5 mg, Nasogastric, Nightly PRN, Garret Shah MD, 5  mg at 04/06/24 2108    metoprolol tartrate (LOPRESSOR) tablet 12.5 mg, 12.5 mg, Nasogastric, Q12H, Era Light MD, 12.5 mg at 04/07/24 0813    naloxone (NARCAN) injection 1 mg, 1 mg, Intravenous, Once PRN, Willy Tena DO    nystatin (MYCOSTATIN) 100,000 unit/mL suspension 500,000 Units, 5 mL, Oral, 4x Daily, Fausto Downey MD, 500,000 Units at 04/07/24 1701    ondansetron (ZOFRAN) injection 4 mg, 4 mg, Intravenous, Q4H PRN, Garret Shah MD, 4 mg at 04/06/24 1936    oxyCODONE (ROXICODONE) immediate release tablet 10 mg, 10 mg, Nasogastric, Q4H, Willy Tena DO, 10 mg at 04/07/24 1654    Pharmacy to Dose ampicillin-sulbactam, , Does not apply, Continuous PRN, Willy Tena DO    potassium chloride (KLOR-CON) packet 40 mEq, 40 mEq, Nasogastric, BID, Willy Tena DO    predniSONE (DELTASONE) tablet 40 mg, 40 mg, Nasogastric, Daily With Breakfast, Willy Tena DO, 40 mg at 04/07/24 0814    primidone (MYSOLINE) tablet 250 mg, 250 mg, Nasogastric, TID, Vianey Moses MD, 250 mg at 04/07/24 1654    prochlorperazine (COMPAZINE) injection 5 mg, 5 mg, Intravenous, Q6H PRN, Garret Shah MD    saccharomyces boulardii (FLORASTOR) capsule 250 mg, 250 mg, Nasogastric, BID, Willy Tena DO    sennosides-docusate (PERICOLACE) 8.6-50 MG per tablet 1 tablet, 1 tablet, Nasogastric, Nightly, Willy Tena DO, 1 tablet at 04/06/24 2107    sodium chloride 0.9 % flush 10 mL, 10 mL, Intravenous, PRN, Caroline Jaramillo MD    sodium chloride 0.9 % flush 10 mL, 10 mL, Intravenous, Q12H, Caroline Jaramillo MD, 10 mL at 04/07/24 0814    sodium chloride 0.9 % flush 10 mL, 10 mL, Intravenous, PRN, Caroline Jaramillo MD    sodium chloride 0.9 % infusion 40 mL, 40 mL, Intravenous, PRN, Caroline Jaramillo MD     Allergies   Allergen Reactions    Codeine Arrhythmia     Chest pain    Guaifenesin-Codeine Arrhythmia     Chest pain    Cefdinir Unknown - Low Severity       Family History   Problem Relation Age of Onset     "Bleeding Disorder Mother     Stroke Father     Heart disease Father     Heart disease Other     Malig Hyperthermia Neg Hx         Social History     Social History Narrative    Not on file   Social history neck smoking, abuse, drug    Immunization:  Immunization History   Administered Date(s) Administered    COVID-19 (PFIZER) Purple Cap Monovalent 03/16/2021, 04/06/2021    Fluzone (or Fluarix & Flulaval for VFC) >6mos 11/11/2021    Pneumococcal Polysaccharide (PPSV23) 06/06/2022        Objective     Review of Systems 10 system review is negative except as mentioned HPI    Vital Signs:   /70 (BP Location: Right arm, Patient Position: Lying)   Pulse 89   Temp 98.4 °F (36.9 °C) (Oral)   Resp 20   Ht 175.3 cm (69.02\")   Wt 97 kg (213 lb 13.5 oz)   SpO2 98%   BMI 31.56 kg/m²       Physical Exam  Constitutional:       General: He is awake. He is not in acute distress.     Appearance: Normal appearance. He is well-developed and well-groomed.   HENT:      Head: Normocephalic and atraumatic.      Mouth/Throat:      Mouth: Mucous membranes are moist.      Comments: Walt dental hygiene is good  Eyes:      General: Lids are normal.      Conjunctiva/sclera: Conjunctivae normal.      Pupils: Pupils are equal, round, and reactive to light.   Neck:      Thyroid: No thyroid mass.      Trachea: Trachea normal.   Cardiovascular:      Rate and Rhythm: Normal rate and regular rhythm.      Heart sounds: Normal heart sounds.   Pulmonary:      Effort: Pulmonary effort is normal.      Breath sounds: Normal breath sounds and air entry.   Abdominal:      General: Abdomen is flat. Bowel sounds are normal. There is no distension.      Palpations: Abdomen is soft. There is no mass.      Tenderness: There is no abdominal tenderness. There is no guarding.   Musculoskeletal:      Cervical back: Neck supple.      Right lower leg: No edema.      Left lower leg: No edema.   Skin:     General: Skin is warm and moist.      Coloration: Skin " is not cyanotic, jaundiced or pale.      Findings: No rash.      Nails: There is no clubbing.   Neurological:      Mental Status: He is alert and oriented to person, place, and time.   Psychiatric:         Attention and Perception: Attention normal.         Mood and Affect: Mood and affect normal.         Speech: Speech normal.         Labs:  Results from last 7 days   Lab Units 04/07/24 0442 04/06/24 0407 04/05/24 0414   WBC 10*3/mm3 13.90* 13.46* 12.23*   HEMOGLOBIN g/dL 12.1* 12.9* 12.1*   HEMATOCRIT % 37.6 40.1 37.3*   PLATELETS 10*3/mm3 356 342 292      Results from last 7 days   Lab Units 04/07/24 0442 04/06/24 0407 04/05/24 0414   SODIUM mmol/L 140 141 141   POTASSIUM mmol/L 3.3* 3.2* 3.1*   CHLORIDE mmol/L 103 103 103   CO2 mmol/L 24.5 25.7 24.6   BUN mg/dL 37* 40* 40*   CREATININE mg/dL 0.91 1.11 0.96   CALCIUM mg/dL 8.8 8.9 8.6   BILIRUBIN mg/dL 0.4 0.7 0.5   ALK PHOS U/L 89 103 74   ALT (SGPT) U/L 116* 142* 114*   AST (SGOT) U/L 56* 75* 62*   GLUCOSE mg/dL 114* 132* 97      Results from last 7 days   Lab Units 04/01/24  2122   INR  1.09        Assessment & Plan:  Principal Problem:    Acute metabolic encephalopathy  Oropharyngeal dysphagia  Patient was on Plavix which was stopped yesterday    Will do PEG placement when patient is off Plavix for about 5 days.    Patient was given instructions and counseling regarding his condition or for health maintenance advice. Please see specific information pulled into the AVS if appropriate.        Signed:  Sarkis Haque MD  04/07/24  17:11 EDT

## 2024-04-07 NOTE — THERAPY RE-EVALUATION
"Acute Care - Physical Therapy Re-Evaluation and Treatment Note  [facility]     Patient Name: Davonte Marshall  : 1955  MRN: [id number]  Today's Date: 2024      Visit Dx:     ICD-10-CM ICD-9-CM   1. Acute respiratory failure with hypoxia  J96.01 518.81   2. Metabolic encephalopathy  G93.41 348.31   3. DIONNA (acute kidney injury)  N17.9 584.9   4. NSTEMI (non-ST elevated myocardial infarction)  I21.4 410.70   5. Oropharyngeal dysphagia  R13.12 787.22   6. Impaired mobility and ADLs  Z74.09 V49.89    Z78.9    7. Difficulty in walking  R26.2 719.7     Patient Active Problem List   Diagnosis    Anemia    Anxiety    Arthritis    Back pain    Chronic bronchitis    Complication of surgical procedure    Degeneration of lumbosacral intervertebral disc    Depression    Encounter for long-term (current) use of insulin    Essential tremor    Gall stones    Head injury    Hemorrhoids    Herniation of intervertebral disc    Hyperlipidemia    Leg pain    Neck pain    Renal insufficiency    Scoliosis    Seizure disorder    Smokes 1.5 packs of cigarettes per day    Vitamin D deficiency disease    Gross hematuria    BPH without obstruction/lower urinary tract symptoms    Urge incontinence of urine    Benign prostatic hyperplasia with urinary hesitancy    Sequelae, post-stroke    S/P TURP    Postoperative anemia    Postoperative hypoxia    Essential hypertension    Opioid dependence    Cervical dystonia    Acute metabolic encephalopathy     Past Medical History:   Diagnosis Date    Anemia     Anxiety     Arthritis     Benign essential hypertension     Chronic bronchitis     Depression     Enlarged prostate     Floaters in visual field     Gall stones     Generalized weakness     Head injury     Hemorrhoids     Hyperlipidemia     Leg pain     Numbness in both hands 2015    Seizures     last \"quite a while ago\"    Stroke 2020    left sided weakness     Past Surgical History:   Procedure Laterality Date    APPENDECTOMY   "    BACK SURGERY      4    CHOLECYSTECTOMY      CYSTOSCOPY Bilateral     7/12/21    CYSTOSCOPY TRANSURETHRAL RESECTION OF PROSTATE N/A 7/12/2021    Procedure: CYSTOSCOPY TRANSURETHRAL RESECTION OF PROSTATE;  Surgeon: Penelope Fox MD;  Location: [facility];  Service: Urology;  Laterality: N/A;    HAND SURGERY Bilateral     thumb    LYMPH NODE DISSECTION      jaw line    TURP / TRANSURETHRAL INCISION / DRAINAGE PROSTATE  07/12/2021     PT Assessment (Last 12 Hours)       PT Evaluation and Treatment       Row Name 04/07/24 1600          Physical Therapy Time and Intention    Subjective Information complains of;fatigue;weakness  -CS     Document Type re-evaluation  -CS     Mode of Treatment individual therapy;physical therapy  -CS     Patient Effort fair  -CS     Symptoms Noted During/After Treatment fatigue  -CS       Row Name 04/07/24 1600          Cognition    Affect/Mental Status (Cognition) --  intermittent confusion noted  -CS     Orientation Status (Cognition) oriented to;person;place  -CS       Row Name 04/07/24 1600          Range of Motion Comprehensive    General Range of Motion lower extremity range of motion deficits identified  -CS     Comment, General Range of Motion BLEs required AAROM to achieve WFL  -CS       Row Name 04/07/24 1600          Strength Comprehensive (MMT)    General Manual Muscle Testing (MMT) Assessment lower extremity strength deficits identified  -CS     Comment, General Manual Muscle Testing (MMT) Assessment BLEs assessed at 2+/5  -CS       Row Name 04/07/24 1600          Bed Mobility    Bed Mobility bed mobility (all) activities  -CS     All Activities, [place] (Bed Mobility) verbal cues;maximum assist (25% patient effort)  -CS     Bed Mobility, Safety Issues decreased use of arms for pushing/pulling;decreased use of legs for bridging/pushing;impaired trunk control for bed mobility  -CS     Assistive Device (Bed Mobility) bed rails;head of bed elevated;draw sheet  -CS        Row Name 04/07/24 1600          Transfers    Transfers sit-stand transfer;stand pivot/stand step transfer;bed-chair transfer;chair-bed transfer  -CS       Row Name 04/07/24 1600          Bed-Chair Transfer    Bed-Chair [place] (Transfers) verbal cues;maximum assist (25% patient effort)  -CS     Assistive Device (Bed-Chair Transfers) walker, front-wheeled  -CS       Row Name 04/07/24 1600          Chair-Bed Transfer    Chair-Bed [place] (Transfers) verbal cues;maximum assist (25% patient effort)  -CS     Assistive Device (Chair-Bed Transfers) other (see comments)  stand pivot transfer without AD  -CS       Row Name 04/07/24 1600          Sit-Stand Transfer    Sit-Stand [place] (Transfers) verbal cues;maximum assist (25% patient effort)  -CS     Assistive Device (Sit-Stand Transfers) walker, front-wheeled  -CS       Row Name 04/07/24 1600          Stand Pivot/Stand Step Transfer    Stand Pivot/Stand Step [place] (Transfers) verbal cues;maximum assist (25% patient effort)  -CS     Assistive Device (Stand Pivot Stand Step Transfer) walker, front-wheeled  -CS       Row Name 04/07/24 1600          Gait/Stairs (Locomotion)    Gait/Stairs Locomotion gait/ambulation assistive device  -CS     [place] Level (Gait) verbal cues;maximum assist (25% patient effort)  -CS     Assistive Device (Gait) walker, front-wheeled  -CS     Patient was able to Ambulate yes  -CS     Distance in Feet (Gait) --  pt only able to take 2-3 steps from EOB to recliner with RW  -CS     Pattern (Gait) step-to  -CS       Row Name 04/07/24 1600          Safety Issues, Functional Mobility    Safety Issues Affecting Function (Mobility) awareness of need for assistance;insight into deficits/self-awareness;sequencing abilities;problem-solving  -CS     Impairments Affecting Function (Mobility) balance;cognition;endurance/activity tolerance;strength;pain  -CS     Cognitive Impairments, Mobility Safety/Performance insight into  deficits/self-awareness;judgment;problem-solving/reasoning;sequencing abilities;awareness, need for assistance  -CS       Row Name 04/07/24 1600          Balance    Balance Assessment standing dynamic balance  -CS     Dynamic Standing Balance maximum assist;dependent  -CS     Position/Device Used, Standing Balance supported;walker, front-wheeled  -CS       Row Name             Wound 04/04/24 1200 Right anterior second toe    Wound - Properties Group Placement Date: 04/04/24  -EP Placement Time: 1200  -EP Side: Right  -EP Orientation: anterior  -EP Location: second toe  -EP    Retired Wound - Properties Group Placement Date: 04/04/24  -EP Placement Time: 1200  -EP Side: Right  -EP Orientation: anterior  -EP Location: second toe  -EP    Retired Wound - Properties Group Date first assessed: 04/04/24  -EP Time first assessed: 1200  -EP Side: Right  -EP Location: second toe  -EP      Row Name             Wound 04/04/24 1200 Right anterior third toe    Wound - Properties Group Placement Date: 04/04/24  -EP Placement Time: 1200  -EP Side: Right  -EP Orientation: anterior  -EP Location: third toe  -EP    Retired Wound - Properties Group Placement Date: 04/04/24  -EP Placement Time: 1200  -EP Side: Right  -EP Orientation: anterior  -EP Location: third toe  -EP    Retired Wound - Properties Group Date first assessed: 04/04/24  -EP Time first assessed: 1200  -EP Side: Right  -EP Location: third toe  -EP      Row Name             Wound 04/04/24 1200 Left anterior great toe    Wound - Properties Group Placement Date: 04/04/24  -EP Placement Time: 1200  -EP Side: Left  -EP Orientation: anterior  -EP Location: great toe  -EP    Retired Wound - Properties Group Placement Date: 04/04/24  -EP Placement Time: 1200  -EP Side: Left  -EP Orientation: anterior  -EP Location: great toe  -EP    Retired Wound - Properties Group Date first assessed: 04/04/24  -EP Time first assessed: 1200  -EP Side: Left  -EP Location: great toe  -EP      Row  Name             Wound 04/04/24 1200 Left anterior second toe    Wound - Properties Group Placement Date: 04/04/24  -EP Placement Time: 1200  -EP Side: Left  -EP Orientation: anterior  -EP Location: second toe  -EP    Retired Wound - Properties Group Placement Date: 04/04/24  -EP Placement Time: 1200  -EP Side: Left  -EP Orientation: anterior  -EP Location: second toe  -EP    Retired Wound - Properties Group Date first assessed: 04/04/24  -EP Time first assessed: 1200  -EP Side: Left  -EP Location: second toe  -EP      Row Name 04/07/24 1600          Plan of Care Review    Plan of Care Reviewed With patient  -CS     Progress no change  -CS     Outcome Evaluation To optimize functional mobility and independence, skilled PT services needed to address mobility needs, strength, endurance, and balance impairments. Continue plan of care for another 10 days as all goals are still appropriate.  -CS       Row Name 04/07/24 1600          Positioning and Restraints    Pre-Treatment Position in bed  -CS     Post Treatment Position bed  -CS     In Bed supine;call light within reach;encouraged to call for assist;exit alarm on  -CS       Row Name 04/07/24 1600          Therapy Assessment/Plan (PT)    Rehab Potential (PT) fair, will monitor progress closely  -CS     Criteria for Skilled Interventions Met (PT) yes;skilled treatment is necessary  -CS     Therapy Frequency (PT) daily  -CS     Predicted Duration of Therapy Intervention (PT) 10 days  -CS     Problem List (PT) problems related to;balance;mobility;strength;pain  -CS     Activity Limitations Related to Problem List (PT) unable to ambulate safely;unable to transfer safely  -CS       Row Name 04/07/24 1600          PT Evaluation Complexity    History, PT Evaluation Complexity 1-2 personal factors and/or comorbidities  -CS     Examination of Body Systems (PT Eval Complexity) total of 4 or more elements  -CS     Clinical Presentation (PT Evaluation Complexity) stable  -CS      Clinical Decision Making (PT Evaluation Complexity) low complexity  -CS     Overall Complexity (PT Evaluation Complexity) low complexity  -CS       Row Name 04/07/24 1600          Progress Summary (PT)    Progress Toward Functional Goals (PT) progress toward functional goals is fair  -       Row Name 04/07/24 1600          Therapy Plan Review/Discharge Plan (PT)    Therapy Plan Review (PT) evaluation/treatment results reviewed;patient;spouse/significant other  -       Row Name 04/07/24 1600          Bed Mobility Goal 1 (PT)    Progress/Outcomes (Bed Mobility Goal 1, PT) goal not met;goal ongoing;progress slower than expected  -       Row Name 04/07/24 1600          Transfer Goal 1 (PT)    Progress/Outcome (Transfer Goal 1, PT) goal not met;goal ongoing;progress slower than expected  -       Row Name 04/07/24 1600          Gait Training Goal 1 (PT)    Progress/Outcome (Gait Training Goal 1, PT) goal not met;goal ongoing;progress slower than expected  -               User Key  (r) = Recorded By, (t) = Taken By, (c) = Cosigned By      Initials Name Provider Type    CS Roseann Bundy, PT Physical Therapist    Alise Altamirano RN Registered Nurse                      PT Recommendation and Plan  Anticipated Discharge Disposition (PT): sub acute care setting  Planned Therapy Interventions (PT): balance training, bed mobility training, gait training, transfer training, strengthening  Therapy Frequency (PT): daily  Progress Summary (PT)  Progress Toward Functional Goals (PT): progress toward functional goals is fair  Plan of Care Reviewed With: patient  Progress: no change  Outcome Evaluation: To optimize functional mobility and independence, skilled PT services needed to address mobility needs, strength, endurance, and balance impairments. Continue plan of care for another 10 days as all goals are still appropriate.   Outcome Measures       Row Name 04/07/24 1600             How much help from another person do  you currently need...    Turning from your back to your side while in flat bed without using bedrails? 2  -CS      Moving from lying on back to sitting on the side of a flat bed without bedrails? 2  -CS      Moving to and from a bed to a chair (including a wheelchair)? 2  -CS      Standing up from a chair using your arms (e.g., wheelchair, bedside chair)? 2  -CS      Climbing 3-5 steps with a railing? 1  -CS      To walk in hospital room? 2  -CS      AM-PAC 6 Clicks Score (PT) 11  -CS      Highest Level of Mobility Goal 4 --> Transfer to chair/commode  -CS         Functional Assessment    Outcome Measure Options AM-PAC 6 Clicks Basic Mobility (PT)  -CS                User Key  (r) = Recorded By, (t) = Taken By, (c) = Cosigned By      Initials Name Provider Type    CS Roseann Bundy, PT Physical Therapist                     Time Calculation:    PT Charges       Row Name 04/07/24 1608             Time Calculation    PT Received On 04/07/24  -CS      PT Goal Re-Cert Due Date 04/16/24  -CS         Timed Charges    99083 - PT Therapeutic Activity Minutes 28  -CS         Untimed Charges    PT Eval/Re-eval Minutes 12  -CS         Total Minutes    Timed Charges Total Minutes 28  -CS      Untimed Charges Total Minutes 12  -CS       Total Minutes 40  -CS                User Key  (r) = Recorded By, (t) = Taken By, (c) = Cosigned By      Initials Name Provider Type    CS Roseann Bundy, PT Physical Therapist                  Therapy Charges for Today       Code Description Service Date Service Provider Modifiers Qty    86239997656  PT THERAPEUTIC ACT EA 15 MIN 4/7/2024 Roseann Bundy, PT GP 2    18073579293 HC PT RE-EVAL ESTABLISHED PLAN 2 4/7/2024 Roseann Bundy, PT GP 1            PT G-Codes  Outcome Measure Options: AM-PAC 6 Clicks Basic Mobility (PT)  AM-PAC 6 Clicks Score (PT): 11  AM-PAC 6 Clicks Score (OT): 10    Roseann Bundy PT  4/7/2024

## 2024-04-07 NOTE — PLAN OF CARE
Goal Outcome Evaluation:  Plan of Care Reviewed With: patient        Progress: no change  Outcome Evaluation: To optimize functional mobility and independence, skilled PT services needed to address mobility needs, strength, endurance, and balance impairments. Continue plan of care for another 10 days as all goals are still appropriate.      Anticipated Discharge Disposition (PT): sub acute care setting

## 2024-04-07 NOTE — PROGRESS NOTES
[facility]   Hospitalist Progress Note  Date: 2024  Patient Name: Davonte Marshall  : 1955  MRN: [id number]  Date of admission: 3/27/2024      Subjective   Subjective     Chief Complaint: Follow up for somnolence    Summary: 68-year-old male with hypertension, chronic pain on high dose of morphine who presented after having worsening lethargy and mental status apparently was found to still have food in his mouth from the day before, is midline responsive, prior to the CT department had hypoxia with sats in the 70s initially requiring BiPAP, had aspiration episode and required intubation for airway distress.  Treating for sepsis with multifocal pneumonia, possible cardiogenic shock, aspiration, hypoxemic hypercapnic respiratory failure, polypharmacy and oversedation and question positive benzodiazepines and barbiturates on tox screen that he is not prescribed.  Pulmonology cards and neuro assisting.  Extubated to bipap and slowly weaning.  Keppra discontinued and back on primidone.  Failed MBS badly, Cortrak in place.  Had persistent fevers, white count; CT chest showed left lower lobe collapse with bronchopneumonia.  CT abdomen pelvis no acute process      Interval Followup:   No acute events overnight  Remains afebrile.  Blood pressure soft.  On 1 to 2 L of oxygen  Was up in chair for ~ 30 minutes. 2 person assist  No resp complaints  MSK pain tolerable  Tolerating tube feeds    Review of Systems  Cardiovascular:  No Chest Pain  Gastrointestinal:  No abdominal pain, no vomiting      Objective   Objective     Vitals:   Temp:  [97.2 °F (36.2 °C)-98.8 °F (37.1 °C)] 97.2 °F (36.2 °C)  Heart Rate:  [74-93] 80  Resp:  [20-21] 20  BP: (100-145)/(62-79) 100/69  Flow (L/min):  [1.5] 1.5  Physical Exam    Constitutional: Chronically ill-appearing male, conversant, NAD   Neck:  neck flexed difficulty keeping it upright   HENT: NCAT, Cortrak tube   Respiratory: nonlabored respirations    Cardiovascular: RRR,  trace pedal edema   Gastrointestinal: Soft, nontender   Neurologic: Alert and oriented x3, CN grossly intact, speech clear   Skin: Extremities warm, no rashes    Result Review    Result Review:  I have personally reviewed the following over the last 24 hours (07:00 to 07:00) and agree with the following findings  [x]  Laboratory  CBC          4/5/2024    04:14 4/6/2024    04:07 4/7/2024    04:42   CBC   WBC 12.23  13.46  13.90    RBC 4.06  4.31  4.05    Hemoglobin 12.1  12.9  12.1    Hematocrit 37.3  40.1  37.6    MCV 91.9  93.0  92.8    MCH 29.8  29.9  29.9    MCHC 32.4  32.2  32.2    RDW 15.2  15.1  15.0    Platelets 292  342  356      BMP          4/5/2024    04:14 4/6/2024    04:07 4/7/2024    04:42   BMP   BUN 40  40  37    Creatinine 0.96  1.11  0.91    Sodium 141  141  140    Potassium 3.1  3.2  3.3    Chloride 103  103  103    CO2 24.6  25.7  24.5    Calcium 8.6  8.9  8.8      [x]  Microbiology  Blood cultures 4/3 NGTD  []  Radiology   [x]  EKG/Telemetry monitor personally reviewed: NSR/sinus tachycardia  []  Cardiology/Vascular   []  Pathology  []  Old records  [x]  Other:    Intake/Output Summary (Last 24 hours) at 4/7/2024 1218  Last data filed at 4/7/2024 0509  Gross per 24 hour   Intake 1559 ml   Output 1700 ml   Net -141 ml         Assessment & Plan   Assessment / Plan     Assessment/Plan:  Dysphagia/silent aspiration  Acute infarct involving right frontal cortex  Aspiration pneumonia with Klebsiella, Streptococcus, MSSA and Moraxella  Acute hypoxemic and hypercapnic respiratory failure requiring mechanical intervention  Acute metabolic encephalopathy in setting of polypharmacy and hypercapnia - improving  Hx of seizures and Essential tremor - on primidone  Hypernatremia  Hypokalemia  Transaminitis  NSTEMI, suspect type II from demand/sepsis  Lactic acidosis, POA.  Clinically significant  Hx of Hyperlipidemia  Hx of Depression/anxiety on outpatient Cymbalta  Hx of Chronic low back pain 2/2 lumbosacral  degenerative disc disease  Hx of anterocollis and cervical dystonia  Hx of left corona radiata stroke 2020 with no residual deficits  DIONNA, secondary to sepsis/shock. Resolved  Septic shock 2/2 pneumonia. Resolved        SLP following; not cleared for diet and will need PEG in order to go to rehab.  GI consulted, appreciate assistance.  Last Plavix dose 4/6  TeleNeurology recommending DAPT for 21 days followed my monotherapy with aspirin  AST/ALT improving.  Continue to hold statin. Daily CMP  Continue primidone 250 mg three times daily. Seizure precaution  Afebrile for >48 hours.  White count 13.4. Repeat blood cultures no growth.  De-escalate back to Unasyn, stop date 4/10.  Appreciate pulmonology recommendations.  Repeat chest x-ray shows improvement in left lower lobe.  Continue bronchopulmonary hygiene protocol  Continue scheduled nebulizers  Continue prednisone 40 mg daily. Day 4  Wean oxygen, SpO2 goal >90%  Maintaining net negative fluid balance. Creatinine stable.  Continue IV Lasix 40 mg two times daily.  Trend renal function electrolytes with daily BMP  Increase oral potassium chloride to 40 mEq BID  Cardiology following peripherally.  Medical management with DAPT, statin, BB. Cardiac catheterization recommended once more stable.  Blood pressure soft.  Discontinue amlodipine. Continue metoprolol tartrate 12.5 mg q12 hours  Reports tolerable MSK pain.  Unable to crush MS Contin.  Continue oxycodone IR 10 mg every 4 hours scheduled with instructions to hold for or oversedation/if sleeping  Continue tube feeds.  Per dietitian  VTE ppx: Lovenox 40 mg daily  Continue PT/OT.  Will need rehab    Discussed plan with RN.  Discussed with wife at bedside    DVT prophylaxis:  No DVT prophylaxis order currently exists.      CODE STATUS:   Level Of Support Discussed With: Health Care Surrogate  Code Status (Patient has no pulse and is not breathing): CPR (Attempt to Resuscitate)  Medical Interventions (Patient has pulse  or is breathing): Full Support      I spent greater than 50 minutes minutes of time for evaluation and management of this patient including review of chart, labs pertinent imaging available as well as medical decision making and discussion with physicians, staff and patient/family.    Electronically signed by Willy Tena DO, 04/07/24, 12:23 PM EDT.

## 2024-04-08 LAB
ALBUMIN SERPL-MCNC: 3.6 G/DL (ref 3.5–5.2)
ALP SERPL-CCNC: 90 U/L (ref 39–117)
ALT SERPL W P-5'-P-CCNC: 104 U/L (ref 1–41)
ANION GAP SERPL CALCULATED.3IONS-SCNC: 9.8 MMOL/L (ref 5–15)
AST SERPL-CCNC: 46 U/L (ref 1–40)
BACTERIA SPEC AEROBE CULT: NORMAL
BACTERIA SPEC AEROBE CULT: NORMAL
BILIRUB CONJ SERPL-MCNC: <0.2 MG/DL (ref 0–0.3)
BILIRUB INDIRECT SERPL-MCNC: ABNORMAL MG/DL
BILIRUB SERPL-MCNC: 0.4 MG/DL (ref 0–1.2)
BUN SERPL-MCNC: 35 MG/DL (ref 8–23)
BUN/CREAT SERPL: 38.9 (ref 7–25)
CALCIUM SPEC-SCNC: 8.8 MG/DL (ref 8.6–10.5)
CHLORIDE SERPL-SCNC: 103 MMOL/L (ref 98–107)
CO2 SERPL-SCNC: 26.2 MMOL/L (ref 22–29)
CREAT SERPL-MCNC: 0.9 MG/DL (ref 0.76–1.27)
DEPRECATED RDW RBC AUTO: 50.2 FL (ref 37–54)
EGFRCR SERPLBLD CKD-EPI 2021: 94.2 ML/MIN/1.73
ERYTHROCYTE [DISTWIDTH] IN BLOOD BY AUTOMATED COUNT: 14.7 % (ref 12.3–15.4)
GLUCOSE SERPL-MCNC: 92 MG/DL (ref 65–99)
HCT VFR BLD AUTO: 37.8 % (ref 37.5–51)
HGB BLD-MCNC: 12 G/DL (ref 13–17.7)
MAGNESIUM SERPL-MCNC: 2.4 MG/DL (ref 1.6–2.4)
MCH RBC QN AUTO: 29.9 PG (ref 26.6–33)
MCHC RBC AUTO-ENTMCNC: 31.7 G/DL (ref 31.5–35.7)
MCV RBC AUTO: 94 FL (ref 79–97)
PHOSPHATE SERPL-MCNC: 2.9 MG/DL (ref 2.5–4.5)
PLATELET # BLD AUTO: 375 10*3/MM3 (ref 140–450)
PMV BLD AUTO: 11.1 FL (ref 6–12)
POTASSIUM SERPL-SCNC: 4 MMOL/L (ref 3.5–5.2)
PROT SERPL-MCNC: 6.6 G/DL (ref 6–8.5)
RBC # BLD AUTO: 4.02 10*6/MM3 (ref 4.14–5.8)
SODIUM SERPL-SCNC: 139 MMOL/L (ref 136–145)
WBC NRBC COR # BLD AUTO: 12.23 10*3/MM3 (ref 3.4–10.8)

## 2024-04-08 PROCEDURE — 94761 N-INVAS EAR/PLS OXIMETRY MLT: CPT

## 2024-04-08 PROCEDURE — 80076 HEPATIC FUNCTION PANEL: CPT | Performed by: INTERNAL MEDICINE

## 2024-04-08 PROCEDURE — 84100 ASSAY OF PHOSPHORUS: CPT | Performed by: INTERNAL MEDICINE

## 2024-04-08 PROCEDURE — 94799 UNLISTED PULMONARY SVC/PX: CPT

## 2024-04-08 PROCEDURE — 80048 BASIC METABOLIC PNL TOTAL CA: CPT | Performed by: INTERNAL MEDICINE

## 2024-04-08 PROCEDURE — 94664 DEMO&/EVAL PT USE INHALER: CPT

## 2024-04-08 PROCEDURE — 85027 COMPLETE CBC AUTOMATED: CPT | Performed by: INTERNAL MEDICINE

## 2024-04-08 PROCEDURE — 83735 ASSAY OF MAGNESIUM: CPT | Performed by: INTERNAL MEDICINE

## 2024-04-08 PROCEDURE — 97110 THERAPEUTIC EXERCISES: CPT

## 2024-04-08 PROCEDURE — 92526 ORAL FUNCTION THERAPY: CPT

## 2024-04-08 PROCEDURE — 97530 THERAPEUTIC ACTIVITIES: CPT

## 2024-04-08 PROCEDURE — 99232 SBSQ HOSP IP/OBS MODERATE 35: CPT | Performed by: INTERNAL MEDICINE

## 2024-04-08 PROCEDURE — 99233 SBSQ HOSP IP/OBS HIGH 50: CPT | Performed by: INTERNAL MEDICINE

## 2024-04-08 PROCEDURE — 63710000001 PREDNISONE PER 1 MG: Performed by: INTERNAL MEDICINE

## 2024-04-08 PROCEDURE — 25010000002 FUROSEMIDE PER 20 MG: Performed by: INTERNAL MEDICINE

## 2024-04-08 PROCEDURE — 25010000002 AMPICILLIN-SULBACTAM PER 1.5 G: Performed by: INTERNAL MEDICINE

## 2024-04-08 RX ORDER — DULOXETIN HYDROCHLORIDE 20 MG/1
40 CAPSULE, DELAYED RELEASE ORAL 2 TIMES DAILY
Status: DISCONTINUED | OUTPATIENT
Start: 2024-04-08 | End: 2024-04-08

## 2024-04-08 RX ORDER — POTASSIUM CHLORIDE 1.5 G/1.58G
40 POWDER, FOR SOLUTION ORAL DAILY
Status: DISCONTINUED | OUTPATIENT
Start: 2024-04-09 | End: 2024-04-12

## 2024-04-08 RX ORDER — VENLAFAXINE 37.5 MG/1
37.5 TABLET ORAL
Status: DISCONTINUED | OUTPATIENT
Start: 2024-04-08 | End: 2024-04-12

## 2024-04-08 RX ADMIN — OXYCODONE 10 MG: 5 TABLET ORAL at 00:07

## 2024-04-08 RX ADMIN — Medication 250 MG: at 20:06

## 2024-04-08 RX ADMIN — AMPICILLIN AND SULBACTAM 3 G: 2; 1 INJECTION, POWDER, FOR SOLUTION INTRAVENOUS at 12:48

## 2024-04-08 RX ADMIN — METOPROLOL TARTRATE 12.5 MG: 25 TABLET, FILM COATED ORAL at 08:57

## 2024-04-08 RX ADMIN — DOCUSATE SODIUM 50MG AND SENNOSIDES 8.6MG 1 TABLET: 8.6; 5 TABLET, FILM COATED ORAL at 20:05

## 2024-04-08 RX ADMIN — PRIMIDONE 250 MG: 250 TABLET ORAL at 20:05

## 2024-04-08 RX ADMIN — AMPICILLIN AND SULBACTAM 3 G: 2; 1 INJECTION, POWDER, FOR SOLUTION INTRAVENOUS at 05:19

## 2024-04-08 RX ADMIN — OXYCODONE 10 MG: 5 TABLET ORAL at 05:19

## 2024-04-08 RX ADMIN — Medication 10 ML: at 08:59

## 2024-04-08 RX ADMIN — ASPIRIN 81 MG 81 MG: 81 TABLET ORAL at 08:57

## 2024-04-08 RX ADMIN — OXYCODONE 10 MG: 5 TABLET ORAL at 08:57

## 2024-04-08 RX ADMIN — FUROSEMIDE 40 MG: 10 INJECTION, SOLUTION INTRAMUSCULAR; INTRAVENOUS at 17:41

## 2024-04-08 RX ADMIN — AMPICILLIN AND SULBACTAM 3 G: 2; 1 INJECTION, POWDER, FOR SOLUTION INTRAVENOUS at 17:40

## 2024-04-08 RX ADMIN — FAMOTIDINE 20 MG: 20 TABLET ORAL at 08:57

## 2024-04-08 RX ADMIN — OXYCODONE 10 MG: 5 TABLET ORAL at 23:45

## 2024-04-08 RX ADMIN — OXYCODONE 10 MG: 5 TABLET ORAL at 16:48

## 2024-04-08 RX ADMIN — VENLAFAXINE HYDROCHLORIDE 37.5 MG: 37.5 TABLET ORAL at 11:17

## 2024-04-08 RX ADMIN — VENLAFAXINE HYDROCHLORIDE 37.5 MG: 37.5 TABLET ORAL at 17:41

## 2024-04-08 RX ADMIN — BUDESONIDE 0.5 MG: 0.5 INHALANT ORAL at 06:36

## 2024-04-08 RX ADMIN — PREDNISONE 40 MG: 20 TABLET ORAL at 08:56

## 2024-04-08 RX ADMIN — BACITRACIN 0.9 G: 500 OINTMENT TOPICAL at 08:57

## 2024-04-08 RX ADMIN — AMPICILLIN AND SULBACTAM 3 G: 2; 1 INJECTION, POWDER, FOR SOLUTION INTRAVENOUS at 23:40

## 2024-04-08 RX ADMIN — PRIMIDONE 250 MG: 250 TABLET ORAL at 08:56

## 2024-04-08 RX ADMIN — PRIMIDONE 250 MG: 250 TABLET ORAL at 16:48

## 2024-04-08 RX ADMIN — FUROSEMIDE 40 MG: 10 INJECTION, SOLUTION INTRAMUSCULAR; INTRAVENOUS at 08:57

## 2024-04-08 RX ADMIN — BACITRACIN 0.9 G: 500 OINTMENT TOPICAL at 20:05

## 2024-04-08 RX ADMIN — AMPICILLIN AND SULBACTAM 3 G: 2; 1 INJECTION, POWDER, FOR SOLUTION INTRAVENOUS at 00:08

## 2024-04-08 RX ADMIN — OXYCODONE 10 MG: 5 TABLET ORAL at 20:05

## 2024-04-08 RX ADMIN — ARFORMOTEROL TARTRATE 15 MCG: 15 SOLUTION RESPIRATORY (INHALATION) at 06:36

## 2024-04-08 RX ADMIN — METOPROLOL TARTRATE 12.5 MG: 25 TABLET, FILM COATED ORAL at 20:06

## 2024-04-08 RX ADMIN — Medication 10 ML: at 20:05

## 2024-04-08 RX ADMIN — POTASSIUM CHLORIDE 40 MEQ: 1.5 POWDER, FOR SOLUTION ORAL at 08:56

## 2024-04-08 RX ADMIN — OXYCODONE 10 MG: 5 TABLET ORAL at 12:47

## 2024-04-08 RX ADMIN — LIDOCAINE 2 PATCH: 4 PATCH TOPICAL at 08:58

## 2024-04-08 RX ADMIN — Medication 250 MG: at 08:56

## 2024-04-08 NOTE — PROGRESS NOTES
Respiratory Therapist Broncho-Pulmonary Hygiene Progress Note      Patient Name:  Davonte Marshall  YOB: 1955    Davonte Marshall meets the qualification for Level 2 of the Bronco-Pulmonary Hygiene Protocol. This was based on my daily patient assessment and includes review of chest x-ray results, cough ability and quality, oxygenation, secretions or risk for secretion development and patient mobility.     Broncho-Pulmonary Hygiene Assessment:    Level of Movement: Actively changing positions without assistance  Alert/ oriented/ cooperative    Breath Sounds: Clear to slightly diminished    Cough: Strong, effective    Chest X-Ray: Possible signs of consolidation and/or atelectasis or clear.     Sputum Productions: None or small amount of thin or watery secretions with effective cough    History and Physical: None    SpO2 to Oxygen Need: greater than 92% on room air or  less than 3L nasal canula    Current SpO2 is: 96 on 2L     Based on this information, I have completed the following interventions: Aerobika with bronchodialtor medication or TID    Patients most recent xray showed improvement with no cardiopulmonary processes identified.     Electronically signed by Mony Ratliff RRT, 04/08/24, 8:12 AM EDT.

## 2024-04-08 NOTE — PROGRESS NOTES
Pulmonary / Critical Care Progress Note      Patient Name: Davonte Marshall  : 1955  MRN: [id number]  Attending:  Willy Tena DO   Date of admission: 3/27/2024    Subjective   Subjective   Follow-up for respiratory failure on ventilator, aspiration pneumonia secondary to Klebsiella, strep pneumo, MSSA and Moraxella     Resting comfortably in bed  CT scan from 4/3/2024 showed persistent left lower lobe collapse with fluid and debris in the left mainstem bronchus  Chest x-ray from 2024 showed improvement aeration left lower lobe   Chest x-ray from 2024 shows no active process  On room air    Objective     Vitals:   Vital signs for last 24 hours:  Temp:  [97.3 °F (36.3 °C)-98.8 °F (37.1 °C)] 97.3 °F (36.3 °C)  Heart Rate:  [83-96] 83  Resp:  [18-20] 18  BP: (111-152)/(60-87) 142/74    Physical Exam   Vital Signs Reviewed   Elderly male  Diminished breath sounds at bases but improved aeration  Scattered rhonchi present  He is alert and oriented  No on Roomair    Result Review    Result Review:  I have personally reviewed the results from the time of this admission to 2024 13:06 EDT and agree with these findings:  [x]  Laboratory  [x]  Microbiology  [x]  Radiology  [x]  EKG/Telemetry   [x]  Cardiology/Vascular   []  Pathology  []  Old records  []  Other:  Most notable findings include:       Lab 24  0433 24  0442 24  0407 24  0414 24  0506 24  0010 24  0004 24  0440   WBC 12.23* 13.90* 13.46* 12.23* 14.79* 19.15*  --  14.02*   HEMOGLOBIN 12.0* 12.1* 12.9* 12.1* 13.0 14.7  --  13.9   HEMATOCRIT 37.8 37.6 40.1 37.3* 41.0 44.0  --  42.1   PLATELETS 375 356 342 292 309 396  --  267   SODIUM 139 140 141 141 144 145  --  146*   SODIUM, ARTERIAL  --   --   --   --   --   --  144.0  --    POTASSIUM 4.0 3.3* 3.2* 3.1* 3.4* 3.8  --  3.4*   CHLORIDE 103 103 103 103 107 105  --  107   CO2 26.2 24.5 25.7 24.6 24.6 24.0  --  24.4   BUN 35* 37* 40* 40* 46* 41*  --   36*   CREATININE 0.90 0.91 1.11 0.96 1.18 1.19  --  1.01   GLUCOSE 92 114* 132* 97 116* 132*  --  140*   GLUCOSE, ARTERIAL  --   --   --   --   --   --  129*  --    CALCIUM 8.8 8.8 8.9 8.6 8.7 9.0  --  9.0   PHOSPHORUS 2.9 3.3 3.0 3.8 3.3 4.7*  --  3.4   TOTAL PROTEIN 6.6 6.6 7.3 6.2 6.8 7.5  --  6.9   ALBUMIN 3.6 3.5 3.6 3.3* 3.4* 3.8  --  3.6   GLOBULIN  --  3.1 3.7 2.9 3.4 3.7  --  3.3     Results for orders placed during the hospital encounter of 03/27/24    Adult Transthoracic Echo Limited W/ Cont if Necessary Per Protocol    Interpretation Summary    Extremely technically difficult study with very limited views.    The subcostal view is the only one where we can see myocardium and it looks like ejection fraction is greater than 50%.  Cannot rule out regional wall motion abnormalities.    No obvious significant valvular disease by Doppler.  The valves are not well-visualized.    Left ventricular diastolic function was indeterminate.    BAL culture with Streptococcus pneumonia, Klebsiella and MSSA    Assessment & Plan   Assessment / Plan     Active Hospital Problems:  Active Hospital Problems    Diagnosis     **Acute metabolic encephalopathy      Impression:  Septic shock, resolved  Aspiration pneumonia secondary to Klebsiella, strep pneumo, MSSA and Moraxella  Lactic acidosis, clinically significant  Metabolic acidosis  Acute hypoxemic and hypercapnic respiratory failure require mechanical ventilation  Altered mental status  Toxic/metabolic encephalopathy  NSTEMI  Acute kidney injury secondary to prerenal etiology  Hypomagnesemia  Hypophosphatemia  Hypophosphatemia  Leukocytosis  Seizures/tremors.  On primidone at home     Plan:  Now Of O2  Patient improving will continue bronchopulmonary hygiene  Continue Pulmicort  Continue antibiotics  Continue chest vest  Okay from a standpoint for patient to proceed with endoscopy if needed for PEG tube placement    We Will follow from a distance call with  questions      DVT prophylaxis: Lovenox  No DVT prophylaxis order currently exists.    CODE STATUS:   Level Of Support Discussed With: Health Care Surrogate  Code Status (Patient has no pulse and is not breathing): CPR (Attempt to Resuscitate)  Medical Interventions (Patient has pulse or is breathing): Full Support    Labs, managing, and medications personally reviewed  Discussed with primary and patient    Electronically signed by Shelton Bernal DO, 04/08/24, 1:06 PM EDT.'

## 2024-04-08 NOTE — THERAPY TREATMENT NOTE
"Acute Care - Speech Language Pathology   Swallow Treatment Note [facility]     Patient Name: Davonte Marshall  : 1955  MRN: [id number]  Today's Date: 2024               Admit Date: 3/27/2024    Visit Dx:     ICD-10-CM ICD-9-CM   1. Acute respiratory failure with hypoxia  J96.01 518.81   2. Metabolic encephalopathy  G93.41 348.31   3. DIONNA (acute kidney injury)  N17.9 584.9   4. NSTEMI (non-ST elevated myocardial infarction)  I21.4 410.70   5. Oropharyngeal dysphagia  R13.12 787.22   6. Impaired mobility and ADLs  Z74.09 V49.89    Z78.9    7. Difficulty in walking  R26.2 719.7     Patient Active Problem List   Diagnosis    Anemia    Anxiety    Arthritis    Back pain    Chronic bronchitis    Complication of surgical procedure    Degeneration of lumbosacral intervertebral disc    Depression    Encounter for long-term (current) use of insulin    Essential tremor    Gall stones    Head injury    Hemorrhoids    Herniation of intervertebral disc    Hyperlipidemia    Leg pain    Neck pain    Renal insufficiency    Scoliosis    Seizure disorder    Smokes 1.5 packs of cigarettes per day    Vitamin D deficiency disease    Gross hematuria    BPH without obstruction/lower urinary tract symptoms    Urge incontinence of urine    Benign prostatic hyperplasia with urinary hesitancy    Sequelae, post-stroke    S/P TURP    Postoperative anemia    Postoperative hypoxia    Essential hypertension    Opioid dependence    Cervical dystonia    Acute metabolic encephalopathy     Past Medical History:   Diagnosis Date    Anemia     Anxiety     Arthritis     Benign essential hypertension     Chronic bronchitis     Depression     Enlarged prostate     Floaters in visual field     Gall stones     Generalized weakness     Head injury     Hemorrhoids     Hyperlipidemia     Leg pain     Numbness in both hands 2015    Seizures     last \"quite a while ago\"    Stroke 2020    left sided weakness     Past Surgical History:   Procedure " Laterality Date    APPENDECTOMY      BACK SURGERY      4    CHOLECYSTECTOMY      CYSTOSCOPY Bilateral     7/12/21    CYSTOSCOPY TRANSURETHRAL RESECTION OF PROSTATE N/A 7/12/2021    Procedure: CYSTOSCOPY TRANSURETHRAL RESECTION OF PROSTATE;  Surgeon: Penelope Fox MD;  Location: [facility] MAIN OR;  Service: Urology;  Laterality: N/A;    HAND SURGERY Bilateral     thumb    LYMPH NODE DISSECTION      jaw line    TURP / TRANSURETHRAL INCISION / DRAINAGE PROSTATE  07/12/2021       SPEECH PATHOLOGY DYSPHAGIA TREATMENT     Subjective/Behavioral Observations: Alert and cooperative, coretrak in place.          Day/time of Treatment: 4/8/2024        Current Diet: N.p.o. with coretrak.        Current Strategies: N/A        Treatment received: Dysphagia therapy to address swallow function through exercises and education of strategies.        Results of treatment:   Patient educated for oral motor exercise program, exercises completed addressing tongue base strength, laryngeal elevation and closure.  Patient requiring min-mod cueing for exercises.  Patient appears with overall improving general strength.  Vocal quality appearing clear and adequate.  Patient given ice chips x 10, occasional vocal wetness noted.  Decreased laryngeal elevation noted to palpation.        Progress toward goals: Adequate        Barriers to Achieving goals: Medical status        Plan of care:/changes in plan: Continue per current plan.  Recommend utilizing swab with water to freshen oral cavity.  May have single ice chips with nursing only.                                                                                 Plan of Care Reviewed With: patient, spouse          EDUCATION  The patient has been educated in the following areas:   NPO rationale.              Time Calculation:    Time Calculation- SLP       Row Name 04/08/24 8147             Time Calculation- SLP    SLP Stop Time 1215  -TB      SLP Received On 04/08/24  -TB         Untimed  Charges    89250-RX Treatment Swallow Minutes 40  -TB         Total Minutes    Untimed Charges Total Minutes 40  -TB       Total Minutes 40  -TB                User Key  (r) = Recorded By, (t) = Taken By, (c) = Cosigned By      Initials Name Provider Type    Floridalma Shabazz SLP Speech and Language Pathologist                    Therapy Charges for Today       Code Description Service Date Service Provider Modifiers Qty    [id number] HC ST TREATMENT SWALLOW 3 4/8/2024 Floridalma Silva SLP GN 1                 PATRICA Oropeza  4/8/2024

## 2024-04-08 NOTE — PLAN OF CARE
Goal Outcome Evaluation:                 No acute changes this shift. VSS.

## 2024-04-08 NOTE — PROGRESS NOTES
[facility]   Hospitalist Progress Note  Date: 2024  Patient Name: Davonte Marshall  : 1955  MRN: [id number]  Date of admission: 3/27/2024      Subjective   Subjective     Chief Complaint: Follow up for somnolence    Summary: 68-year-old male with hypertension, chronic pain on MS Contin/Cymbalta, seizure/essential tremor on primidone presented with altered mental status and hypoxia. Required BiPAP, had aspiration episode and required intubation for airway distress.  Initial event attributed to seizure as had not been taking primidone.  Initially received Keppra, switched back to primidone with improvement in mental status and no further seizure activity.  Extubated to BiPAP and now on room air.  Completing course of Unasyn for aspiration pneumonia.  Had left lower lobe collapse but opened up with bronchopulmonary hygiene protocol.  Hospital course complicated by acute CVA; not candidate for thrombolytic; DAPT recommended from neurology and cardiology standpoint given concern for ACS; cath eventually recommended.  Plavix currently on hold in order to get PEG later this week.  Overall better but remains very debilitated and rehab planned    Interval Followup:   Afebrile.  Blood pressure controlled  Weaned to room air.  Denies dyspnea  No fever or chills  Feeling more anxious and jittery today  MSK pain control  Tolerating tube feeds    Review of Systems  Cardiovascular:  No Chest Pain  Gastrointestinal:  No abdominal pain, no vomiting      Objective   Objective     Vitals:   Temp:  [97.3 °F (36.3 °C)-98.8 °F (37.1 °C)] 97.3 °F (36.3 °C)  Heart Rate:  [83-96] 96  Resp:  [18-20] 20  BP: (111-152)/(60-87) 152/80  Flow (L/min):  [1.5] 1.5  Physical Exam    Constitutional: Chronically ill-appearing male, conversant, NAD   Neck:  neck flexed difficulty keeping it upright   HENT: NCAT, Cortrak tube   Respiratory: nonlabored respirations    Cardiovascular: RRR, trace pedal edema   Gastrointestinal: Soft,  nontender   Neurologic: Alert and oriented x3, CN grossly intact, speech clear   Skin: Extremities warm, no rashes    Result Review    Result Review:  I have personally reviewed the following over the last 24 hours (07:00 to 07:00) and agree with the following findings  [x]  Laboratory  CBC          4/6/2024    04:07 4/7/2024    04:42 4/8/2024    04:33   CBC   WBC 13.46  13.90  12.23    RBC 4.31  4.05  4.02    Hemoglobin 12.9  12.1  12.0    Hematocrit 40.1  37.6  37.8    MCV 93.0  92.8  94.0    MCH 29.9  29.9  29.9    MCHC 32.2  32.2  31.7    RDW 15.1  15.0  14.7    Platelets 342  356  375      BMP          4/6/2024    04:07 4/7/2024    04:42 4/8/2024    04:33   BMP   BUN 40  37  35    Creatinine 1.11  0.91  0.90    Sodium 141  140  139    Potassium 3.2  3.3  4.0    Chloride 103  103  103    CO2 25.7  24.5  26.2    Calcium 8.9  8.8  8.8      [x]  Microbiology  Blood cultures 4/3 NGTD  []  Radiology   [x]  EKG/Telemetry monitor personally reviewed: NSR/sinus tachycardia  []  Cardiology/Vascular   []  Pathology  []  Old records  [x]  Other:    Intake/Output Summary (Last 24 hours) at 4/8/2024 1141  Last data filed at 4/8/2024 0900  Gross per 24 hour   Intake 3538 ml   Output 1275 ml   Net 2263 ml         Assessment & Plan   Assessment / Plan     Assessment/Plan:  Dysphagia/silent aspiration  Acute infarct involving right frontal cortex  Aspiration pneumonia with Klebsiella, Streptococcus, MSSA and Moraxella  Acute hypoxemic and hypercapnic respiratory failure requiring mechanical intervention  Acute metabolic encephalopathy in setting of polypharmacy and hypercapnia - improving  Hx of seizures and Essential tremor - on primidone  Hypernatremia  Hypokalemia  Transaminitis  NSTEMI, suspect type II from demand/sepsis  Lactic acidosis, POA.  Clinically significant  Hx of Hyperlipidemia  Hx of Depression/anxiety on outpatient Cymbalta  Hx of Chronic low back pain 2/2 lumbosacral degenerative disc disease  Hx of  anterocollis and cervical dystonia  Hx of left corona radiata stroke 2020 with no residual deficits  DIONNA, secondary to sepsis/shock. Resolved  Septic shock 2/2 pneumonia. Resolved        SLP following;  will need PEG in order to go to rehab.  GI on board. PEG placement planned mid week.  Last Plavix dose 4/6  TeleNeurology recommending DAPT for 21 days followed my monotherapy with aspirin  AST/ALT improving.  Statin on hold.  Daily CMP  Continue primidone 250 mg three times daily. Seizure precaution  Afebrile for >48 hours.  White count downtrending. Repeat blood cultures no growth.  De-escalated back to Unasyn, stop date 4/10.  Appreciate pulmonology recommendations.  Repeat chest x-ray shows improvement in left lower lobe.  Weaned to room air. Continue bronchopulmonary hygiene protocol  Continue scheduled nebulizers  Completed 5 days of prednisone  Maintaining net negative fluid balance. Creatinine stable.  Continue IV Lasix 40 mg two times daily.  Trend renal function electrolytes with daily BMP  Continue oral potassium chloride 40 mEq daily  Cardiology following peripherally.  Medical management with DAPT, statin, BB. Cardiac catheterization recommended once more stable.  Continue metoprolol tartrate 12.5 mg q12 hours  Unable to crush MS Contin.  Switched to oxycodone IR 10 mg every 4 hours scheduled and reports pain control is adequate.  No signs of oversedation.  Cymbalta unable to be crushed and has also been on hold.  Suspect he is having  withdrawal symptoms.  Discussed with pharmacy only SNRI that we have that can be given via Cortrak/PEG is venlafaxine.  Will start at 37.5 mg TID.   Continue tube feeds.  Per dietitian  VTE ppx: Lovenox 40 mg daily  Continue PT/OT.  Will need rehab    Discussed plan with RN.  Discussed with wife at bedside    DVT prophylaxis:  No DVT prophylaxis order currently exists.      CODE STATUS:   Level Of Support Discussed With: Health Care Surrogate  Code Status (Patient has no  pulse and is not breathing): CPR (Attempt to Resuscitate)  Medical Interventions (Patient has pulse or is breathing): Full Support      I spent greater than 50 minutes minutes of time for evaluation and management of this patient including review of chart, labs pertinent imaging available as well as medical decision making and discussion with physicians, staff and patient/family.    Electronically signed by Willy Tena DO, 04/08/24, 11:50 AM EDT.

## 2024-04-08 NOTE — THERAPY TREATMENT NOTE
"Acute Care - Physical Therapy Treatment Note  [facility]     Patient Name: Davonte Marshall  : 1955  MRN: [id number]  Today's Date: 2024      Visit Dx:     ICD-10-CM ICD-9-CM   1. Acute respiratory failure with hypoxia  J96.01 518.81   2. Metabolic encephalopathy  G93.41 348.31   3. DIONNA (acute kidney injury)  N17.9 584.9   4. NSTEMI (non-ST elevated myocardial infarction)  I21.4 410.70   5. Oropharyngeal dysphagia  R13.12 787.22   6. Impaired mobility and ADLs  Z74.09 V49.89    Z78.9    7. Difficulty in walking  R26.2 719.7     Patient Active Problem List   Diagnosis    Anemia    Anxiety    Arthritis    Back pain    Chronic bronchitis    Complication of surgical procedure    Degeneration of lumbosacral intervertebral disc    Depression    Encounter for long-term (current) use of insulin    Essential tremor    Gall stones    Head injury    Hemorrhoids    Herniation of intervertebral disc    Hyperlipidemia    Leg pain    Neck pain    Renal insufficiency    Scoliosis    Seizure disorder    Smokes 1.5 packs of cigarettes per day    Vitamin D deficiency disease    Gross hematuria    BPH without obstruction/lower urinary tract symptoms    Urge incontinence of urine    Benign prostatic hyperplasia with urinary hesitancy    Sequelae, post-stroke    S/P TURP    Postoperative anemia    Postoperative hypoxia    Essential hypertension    Opioid dependence    Cervical dystonia    Acute metabolic encephalopathy     Past Medical History:   Diagnosis Date    Anemia     Anxiety     Arthritis     Benign essential hypertension     Chronic bronchitis     Depression     Enlarged prostate     Floaters in visual field     Gall stones     Generalized weakness     Head injury     Hemorrhoids     Hyperlipidemia     Leg pain     Numbness in both hands 2015    Seizures     last \"quite a while ago\"    Stroke 2020    left sided weakness     Past Surgical History:   Procedure Laterality Date    APPENDECTOMY      BACK SURGERY   "    4    CHOLECYSTECTOMY      CYSTOSCOPY Bilateral     7/12/21    CYSTOSCOPY TRANSURETHRAL RESECTION OF PROSTATE N/A 7/12/2021    Procedure: CYSTOSCOPY TRANSURETHRAL RESECTION OF PROSTATE;  Surgeon: Penelope Fox MD;  Location: [facility] MAIN OR;  Service: Urology;  Laterality: N/A;    HAND SURGERY Bilateral     thumb    LYMPH NODE DISSECTION      jaw line    TURP / TRANSURETHRAL INCISION / DRAINAGE PROSTATE  07/12/2021     PT Assessment (Last 12 Hours)       PT Evaluation and Treatment       Row Name 04/08/24 0910          Physical Therapy Time and Intention    Subjective Information complains of;weakness;fatigue;pain;dizziness  -DK     Document Type therapy note (daily note)  -DK     Mode of Treatment individual therapy;physical therapy  -DK     Patient Effort good  -DK     Symptoms Noted During/After Treatment fatigue;dizziness;increased pain  -DK     Comment Per PCA, pt has been on / off the bed pan several times, required boosting up in bed several times this morning.  -DK       Row Name 04/08/24 0910          Pain    Pretreatment Pain Rating 3/10  -DK     Posttreatment Pain Rating 3/10  -DK     Pain Location generalized  -DK     Pain Intervention(s) Repositioned;Ambulation/increased activity;Distraction;Therapeutic presence  -DK       Row Name 04/08/24 0910          Cognition    Affect/Mental Status (Cognition) confused;low arousal/lethargic  -DK     Orientation Status (Cognition) oriented to;person;situation  -DK     Follows Commands (Cognition) WFL  -DK     Cognitive Function WFL  -DK     Personal Safety Interventions gait belt;nonskid shoes/slippers when out of bed;supervised activity  -DK       Row Name 04/08/24 0910          Bed Mobility    Bed Mobility scooting/bridging;supine-sit-supine  -DK     All Activities, [place] (Bed Mobility) contact guard;minimum assist (75% patient effort);1 person assist  -DK     Rolling Left [place] (Bed Mobility) contact guard;1 person assist  -DK     Rolling  Right [place] (Bed Mobility) contact guard;1 person assist  -DK     Scooting/Bridging [place] (Bed Mobility) minimum assist (75% patient effort);2 person assist  -DK     Supine-Sit [place] (Bed Mobility) contact guard;1 person assist  -DK     Sit-Supine [place] (Bed Mobility) contact guard;1 person assist  -DK     Supine-Sit-Supine [place] (Bed Mobility) contact guard;1 person assist  -DK     Bed Mobility, Safety Issues decreased use of arms for pushing/pulling;decreased use of legs for bridging/pushing  -DK     Assistive Device (Bed Mobility) bed rails;draw sheet  -DK     Comment, (Bed Mobility) Pt sat EOB x 2-3 minutes with SBA.  He deferred standing transfers due to c/o pain, fatigue, and dizziness.  Pt returned to bed on alert post treatment.  -       Row Name 04/08/24 0910          Safety Issues, Functional Mobility    Safety Issues Affecting Function (Mobility) awareness of need for assistance;impulsivity;judgment;safety precaution awareness  -DK     Impairments Affecting Function (Mobility) balance;cognition;endurance/activity tolerance;muscle tone abnormal;strength;pain  -DK     Cognitive Impairments, Mobility Safety/Performance attention;awareness, need for assistance;impulsivity;judgment;safety precaution awareness  -       Row Name 04/08/24 0910          Balance    Balance Assessment sitting static balance;sitting dynamic balance  -DK     Static Sitting Balance standby assist  -DK     Dynamic Sitting Balance standby assist  -DK     Position, Sitting Balance unsupported;sitting edge of bed  -DK     Balance Interventions sitting;static;dynamic  -       Row Name 04/08/24 0910          Motor Skills    Motor Skills --  therapeutic exercises  -DK     Coordination WFL  -DK     Therapeutic Exercise hip;knee;ankle  -       Row Name 04/08/24 0910          Hip (Therapeutic Exercise)    Hip (Therapeutic Exercise) AAROM (active assistive range of motion)  -DK     Hip AAROM (Therapeutic  Exercise) bilateral;flexion;extension;aBduction;aDduction;supine;10 repetitions;2 sets  -DK       Row Name 04/08/24 0910          Knee (Therapeutic Exercise)    Knee (Therapeutic Exercise) AAROM (active assistive range of motion)  -DK     Knee AAROM (Therapeutic Exercise) bilateral;flexion;extension;supine;10 repetitions;2 sets  -DK       Row Name 04/08/24 0910          Ankle (Therapeutic Exercise)    Ankle (Therapeutic Exercise) AAROM (active assistive range of motion)  -DK     Ankle AAROM (Therapeutic Exercise) bilateral;dorsiflexion;plantarflexion;supine;10 repetitions;2 sets  -DK       Row Name             Wound 04/04/24 1200 Right anterior second toe    Wound - Properties Group Placement Date: 04/04/24  -EP Placement Time: 1200  -EP Side: Right  -EP Orientation: anterior  -EP Location: second toe  -EP    Retired Wound - Properties Group Placement Date: 04/04/24  -EP Placement Time: 1200  -EP Side: Right  -EP Orientation: anterior  -EP Location: second toe  -EP    Retired Wound - Properties Group Date first assessed: 04/04/24  -EP Time first assessed: 1200  -EP Side: Right  -EP Location: second toe  -EP      Row Name             Wound 04/04/24 1200 Right anterior third toe    Wound - Properties Group Placement Date: 04/04/24  -EP Placement Time: 1200  -EP Side: Right  -EP Orientation: anterior  -EP Location: third toe  -EP    Retired Wound - Properties Group Placement Date: 04/04/24  -EP Placement Time: 1200  -EP Side: Right  -EP Orientation: anterior  -EP Location: third toe  -EP    Retired Wound - Properties Group Date first assessed: 04/04/24  -EP Time first assessed: 1200  -EP Side: Right  -EP Location: third toe  -EP      Row Name             Wound 04/04/24 1200 Left anterior great toe    Wound - Properties Group Placement Date: 04/04/24  -EP Placement Time: 1200  -EP Side: Left  -EP Orientation: anterior  -EP Location: great toe  -EP    Retired Wound - Properties Group Placement Date: 04/04/24  -EP  Placement Time: 1200  -EP Side: Left  -EP Orientation: anterior  -EP Location: great toe  -EP    Retired Wound - Properties Group Date first assessed: 04/04/24  -EP Time first assessed: 1200  -EP Side: Left  -EP Location: great toe  -EP      Row Name             Wound 04/04/24 1200 Left anterior second toe    Wound - Properties Group Placement Date: 04/04/24  -EP Placement Time: 1200  -EP Side: Left  -EP Orientation: anterior  -EP Location: second toe  -EP    Retired Wound - Properties Group Placement Date: 04/04/24  -EP Placement Time: 1200  -EP Side: Left  -EP Orientation: anterior  -EP Location: second toe  -EP    Retired Wound - Properties Group Date first assessed: 04/04/24  -EP Time first assessed: 1200  -EP Side: Left  -EP Location: second toe  -EP      Row Name 04/08/24 0910          Plan of Care Review    Plan of Care Reviewed With patient;family  -DK     Progress no change  -DK       Row Name 04/08/24 0910          Positioning and Restraints    Pre-Treatment Position in bed  -DK     Post Treatment Position bed  -DK     In Bed supine;call light within reach;encouraged to call for assist;exit alarm on;side rails up x3;with family/caregiver;legs elevated;heels elevated  -DK       Row Name 04/08/24 0910          Therapy Assessment/Plan (PT)    Rehab Potential (PT) good, to achieve stated therapy goals  -DK     Criteria for Skilled Interventions Met (PT) skilled treatment is necessary  -DK     Therapy Frequency (PT) daily  -DK     Problem List (PT) problems related to;balance;cognition;mobility;strength;pain;hearing  -DK     Activity Limitations Related to Problem List (PT) unable to ambulate safely;unable to transfer safely  -DK       Row Name 04/08/24 0910          Progress Summary (PT)    Progress Toward Functional Goals (PT) progress toward functional goals is good  -DK               User Key  (r) = Recorded By, (t) = Taken By, (c) = Cosigned By      Initials Name Provider Type    Chantal De Paz PTA  Physical Therapist Assistant    Alise Altamirano RN Registered Nurse                      PT Recommendation and Plan  Planned Therapy Interventions (PT): balance training, bed mobility training, gait training, strengthening, transfer training  Therapy Frequency (PT): daily  Progress Summary (PT)  Progress Toward Functional Goals (PT): progress toward functional goals is good  Plan of Care Reviewed With: patient, family  Progress: no change   Outcome Measures       Row Name 04/08/24 0910 04/07/24 1600          How much help from another person do you currently need...    Turning from your back to your side while in flat bed without using bedrails? 3  -DK 2  -CS     Moving from lying on back to sitting on the side of a flat bed without bedrails? 3  -DK 2  -CS     Moving to and from a bed to a chair (including a wheelchair)? 2  -DK 2  -CS     Standing up from a chair using your arms (e.g., wheelchair, bedside chair)? 2  -DK 2  -CS     Climbing 3-5 steps with a railing? 1  -DK 1  -CS     To walk in hospital room? 2  -DK 2  -CS     AM-PAC 6 Clicks Score (PT) 13  -DK 11  -CS     Highest Level of Mobility Goal 4 --> Transfer to chair/commode  -DK 4 --> Transfer to chair/commode  -CS        Functional Assessment    Outcome Measure Options AM-PAC 6 Clicks Basic Mobility (PT)  -DK AM-PAC 6 Clicks Basic Mobility (PT)  -CS               User Key  (r) = Recorded By, (t) = Taken By, (c) = Cosigned By      Initials Name Provider Type    Chantal De Paz, PTA Physical Therapist Assistant    Roseann Vivas PT Physical Therapist                     Time Calculation:    PT Charges       Row Name 04/08/24 0915             Time Calculation    PT Received On 04/08/24  -DK      PT Goal Re-Cert Due Date 04/16/24  -DK         Timed Charges    93981 - PT Therapeutic Exercise Minutes 14  -DK      78832 - PT Therapeutic Activity Minutes 11  -DK         Total Minutes    Timed Charges Total Minutes 25  -DK       Total Minutes 25  -DK                 User Key  (r) = Recorded By, (t) = Taken By, (c) = Cosigned By      Initials Name Provider Type    Chantal De Paz PTA Physical Therapist Assistant                  Therapy Charges for Today       Code Description Service Date Service Provider Modifiers Qty    24633205352 HC PT THER PROC EA 15 MIN 4/8/2024 Chantal Narayanan PTA GP 1    82120202859 HC PT THERAPEUTIC ACT EA 15 MIN 4/8/2024 Chantal Narayanan PTA GP 1            PT G-Codes  Outcome Measure Options: AM-PAC 6 Clicks Basic Mobility (PT)  AM-PAC 6 Clicks Score (PT): 13  AM-PAC 6 Clicks Score (OT): 10    Chantal Narayanan PTA  4/8/2024

## 2024-04-08 NOTE — PLAN OF CARE
Goal Outcome Evaluation:           Progress: no change  Outcome Evaluation: A&Ox4. No acute changes to patient condition. Tube feed continuously infusing as ordered. Patient tolerating well. Resting in bed with eyes closed and visible respirations.

## 2024-04-09 LAB
ANION GAP SERPL CALCULATED.3IONS-SCNC: 12.1 MMOL/L (ref 5–15)
BUN SERPL-MCNC: 33 MG/DL (ref 8–23)
BUN/CREAT SERPL: 42.9 (ref 7–25)
CALCIUM SPEC-SCNC: 9.1 MG/DL (ref 8.6–10.5)
CHLORIDE SERPL-SCNC: 101 MMOL/L (ref 98–107)
CO2 SERPL-SCNC: 23.9 MMOL/L (ref 22–29)
CREAT SERPL-MCNC: 0.77 MG/DL (ref 0.76–1.27)
DEPRECATED RDW RBC AUTO: 48.7 FL (ref 37–54)
EGFRCR SERPLBLD CKD-EPI 2021: 98.7 ML/MIN/1.73
ERYTHROCYTE [DISTWIDTH] IN BLOOD BY AUTOMATED COUNT: 14.6 % (ref 12.3–15.4)
GLUCOSE SERPL-MCNC: 106 MG/DL (ref 65–99)
HCT VFR BLD AUTO: 36.5 % (ref 37.5–51)
HGB BLD-MCNC: 11.9 G/DL (ref 13–17.7)
MAGNESIUM SERPL-MCNC: 2.3 MG/DL (ref 1.6–2.4)
MCH RBC QN AUTO: 30.1 PG (ref 26.6–33)
MCHC RBC AUTO-ENTMCNC: 32.6 G/DL (ref 31.5–35.7)
MCV RBC AUTO: 92.2 FL (ref 79–97)
PHOSPHATE SERPL-MCNC: 3.5 MG/DL (ref 2.5–4.5)
PLATELET # BLD AUTO: 385 10*3/MM3 (ref 140–450)
PMV BLD AUTO: 11.2 FL (ref 6–12)
POTASSIUM SERPL-SCNC: 3.7 MMOL/L (ref 3.5–5.2)
RBC # BLD AUTO: 3.96 10*6/MM3 (ref 4.14–5.8)
SODIUM SERPL-SCNC: 137 MMOL/L (ref 136–145)
WBC NRBC COR # BLD AUTO: 10.92 10*3/MM3 (ref 3.4–10.8)

## 2024-04-09 PROCEDURE — 25010000002 AMPICILLIN-SULBACTAM PER 1.5 G: Performed by: INTERNAL MEDICINE

## 2024-04-09 PROCEDURE — 97530 THERAPEUTIC ACTIVITIES: CPT

## 2024-04-09 PROCEDURE — 80048 BASIC METABOLIC PNL TOTAL CA: CPT | Performed by: INTERNAL MEDICINE

## 2024-04-09 PROCEDURE — 83735 ASSAY OF MAGNESIUM: CPT | Performed by: INTERNAL MEDICINE

## 2024-04-09 PROCEDURE — 84100 ASSAY OF PHOSPHORUS: CPT | Performed by: INTERNAL MEDICINE

## 2024-04-09 PROCEDURE — 94799 UNLISTED PULMONARY SVC/PX: CPT

## 2024-04-09 PROCEDURE — 25010000002 FUROSEMIDE PER 20 MG: Performed by: INTERNAL MEDICINE

## 2024-04-09 PROCEDURE — 85027 COMPLETE CBC AUTOMATED: CPT | Performed by: INTERNAL MEDICINE

## 2024-04-09 PROCEDURE — 97110 THERAPEUTIC EXERCISES: CPT

## 2024-04-09 PROCEDURE — 99232 SBSQ HOSP IP/OBS MODERATE 35: CPT | Performed by: STUDENT IN AN ORGANIZED HEALTH CARE EDUCATION/TRAINING PROGRAM

## 2024-04-09 RX ADMIN — Medication 250 MG: at 20:25

## 2024-04-09 RX ADMIN — PRIMIDONE 250 MG: 250 TABLET ORAL at 16:43

## 2024-04-09 RX ADMIN — BACITRACIN 0.9 G: 500 OINTMENT TOPICAL at 20:25

## 2024-04-09 RX ADMIN — PRIMIDONE 250 MG: 250 TABLET ORAL at 20:25

## 2024-04-09 RX ADMIN — OXYCODONE 10 MG: 5 TABLET ORAL at 20:25

## 2024-04-09 RX ADMIN — VENLAFAXINE HYDROCHLORIDE 37.5 MG: 37.5 TABLET ORAL at 17:49

## 2024-04-09 RX ADMIN — OXYCODONE 10 MG: 5 TABLET ORAL at 08:56

## 2024-04-09 RX ADMIN — METOPROLOL TARTRATE 12.5 MG: 25 TABLET, FILM COATED ORAL at 08:56

## 2024-04-09 RX ADMIN — OXYCODONE 10 MG: 5 TABLET ORAL at 12:25

## 2024-04-09 RX ADMIN — Medication 250 MG: at 08:56

## 2024-04-09 RX ADMIN — AMPICILLIN AND SULBACTAM 3 G: 2; 1 INJECTION, POWDER, FOR SOLUTION INTRAVENOUS at 12:25

## 2024-04-09 RX ADMIN — Medication 10 ML: at 08:56

## 2024-04-09 RX ADMIN — FAMOTIDINE 20 MG: 20 TABLET ORAL at 08:56

## 2024-04-09 RX ADMIN — Medication 10 ML: at 20:25

## 2024-04-09 RX ADMIN — AMPICILLIN AND SULBACTAM 3 G: 2; 1 INJECTION, POWDER, FOR SOLUTION INTRAVENOUS at 17:49

## 2024-04-09 RX ADMIN — ASPIRIN 81 MG 81 MG: 81 TABLET ORAL at 08:56

## 2024-04-09 RX ADMIN — LIDOCAINE 2 PATCH: 4 PATCH TOPICAL at 08:56

## 2024-04-09 RX ADMIN — FUROSEMIDE 40 MG: 10 INJECTION, SOLUTION INTRAMUSCULAR; INTRAVENOUS at 17:49

## 2024-04-09 RX ADMIN — POTASSIUM CHLORIDE 40 MEQ: 1.5 POWDER, FOR SOLUTION ORAL at 08:56

## 2024-04-09 RX ADMIN — FUROSEMIDE 40 MG: 10 INJECTION, SOLUTION INTRAMUSCULAR; INTRAVENOUS at 08:56

## 2024-04-09 RX ADMIN — OXYCODONE 10 MG: 5 TABLET ORAL at 04:55

## 2024-04-09 RX ADMIN — BACITRACIN 0.9 G: 500 OINTMENT TOPICAL at 08:56

## 2024-04-09 RX ADMIN — METOPROLOL TARTRATE 12.5 MG: 25 TABLET, FILM COATED ORAL at 20:25

## 2024-04-09 RX ADMIN — VENLAFAXINE HYDROCHLORIDE 37.5 MG: 37.5 TABLET ORAL at 08:56

## 2024-04-09 RX ADMIN — VENLAFAXINE HYDROCHLORIDE 37.5 MG: 37.5 TABLET ORAL at 12:25

## 2024-04-09 RX ADMIN — OXYCODONE 10 MG: 5 TABLET ORAL at 16:43

## 2024-04-09 RX ADMIN — AMPICILLIN AND SULBACTAM 3 G: 2; 1 INJECTION, POWDER, FOR SOLUTION INTRAVENOUS at 05:05

## 2024-04-09 RX ADMIN — PRIMIDONE 250 MG: 250 TABLET ORAL at 08:56

## 2024-04-09 NOTE — PROGRESS NOTES
[facility]   Hospitalist Progress Note  Date: 2024  Patient Name: Davonte Marshall  : 1955  MRN: [id number]  Date of admission: 3/27/2024  Room/Bed: [facility]      Subjective   Subjective     Chief Complaint: Follow up for somnolence     Summary:Davonte Marshall is a 68 y.o. male with hypertension, chronic pain on MS Contin/Cymbalta, seizure/essential tremor on primidone presented with altered mental status and hypoxia. Required BiPAP, had aspiration episode and required intubation for airway distress.  Initial event attributed to seizure as had not been taking primidone.  Initially received Keppra, switched back to primidone with improvement in mental status and no further seizure activity.  Extubated to BiPAP and now on room air.  Completing course of Unasyn for aspiration pneumonia.  Had left lower lobe collapse but opened up with bronchopulmonary hygiene protocol.  Hospital course complicated by acute CVA; not candidate for thrombolytic; DAPT recommended from neurology and cardiology standpoint given concern for ACS; cath eventually recommended.  Plavix currently on hold in order to get PEG later this week.  Overall better but remains very debilitated and rehab planned       Interval Followup: No acute events overnight.  Patient's mentation at baseline.  Able to answer all questions appropriately.  Denied any fever, chills, rigors, chest pain or difficulty breathing.  Not in acute distress.  Currently saturating well on room air.    Review of Systems    All systems reviewed and negative except for what is outlined above.      Objective   Objective     Vitals:   Temp:  [97.3 °F (36.3 °C)-98.2 °F (36.8 °C)] 98.1 °F (36.7 °C)  Heart Rate:  [77-94] 94  Resp:  [16-20] 16  BP: (137-150)/(70-84) 148/79    Physical Exam   General: Awake, alert, NAD; Cortrak in situ  Cardiovascular: RRR, no murmurs   Pulmonary: CTA bilaterally; no wheezes; no conversational dyspnea  Gastrointestinal: S/ND/NT, +BS  Neuro:  Alert, awake, oriented x 3; speech clear; no tremor      Result Review    Result Review:  I have personally reviewed these results:  [x]  Laboratory      Lab 04/09/24 0435 04/08/24 0433 04/07/24 0442 04/06/24 0407 04/05/24 0414 04/04/24  0506 04/03/24  0010 04/03/24  0004   WBC 10.92* 12.23* 13.90* 13.46* 12.23*   < > 19.15*  --    HEMOGLOBIN 11.9* 12.0* 12.1* 12.9* 12.1*   < > 14.7  --    HEMATOCRIT 36.5* 37.8 37.6 40.1 37.3*   < > 44.0  --    PLATELETS 385 375 356 342 292   < > 396  --    NEUTROS ABS  --   --  9.89* 10.06* 8.64*   < > 11.82*  --    IMMATURE GRANS (ABS)  --   --  0.08* 0.06* 0.07*   < > 0.27*  --    LYMPHS ABS  --   --  2.53 2.27 2.42   < > 4.73*  --    MONOS ABS  --   --  0.96* 0.76 0.73   < > 1.87*  --    EOS ABS  --   --  0.32 0.24 0.30   < > 0.37  --    MCV 92.2 94.0 92.8 93.0 91.9   < > 89.6  --    LACTATE  --   --   --   --   --   --  1.9  --    LACTATE, ARTERIAL  --   --   --   --   --   --   --  2.34*    < > = values in this interval not displayed.         Lab 04/09/24 0435 04/08/24 0433 04/07/24 0442 04/03/24  0010 04/03/24  0004   SODIUM 137 139 140   < >  --    SODIUM, ARTERIAL  --   --   --   --  144.0   POTASSIUM 3.7 4.0 3.3*   < >  --    CHLORIDE 101 103 103   < >  --    CO2 23.9 26.2 24.5   < >  --    ANION GAP 12.1 9.8 12.5   < >  --    BUN 33* 35* 37*   < >  --    CREATININE 0.77 0.90 0.91   < >  --    EGFR 98.7 94.2 93.0   < >  --    GLUCOSE 106* 92 114*   < >  --    GLUCOSE, ARTERIAL  --   --   --   --  129*   CALCIUM 9.1 8.8 8.8   < >  --    IONIZED CALCIUM  --   --   --   --  1.09*   MAGNESIUM 2.3 2.4 2.3   < >  --    PHOSPHORUS 3.5 2.9 3.3   < >  --     < > = values in this interval not displayed.         Lab 04/08/24  0433 04/07/24  0442 04/06/24  0407 04/05/24  0414   TOTAL PROTEIN 6.6 6.6 7.3 6.2   ALBUMIN 3.6 3.5 3.6 3.3*   GLOBULIN  --  3.1 3.7 2.9   ALT (SGPT) 104* 116* 142* 114*   AST (SGOT) 46* 56* 75* 62*   BILIRUBIN 0.4 0.4 0.7 0.5   BILIRUBIN DIRECT <0.2  --    --   --    ALK PHOS 90 89 103 74         Lab 04/03/24  0010   PROBNP 5,186.0*         Lab 04/04/24  0506   CHOLESTEROL 127   LDL CHOL 73   HDL CHOL 26*   TRIGLYCERIDES 158*             Lab 04/03/24  0004   PH, ARTERIAL 7.605*   PCO2, ARTERIAL 25.7*   PO2 ART 65.0*   O2 SATURATION ART 94.1*   FIO2 28   HCO3 ART 25.0   BASE EXCESS ART 5.0*   CARBOXYHEMOGLOBIN 0.6     Brief Urine Lab Results  (Last result in the past 365 days)        Color   Clarity   Blood   Leuk Est   Nitrite   Protein   CREAT   Urine HCG        04/02/24 1846 Yellow   Turbid   Small (1+)   Negative   Negative   Negative                 [x]  Microbiology   Microbiology Results (last 10 days)       Procedure Component Value - Date/Time    MRSA Screen, PCR (Inpatient) - Swab, Nares [[id number]]  (Normal) Collected: 04/03/24 0301    Lab Status: Final result Specimen: Swab from Nares Updated: 04/03/24 0514     MRSA PCR No MRSA Detected    Narrative:      The negative predictive value of this diagnostic test is high and should only be used to consider de-escalating anti-MRSA therapy. A positive result may indicate colonization with MRSA and must be correlated clinically.    Blood Culture - Blood, Arm, Left [[id number]]  (Normal) Collected: 04/03/24 0011    Lab Status: Final result Specimen: Blood from Arm, Left Updated: 04/08/24 0030     Blood Culture No growth at 5 days    Blood Culture - Blood, Arm, Right [[id number]]  (Normal) Collected: 04/03/24 0010    Lab Status: Final result Specimen: Blood from Arm, Right Updated: 04/08/24 0030     Blood Culture No growth at 5 days          [x]  Radiology  XR Chest 1 View    Result Date: 4/7/2024  No acute cardiopulmonary process identified.   Electronically Signed By-Brian Pike MD On:4/7/2024 6:13 AM      XR Chest 1 View    Result Date: 4/5/2024   IMPRESSION: 1. Significantly improved aeration at left lung base with minimal residual left basilar atelectasis. 2. Esophagogastric tube tip below the diaphragm.   Electronically Signed By-Jarocho Aden MD On:4/5/2024 3:24 PM      XR Abdomen KUB    Result Date: 4/5/2024  Dobbhoff tube with tip in mid duodenum.   Electronically Signed By-Brian Pike MD On:4/5/2024 11:39 AM      MRI Angiogram Head Without Contrast    Result Date: 4/4/2024  Negative intracranial MRA allowing for some technical artifact on the reformatted images.    Electronically Signed By-Dr. Tu Bernal MD On:4/4/2024 10:57 PM      MRI Angiogram Neck Without Contrast    Result Date: 4/4/2024   1. Proximal left ICA stenosis measuring less than 50%. 2. Otherwise negative MRA of the neck.  Electronically Signed By-Dr. Tu Bernal MD On:4/4/2024 10:54 PM      MRI Brain With & Without Contrast    Result Date: 4/3/2024   1. 6 mm focus of restricted diffusion in the right frontal cortex compatible with an acute infarct. 2. Atrophy with chronic small vessel ischemic disease in the white matter. No acute hemorrhage. 3. No masses or pathologic contrast enhancement.  Results called to the patient's nurse, Shalonda, at 11:11 p.m. on 4/3/2024. She reports that she will notify the patient's physician immediately.   Electronically Signed By-Dr. Tu Bernal MD On:4/3/2024 11:13 PM      CT Chest Without Contrast Diagnostic    Result Date: 4/3/2024  Impression: Chest: 1. Fluid/debris occluding the left lower lobe bronchus with complete left lower lobe collapse. Superimposed left lower lobe pneumonia not excluded. 2. Multifocal tree-in-bud small groundglass opacities throughout the lungs right greater than left consistent with bronchopneumonia. 3. Additional chronic findings above.  Abdomen/pelvis: 1. No acute abnormality in the abdomen or pelvis, specifically no abscess. 2. Esophagogastric tube tip terminates in the second portion the duodenum. 3. Additional chronic findings above.  Electronically Signed By-Jarocho Aden MD On:4/3/2024 2:02 PM      CT Abdomen Pelvis Without Contrast    Result Date:  4/3/2024  Impression: Chest: 1. Fluid/debris occluding the left lower lobe bronchus with complete left lower lobe collapse. Superimposed left lower lobe pneumonia not excluded. 2. Multifocal tree-in-bud small groundglass opacities throughout the lungs right greater than left consistent with bronchopneumonia. 3. Additional chronic findings above.  Abdomen/pelvis: 1. No acute abnormality in the abdomen or pelvis, specifically no abscess. 2. Esophagogastric tube tip terminates in the second portion the duodenum. 3. Additional chronic findings above.  Electronically Signed By-Jarocho Aden MD On:4/3/2024 2:02 PM      XR Chest 1 View    Result Date: 4/3/2024  Persistent left basilar infiltrate concerning for pneumonia with a probable small left effusion.  Electronically Signed By-Dr. Tu Bernal MD On:4/3/2024 12:24 AM      FL Video Swallow With Speech Single Contrast    Result Date: 4/2/2024  Impression: 1. Deep laryngeal penetration with eventual silent aspiration with nectar thick liquid barium via spoon trial 2. Laryngeal penetration from residues of applesauce with barium  Please consult speech pathology report for further details regarding the exam and for dietary recommendations.  CHRIS Maria PA-C     Electronically Signed By-SANDHYA CAMARA MD On:4/2/2024 4:23 PM      XR Chest 1 View    Result Date: 4/2/2024  Mild left basilar infiltrate.   Electronically Signed By-VICKI WHITE MD On:4/2/2024 1:56 PM     []  EKG/Telemetry   []  Cardiology/Vascular   []  Pathology  []  Old records  []  Other:    Assessment & Plan   Assessment / Plan     Assessment:  Dysphagia/silent aspiration  Acute infarct involving right frontal cortex  Aspiration pneumonia with Klebsiella, Streptococcus, MSSA and Moraxella  Acute hypoxemic and hypercapnic respiratory failure requiring mechanical intervention  Acute metabolic encephalopathy in setting of polypharmacy and hypercapnia - improving  Hx of seizures and Essential tremor -  on primidone  Hypernatremia  Hypokalemia  Transaminitis  NSTEMI, suspect type II from demand/sepsis  Lactic acidosis, POA.  Clinically significant  Hx of Hyperlipidemia  Hx of Depression/anxiety on outpatient Cymbalta  Hx of Chronic low back pain 2/2 lumbosacral degenerative disc disease  Hx of anterocollis and cervical dystonia  Hx of left corona radiata stroke 2020 with no residual deficits  DIONNA, secondary to sepsis/shock. Resolved  Septic shock 2/2 pneumonia. Resolved    Plan:  Patient currently being managed in medicine service.  Currently has cortrak in place for nutrition.  Plan for PEG tube placement likely tomorrow.  Currently holding Plavix, last dose 4/6.  Mentation at baseline.  On dual antiplatelet for total of 21 days followed by monotherapy with aspirin as per teleneurology.  Will restart Plavix once PEG tube is placed.  Currently holding statin in the setting of transaminitis.  Continue to trend and monitor.  Pulmonology following the patient, appreciate further input.  Currently on Unasyn, continue.  Blood culture showing no growth at 5 days.  Bowel culture on 3/27 positive for Streptococcus pneumonia, Klebsiella pneumonia and Staphylococcus aureus.  Pneumonia panel on 3/27 showed positive Klebsiella pneumoniae, Klebsiella aerogenes, Staphylococcus aureus and Streptococcus pneumonia.  Status post 5-day course of prednisone.  Continue IV Lasix 40 mg twice daily.  Hypokalemia resolved.  Continue metoprolol tartrate 12.5 mg every 12 hours.  Unable to cross MS Contin, switch to oxycodone IR 10 mg every 4 hours scheduled.  Unable to crush Cymbalta so currently on hold, discussed with pharmacy only SNRI that we have that can be given via Cortrak/PEG is venlafaxine.  Will start at 37.5 mg TID.   Continue rest of the current management.  Will hold tube feed in the morning for possible PEG tube placement tomorrow.       DVT prophylaxis:  No DVT prophylaxis order currently exists.        CODE STATUS:   Level  Of Support Discussed With: Health Care Surrogate  Code Status (Patient has no pulse and is not breathing): CPR (Attempt to Resuscitate)  Medical Interventions (Patient has pulse or is breathing): Full Support      Electronically signed by Laurie Crowley MD, 04/09/24, 10:21 AM EDT.

## 2024-04-09 NOTE — THERAPY TREATMENT NOTE
"Acute Care - Physical Therapy Treatment Note  [facility]     Patient Name: Davonte Marshall  : 1955  MRN: [id number]  Today's Date: 2024      Visit Dx:     ICD-10-CM ICD-9-CM   1. Acute respiratory failure with hypoxia  J96.01 518.81   2. Metabolic encephalopathy  G93.41 348.31   3. DIONNA (acute kidney injury)  N17.9 584.9   4. NSTEMI (non-ST elevated myocardial infarction)  I21.4 410.70   5. Oropharyngeal dysphagia  R13.12 787.22   6. Impaired mobility and ADLs  Z74.09 V49.89    Z78.9    7. Difficulty in walking  R26.2 719.7     Patient Active Problem List   Diagnosis    Anemia    Anxiety    Arthritis    Back pain    Chronic bronchitis    Complication of surgical procedure    Degeneration of lumbosacral intervertebral disc    Depression    Encounter for long-term (current) use of insulin    Essential tremor    Gall stones    Head injury    Hemorrhoids    Herniation of intervertebral disc    Hyperlipidemia    Leg pain    Neck pain    Renal insufficiency    Scoliosis    Seizure disorder    Smokes 1.5 packs of cigarettes per day    Vitamin D deficiency disease    Gross hematuria    BPH without obstruction/lower urinary tract symptoms    Urge incontinence of urine    Benign prostatic hyperplasia with urinary hesitancy    Sequelae, post-stroke    S/P TURP    Postoperative anemia    Postoperative hypoxia    Essential hypertension    Opioid dependence    Cervical dystonia    Acute metabolic encephalopathy     Past Medical History:   Diagnosis Date    Anemia     Anxiety     Arthritis     Benign essential hypertension     Chronic bronchitis     Depression     Enlarged prostate     Floaters in visual field     Gall stones     Generalized weakness     Head injury     Hemorrhoids     Hyperlipidemia     Leg pain     Numbness in both hands 2015    Seizures     last \"quite a while ago\"    Stroke 2020    left sided weakness     Past Surgical History:   Procedure Laterality Date    APPENDECTOMY      BACK SURGERY   "    4    CHOLECYSTECTOMY      CYSTOSCOPY Bilateral     7/12/21    CYSTOSCOPY TRANSURETHRAL RESECTION OF PROSTATE N/A 7/12/2021    Procedure: CYSTOSCOPY TRANSURETHRAL RESECTION OF PROSTATE;  Surgeon: Penelope Fox MD;  Location: [facility] MAIN OR;  Service: Urology;  Laterality: N/A;    HAND SURGERY Bilateral     thumb    LYMPH NODE DISSECTION      jaw line    TURP / TRANSURETHRAL INCISION / DRAINAGE PROSTATE  07/12/2021     PT Assessment (Last 12 Hours)       PT Evaluation and Treatment       Row Name 04/09/24 0800          Physical Therapy Time and Intention    Subjective Information complains of;weakness;fatigue (P)   -     Document Type therapy note (daily note) (P)   -     Mode of Treatment individual therapy;physical therapy (P)   -     Patient Effort adequate (P)   -     Symptoms Noted During/After Treatment fatigue (P)   -[facility] Name 04/09/24 0800          Bed Mobility    Bed Mobility supine-sit;sit-supine;scooting/bridging (P)   -     Scooting/Bridging [place] (Bed Mobility) moderate assist (50% patient effort);2 person assist (P)   -     Supine-Sit [place] (Bed Mobility) contact guard (P)   -     Sit-Supine [place] (Bed Mobility) contact guard (P)   -     Bed Mobility, Safety Issues decreased use of legs for bridging/pushing (P)   -     Assistive Device (Bed Mobility) bed rails;draw sheet;head of bed elevated (P)   -       Row Name 04/09/24 0800          Transfers    Comment, (Transfers) Deferred due to Pt tolerance (P)   -[facility] Name 04/09/24 0800          Gait/Stairs (Locomotion)    Patient was able to Ambulate no, other medical factors prevent ambulation (P)   -     Reason Patient was unable to Ambulate Excessive Weakness (P)   -       Row Name 04/09/24 0800          Safety Issues, Functional Mobility    Impairments Affecting Function (Mobility) balance;cognition;endurance/activity tolerance;muscle tone abnormal;strength;pain (P)   -     Cognitive  Impairments, Mobility Safety/Performance attention;awareness, need for assistance;safety precaution awareness (P)   -       Row Name 04/09/24 0800          Balance    Static Sitting Balance standby assist (P)   -     Dynamic Sitting Balance standby assist (P)   -     Position, Sitting Balance sitting edge of bed;unsupported (P)   -     Comment, Balance Pt able to tolerate 2-3 min of sitting EOB with B UE hold/ assist for stability. Pt with severely flexed posture throughout sitting ther ex (P)   -       Row Name 04/09/24 0800          Motor Skills    Therapeutic Exercise hip;knee;ankle (P)   supine ankle pumps, SAQ 10 x 1 set per. seated march, and hip abd 10x 1 set per. Pt tolerates supine exercises well. seated exercise tolerance limited by postural endurance.  -       Row Name             Wound 04/04/24 1200 Right anterior second toe    Wound - Properties Group Placement Date: 04/04/24  -EP Placement Time: 1200  -EP Side: Right  -EP Orientation: anterior  -EP Location: second toe  -EP    Retired Wound - Properties Group Placement Date: 04/04/24  -EP Placement Time: 1200  -EP Side: Right  -EP Orientation: anterior  -EP Location: second toe  -EP    Retired Wound - Properties Group Date first assessed: 04/04/24  -EP Time first assessed: 1200  -EP Side: Right  -EP Location: second toe  -EP      Row Name             Wound 04/04/24 1200 Right anterior third toe    Wound - Properties Group Placement Date: 04/04/24  -EP Placement Time: 1200  -EP Side: Right  -EP Orientation: anterior  -EP Location: third toe  -EP    Retired Wound - Properties Group Placement Date: 04/04/24  -EP Placement Time: 1200  -EP Side: Right  -EP Orientation: anterior  -EP Location: third toe  -EP    Retired Wound - Properties Group Date first assessed: 04/04/24  -EP Time first assessed: 1200  -EP Side: Right  -EP Location: third toe  -EP      Row Name             Wound 04/04/24 1200 Left anterior great toe    Wound - Properties Group  Placement Date: 04/04/24  -EP Placement Time: 1200  -EP Side: Left  -EP Orientation: anterior  -EP Location: great toe  -EP    Retired Wound - Properties Group Placement Date: 04/04/24  -EP Placement Time: 1200  -EP Side: Left  -EP Orientation: anterior  -EP Location: great toe  -EP    Retired Wound - Properties Group Date first assessed: 04/04/24  -EP Time first assessed: 1200  -EP Side: Left  -EP Location: great toe  -EP      Row Name             Wound 04/04/24 1200 Left anterior second toe    Wound - Properties Group Placement Date: 04/04/24  -EP Placement Time: 1200  -EP Side: Left  -EP Orientation: anterior  -EP Location: second toe  -EP    Retired Wound - Properties Group Placement Date: 04/04/24  -EP Placement Time: 1200  -EP Side: Left  -EP Orientation: anterior  -EP Location: second toe  -EP    Retired Wound - Properties Group Date first assessed: 04/04/24  -EP Time first assessed: 1200  -EP Side: Left  -EP Location: second toe  -EP      Row Name 04/09/24 0800          Positioning and Restraints    Pre-Treatment Position in bed (P)   -     Post Treatment Position bed (P)   -     In Bed fowlers;call light within reach;with family/caregiver (P)   -       Row Name 04/09/24 0800          Progress Summary (PT)    Progress Toward Functional Goals (PT) progress toward functional goals is good (P)   -     Daily Progress Summary (PT) Pt able to tolerate increased length of seated exercises. Pt remains limited in transfer and gait measures. Continue to treat per tolerance. (P)   -               User Key  (r) = Recorded By, (t) = Taken By, (c) = Cosigned By      Initials Name Provider Type    Alise Altamirano, RN Registered Nurse     Homar Rajput, PT Student PT Student                      PT Recommendation and Plan     Progress Summary (PT)  Progress Toward Functional Goals (PT): (P) progress toward functional goals is good  Daily Progress Summary (PT): (P) Pt able to tolerate increased length of seated  exercises. Pt remains limited in transfer and gait measures. Continue to treat per tolerance.   Outcome Measures       Row Name 04/08/24 0910 04/07/24 1600          How much help from another person do you currently need...    Turning from your back to your side while in flat bed without using bedrails? 3  -DK 2  -CS     Moving from lying on back to sitting on the side of a flat bed without bedrails? 3  -DK 2  -CS     Moving to and from a bed to a chair (including a wheelchair)? 2  -DK 2  -CS     Standing up from a chair using your arms (e.g., wheelchair, bedside chair)? 2  -DK 2  -CS     Climbing 3-5 steps with a railing? 1  -DK 1  -CS     To walk in hospital room? 2  -DK 2  -CS     AM-PAC 6 Clicks Score (PT) 13  -DK 11  -CS     Highest Level of Mobility Goal 4 --> Transfer to chair/commode  -DK 4 --> Transfer to chair/commode  -CS        Functional Assessment    Outcome Measure Options AM-PAC 6 Clicks Basic Mobility (PT)  -DK AM-PAC 6 Clicks Basic Mobility (PT)  -CS               User Key  (r) = Recorded By, (t) = Taken By, (c) = Cosigned By      Initials Name Provider Type    DK Chantal Naaryanan, PTA Physical Therapist Assistant    Roseann Vivas, PT Physical Therapist                     Time Calculation:    PT Charges       Row Name 04/09/24 0825             Time Calculation    PT Received On 04/09/24 (P)   -         Timed Charges    75370 - PT Therapeutic Exercise Minutes 16 (P)   -      23738 - PT Therapeutic Activity Minutes 7 (P)   -         Total Minutes    Timed Charges Total Minutes 23 (P)   -MH       Total Minutes 23 (P)   -                User Key  (r) = Recorded By, (t) = Taken By, (c) = Cosigned By      Initials Name Provider Type     Homar Rajput, PT Student PT Student                  Therapy Charges for Today       Code Description Service Date Service Provider Modifiers Qty    98044683782  PT THER PROC EA 15 MIN 4/9/2024 Homar Rajput, PT Student GP 1    [id number]  PT  THERAPEUTIC ACT EA 15 MIN 4/9/2024 Homar Rajput, PT Student GP 1            PT G-Codes  Outcome Measure Options: AM-PAC 6 Clicks Basic Mobility (PT)  AM-PAC 6 Clicks Score (PT): 13  AM-PAC 6 Clicks Score (OT): 10    Homar Rajput PT Student  4/9/2024

## 2024-04-09 NOTE — PLAN OF CARE
Goal Outcome Evaluation:  Plan of Care Reviewed With: patient        Progress: no change  Outcome Evaluation: A&Ox4. No acute changes to patient condition. Tube feed continuously infusing as ordered. Patient tolerating well. Resting in bed with eyes closed and visible respirations.

## 2024-04-09 NOTE — SIGNIFICANT NOTE
04/09/24 1215   Coping/Psychosocial   Observed Emotional State calm;cooperative   Verbalized Emotional State hopefulness   Trust Relationship/Rapport empathic listening provided   Family/Support Persons spouse   Involvement in Care interacting with patient   Additional Documentation Spiritual Care (Group)   Spiritual Care   Use of Spiritual Resources non-Taoist use of spiritual care   Spiritual Care Source  initiative   Spiritual Care Follow-Up follow-up, none required as presently assessed   Response to Spiritual Care receptive of support   Spiritual Care Interventions supportive conversation provided   Spiritual Care Visit Type initial   Receptivity to Spiritual Care visit welcomed

## 2024-04-09 NOTE — PROGRESS NOTES
"Nutrition Services    Patient Name: Davonte Marshall  YOB: 1955  MRN: [id number]  Admission date: 3/27/2024      CLINICAL NUTRITION ASSESSMENT      Reason for Assessment  Follow Up   H&P:  Past Medical History:   Diagnosis Date    Anemia     Anxiety     Arthritis     Benign essential hypertension     Chronic bronchitis     Depression     Enlarged prostate     Floaters in visual field     Gall stones     Generalized weakness     Head injury     Hemorrhoids     Hyperlipidemia     Leg pain     Numbness in both hands 7/6/2015    Seizures     last \"quite a while ago\"    Stroke 11/2020    left sided weakness        Current Problems:   Active Hospital Problems    Diagnosis     **Acute metabolic encephalopathy         Nutrition/Diet History         Narrative   Pt continues to receive EN. SLP working with pt, recommending to continue with the current plan. Ice chips only with nursing and utilizing swab with water to freshen oral cavity. Hypernatremia is corrected. RD will decrease free water flushes. Last BM noted 4/09.      Anthropometrics        Current Height, Weight Height: 175.3 cm (69.02\")  Weight: 97 kg (213 lb 13.5 oz)   Current BMI Body mass index is 31.56 kg/m².   BMI Classification Obese Class I   % %    Adjusted Body Weight (ABW) 81 kg   Weight Hx  Wt Readings from Last 30 Encounters:   03/29/24 0551 97 kg (213 lb 13.5 oz)   03/27/24 0931 89 kg (196 lb 3.4 oz)   03/27/24 0842 89 kg (196 lb 3.4 oz)   03/27/24 0347 92.1 kg (203 lb 0.7 oz)   03/16/23 1526 103 kg (227 lb)   02/28/22 1429 97.8 kg (215 lb 8 oz)   09/21/21 1059 97.4 kg (214 lb 12.8 oz)   07/21/21 0911 96.2 kg (212 lb)   07/12/21 0611 96.3 kg (212 lb 4.9 oz)   07/06/21 0948 94.3 kg (208 lb)   06/30/21 1555 94.8 kg (209 lb)   06/15/21 1312 96.5 kg (212 lb 12.8 oz)   01/28/21 0000 95.3 kg (210 lb)   12/30/20 0000 94.5 kg (208 lb 6 oz)   10/29/20 0000 98.6 kg (217 lb 6 oz)   10/09/20 0000 97.5 kg (215 lb)   07/09/20 0000 103 kg (226 " "lb)   01/09/20 0000 98 kg (216 lb)   10/07/19 0000 96.6 kg (213 lb)   09/06/19 0000 94.8 kg (209 lb)          Wt Change Observation 5% gain throughout admission      Estimated/Assessed Needs  Estimated Needs based on: Ideal Body Weight       Energy Requirements 25-30 kcal/kg   EST Needs (kcal/day) 8015-0048 kcal/day        Protein Requirements 1.0-1.2 g/kg   EST Daily Needs (g/day) 71-85 g/day        Fluid Requirements 1 ml/kcal    Estimated Needs (mL/day) 8597-1242 ml/day      Labs/Medications         Pertinent Labs Reviewed.   Results from last 7 days   Lab Units 04/09/24 0435 04/08/24 0433 04/07/24 0442 04/06/24  0407   SODIUM mmol/L 137 139 140 141   POTASSIUM mmol/L 3.7 4.0 3.3* 3.2*   CHLORIDE mmol/L 101 103 103 103   CO2 mmol/L 23.9 26.2 24.5 25.7   BUN mg/dL 33* 35* 37* 40*   CREATININE mg/dL 0.77 0.90 0.91 1.11   CALCIUM mg/dL 9.1 8.8 8.8 8.9   BILIRUBIN mg/dL  --  0.4 0.4 0.7   ALK PHOS U/L  --  90 89 103   ALT (SGPT) U/L  --  104* 116* 142*   AST (SGOT) U/L  --  46* 56* 75*   GLUCOSE mg/dL 106* 92 114* 132*     Results from last 7 days   Lab Units 04/09/24 0435 04/08/24 0433 04/07/24 0442 04/05/24 0414 04/04/24  0506   MAGNESIUM mg/dL 2.3 2.4 2.3   < > 2.1   PHOSPHORUS mg/dL 3.5 2.9 3.3   < > 3.3   HEMOGLOBIN g/dL 11.9* 12.0* 12.1*   < > 13.0   HEMATOCRIT % 36.5* 37.8 37.6   < > 41.0   TRIGLYCERIDES mg/dL  --   --   --   --  158*    < > = values in this interval not displayed.     COVID19   Date Value Ref Range Status   03/27/2024 Not Detected Not Detected - Ref. Range Final     No results found for: \"HGBA1C\"      Pertinent Medications Reviewed.     Malnutrition Severity Assessment              Nutrition Diagnosis         Nutrition Dx Problem 1 Swallowing difficulty related to  dysphagia  as evidenced by  need for NPO/tube feedings      Nutrition Intervention           Current Nutrition Orders & Evaluation of Intake       Current PO Diet NPO Diet NPO Type: Strict NPO   Supplement Orders Placed This " Encounter      Diet, Tube Feeding Tube Feeding Formula: Fibersource HN; Tube Feeding Type: Continuous; Continuous Tube Feeding Start Rate (mL/hr): 25; Then Advance Rate By (mL/hr): 25; Every __ Hours: 4; To Goal Rate of (mL/hr): 85      Feeding Tube Insertion - Cortrak System      Feeding Tube Insertion - Cortrak System           Nutrition Intervention/Prescription        Fibersource at 85 ml/hr over 22 hours. 65 ml water flush q3h (520 ml)  This provides 2244 kcal/day, 101g protein/day, and 1515 ml water (2035 ml with water flush)         Medical Nutrition Therapy/Nutrition Education          Learner     Readiness Patient  Education not indicated at this time     Method     Response N/A  N/A     Monitor/Evaluation        Monitor Per protocol, Pertinent labs, EN delivery/tolerance, POC/GOC, Swallow function     Nutrition Discharge Plan         To be determined     Electronically signed by:  Lisa Pitts RD  04/09/24 14:27 EDT

## 2024-04-10 LAB
ANION GAP SERPL CALCULATED.3IONS-SCNC: 12.6 MMOL/L (ref 5–15)
BASOPHILS # BLD AUTO: 0.19 10*3/MM3 (ref 0–0.2)
BASOPHILS NFR BLD AUTO: 1.8 % (ref 0–1.5)
BUN SERPL-MCNC: 31 MG/DL (ref 8–23)
BUN/CREAT SERPL: 40.3 (ref 7–25)
CALCIUM SPEC-SCNC: 9 MG/DL (ref 8.6–10.5)
CHLORIDE SERPL-SCNC: 100 MMOL/L (ref 98–107)
CO2 SERPL-SCNC: 24.4 MMOL/L (ref 22–29)
CREAT SERPL-MCNC: 0.77 MG/DL (ref 0.76–1.27)
DEPRECATED RDW RBC AUTO: 48.2 FL (ref 37–54)
EGFRCR SERPLBLD CKD-EPI 2021: 98.7 ML/MIN/1.73
EOSINOPHIL # BLD AUTO: 0.36 10*3/MM3 (ref 0–0.4)
EOSINOPHIL NFR BLD AUTO: 3.3 % (ref 0.3–6.2)
ERYTHROCYTE [DISTWIDTH] IN BLOOD BY AUTOMATED COUNT: 14.9 % (ref 12.3–15.4)
GLUCOSE SERPL-MCNC: 111 MG/DL (ref 65–99)
HCT VFR BLD AUTO: 36 % (ref 37.5–51)
HGB BLD-MCNC: 12.1 G/DL (ref 13–17.7)
IMM GRANULOCYTES # BLD AUTO: 0.06 10*3/MM3 (ref 0–0.05)
IMM GRANULOCYTES NFR BLD AUTO: 0.6 % (ref 0–0.5)
LYMPHOCYTES # BLD AUTO: 2.51 10*3/MM3 (ref 0.7–3.1)
LYMPHOCYTES NFR BLD AUTO: 23.1 % (ref 19.6–45.3)
MAGNESIUM SERPL-MCNC: 2.2 MG/DL (ref 1.6–2.4)
MCH RBC QN AUTO: 30.2 PG (ref 26.6–33)
MCHC RBC AUTO-ENTMCNC: 33.6 G/DL (ref 31.5–35.7)
MCV RBC AUTO: 89.8 FL (ref 79–97)
MONOCYTES # BLD AUTO: 0.79 10*3/MM3 (ref 0.1–0.9)
MONOCYTES NFR BLD AUTO: 7.3 % (ref 5–12)
NEUTROPHILS NFR BLD AUTO: 6.94 10*3/MM3 (ref 1.7–7)
NEUTROPHILS NFR BLD AUTO: 63.9 % (ref 42.7–76)
NRBC BLD AUTO-RTO: 0 /100 WBC (ref 0–0.2)
PHOSPHATE SERPL-MCNC: 3.7 MG/DL (ref 2.5–4.5)
PLATELET # BLD AUTO: 362 10*3/MM3 (ref 140–450)
PMV BLD AUTO: 11.2 FL (ref 6–12)
POTASSIUM SERPL-SCNC: 3.7 MMOL/L (ref 3.5–5.2)
RBC # BLD AUTO: 4.01 10*6/MM3 (ref 4.14–5.8)
SODIUM SERPL-SCNC: 137 MMOL/L (ref 136–145)
WBC NRBC COR # BLD AUTO: 10.85 10*3/MM3 (ref 3.4–10.8)

## 2024-04-10 PROCEDURE — 92526 ORAL FUNCTION THERAPY: CPT

## 2024-04-10 PROCEDURE — 94799 UNLISTED PULMONARY SVC/PX: CPT

## 2024-04-10 PROCEDURE — 94664 DEMO&/EVAL PT USE INHALER: CPT

## 2024-04-10 PROCEDURE — 83735 ASSAY OF MAGNESIUM: CPT | Performed by: STUDENT IN AN ORGANIZED HEALTH CARE EDUCATION/TRAINING PROGRAM

## 2024-04-10 PROCEDURE — 25010000002 AMPICILLIN-SULBACTAM PER 1.5 G: Performed by: INTERNAL MEDICINE

## 2024-04-10 PROCEDURE — 80048 BASIC METABOLIC PNL TOTAL CA: CPT | Performed by: STUDENT IN AN ORGANIZED HEALTH CARE EDUCATION/TRAINING PROGRAM

## 2024-04-10 PROCEDURE — 84100 ASSAY OF PHOSPHORUS: CPT | Performed by: STUDENT IN AN ORGANIZED HEALTH CARE EDUCATION/TRAINING PROGRAM

## 2024-04-10 PROCEDURE — 85025 COMPLETE CBC W/AUTO DIFF WBC: CPT | Performed by: STUDENT IN AN ORGANIZED HEALTH CARE EDUCATION/TRAINING PROGRAM

## 2024-04-10 PROCEDURE — 94761 N-INVAS EAR/PLS OXIMETRY MLT: CPT

## 2024-04-10 PROCEDURE — 99232 SBSQ HOSP IP/OBS MODERATE 35: CPT | Performed by: STUDENT IN AN ORGANIZED HEALTH CARE EDUCATION/TRAINING PROGRAM

## 2024-04-10 PROCEDURE — 25010000002 FUROSEMIDE PER 20 MG: Performed by: INTERNAL MEDICINE

## 2024-04-10 RX ADMIN — VENLAFAXINE HYDROCHLORIDE 37.5 MG: 37.5 TABLET ORAL at 12:28

## 2024-04-10 RX ADMIN — OXYCODONE 10 MG: 5 TABLET ORAL at 17:43

## 2024-04-10 RX ADMIN — ARFORMOTEROL TARTRATE 15 MCG: 15 SOLUTION RESPIRATORY (INHALATION) at 19:50

## 2024-04-10 RX ADMIN — FAMOTIDINE 20 MG: 20 TABLET ORAL at 09:25

## 2024-04-10 RX ADMIN — AMPICILLIN AND SULBACTAM 3 G: 2; 1 INJECTION, POWDER, FOR SOLUTION INTRAVENOUS at 05:21

## 2024-04-10 RX ADMIN — LIDOCAINE 2 PATCH: 4 PATCH TOPICAL at 09:24

## 2024-04-10 RX ADMIN — BUDESONIDE 0.5 MG: 0.5 INHALANT ORAL at 19:50

## 2024-04-10 RX ADMIN — Medication 250 MG: at 09:25

## 2024-04-10 RX ADMIN — VENLAFAXINE HYDROCHLORIDE 37.5 MG: 37.5 TABLET ORAL at 17:43

## 2024-04-10 RX ADMIN — OXYCODONE 10 MG: 5 TABLET ORAL at 00:27

## 2024-04-10 RX ADMIN — Medication 10 ML: at 09:26

## 2024-04-10 RX ADMIN — PRIMIDONE 250 MG: 250 TABLET ORAL at 09:25

## 2024-04-10 RX ADMIN — ARFORMOTEROL TARTRATE 15 MCG: 15 SOLUTION RESPIRATORY (INHALATION) at 06:39

## 2024-04-10 RX ADMIN — Medication 10 ML: at 21:09

## 2024-04-10 RX ADMIN — OXYCODONE 10 MG: 5 TABLET ORAL at 09:25

## 2024-04-10 RX ADMIN — AMPICILLIN AND SULBACTAM 3 G: 2; 1 INJECTION, POWDER, FOR SOLUTION INTRAVENOUS at 01:57

## 2024-04-10 RX ADMIN — AMPICILLIN AND SULBACTAM 3 G: 2; 1 INJECTION, POWDER, FOR SOLUTION INTRAVENOUS at 12:28

## 2024-04-10 RX ADMIN — METOPROLOL TARTRATE 12.5 MG: 25 TABLET, FILM COATED ORAL at 09:25

## 2024-04-10 RX ADMIN — PRIMIDONE 250 MG: 250 TABLET ORAL at 21:08

## 2024-04-10 RX ADMIN — FUROSEMIDE 40 MG: 10 INJECTION, SOLUTION INTRAMUSCULAR; INTRAVENOUS at 09:26

## 2024-04-10 RX ADMIN — PRIMIDONE 250 MG: 250 TABLET ORAL at 16:08

## 2024-04-10 RX ADMIN — BACITRACIN 0.9 G: 500 OINTMENT TOPICAL at 21:08

## 2024-04-10 RX ADMIN — VENLAFAXINE HYDROCHLORIDE 37.5 MG: 37.5 TABLET ORAL at 09:25

## 2024-04-10 RX ADMIN — POTASSIUM CHLORIDE 40 MEQ: 1.5 POWDER, FOR SOLUTION ORAL at 09:25

## 2024-04-10 RX ADMIN — METOPROLOL TARTRATE 12.5 MG: 25 TABLET, FILM COATED ORAL at 21:08

## 2024-04-10 RX ADMIN — ASPIRIN 81 MG 81 MG: 81 TABLET ORAL at 09:25

## 2024-04-10 RX ADMIN — Medication 250 MG: at 21:07

## 2024-04-10 RX ADMIN — BACITRACIN 0.9 G: 500 OINTMENT TOPICAL at 09:25

## 2024-04-10 RX ADMIN — OXYCODONE 10 MG: 5 TABLET ORAL at 21:08

## 2024-04-10 RX ADMIN — FUROSEMIDE 40 MG: 10 INJECTION, SOLUTION INTRAMUSCULAR; INTRAVENOUS at 17:43

## 2024-04-10 RX ADMIN — OXYCODONE 10 MG: 5 TABLET ORAL at 13:13

## 2024-04-10 RX ADMIN — OXYCODONE 10 MG: 5 TABLET ORAL at 05:20

## 2024-04-10 RX ADMIN — BUDESONIDE 0.5 MG: 0.5 INHALANT ORAL at 06:40

## 2024-04-10 NOTE — PROGRESS NOTES
[facility]   Hospitalist Progress Note  Date: 4/10/2024  Patient Name: Davonte Marshall  : 1955  MRN: [id number]  Date of admission: 3/27/2024  Room/Bed: [facility]/      Subjective   Subjective     Chief Complaint: Follow up for somnolence     Summary:Davonte Marshall is a 68 y.o. male with hypertension, chronic pain on MS Contin/Cymbalta, seizure/essential tremor on primidone presented with altered mental status and hypoxia. Required BiPAP, had aspiration episode and required intubation for airway distress.  Initial event attributed to seizure as had not been taking primidone.  Initially received Keppra, switched back to primidone with improvement in mental status and no further seizure activity.  Extubated to BiPAP and now on room air.  Completing course of Unasyn for aspiration pneumonia.  Had left lower lobe collapse but opened up with bronchopulmonary hygiene protocol.  Hospital course complicated by acute CVA; not candidate for thrombolytic; DAPT recommended from neurology and cardiology standpoint given concern for ACS; cath eventually recommended.  Plavix currently on hold in order to get PEG later this week.  Overall better but remains very debilitated and rehab planned       Interval Followup: No acute events overnight.  Patient's mentation at baseline.  Able to answer all questions appropriately.  Denied any fever, chills, rigors, chest pain or difficulty breathing.  Not in acute distress.  Currently saturating well on room air.    Review of Systems    All systems reviewed and negative except for what is outlined above.      Objective   Objective     Vitals:   Temp:  [97.2 °F (36.2 °C)-98.8 °F (37.1 °C)] 98.2 °F (36.8 °C)  Heart Rate:  [69-89] 69  Resp:  [18-20] 18  BP: (108-139)/(68-84) 117/77    Physical Exam   General: Awake, alert, NAD; Cortrak in situ  Cardiovascular: RRR, no murmurs   Pulmonary: CTA bilaterally; no wheezes; no conversational dyspnea  Gastrointestinal: S/ND/NT, +BS  Neuro:  Alert, awake, oriented x 3; speech clear; no tremor      Result Review    Result Review:  I have personally reviewed these results:  [x]  Laboratory      Lab 04/10/24  0422 04/09/24  0435 04/08/24  0433 04/07/24  0442 04/06/24  0407   WBC 10.85* 10.92* 12.23* 13.90* 13.46*   HEMOGLOBIN 12.1* 11.9* 12.0* 12.1* 12.9*   HEMATOCRIT 36.0* 36.5* 37.8 37.6 40.1   PLATELETS 362 385 375 356 342   NEUTROS ABS 6.94  --   --  9.89* 10.06*   IMMATURE GRANS (ABS) 0.06*  --   --  0.08* 0.06*   LYMPHS ABS 2.51  --   --  2.53 2.27   MONOS ABS 0.79  --   --  0.96* 0.76   EOS ABS 0.36  --   --  0.32 0.24   MCV 89.8 92.2 94.0 92.8 93.0         Lab 04/10/24  0422 04/09/24  0435 04/08/24  0433   SODIUM 137 137 139   POTASSIUM 3.7 3.7 4.0   CHLORIDE 100 101 103   CO2 24.4 23.9 26.2   ANION GAP 12.6 12.1 9.8   BUN 31* 33* 35*   CREATININE 0.77 0.77 0.90   EGFR 98.7 98.7 94.2   GLUCOSE 111* 106* 92   CALCIUM 9.0 9.1 8.8   MAGNESIUM 2.2 2.3 2.4   PHOSPHORUS 3.7 3.5 2.9         Lab 04/08/24 0433 04/07/24 0442 04/06/24 0407 04/05/24  0414   TOTAL PROTEIN 6.6 6.6 7.3 6.2   ALBUMIN 3.6 3.5 3.6 3.3*   GLOBULIN  --  3.1 3.7 2.9   ALT (SGPT) 104* 116* 142* 114*   AST (SGOT) 46* 56* 75* 62*   BILIRUBIN 0.4 0.4 0.7 0.5   BILIRUBIN DIRECT <0.2  --   --   --    ALK PHOS 90 89 103 74               Lab 04/04/24  0506   CHOLESTEROL 127   LDL CHOL 73   HDL CHOL 26*   TRIGLYCERIDES 158*               Brief Urine Lab Results  (Last result in the past 365 days)        Color   Clarity   Blood   Leuk Est   Nitrite   Protein   CREAT   Urine HCG        04/02/24 1846 Yellow   Turbid   Small (1+)   Negative   Negative   Negative                 [x]  Microbiology   Microbiology Results (last 10 days)       Procedure Component Value - Date/Time    MRSA Screen, PCR (Inpatient) - Swab, Nares [[id number]]  (Normal) Collected: 04/03/24 0301    Lab Status: Final result Specimen: Swab from Nares Updated: 04/03/24 0514     MRSA PCR No MRSA Detected    Narrative:      The  negative predictive value of this diagnostic test is high and should only be used to consider de-escalating anti-MRSA therapy. A positive result may indicate colonization with MRSA and must be correlated clinically.    Blood Culture - Blood, Arm, Left [[id number]]  (Normal) Collected: 04/03/24 0011    Lab Status: Final result Specimen: Blood from Arm, Left Updated: 04/08/24 0030     Blood Culture No growth at 5 days    Blood Culture - Blood, Arm, Right [[id number]]  (Normal) Collected: 04/03/24 0010    Lab Status: Final result Specimen: Blood from Arm, Right Updated: 04/08/24 0030     Blood Culture No growth at 5 days          [x]  Radiology  XR Chest 1 View    Result Date: 4/7/2024  No acute cardiopulmonary process identified.   Electronically Signed By-Brian Pike MD On:4/7/2024 6:13 AM      XR Chest 1 View    Result Date: 4/5/2024   IMPRESSION: 1. Significantly improved aeration at left lung base with minimal residual left basilar atelectasis. 2. Esophagogastric tube tip below the diaphragm.  Electronically Signed By-Jarocho Aden MD On:4/5/2024 3:24 PM      XR Abdomen KUB    Result Date: 4/5/2024  Dobbhoff tube with tip in mid duodenum.   Electronically Signed By-Brian Pike MD On:4/5/2024 11:39 AM      MRI Angiogram Head Without Contrast    Result Date: 4/4/2024  Negative intracranial MRA allowing for some technical artifact on the reformatted images.    Electronically Signed By-Dr. Tu Bernal MD On:4/4/2024 10:57 PM      MRI Angiogram Neck Without Contrast    Result Date: 4/4/2024   1. Proximal left ICA stenosis measuring less than 50%. 2. Otherwise negative MRA of the neck.  Electronically Signed By-Dr. Tu Bernal MD On:4/4/2024 10:54 PM      MRI Brain With & Without Contrast    Result Date: 4/3/2024   1. 6 mm focus of restricted diffusion in the right frontal cortex compatible with an acute infarct. 2. Atrophy with chronic small vessel ischemic disease in the white matter. No acute  hemorrhage. 3. No masses or pathologic contrast enhancement.  Results called to the patient's nurse, Shalonda, at 11:11 p.m. on 4/3/2024. She reports that she will notify the patient's physician immediately.   Electronically Signed By-Dr. Tu Bernal MD On:4/3/2024 11:13 PM      CT Chest Without Contrast Diagnostic    Result Date: 4/3/2024  Impression: Chest: 1. Fluid/debris occluding the left lower lobe bronchus with complete left lower lobe collapse. Superimposed left lower lobe pneumonia not excluded. 2. Multifocal tree-in-bud small groundglass opacities throughout the lungs right greater than left consistent with bronchopneumonia. 3. Additional chronic findings above.  Abdomen/pelvis: 1. No acute abnormality in the abdomen or pelvis, specifically no abscess. 2. Esophagogastric tube tip terminates in the second portion the duodenum. 3. Additional chronic findings above.  Electronically Signed By-Jarocho Aden MD On:4/3/2024 2:02 PM      CT Abdomen Pelvis Without Contrast    Result Date: 4/3/2024  Impression: Chest: 1. Fluid/debris occluding the left lower lobe bronchus with complete left lower lobe collapse. Superimposed left lower lobe pneumonia not excluded. 2. Multifocal tree-in-bud small groundglass opacities throughout the lungs right greater than left consistent with bronchopneumonia. 3. Additional chronic findings above.  Abdomen/pelvis: 1. No acute abnormality in the abdomen or pelvis, specifically no abscess. 2. Esophagogastric tube tip terminates in the second portion the duodenum. 3. Additional chronic findings above.  Electronically Signed By-Jarocho Aden MD On:4/3/2024 2:02 PM      XR Chest 1 View    Result Date: 4/3/2024  Persistent left basilar infiltrate concerning for pneumonia with a probable small left effusion.  Electronically Signed By-Dr. Tu Bernal MD On:4/3/2024 12:24 AM      FL Video Swallow With Speech Single Contrast    Result Date: 4/2/2024  Impression: 1. Deep laryngeal  penetration with eventual silent aspiration with nectar thick liquid barium via spoon trial 2. Laryngeal penetration from residues of applesauce with barium  Please consult speech pathology report for further details regarding the exam and for dietary recommendations.  CHRIS Maria PA-C     Electronically Signed By-SANDHYA CAMARA MD On:4/2/2024 4:23 PM      XR Chest 1 View    Result Date: 4/2/2024  Mild left basilar infiltrate.   Electronically Signed By-VICKI WHITE MD On:4/2/2024 1:56 PM     []  EKG/Telemetry   []  Cardiology/Vascular   []  Pathology  []  Old records  []  Other:    Assessment & Plan   Assessment / Plan     Assessment:  Dysphagia/silent aspiration  Acute infarct involving right frontal cortex  Aspiration pneumonia with Klebsiella, Streptococcus, MSSA and Moraxella  Acute hypoxemic and hypercapnic respiratory failure requiring mechanical intervention  Acute metabolic encephalopathy in setting of polypharmacy and hypercapnia - improving  Hx of seizures and Essential tremor - on primidone  Hypernatremia  Hypokalemia  Transaminitis  NSTEMI, suspect type II from demand/sepsis  Lactic acidosis, POA.  Clinically significant  Hx of Hyperlipidemia  Hx of Depression/anxiety on outpatient Cymbalta  Hx of Chronic low back pain 2/2 lumbosacral degenerative disc disease  Hx of anterocollis and cervical dystonia  Hx of left corona radiata stroke 2020 with no residual deficits  DIONNA, secondary to sepsis/shock. Resolved  Septic shock 2/2 pneumonia. Resolved    Plan:  Patient currently being managed in medicine service.  Currently has cortrak in place for nutrition.  Plan for PEG tube placement likely Friday after holding Plavix for 5 days.  Currently holding Plavix, last dose 4/6.  Mentation at baseline.  On dual antiplatelet for total of 21 days followed by monotherapy with aspirin as per teleneurology.  Will restart Plavix once PEG tube is placed.  Currently holding statin in the setting of transaminitis.   Continue to trend and monitor.  Pulmonology following the patient, appreciate further input.  Currently on Unasyn, continue.  Blood culture showing no growth at 5 days.  Bowel culture on 3/27 positive for Streptococcus pneumonia, Klebsiella pneumonia and Staphylococcus aureus.  Pneumonia panel on 3/27 showed positive Klebsiella pneumoniae, Klebsiella aerogenes, Staphylococcus aureus and Streptococcus pneumonia.  Status post 5-day course of prednisone.  Continue IV Lasix 40 mg twice daily.  Hypokalemia resolved.  Continue metoprolol tartrate 12.5 mg every 12 hours.  Unable to cross MS Contin, switch to oxycodone IR 10 mg every 4 hours scheduled.  Unable to crush Cymbalta so currently on hold, discussed with pharmacy only SNRI that we have that can be given via Cortrak/PEG is venlafaxine.  Will start at 37.5 mg TID.   Continue rest of the current management.       DVT prophylaxis:  No DVT prophylaxis order currently exists.        CODE STATUS:   Level Of Support Discussed With: Health Care Surrogate  Code Status (Patient has no pulse and is not breathing): CPR (Attempt to Resuscitate)  Medical Interventions (Patient has pulse or is breathing): Full Support      Electronically signed by Laurie Crowley MD, 04/10/24, 10:21 AM EDT.

## 2024-04-10 NOTE — SIGNIFICANT NOTE
04/10/24 1442   Physical Therapy Time and Intention   Session Not Performed patient unavailable for treatment   Comment, Session Not Performed Pt with family in the room, Pt declines PT for today

## 2024-04-10 NOTE — THERAPY TREATMENT NOTE
"Acute Care - Speech Language Pathology   Swallow Treatment Note [facility]     Patient Name: Davonte Marshall  : 1955  MRN: [id number]  Today's Date: 4/10/2024               Admit Date: 3/27/2024    Visit Dx:     ICD-10-CM ICD-9-CM   1. Acute respiratory failure with hypoxia  J96.01 518.81   2. Metabolic encephalopathy  G93.41 348.31   3. DIONNA (acute kidney injury)  N17.9 584.9   4. NSTEMI (non-ST elevated myocardial infarction)  I21.4 410.70   5. Oropharyngeal dysphagia  R13.12 787.22   6. Impaired mobility and ADLs  Z74.09 V49.89    Z78.9    7. Difficulty in walking  R26.2 719.7     Patient Active Problem List   Diagnosis    Anemia    Anxiety    Arthritis    Back pain    Chronic bronchitis    Complication of surgical procedure    Degeneration of lumbosacral intervertebral disc    Depression    Encounter for long-term (current) use of insulin    Essential tremor    Gall stones    Head injury    Hemorrhoids    Herniation of intervertebral disc    Hyperlipidemia    Leg pain    Neck pain    Renal insufficiency    Scoliosis    Seizure disorder    Smokes 1.5 packs of cigarettes per day    Vitamin D deficiency disease    Gross hematuria    BPH without obstruction/lower urinary tract symptoms    Urge incontinence of urine    Benign prostatic hyperplasia with urinary hesitancy    Sequelae, post-stroke    S/P TURP    Postoperative anemia    Postoperative hypoxia    Essential hypertension    Opioid dependence    Cervical dystonia    Acute metabolic encephalopathy     Past Medical History:   Diagnosis Date    Anemia     Anxiety     Arthritis     Benign essential hypertension     Chronic bronchitis     Depression     Enlarged prostate     Floaters in visual field     Gall stones     Generalized weakness     Head injury     Hemorrhoids     Hyperlipidemia     Leg pain     Numbness in both hands 2015    Seizures     last \"quite a while ago\"    Stroke 2020    left sided weakness     Past Surgical History:   Procedure " Laterality Date    APPENDECTOMY      BACK SURGERY      4    CHOLECYSTECTOMY      CYSTOSCOPY Bilateral     7/12/21    CYSTOSCOPY TRANSURETHRAL RESECTION OF PROSTATE N/A 7/12/2021    Procedure: CYSTOSCOPY TRANSURETHRAL RESECTION OF PROSTATE;  Surgeon: Penelope Fox MD;  Location: [facility] MAIN OR;  Service: Urology;  Laterality: N/A;    HAND SURGERY Bilateral     thumb    LYMPH NODE DISSECTION      jaw line    TURP / TRANSURETHRAL INCISION / DRAINAGE PROSTATE  07/12/2021       SPEECH PATHOLOGY DYSPHAGIA TREATMENT     Subjective/Behavioral Observations: Alert and cooperative, coretrak in place.          Day/time of Treatment: 4/10/2024        Current Diet: N.p.o. with coretrak.        Current Strategies: N/A        Treatment received: Dysphagia therapy to address swallow function through exercises and education of strategies.        Results of treatment:   Patient educated for oral motor exercise program, exercises completed addressing tongue base strength, laryngeal elevation and closure.  Patient recalling several exercises with min cueing.  Patient corrected for technique.  Patient requiring min-mod cueing for exercises.   Vocal quality appearing clear.  Patient given ice chips x 10, occasional vocal wetness noted.  Decreased laryngeal elevation noted to palpation.        Progress toward goals: Adequate        Barriers to Achieving goals: Medical status        Plan of care:/changes in plan: Continue per current plan.  Recommend utilizing swab with water to freshen oral cavity.  May have single ice chips with nursing .                                                                                 Plan of Care Reviewed With: patient, spouse          EDUCATION  The patient has been educated in the following areas:   Exercise program.             Time Calculation:    Time Calculation- SLP       Row Name 04/10/24 1042             Time Calculation- SLP    SLP Stop Time 0948  -TB      SLP Received On 04/10/24  -ANJALI          Untimed Charges    02542-RI Treatment Swallow Minutes 40  -TB         Total Minutes    Untimed Charges Total Minutes 40  -TB       Total Minutes 40  -TB                User Key  (r) = Recorded By, (t) = Taken By, (c) = Cosigned By      Initials Name Provider Type    Floridalma Shabazz SLP Speech and Language Pathologist                    Therapy Charges for Today       Code Description Service Date Service Provider Modifiers Qty    [id number] HC ST TREATMENT SWALLOW 3 4/10/2024 Floridalma Silva SLP GN 1                 PATRICA Oropeza  4/10/2024

## 2024-04-11 ENCOUNTER — APPOINTMENT (OUTPATIENT)
Dept: GENERAL RADIOLOGY | Facility: HOSPITAL | Age: 69
DRG: 871 | End: 2024-04-11
Payer: MEDICARE

## 2024-04-11 ENCOUNTER — PREP FOR SURGERY (OUTPATIENT)
Dept: OTHER | Facility: HOSPITAL | Age: 69
End: 2024-04-11
Payer: MEDICARE

## 2024-04-11 DIAGNOSIS — D64.9 ANEMIA: Primary | ICD-10-CM

## 2024-04-11 LAB
ANION GAP SERPL CALCULATED.3IONS-SCNC: 10 MMOL/L (ref 5–15)
BASOPHILS # BLD AUTO: 0.17 10*3/MM3 (ref 0–0.2)
BASOPHILS NFR BLD AUTO: 1.6 % (ref 0–1.5)
BUN SERPL-MCNC: 32 MG/DL (ref 8–23)
BUN/CREAT SERPL: 39.5 (ref 7–25)
CALCIUM SPEC-SCNC: 8.9 MG/DL (ref 8.6–10.5)
CHLORIDE SERPL-SCNC: 99 MMOL/L (ref 98–107)
CO2 SERPL-SCNC: 27 MMOL/L (ref 22–29)
CREAT SERPL-MCNC: 0.81 MG/DL (ref 0.76–1.27)
DEPRECATED RDW RBC AUTO: 49.6 FL (ref 37–54)
EGFRCR SERPLBLD CKD-EPI 2021: 97.2 ML/MIN/1.73
EOSINOPHIL # BLD AUTO: 0.43 10*3/MM3 (ref 0–0.4)
EOSINOPHIL NFR BLD AUTO: 4 % (ref 0.3–6.2)
ERYTHROCYTE [DISTWIDTH] IN BLOOD BY AUTOMATED COUNT: 15.1 % (ref 12.3–15.4)
GLUCOSE SERPL-MCNC: 91 MG/DL (ref 65–99)
HCT VFR BLD AUTO: 35.1 % (ref 37.5–51)
HGB BLD-MCNC: 11.8 G/DL (ref 13–17.7)
IMM GRANULOCYTES # BLD AUTO: 0.06 10*3/MM3 (ref 0–0.05)
IMM GRANULOCYTES NFR BLD AUTO: 0.6 % (ref 0–0.5)
LYMPHOCYTES # BLD AUTO: 2.58 10*3/MM3 (ref 0.7–3.1)
LYMPHOCYTES NFR BLD AUTO: 23.9 % (ref 19.6–45.3)
MAGNESIUM SERPL-MCNC: 2.3 MG/DL (ref 1.6–2.4)
MCH RBC QN AUTO: 30.7 PG (ref 26.6–33)
MCHC RBC AUTO-ENTMCNC: 33.6 G/DL (ref 31.5–35.7)
MCV RBC AUTO: 91.4 FL (ref 79–97)
MONOCYTES # BLD AUTO: 0.82 10*3/MM3 (ref 0.1–0.9)
MONOCYTES NFR BLD AUTO: 7.6 % (ref 5–12)
NEUTROPHILS NFR BLD AUTO: 6.73 10*3/MM3 (ref 1.7–7)
NEUTROPHILS NFR BLD AUTO: 62.3 % (ref 42.7–76)
NRBC BLD AUTO-RTO: 0 /100 WBC (ref 0–0.2)
PHOSPHATE SERPL-MCNC: 4 MG/DL (ref 2.5–4.5)
PLATELET # BLD AUTO: 385 10*3/MM3 (ref 140–450)
PMV BLD AUTO: 11.2 FL (ref 6–12)
POTASSIUM SERPL-SCNC: 3.9 MMOL/L (ref 3.5–5.2)
RBC # BLD AUTO: 3.84 10*6/MM3 (ref 4.14–5.8)
SODIUM SERPL-SCNC: 136 MMOL/L (ref 136–145)
WBC NRBC COR # BLD AUTO: 10.79 10*3/MM3 (ref 3.4–10.8)

## 2024-04-11 PROCEDURE — 94799 UNLISTED PULMONARY SVC/PX: CPT

## 2024-04-11 PROCEDURE — 85025 COMPLETE CBC W/AUTO DIFF WBC: CPT | Performed by: STUDENT IN AN ORGANIZED HEALTH CARE EDUCATION/TRAINING PROGRAM

## 2024-04-11 PROCEDURE — 97110 THERAPEUTIC EXERCISES: CPT

## 2024-04-11 PROCEDURE — 74018 RADEX ABDOMEN 1 VIEW: CPT

## 2024-04-11 PROCEDURE — 84100 ASSAY OF PHOSPHORUS: CPT | Performed by: STUDENT IN AN ORGANIZED HEALTH CARE EDUCATION/TRAINING PROGRAM

## 2024-04-11 PROCEDURE — 94761 N-INVAS EAR/PLS OXIMETRY MLT: CPT

## 2024-04-11 PROCEDURE — 97116 GAIT TRAINING THERAPY: CPT

## 2024-04-11 PROCEDURE — 94664 DEMO&/EVAL PT USE INHALER: CPT

## 2024-04-11 PROCEDURE — 99232 SBSQ HOSP IP/OBS MODERATE 35: CPT | Performed by: STUDENT IN AN ORGANIZED HEALTH CARE EDUCATION/TRAINING PROGRAM

## 2024-04-11 PROCEDURE — 80048 BASIC METABOLIC PNL TOTAL CA: CPT | Performed by: STUDENT IN AN ORGANIZED HEALTH CARE EDUCATION/TRAINING PROGRAM

## 2024-04-11 PROCEDURE — 25010000002 FUROSEMIDE PER 20 MG: Performed by: INTERNAL MEDICINE

## 2024-04-11 PROCEDURE — 83735 ASSAY OF MAGNESIUM: CPT | Performed by: STUDENT IN AN ORGANIZED HEALTH CARE EDUCATION/TRAINING PROGRAM

## 2024-04-11 RX ADMIN — FUROSEMIDE 40 MG: 10 INJECTION, SOLUTION INTRAMUSCULAR; INTRAVENOUS at 09:30

## 2024-04-11 RX ADMIN — Medication 10 ML: at 09:31

## 2024-04-11 RX ADMIN — PRIMIDONE 250 MG: 250 TABLET ORAL at 20:30

## 2024-04-11 RX ADMIN — OXYCODONE 10 MG: 5 TABLET ORAL at 04:46

## 2024-04-11 RX ADMIN — OXYCODONE 10 MG: 5 TABLET ORAL at 09:31

## 2024-04-11 RX ADMIN — PRIMIDONE 250 MG: 250 TABLET ORAL at 09:30

## 2024-04-11 RX ADMIN — VENLAFAXINE HYDROCHLORIDE 37.5 MG: 37.5 TABLET ORAL at 09:30

## 2024-04-11 RX ADMIN — ARFORMOTEROL TARTRATE 15 MCG: 15 SOLUTION RESPIRATORY (INHALATION) at 06:30

## 2024-04-11 RX ADMIN — BACITRACIN 0.9 G: 500 OINTMENT TOPICAL at 20:29

## 2024-04-11 RX ADMIN — OXYCODONE 10 MG: 5 TABLET ORAL at 17:49

## 2024-04-11 RX ADMIN — BUDESONIDE 0.5 MG: 0.5 INHALANT ORAL at 18:16

## 2024-04-11 RX ADMIN — POTASSIUM CHLORIDE 40 MEQ: 1.5 POWDER, FOR SOLUTION ORAL at 09:30

## 2024-04-11 RX ADMIN — FAMOTIDINE 20 MG: 20 TABLET ORAL at 09:31

## 2024-04-11 RX ADMIN — FUROSEMIDE 40 MG: 10 INJECTION, SOLUTION INTRAMUSCULAR; INTRAVENOUS at 17:50

## 2024-04-11 RX ADMIN — OXYCODONE 10 MG: 5 TABLET ORAL at 13:11

## 2024-04-11 RX ADMIN — VENLAFAXINE HYDROCHLORIDE 37.5 MG: 37.5 TABLET ORAL at 13:10

## 2024-04-11 RX ADMIN — METOPROLOL TARTRATE 12.5 MG: 25 TABLET, FILM COATED ORAL at 20:30

## 2024-04-11 RX ADMIN — METOPROLOL TARTRATE 12.5 MG: 25 TABLET, FILM COATED ORAL at 09:30

## 2024-04-11 RX ADMIN — ARFORMOTEROL TARTRATE 15 MCG: 15 SOLUTION RESPIRATORY (INHALATION) at 18:16

## 2024-04-11 RX ADMIN — Medication 10 ML: at 20:30

## 2024-04-11 RX ADMIN — LIDOCAINE 2 PATCH: 4 PATCH TOPICAL at 09:29

## 2024-04-11 RX ADMIN — PRIMIDONE 250 MG: 250 TABLET ORAL at 17:48

## 2024-04-11 RX ADMIN — OXYCODONE 10 MG: 5 TABLET ORAL at 00:02

## 2024-04-11 RX ADMIN — BACITRACIN 0.9 G: 500 OINTMENT TOPICAL at 09:31

## 2024-04-11 RX ADMIN — VENLAFAXINE HYDROCHLORIDE 37.5 MG: 37.5 TABLET ORAL at 18:20

## 2024-04-11 RX ADMIN — Medication 250 MG: at 09:31

## 2024-04-11 RX ADMIN — BUDESONIDE 0.5 MG: 0.5 INHALANT ORAL at 06:30

## 2024-04-11 RX ADMIN — ASPIRIN 81 MG 81 MG: 81 TABLET ORAL at 09:30

## 2024-04-11 RX ADMIN — DOCUSATE SODIUM 50MG AND SENNOSIDES 8.6MG 1 TABLET: 8.6; 5 TABLET, FILM COATED ORAL at 20:30

## 2024-04-11 RX ADMIN — Medication 250 MG: at 20:30

## 2024-04-11 NOTE — PROGRESS NOTES
[facility]   Hospitalist Progress Note  Date: 2024  Patient Name: Davonte Marshall  : 1955  MRN: [id number]  Date of admission: 3/27/2024  Room/Bed: [facility]/      Subjective   Subjective     Chief Complaint: Follow up for somnolence     Summary:Davonte Marshall is a 68 y.o. male with hypertension, chronic pain on MS Contin/Cymbalta, seizure/essential tremor on primidone presented with altered mental status and hypoxia. Required BiPAP, had aspiration episode and required intubation for airway distress.  Initial event attributed to seizure as had not been taking primidone.  Initially received Keppra, switched back to primidone with improvement in mental status and no further seizure activity.  Extubated to BiPAP and now on room air.  Completing course of Unasyn for aspiration pneumonia.  Had left lower lobe collapse but opened up with bronchopulmonary hygiene protocol.  Hospital course complicated by acute CVA; not candidate for thrombolytic; DAPT recommended from neurology and cardiology standpoint given concern for ACS; cath eventually recommended.  Plavix currently on hold in order to get PEG later this week.  Overall better but remains very debilitated and rehab planned       Interval Followup: No acute events overnight.  Patient's mentation at baseline.  Able to answer all questions appropriately.  Denied any fever, chills, rigors, chest pain or difficulty breathing.  Not in acute distress.  Currently saturating well on room air.    Review of Systems    All systems reviewed and negative except for what is outlined above.      Objective   Objective     Vitals:   Temp:  [97.5 °F (36.4 °C)-97.9 °F (36.6 °C)] 97.9 °F (36.6 °C)  Heart Rate:  [79-87] 87  Resp:  [16-20] 18  BP: (111-144)/(64-87) 144/64    Physical Exam   General: Awake, alert, NAD; Cortrak in situ  Cardiovascular: RRR, no murmurs   Pulmonary: CTA bilaterally; no wheezes; no conversational dyspnea  Gastrointestinal: S/ND/NT, +BS  Neuro:  Alert, awake, oriented x 3; speech clear; no tremor      Result Review    Result Review:  I have personally reviewed these results:  [x]  Laboratory      Lab 04/11/24  0441 04/10/24  0422 04/09/24  0435 04/08/24  0433 04/07/24  0442   WBC 10.79 10.85* 10.92*   < > 13.90*   HEMOGLOBIN 11.8* 12.1* 11.9*   < > 12.1*   HEMATOCRIT 35.1* 36.0* 36.5*   < > 37.6   PLATELETS 385 362 385   < > 356   NEUTROS ABS 6.73 6.94  --   --  9.89*   IMMATURE GRANS (ABS) 0.06* 0.06*  --   --  0.08*   LYMPHS ABS 2.58 2.51  --   --  2.53   MONOS ABS 0.82 0.79  --   --  0.96*   EOS ABS 0.43* 0.36  --   --  0.32   MCV 91.4 89.8 92.2   < > 92.8    < > = values in this interval not displayed.         Lab 04/11/24  0441 04/10/24  0422 04/09/24  0435   SODIUM 136 137 137   POTASSIUM 3.9 3.7 3.7   CHLORIDE 99 100 101   CO2 27.0 24.4 23.9   ANION GAP 10.0 12.6 12.1   BUN 32* 31* 33*   CREATININE 0.81 0.77 0.77   EGFR 97.2 98.7 98.7   GLUCOSE 91 111* 106*   CALCIUM 8.9 9.0 9.1   MAGNESIUM 2.3 2.2 2.3   PHOSPHORUS 4.0 3.7 3.5         Lab 04/08/24 0433 04/07/24 0442 04/06/24 0407 04/05/24 0414   TOTAL PROTEIN 6.6 6.6 7.3 6.2   ALBUMIN 3.6 3.5 3.6 3.3*   GLOBULIN  --  3.1 3.7 2.9   ALT (SGPT) 104* 116* 142* 114*   AST (SGOT) 46* 56* 75* 62*   BILIRUBIN 0.4 0.4 0.7 0.5   BILIRUBIN DIRECT <0.2  --   --   --    ALK PHOS 90 89 103 74                           Brief Urine Lab Results  (Last result in the past 365 days)        Color   Clarity   Blood   Leuk Est   Nitrite   Protein   CREAT   Urine HCG        04/02/24 1846 Yellow   Turbid   Small (1+)   Negative   Negative   Negative                 [x]  Microbiology   Microbiology Results (last 10 days)       Procedure Component Value - Date/Time    MRSA Screen, PCR (Inpatient) - Swab, Nares [[id number]]  (Normal) Collected: 04/03/24 0301    Lab Status: Final result Specimen: Swab from Nares Updated: 04/03/24 0514     MRSA PCR No MRSA Detected    Narrative:      The negative predictive value of this  diagnostic test is high and should only be used to consider de-escalating anti-MRSA therapy. A positive result may indicate colonization with MRSA and must be correlated clinically.    Blood Culture - Blood, Arm, Left [[id number]]  (Normal) Collected: 04/03/24 0011    Lab Status: Final result Specimen: Blood from Arm, Left Updated: 04/08/24 0030     Blood Culture No growth at 5 days    Blood Culture - Blood, Arm, Right [[id number]]  (Normal) Collected: 04/03/24 0010    Lab Status: Final result Specimen: Blood from Arm, Right Updated: 04/08/24 0030     Blood Culture No growth at 5 days          [x]  Radiology  XR Abdomen KUB    Result Date: 4/11/2024  Impression: 1.  Feeding tube with tip in distal descending duodenum   Electronically Signed By-Emerson Magallanes MD On:4/11/2024 5:28 PM      XR Chest 1 View    Result Date: 4/7/2024  No acute cardiopulmonary process identified.   Electronically Signed By-Brian Pike MD On:4/7/2024 6:13 AM      XR Chest 1 View    Result Date: 4/5/2024   IMPRESSION: 1. Significantly improved aeration at left lung base with minimal residual left basilar atelectasis. 2. Esophagogastric tube tip below the diaphragm.  Electronically Signed By-Jarocho Aden MD On:4/5/2024 3:24 PM      XR Abdomen KUB    Result Date: 4/5/2024  Dobbhoff tube with tip in mid duodenum.   Electronically Signed By-Brian Pike MD On:4/5/2024 11:39 AM      MRI Angiogram Head Without Contrast    Result Date: 4/4/2024  Negative intracranial MRA allowing for some technical artifact on the reformatted images.    Electronically Signed By-Dr. Tu Bernal MD On:4/4/2024 10:57 PM      MRI Angiogram Neck Without Contrast    Result Date: 4/4/2024   1. Proximal left ICA stenosis measuring less than 50%. 2. Otherwise negative MRA of the neck.  Electronically Signed By-Dr. Tu Bernal MD On:4/4/2024 10:54 PM      MRI Brain With & Without Contrast    Result Date: 4/3/2024   1. 6 mm focus of restricted diffusion in the  right frontal cortex compatible with an acute infarct. 2. Atrophy with chronic small vessel ischemic disease in the white matter. No acute hemorrhage. 3. No masses or pathologic contrast enhancement.  Results called to the patient's nurse, Shalonda, at 11:11 p.m. on 4/3/2024. She reports that she will notify the patient's physician immediately.   Electronically Signed By-Dr. Tu Bernal MD On:4/3/2024 11:13 PM     []  EKG/Telemetry   []  Cardiology/Vascular   []  Pathology  []  Old records  []  Other:    Assessment & Plan   Assessment / Plan     Assessment:  Dysphagia/silent aspiration  Acute infarct involving right frontal cortex  Aspiration pneumonia with Klebsiella, Streptococcus, MSSA and Moraxella  Acute hypoxemic and hypercapnic respiratory failure requiring mechanical intervention  Acute metabolic encephalopathy in setting of polypharmacy and hypercapnia - improving  Hx of seizures and Essential tremor - on primidone  Hypernatremia  Hypokalemia  Transaminitis  NSTEMI, suspect type II from demand/sepsis  Lactic acidosis, POA.  Clinically significant  Hx of Hyperlipidemia  Hx of Depression/anxiety on outpatient Cymbalta  Hx of Chronic low back pain 2/2 lumbosacral degenerative disc disease  Hx of anterocollis and cervical dystonia  Hx of left corona radiata stroke 2020 with no residual deficits  DIONNA, secondary to sepsis/shock. Resolved  Septic shock 2/2 pneumonia. Resolved    Plan:  Patient currently being managed in medicine service.  Currently has cortrak in place for nutrition.  Plan for PEG tube placement tomorrow after holding Plavix for 5 days.  Will hold tube feeding prior to the procedure.  Currently holding Plavix, last dose 4/6.  Mentation at baseline.  On dual antiplatelet for total of 21 days followed by monotherapy with aspirin as per teleneurology.  Will restart Plavix once PEG tube is placed.  Currently holding statin in the setting of transaminitis.  Continue to trend and monitor.  Pulmonology  following the patient, appreciate further input.  Currently on Unasyn, continue.  Blood culture showing no growth at 5 days.  Bowel culture on 3/27 positive for Streptococcus pneumonia, Klebsiella pneumonia and Staphylococcus aureus.  Pneumonia panel on 3/27 showed positive Klebsiella pneumoniae, Klebsiella aerogenes, Staphylococcus aureus and Streptococcus pneumonia.  S/p 5-day course of prednisone.  Continue IV Lasix 40 mg twice daily.  Hypokalemia resolved.  Continue metoprolol tartrate 12.5 mg every 12 hours.  Unable to cross MS Contin, switch to oxycodone IR 10 mg every 4 hours scheduled.  Unable to crush Cymbalta so currently on hold, discussed with pharmacy only SNRI that we have that can be given via Cortrak/PEG is venlafaxine.  Will start at 37.5 mg TID.   Continue rest of the current management.       DVT prophylaxis:  No DVT prophylaxis order currently exists.        CODE STATUS:   Level Of Support Discussed With: Health Care Surrogate  Code Status (Patient has no pulse and is not breathing): CPR (Attempt to Resuscitate)  Medical Interventions (Patient has pulse or is breathing): Full Support      Electronically signed by Laurie Crowley MD, 04/11/24, 10:21 AM EDT.

## 2024-04-11 NOTE — H&P (VIEW-ONLY)
[facility]  Inpatient Progress Note    Reason for Follow Up: Dysphagia    Subjective     Interval History:   Patient has been off Plavix for 5 days tomorrow  No new events overnight.    Current Facility-Administered Medications:     arformoterol (BROVANA) nebulizer solution 15 mcg, 15 mcg, Nebulization, BID - RT, Sofiya Pascual APRN, 15 mcg at 04/11/24 0630    aspirin chewable tablet 81 mg, 81 mg, Nasogastric, Daily, Garret Shah MD, 81 mg at 04/11/24 0930    [Held by provider] atorvastatin (LIPITOR) tablet 20 mg, 20 mg, Nasogastric, Nightly, Era Light MD, 20 mg at 03/31/24 2204    bacitracin 500 UNIT/GM ointment 0.9 g, 1 Application, Topical, BID, Willy Tena DO, 0.9 g at 04/11/24 0931    [DISCONTINUED] sennosides-docusate (PERICOLACE) 8.6-50 MG per tablet 2 tablet, 2 tablet, Nasogastric, BID, 2 tablet at 03/28/24 2048 **AND** polyethylene glycol (MIRALAX) packet 17 g, 17 g, Nasogastric, Daily PRN **AND** [DISCONTINUED] bisacodyl (DULCOLAX) EC tablet 5 mg, 5 mg, Oral, Daily PRN **AND** bisacodyl (DULCOLAX) suppository 10 mg, 10 mg, Rectal, Daily PRN, Garret Shah MD    budesonide (PULMICORT) nebulizer solution 0.5 mg, 0.5 mg, Nebulization, BID - RT, Sofiya Pascual APRN, 0.5 mg at 04/11/24 0630    [Held by provider] clopidogrel (PLAVIX) tablet 75 mg, 75 mg, Nasogastric, Daily, Willy Tena DO, 75 mg at 04/06/24 0846    Diclofenac Sodium (VOLTAREN) 1 % gel 2 g, 2 g, Topical, 4x Daily PRN, Garret Shah MD    famotidine (PEPCID) tablet 20 mg, 20 mg, Nasogastric, Daily, Willy Tena DO, 20 mg at 04/11/24 0931    furosemide (LASIX) injection 40 mg, 40 mg, Intravenous, BID, Mikey Mahajan MD, 40 mg at 04/11/24 0930    hydrALAZINE (APRESOLINE) injection 10 mg, 10 mg, Intravenous, Q6H PRN, DowneyFausto morris MD, 10 mg at 04/02/24 2011    hydrOXYzine (ATARAX) tablet 25 mg, 25 mg, Nasogastric, TID PRN, Garret Shah MD, 25 mg at 04/06/24 6416     ipratropium-albuterol (DUO-NEB) nebulizer solution 3 mL, 3 mL, Nebulization, Q6H PRN, Willy Tena DO    Lidocaine 4 % 2 patch, 2 patch, Transdermal, Daily, Willy Tena DO, 2 patch at 04/11/24 0929    melatonin tablet 5 mg, 5 mg, Nasogastric, Nightly PRN, Garret Shah MD, 5 mg at 04/07/24 2019    metoprolol tartrate (LOPRESSOR) tablet 12.5 mg, 12.5 mg, Nasogastric, Q12H, Era Light MD, 12.5 mg at 04/11/24 0930    naloxone (NARCAN) injection 1 mg, 1 mg, Intravenous, Once PRN, Willy Tena DO    ondansetron (ZOFRAN) injection 4 mg, 4 mg, Intravenous, Q4H PRN, Garret Shah MD, 4 mg at 04/06/24 1936    oxyCODONE (ROXICODONE) immediate release tablet 10 mg, 10 mg, Nasogastric, Q4H, Willy Tena DO, 10 mg at 04/11/24 0931    potassium chloride (KLOR-CON) packet 40 mEq, 40 mEq, Nasogastric, Daily, Willy Tena DO, 40 mEq at 04/11/24 0930    primidone (MYSOLINE) tablet 250 mg, 250 mg, Nasogastric, TID, Vianey Moses MD, 250 mg at 04/11/24 0930    prochlorperazine (COMPAZINE) injection 5 mg, 5 mg, Intravenous, Q6H PRN, Garret Shah MD    saccharomyces boulardii (FLORASTOR) capsule 250 mg, 250 mg, Nasogastric, BID, Willy Tena DO, 250 mg at 04/11/24 0931    sennosides-docusate (PERICOLACE) 8.6-50 MG per tablet 1 tablet, 1 tablet, Nasogastric, Nightly, Willy Tena DO, 1 tablet at 04/08/24 2005    sodium chloride 0.9 % flush 10 mL, 10 mL, Intravenous, PRN, Caroline Jaramillo MD    sodium chloride 0.9 % flush 10 mL, 10 mL, Intravenous, Q12H, Caroline Jaramillo MD, 10 mL at 04/11/24 0931    sodium chloride 0.9 % flush 10 mL, 10 mL, Intravenous, PRN, Caroline Jaramillo MD    sodium chloride 0.9 % infusion 40 mL, 40 mL, Intravenous, PRN, Caroline Jaramillo MD    venlafaxine (EFFEXOR) tablet 37.5 mg, 37.5 mg, Nasogastric, TID With Meals, Willy Tena, DO, 37.5 mg at 04/11/24 0930  Review of Systems:    All systems were reviewed and negative except for:  Gastrointestinal: positive for  See  "HPI    Objective     Vital Signs  Temp:  [97.5 °F (36.4 °C)-97.9 °F (36.6 °C)] 97.7 °F (36.5 °C)  Heart Rate:  [79-87] 79  Resp:  [16-20] 20  BP: (111-136)/(66-87) 111/87  Body mass index is 31.56 kg/m².    Intake/Output Summary (Last 24 hours) at 4/11/2024 1231  Last data filed at 4/11/2024 0557  Gross per 24 hour   Intake 2679 ml   Output 825 ml   Net 1854 ml     No intake/output data recorded.     Physical Exam:   General: awake, alert and in no acute distress   Eyes: eyes move symmetrical in all directions, no scleral icterus   Neck: supple, trachea is midline   Skin: warm and dry, not jaundiced   Cardiovascular: regular rhythm and rate   Pulm: Unlabored breathing    Abdomen: soft, non tender, non distended   Rectal: deferred   Extremities: no rash or edema   Psychiatric: mental status within normal limits, alert and oriented      Results Review:     I reviewed the patient's new clinical results.    Results from last 7 days   Lab Units 04/11/24  0441 04/10/24  0422 04/09/24  0435   WBC 10*3/mm3 10.79 10.85* 10.92*   HEMOGLOBIN g/dL 11.8* 12.1* 11.9*   HEMATOCRIT % 35.1* 36.0* 36.5*   PLATELETS 10*3/mm3 385 362 385     Results from last 7 days   Lab Units 04/11/24  0441 04/10/24  0422 04/09/24  0435 04/08/24  0433 04/07/24  0442 04/06/24  0407   SODIUM mmol/L 136 137 137 139 140 141   POTASSIUM mmol/L 3.9 3.7 3.7 4.0 3.3* 3.2*   CHLORIDE mmol/L 99 100 101 103 103 103   CO2 mmol/L 27.0 24.4 23.9 26.2 24.5 25.7   BUN mg/dL 32* 31* 33* 35* 37* 40*   CREATININE mg/dL 0.81 0.77 0.77 0.90 0.91 1.11   CALCIUM mg/dL 8.9 9.0 9.1 8.8 8.8 8.9   BILIRUBIN mg/dL  --   --   --  0.4 0.4 0.7   ALK PHOS U/L  --   --   --  90 89 103   ALT (SGPT) U/L  --   --   --  104* 116* 142*   AST (SGOT) U/L  --   --   --  46* 56* 75*   GLUCOSE mg/dL 91 111* 106* 92 114* 132*         No results found for: \"LIPASE\"    Radiology:    No radiology results for the last day      Assessment & Plan   Assessment:   Oropharyngeal dysphagia      Plan: "   Will place PEG tube tomorrow  Patient has been off Plavix for 5 days  Advised nursing staff patient will need tube feeds turned off and n.p.o. after midnight  Patient understands and agrees to the plan    I discussed the patients findings and my recommendations with patient and family.      Electronically signed by KEVIN Warner, 4/11/2024, 12:31 EDT.

## 2024-04-11 NOTE — PLAN OF CARE
Goal Outcome Evaluation:           Progress: no change  Outcome Evaluation: Vss. Scheduled oxycodone for pain. Tolerating tube feed. No changes during shift. Will continue plan of care.

## 2024-04-11 NOTE — PROGRESS NOTES
[facility]  Inpatient Progress Note    Reason for Follow Up: Dysphagia    Subjective     Interval History:   Patient has been off Plavix for 5 days tomorrow  No new events overnight.    Current Facility-Administered Medications:     arformoterol (BROVANA) nebulizer solution 15 mcg, 15 mcg, Nebulization, BID - RT, Sofiya Pascual APRN, 15 mcg at 04/11/24 0630    aspirin chewable tablet 81 mg, 81 mg, Nasogastric, Daily, Garret Shah MD, 81 mg at 04/11/24 0930    [Held by provider] atorvastatin (LIPITOR) tablet 20 mg, 20 mg, Nasogastric, Nightly, Era Light MD, 20 mg at 03/31/24 2204    bacitracin 500 UNIT/GM ointment 0.9 g, 1 Application, Topical, BID, Willy Tena DO, 0.9 g at 04/11/24 0931    [DISCONTINUED] sennosides-docusate (PERICOLACE) 8.6-50 MG per tablet 2 tablet, 2 tablet, Nasogastric, BID, 2 tablet at 03/28/24 2048 **AND** polyethylene glycol (MIRALAX) packet 17 g, 17 g, Nasogastric, Daily PRN **AND** [DISCONTINUED] bisacodyl (DULCOLAX) EC tablet 5 mg, 5 mg, Oral, Daily PRN **AND** bisacodyl (DULCOLAX) suppository 10 mg, 10 mg, Rectal, Daily PRN, Garret Shah MD    budesonide (PULMICORT) nebulizer solution 0.5 mg, 0.5 mg, Nebulization, BID - RT, Sofiya Pascual APRN, 0.5 mg at 04/11/24 0630    [Held by provider] clopidogrel (PLAVIX) tablet 75 mg, 75 mg, Nasogastric, Daily, Willy Tena DO, 75 mg at 04/06/24 0846    Diclofenac Sodium (VOLTAREN) 1 % gel 2 g, 2 g, Topical, 4x Daily PRN, Garret Shah MD    famotidine (PEPCID) tablet 20 mg, 20 mg, Nasogastric, Daily, Willy Tena DO, 20 mg at 04/11/24 0931    furosemide (LASIX) injection 40 mg, 40 mg, Intravenous, BID, Mikey Mahajan MD, 40 mg at 04/11/24 0930    hydrALAZINE (APRESOLINE) injection 10 mg, 10 mg, Intravenous, Q6H PRN, DowneyFausto morris MD, 10 mg at 04/02/24 2011    hydrOXYzine (ATARAX) tablet 25 mg, 25 mg, Nasogastric, TID PRN, Garret Shah MD, 25 mg at 04/06/24 1486     ipratropium-albuterol (DUO-NEB) nebulizer solution 3 mL, 3 mL, Nebulization, Q6H PRN, Willy Tena DO    Lidocaine 4 % 2 patch, 2 patch, Transdermal, Daily, Willy Tena DO, 2 patch at 04/11/24 0929    melatonin tablet 5 mg, 5 mg, Nasogastric, Nightly PRN, Garret Shah MD, 5 mg at 04/07/24 2019    metoprolol tartrate (LOPRESSOR) tablet 12.5 mg, 12.5 mg, Nasogastric, Q12H, Era Light MD, 12.5 mg at 04/11/24 0930    naloxone (NARCAN) injection 1 mg, 1 mg, Intravenous, Once PRN, Willy Tena DO    ondansetron (ZOFRAN) injection 4 mg, 4 mg, Intravenous, Q4H PRN, Garret Shah MD, 4 mg at 04/06/24 1936    oxyCODONE (ROXICODONE) immediate release tablet 10 mg, 10 mg, Nasogastric, Q4H, Willy Tena DO, 10 mg at 04/11/24 0931    potassium chloride (KLOR-CON) packet 40 mEq, 40 mEq, Nasogastric, Daily, Willy Tena DO, 40 mEq at 04/11/24 0930    primidone (MYSOLINE) tablet 250 mg, 250 mg, Nasogastric, TID, Vianey Moses MD, 250 mg at 04/11/24 0930    prochlorperazine (COMPAZINE) injection 5 mg, 5 mg, Intravenous, Q6H PRN, Garret Shah MD    saccharomyces boulardii (FLORASTOR) capsule 250 mg, 250 mg, Nasogastric, BID, Willy Tena DO, 250 mg at 04/11/24 0931    sennosides-docusate (PERICOLACE) 8.6-50 MG per tablet 1 tablet, 1 tablet, Nasogastric, Nightly, Willy Tena DO, 1 tablet at 04/08/24 2005    sodium chloride 0.9 % flush 10 mL, 10 mL, Intravenous, PRN, Caroline Jaramillo MD    sodium chloride 0.9 % flush 10 mL, 10 mL, Intravenous, Q12H, Caroline Jaramillo MD, 10 mL at 04/11/24 0931    sodium chloride 0.9 % flush 10 mL, 10 mL, Intravenous, PRN, Caroline Jaramillo MD    sodium chloride 0.9 % infusion 40 mL, 40 mL, Intravenous, PRN, Caroline Jaramillo MD    venlafaxine (EFFEXOR) tablet 37.5 mg, 37.5 mg, Nasogastric, TID With Meals, Willy Tena, DO, 37.5 mg at 04/11/24 0930  Review of Systems:    All systems were reviewed and negative except for:  Gastrointestinal: positive for  See  "HPI    Objective     Vital Signs  Temp:  [97.5 °F (36.4 °C)-97.9 °F (36.6 °C)] 97.7 °F (36.5 °C)  Heart Rate:  [79-87] 79  Resp:  [16-20] 20  BP: (111-136)/(66-87) 111/87  Body mass index is 31.56 kg/m².    Intake/Output Summary (Last 24 hours) at 4/11/2024 1231  Last data filed at 4/11/2024 0557  Gross per 24 hour   Intake 2679 ml   Output 825 ml   Net 1854 ml     No intake/output data recorded.     Physical Exam:   General: awake, alert and in no acute distress   Eyes: eyes move symmetrical in all directions, no scleral icterus   Neck: supple, trachea is midline   Skin: warm and dry, not jaundiced   Cardiovascular: regular rhythm and rate   Pulm: Unlabored breathing    Abdomen: soft, non tender, non distended   Rectal: deferred   Extremities: no rash or edema   Psychiatric: mental status within normal limits, alert and oriented      Results Review:     I reviewed the patient's new clinical results.    Results from last 7 days   Lab Units 04/11/24  0441 04/10/24  0422 04/09/24  0435   WBC 10*3/mm3 10.79 10.85* 10.92*   HEMOGLOBIN g/dL 11.8* 12.1* 11.9*   HEMATOCRIT % 35.1* 36.0* 36.5*   PLATELETS 10*3/mm3 385 362 385     Results from last 7 days   Lab Units 04/11/24  0441 04/10/24  0422 04/09/24  0435 04/08/24  0433 04/07/24  0442 04/06/24  0407   SODIUM mmol/L 136 137 137 139 140 141   POTASSIUM mmol/L 3.9 3.7 3.7 4.0 3.3* 3.2*   CHLORIDE mmol/L 99 100 101 103 103 103   CO2 mmol/L 27.0 24.4 23.9 26.2 24.5 25.7   BUN mg/dL 32* 31* 33* 35* 37* 40*   CREATININE mg/dL 0.81 0.77 0.77 0.90 0.91 1.11   CALCIUM mg/dL 8.9 9.0 9.1 8.8 8.8 8.9   BILIRUBIN mg/dL  --   --   --  0.4 0.4 0.7   ALK PHOS U/L  --   --   --  90 89 103   ALT (SGPT) U/L  --   --   --  104* 116* 142*   AST (SGOT) U/L  --   --   --  46* 56* 75*   GLUCOSE mg/dL 91 111* 106* 92 114* 132*         No results found for: \"LIPASE\"    Radiology:    No radiology results for the last day      Assessment & Plan   Assessment:   Oropharyngeal dysphagia      Plan: "   Will place PEG tube tomorrow  Patient has been off Plavix for 5 days  Advised nursing staff patient will need tube feeds turned off and n.p.o. after midnight  Patient understands and agrees to the plan    I discussed the patients findings and my recommendations with patient and family.      Electronically signed by KEVIN Warner, 4/11/2024, 12:31 EDT.

## 2024-04-11 NOTE — THERAPY TREATMENT NOTE
"Acute Care - Physical Therapy Treatment Note  [facility]     Patient Name: Davonte Marshall  : 1955  MRN: [id number]  Today's Date: 2024      Visit Dx:     ICD-10-CM ICD-9-CM   1. Acute respiratory failure with hypoxia  J96.01 518.81   2. Metabolic encephalopathy  G93.41 348.31   3. DIONNA (acute kidney injury)  N17.9 584.9   4. NSTEMI (non-ST elevated myocardial infarction)  I21.4 410.70   5. Oropharyngeal dysphagia  R13.12 787.22   6. Impaired mobility and ADLs  Z74.09 V49.89    Z78.9    7. Difficulty in walking  R26.2 719.7     Patient Active Problem List   Diagnosis    Anemia    Anxiety    Arthritis    Back pain    Chronic bronchitis    Complication of surgical procedure    Degeneration of lumbosacral intervertebral disc    Depression    Encounter for long-term (current) use of insulin    Essential tremor    Gall stones    Head injury    Hemorrhoids    Herniation of intervertebral disc    Hyperlipidemia    Leg pain    Neck pain    Renal insufficiency    Scoliosis    Seizure disorder    Smokes 1.5 packs of cigarettes per day    Vitamin D deficiency disease    Gross hematuria    BPH without obstruction/lower urinary tract symptoms    Urge incontinence of urine    Benign prostatic hyperplasia with urinary hesitancy    Sequelae, post-stroke    S/P TURP    Postoperative anemia    Postoperative hypoxia    Essential hypertension    Opioid dependence    Cervical dystonia    Acute metabolic encephalopathy     Past Medical History:   Diagnosis Date    Anemia     Anxiety     Arthritis     Benign essential hypertension     Chronic bronchitis     Depression     Enlarged prostate     Floaters in visual field     Gall stones     Generalized weakness     Head injury     Hemorrhoids     Hyperlipidemia     Leg pain     Numbness in both hands 2015    Seizures     last \"quite a while ago\"    Stroke 2020    left sided weakness     Past Surgical History:   Procedure Laterality Date    APPENDECTOMY      BACK SURGERY   "    4    CHOLECYSTECTOMY      CYSTOSCOPY Bilateral     7/12/21    CYSTOSCOPY TRANSURETHRAL RESECTION OF PROSTATE N/A 7/12/2021    Procedure: CYSTOSCOPY TRANSURETHRAL RESECTION OF PROSTATE;  Surgeon: Penelope Fox MD;  Location: [facility] MAIN OR;  Service: Urology;  Laterality: N/A;    HAND SURGERY Bilateral     thumb    LYMPH NODE DISSECTION      jaw line    TURP / TRANSURETHRAL INCISION / DRAINAGE PROSTATE  07/12/2021     PT Assessment (Last 12 Hours)       PT Evaluation and Treatment       Row Name 04/11/24 1540          Physical Therapy Time and Intention    Subjective Information no complaints  -RH     Document Type therapy note (daily note)  -     Mode of Treatment individual therapy;physical therapy  -     Patient Effort good  -RH     Symptoms Noted During/After Treatment fatigue  -       Row Name 04/11/24 1540          Pain    Pretreatment Pain Rating 0/10 - no pain  -     Posttreatment Pain Rating 0/10 - no pain  -       Row Name 04/11/24 1540          Bed Mobility    Bed Mobility supine-sit;sit-supine;scooting/bridging  -     Scooting/Bridging [place] (Bed Mobility) minimum assist (75% patient effort)  -     Supine-Sit [place] (Bed Mobility) contact guard  -     Sit-Supine [place] (Bed Mobility) contact guard  -     Bed Mobility, Safety Issues decreased use of legs for bridging/pushing;decreased use of arms for pushing/pulling  -     Assistive Device (Bed Mobility) bed rails;draw sheet;head of bed elevated  -       Row Name 04/11/24 1540          Transfers    Transfers sit-stand transfer;stand-sit transfer  -       Row Name 04/11/24 1540          Sit-Stand Transfer    Sit-Stand [place] (Transfers) minimum assist (75% patient effort);verbal cues  -     Assistive Device (Sit-Stand Transfers) walker, front-wheeled  -       Row Name 04/11/24 1540          Stand-Sit Transfer    Stand-Sit [place] (Transfers) contact guard;verbal cues  -     Assistive Device  (Stand-Sit Transfers) walker, front-wheeled  -       Row Name 04/11/24 1540          Gait/Stairs (Locomotion)    Gait/Stairs Locomotion gait/ambulation assistive device  -     [place] Level (Gait) minimum assist (75% patient effort)  -     Assistive Device (Gait) walker, front-wheeled  -RH     Patient was able to Ambulate yes  -RH     Distance in Feet (Gait) 4  -RH     Pattern (Gait) step-to  -RH     Deviations/Abnormal Patterns (Gait) base of support, wide;[place] decreased;gait speed decreased  -     Bilateral Gait Deviations forward flexed posture;heel strike decreased  -       Row Name 04/11/24 1540          Safety Issues, Functional Mobility    Impairments Affecting Function (Mobility) balance;cognition;endurance/activity tolerance;muscle tone abnormal;strength  -       Row Name 04/11/24 1540          Balance    Dynamic Sitting Balance contact guard  -     Dynamic Standing Balance minimal assist  -       Row Name 04/11/24 1540          Motor Skills    Therapeutic Exercise hip;knee;ankle  -       Row Name 04/11/24 1540          Hip (Therapeutic Exercise)    Hip (Therapeutic Exercise) isometric exercises  -     Hip Isometrics (Therapeutic Exercise) gluteal sets  -       Row Name 04/11/24 1540          Knee (Therapeutic Exercise)    Knee (Therapeutic Exercise) isometric exercises  -     Knee Isometrics (Therapeutic Exercise) quad sets  -       Row Name 04/11/24 1540          Ankle (Therapeutic Exercise)    Ankle (Therapeutic Exercise) AROM (active range of motion)  -     Ankle AROM (Therapeutic Exercise) bilateral;dorsiflexion;plantarflexion  -       Row Name             Wound 04/04/24 1200 Right anterior second toe    Wound - Properties Group Placement Date: 04/04/24  -EP Placement Time: 1200  -EP Side: Right  -EP Orientation: anterior  -EP Location: second toe  -EP    Retired Wound - Properties Group Placement Date: 04/04/24  -EP Placement Time: 1200  -EP Side: Right  -EP  Orientation: anterior  -EP Location: second toe  -EP    Retired Wound - Properties Group Date first assessed: 04/04/24  -EP Time first assessed: 1200  -EP Side: Right  -EP Location: second toe  -EP      Row Name             Wound 04/04/24 1200 Right anterior third toe    Wound - Properties Group Placement Date: 04/04/24  -EP Placement Time: 1200  -EP Side: Right  -EP Orientation: anterior  -EP Location: third toe  -EP    Retired Wound - Properties Group Placement Date: 04/04/24  -EP Placement Time: 1200  -EP Side: Right  -EP Orientation: anterior  -EP Location: third toe  -EP    Retired Wound - Properties Group Date first assessed: 04/04/24  -EP Time first assessed: 1200  -EP Side: Right  -EP Location: third toe  -EP      Row Name             Wound 04/04/24 1200 Left anterior great toe    Wound - Properties Group Placement Date: 04/04/24  -EP Placement Time: 1200  -EP Side: Left  -EP Orientation: anterior  -EP Location: great toe  -EP    Retired Wound - Properties Group Placement Date: 04/04/24  -EP Placement Time: 1200  -EP Side: Left  -EP Orientation: anterior  -EP Location: great toe  -EP    Retired Wound - Properties Group Date first assessed: 04/04/24  -EP Time first assessed: 1200  -EP Side: Left  -EP Location: great toe  -EP      Row Name             Wound 04/04/24 1200 Left anterior second toe    Wound - Properties Group Placement Date: 04/04/24  -EP Placement Time: 1200  -EP Side: Left  -EP Orientation: anterior  -EP Location: second toe  -EP    Retired Wound - Properties Group Placement Date: 04/04/24  -EP Placement Time: 1200  -EP Side: Left  -EP Orientation: anterior  -EP Location: second toe  -EP    Retired Wound - Properties Group Date first assessed: 04/04/24  -EP Time first assessed: 1200  -EP Side: Left  -EP Location: second toe  -EP      Row Name 04/11/24 1540          Vital Signs    O2 Delivery Intra Treatment room air  -RH       Row Name 04/11/24 1540          Positioning and Restraints    Post  Treatment Position bed  -RH     In Bed call light within reach;encouraged to call for assist;with family/caregiver;exit alarm on;heels elevated;supine  -RH       Row Name 04/11/24 1540          Progress Summary (PT)    Progress Toward Functional Goals (PT) progress toward functional goals is good  -RH     Daily Progress Summary (PT) Pt tolerated treatment well, shows improved strength and endurance as evident by increased distance ambulated w/ decreased assistance. Pt would benefit from skilled PT to address remaining strength, endurance, balance deficits.  -RH               User Key  (r) = Recorded By, (t) = Taken By, (c) = Cosigned By      Initials Name Provider Type    RH William Ambriz PTA Physical Therapist Assistant    Alise Altamirano RN Registered Nurse                      PT Recommendation and Plan     Progress Summary (PT)  Progress Toward Functional Goals (PT): progress toward functional goals is good  Daily Progress Summary (PT): Pt tolerated treatment well, shows improved strength and endurance as evident by increased distance ambulated w/ decreased assistance. Pt would benefit from skilled PT to address remaining strength, endurance, balance deficits.   Outcome Measures       Row Name 04/11/24 1500             How much help from another person do you currently need...    Turning from your back to your side while in flat bed without using bedrails? 4  -RH      Moving from lying on back to sitting on the side of a flat bed without bedrails? 3  -RH      Moving to and from a bed to a chair (including a wheelchair)? 2  -RH      Standing up from a chair using your arms (e.g., wheelchair, bedside chair)? 3  -RH      Climbing 3-5 steps with a railing? 1  -RH      To walk in hospital room? 2  -RH      AM-PAC 6 Clicks Score (PT) 15  -RH      Highest Level of Mobility Goal 4 --> Transfer to chair/commode  -RH                User Key  (r) = Recorded By, (t) = Taken By, (c) = Cosigned By      Initials Name  Provider Type    William Altamirano PTA Physical Therapist Assistant                     Time Calculation:    PT Charges       Row Name 04/11/24 1538             Timed Charges    25396 - PT Therapeutic Exercise Minutes 15  -RH      84085 - Gait Training Minutes  10  -RH         Total Minutes    Timed Charges Total Minutes 25  -RH       Total Minutes 25  -RH                User Key  (r) = Recorded By, (t) = Taken By, (c) = Cosigned By      Initials Name Provider Type     William Ambriz PTA Physical Therapist Assistant                  Therapy Charges for Today       Code Description Service Date Service Provider Modifiers Qty    29654075857 HC GAIT TRAINING EA 15 MIN 4/11/2024 William Ambriz PTA GP 1            PT G-Codes  Outcome Measure Options: AM-PAC 6 Clicks Basic Mobility (PT)  AM-PAC 6 Clicks Score (PT): 15  AM-PAC 6 Clicks Score (OT): 10    William Ambriz PTA  4/11/2024

## 2024-04-11 NOTE — PROGRESS NOTES
"Nutrition Services    Patient Name: Davonte Marshall  YOB: 1955  MRN: [id number]  Admission date: 3/27/2024    PROGRESS NOTE      Encounter Information: EN Follow Up       PO Diet: NPO Diet NPO Type: Strict NPO   PO Supplements: NPO   PO Intake:  NPO       Current nutrition support: Fibersource HN @ 85 ml/hr  FWF: 65 ml q3h (520 ml)    Provided: 2244 kcal, 101 g pro, 2035 ml fluid       Estimated Needs: 2044-8647 kcal, 71-85 g, 0406-6429 ml       Nutrition support review: No intolerances noted.        Labs (reviewed below): Electrolytes WDL.        GI Function:  Last BM noted 4/10.       Nutrition Intervention Updates: Continue with current nutrition plan of care.      Results from last 7 days   Lab Units 04/11/24  0441 04/10/24  0422 04/09/24  0435 04/08/24  0433 04/07/24  0442 04/06/24  0407   SODIUM mmol/L 136 137 137 139 140 141   POTASSIUM mmol/L 3.9 3.7 3.7 4.0 3.3* 3.2*   CHLORIDE mmol/L 99 100 101 103 103 103   CO2 mmol/L 27.0 24.4 23.9 26.2 24.5 25.7   BUN mg/dL 32* 31* 33* 35* 37* 40*   CREATININE mg/dL 0.81 0.77 0.77 0.90 0.91 1.11   CALCIUM mg/dL 8.9 9.0 9.1 8.8 8.8 8.9   BILIRUBIN mg/dL  --   --   --  0.4 0.4 0.7   ALK PHOS U/L  --   --   --  90 89 103   ALT (SGPT) U/L  --   --   --  104* 116* 142*   AST (SGOT) U/L  --   --   --  46* 56* 75*   GLUCOSE mg/dL 91 111* 106* 92 114* 132*     Results from last 7 days   Lab Units 04/11/24  0441 04/10/24  0422 04/09/24  0435   MAGNESIUM mg/dL 2.3 2.2 2.3   PHOSPHORUS mg/dL 4.0 3.7 3.5   HEMOGLOBIN g/dL 11.8* 12.1* 11.9*   HEMATOCRIT % 35.1* 36.0* 36.5*     COVID19   Date Value Ref Range Status   03/27/2024 Not Detected Not Detected - Ref. Range Final     No results found for: \"HGBA1C\"    RD to follow up per protocol.    Electronically signed by:  Lisa Pitts RD  04/11/24 08:37 EDT    "

## 2024-04-12 ENCOUNTER — ANESTHESIA EVENT (OUTPATIENT)
Dept: GASTROENTEROLOGY | Facility: HOSPITAL | Age: 69
End: 2024-04-12
Payer: MEDICARE

## 2024-04-12 ENCOUNTER — ANESTHESIA (OUTPATIENT)
Dept: GASTROENTEROLOGY | Facility: HOSPITAL | Age: 69
End: 2024-04-12
Payer: MEDICARE

## 2024-04-12 PROBLEM — R00.1 BRADYCARDIA: Status: ACTIVE | Noted: 2024-04-12

## 2024-04-12 PROBLEM — I95.81 HYPOTENSION AFTER PROCEDURE: Status: ACTIVE | Noted: 2024-04-12

## 2024-04-12 LAB
ANION GAP SERPL CALCULATED.3IONS-SCNC: 11.1 MMOL/L (ref 5–15)
BASOPHILS # BLD AUTO: 0.17 10*3/MM3 (ref 0–0.2)
BASOPHILS NFR BLD AUTO: 1.5 % (ref 0–1.5)
BUN SERPL-MCNC: 33 MG/DL (ref 8–23)
BUN/CREAT SERPL: 37.1 (ref 7–25)
CALCIUM SPEC-SCNC: 9.3 MG/DL (ref 8.6–10.5)
CHLORIDE SERPL-SCNC: 99 MMOL/L (ref 98–107)
CO2 SERPL-SCNC: 25.9 MMOL/L (ref 22–29)
CREAT SERPL-MCNC: 0.89 MG/DL (ref 0.76–1.27)
DEPRECATED RDW RBC AUTO: 49.6 FL (ref 37–54)
EGFRCR SERPLBLD CKD-EPI 2021: 94.5 ML/MIN/1.73
EOSINOPHIL # BLD AUTO: 0.39 10*3/MM3 (ref 0–0.4)
EOSINOPHIL NFR BLD AUTO: 3.4 % (ref 0.3–6.2)
ERYTHROCYTE [DISTWIDTH] IN BLOOD BY AUTOMATED COUNT: 15.2 % (ref 12.3–15.4)
GLUCOSE SERPL-MCNC: 87 MG/DL (ref 65–99)
HCT VFR BLD AUTO: 38.1 % (ref 37.5–51)
HGB BLD-MCNC: 12.5 G/DL (ref 13–17.7)
IMM GRANULOCYTES # BLD AUTO: 0.08 10*3/MM3 (ref 0–0.05)
IMM GRANULOCYTES NFR BLD AUTO: 0.7 % (ref 0–0.5)
INR PPP: 1.06 (ref 0.86–1.15)
LYMPHOCYTES # BLD AUTO: 2.98 10*3/MM3 (ref 0.7–3.1)
LYMPHOCYTES NFR BLD AUTO: 25.7 % (ref 19.6–45.3)
MAGNESIUM SERPL-MCNC: 2.2 MG/DL (ref 1.6–2.4)
MCH RBC QN AUTO: 30 PG (ref 26.6–33)
MCHC RBC AUTO-ENTMCNC: 32.8 G/DL (ref 31.5–35.7)
MCV RBC AUTO: 91.6 FL (ref 79–97)
MONOCYTES # BLD AUTO: 0.76 10*3/MM3 (ref 0.1–0.9)
MONOCYTES NFR BLD AUTO: 6.6 % (ref 5–12)
NEUTROPHILS NFR BLD AUTO: 62.1 % (ref 42.7–76)
NEUTROPHILS NFR BLD AUTO: 7.2 10*3/MM3 (ref 1.7–7)
NRBC BLD AUTO-RTO: 0 /100 WBC (ref 0–0.2)
PHOSPHATE SERPL-MCNC: 4.3 MG/DL (ref 2.5–4.5)
PLATELET # BLD AUTO: 386 10*3/MM3 (ref 140–450)
PMV BLD AUTO: 11.2 FL (ref 6–12)
POTASSIUM SERPL-SCNC: 4.5 MMOL/L (ref 3.5–5.2)
PROTHROMBIN TIME: 14.1 SECONDS (ref 11.8–14.9)
QT INTERVAL: 396 MS
QTC INTERVAL: 475 MS
RBC # BLD AUTO: 4.16 10*6/MM3 (ref 4.14–5.8)
SODIUM SERPL-SCNC: 136 MMOL/L (ref 136–145)
WBC NRBC COR # BLD AUTO: 11.58 10*3/MM3 (ref 3.4–10.8)

## 2024-04-12 PROCEDURE — 25010000002 CEFAZOLIN PER 500 MG: Performed by: NURSE ANESTHETIST, CERTIFIED REGISTERED

## 2024-04-12 PROCEDURE — 25810000003 LACTATED RINGERS PER 1000 ML: Performed by: NURSE ANESTHETIST, CERTIFIED REGISTERED

## 2024-04-12 PROCEDURE — 83735 ASSAY OF MAGNESIUM: CPT | Performed by: STUDENT IN AN ORGANIZED HEALTH CARE EDUCATION/TRAINING PROGRAM

## 2024-04-12 PROCEDURE — 25010000002 CEFAZOLIN PER 500 MG: Performed by: INTERNAL MEDICINE

## 2024-04-12 PROCEDURE — 93005 ELECTROCARDIOGRAM TRACING: CPT | Performed by: INTERNAL MEDICINE

## 2024-04-12 PROCEDURE — 25010000002 LIDOCAINE 1 % SOLUTION: Performed by: INTERNAL MEDICINE

## 2024-04-12 PROCEDURE — 99231 SBSQ HOSP IP/OBS SF/LOW 25: CPT | Performed by: INTERNAL MEDICINE

## 2024-04-12 PROCEDURE — 84100 ASSAY OF PHOSPHORUS: CPT | Performed by: STUDENT IN AN ORGANIZED HEALTH CARE EDUCATION/TRAINING PROGRAM

## 2024-04-12 PROCEDURE — 43246 EGD PLACE GASTROSTOMY TUBE: CPT | Performed by: INTERNAL MEDICINE

## 2024-04-12 PROCEDURE — 94664 DEMO&/EVAL PT USE INHALER: CPT

## 2024-04-12 PROCEDURE — 94799 UNLISTED PULMONARY SVC/PX: CPT

## 2024-04-12 PROCEDURE — 80048 BASIC METABOLIC PNL TOTAL CA: CPT | Performed by: STUDENT IN AN ORGANIZED HEALTH CARE EDUCATION/TRAINING PROGRAM

## 2024-04-12 PROCEDURE — 25010000002 EPINEPHRINE 1 MG/10ML SOLUTION PREFILLED SYRINGE: Performed by: NURSE ANESTHETIST, CERTIFIED REGISTERED

## 2024-04-12 PROCEDURE — 25010000002 PROPOFOL 10 MG/ML EMULSION: Performed by: NURSE ANESTHETIST, CERTIFIED REGISTERED

## 2024-04-12 PROCEDURE — 93010 ELECTROCARDIOGRAM REPORT: CPT | Performed by: INTERNAL MEDICINE

## 2024-04-12 PROCEDURE — 25010000002 FUROSEMIDE PER 20 MG: Performed by: INTERNAL MEDICINE

## 2024-04-12 PROCEDURE — 0DH63UZ INSERTION OF FEEDING DEVICE INTO STOMACH, PERCUTANEOUS APPROACH: ICD-10-PCS | Performed by: INTERNAL MEDICINE

## 2024-04-12 PROCEDURE — 99232 SBSQ HOSP IP/OBS MODERATE 35: CPT | Performed by: STUDENT IN AN ORGANIZED HEALTH CARE EDUCATION/TRAINING PROGRAM

## 2024-04-12 PROCEDURE — 94761 N-INVAS EAR/PLS OXIMETRY MLT: CPT

## 2024-04-12 PROCEDURE — 85025 COMPLETE CBC W/AUTO DIFF WBC: CPT | Performed by: STUDENT IN AN ORGANIZED HEALTH CARE EDUCATION/TRAINING PROGRAM

## 2024-04-12 PROCEDURE — 85610 PROTHROMBIN TIME: CPT | Performed by: INTERNAL MEDICINE

## 2024-04-12 RX ORDER — PROPOFOL 10 MG/ML
VIAL (ML) INTRAVENOUS AS NEEDED
Status: DISCONTINUED | OUTPATIENT
Start: 2024-04-12 | End: 2024-04-12 | Stop reason: SURG

## 2024-04-12 RX ORDER — ASPIRIN 81 MG/1
81 TABLET, CHEWABLE ORAL DAILY
Status: DISCONTINUED | OUTPATIENT
Start: 2024-04-13 | End: 2024-04-16 | Stop reason: HOSPADM

## 2024-04-12 RX ORDER — SACCHAROMYCES BOULARDII 250 MG
250 CAPSULE ORAL 2 TIMES DAILY
Status: DISCONTINUED | OUTPATIENT
Start: 2024-04-12 | End: 2024-04-16 | Stop reason: HOSPADM

## 2024-04-12 RX ORDER — LIDOCAINE HYDROCHLORIDE 20 MG/ML
INJECTION, SOLUTION EPIDURAL; INFILTRATION; INTRACAUDAL; PERINEURAL AS NEEDED
Status: DISCONTINUED | OUTPATIENT
Start: 2024-04-12 | End: 2024-04-12 | Stop reason: SURG

## 2024-04-12 RX ORDER — LIDOCAINE HYDROCHLORIDE 10 MG/ML
INJECTION, SOLUTION INFILTRATION; PERINEURAL AS NEEDED
Status: DISCONTINUED | OUTPATIENT
Start: 2024-04-12 | End: 2024-04-12 | Stop reason: HOSPADM

## 2024-04-12 RX ORDER — DEXMEDETOMIDINE HYDROCHLORIDE 100 UG/ML
INJECTION, SOLUTION INTRAVENOUS AS NEEDED
Status: DISCONTINUED | OUTPATIENT
Start: 2024-04-12 | End: 2024-04-12 | Stop reason: SURG

## 2024-04-12 RX ORDER — SODIUM CHLORIDE, SODIUM LACTATE, POTASSIUM CHLORIDE, CALCIUM CHLORIDE 600; 310; 30; 20 MG/100ML; MG/100ML; MG/100ML; MG/100ML
INJECTION, SOLUTION INTRAVENOUS CONTINUOUS PRN
Status: DISCONTINUED | OUTPATIENT
Start: 2024-04-12 | End: 2024-04-12 | Stop reason: SURG

## 2024-04-12 RX ORDER — VENLAFAXINE 37.5 MG/1
37.5 TABLET ORAL
Status: DISCONTINUED | OUTPATIENT
Start: 2024-04-12 | End: 2024-04-16 | Stop reason: HOSPADM

## 2024-04-12 RX ORDER — PRIMIDONE 250 MG/1
250 TABLET ORAL 3 TIMES DAILY
Status: DISCONTINUED | OUTPATIENT
Start: 2024-04-12 | End: 2024-04-16 | Stop reason: HOSPADM

## 2024-04-12 RX ORDER — AMOXICILLIN 250 MG
1 CAPSULE ORAL NIGHTLY
Status: DISCONTINUED | OUTPATIENT
Start: 2024-04-12 | End: 2024-04-15

## 2024-04-12 RX ORDER — CLOPIDOGREL BISULFATE 75 MG/1
75 TABLET ORAL DAILY
Status: DISCONTINUED | OUTPATIENT
Start: 2024-04-13 | End: 2024-04-16 | Stop reason: HOSPADM

## 2024-04-12 RX ORDER — CEFAZOLIN SODIUM 1 G/3ML
INJECTION, POWDER, FOR SOLUTION INTRAMUSCULAR; INTRAVENOUS AS NEEDED
Status: DISCONTINUED | OUTPATIENT
Start: 2024-04-12 | End: 2024-04-12 | Stop reason: SURG

## 2024-04-12 RX ORDER — ATORVASTATIN CALCIUM 20 MG/1
20 TABLET, FILM COATED ORAL NIGHTLY
Status: DISCONTINUED | OUTPATIENT
Start: 2024-04-12 | End: 2024-04-16 | Stop reason: HOSPADM

## 2024-04-12 RX ORDER — POTASSIUM CHLORIDE 1.5 G/1.58G
40 POWDER, FOR SOLUTION ORAL DAILY
Status: DISCONTINUED | OUTPATIENT
Start: 2024-04-13 | End: 2024-04-16 | Stop reason: HOSPADM

## 2024-04-12 RX ORDER — HYDROXYZINE HYDROCHLORIDE 25 MG/1
25 TABLET, FILM COATED ORAL 3 TIMES DAILY PRN
Status: DISCONTINUED | OUTPATIENT
Start: 2024-04-12 | End: 2024-04-16 | Stop reason: HOSPADM

## 2024-04-12 RX ORDER — OXYCODONE HYDROCHLORIDE 5 MG/1
10 TABLET ORAL EVERY 4 HOURS
Status: COMPLETED | OUTPATIENT
Start: 2024-04-12 | End: 2024-04-13

## 2024-04-12 RX ADMIN — LIDOCAINE 2 PATCH: 4 PATCH TOPICAL at 12:14

## 2024-04-12 RX ADMIN — BACITRACIN 0.9 G: 500 OINTMENT TOPICAL at 21:24

## 2024-04-12 RX ADMIN — CEFAZOLIN 1 G: 1 INJECTION, POWDER, FOR SOLUTION INTRAMUSCULAR; INTRAVENOUS; PARENTERAL at 09:58

## 2024-04-12 RX ADMIN — OXYCODONE 10 MG: 5 TABLET ORAL at 00:25

## 2024-04-12 RX ADMIN — OXYCODONE HYDROCHLORIDE 10 MG: 5 TABLET ORAL at 21:24

## 2024-04-12 RX ADMIN — BUDESONIDE 0.5 MG: 0.5 INHALANT ORAL at 07:53

## 2024-04-12 RX ADMIN — FUROSEMIDE 40 MG: 10 INJECTION, SOLUTION INTRAMUSCULAR; INTRAVENOUS at 17:38

## 2024-04-12 RX ADMIN — BACITRACIN 0.9 G: 500 OINTMENT TOPICAL at 12:14

## 2024-04-12 RX ADMIN — SODIUM CHLORIDE, POTASSIUM CHLORIDE, SODIUM LACTATE AND CALCIUM CHLORIDE: 600; 310; 30; 20 INJECTION, SOLUTION INTRAVENOUS at 09:55

## 2024-04-12 RX ADMIN — PROPOFOL 30 MG: 10 INJECTION, EMULSION INTRAVENOUS at 10:00

## 2024-04-12 RX ADMIN — FUROSEMIDE 40 MG: 10 INJECTION, SOLUTION INTRAMUSCULAR; INTRAVENOUS at 12:14

## 2024-04-12 RX ADMIN — DEXMEDETOMIDINE 4 MCG: 100 INJECTION, SOLUTION INTRAVENOUS at 10:03

## 2024-04-12 RX ADMIN — PROPOFOL 125 MCG/KG/MIN: 10 INJECTION, EMULSION INTRAVENOUS at 10:00

## 2024-04-12 RX ADMIN — METOPROLOL TARTRATE 12.5 MG: 25 TABLET, FILM COATED ORAL at 21:23

## 2024-04-12 RX ADMIN — DOCUSATE SODIUM 50MG AND SENNOSIDES 8.6MG 1 TABLET: 8.6; 5 TABLET, FILM COATED ORAL at 21:23

## 2024-04-12 RX ADMIN — ATORVASTATIN CALCIUM 20 MG: 20 TABLET, FILM COATED ORAL at 21:23

## 2024-04-12 RX ADMIN — LIDOCAINE HYDROCHLORIDE 100 MG: 20 INJECTION, SOLUTION INTRAVENOUS at 10:02

## 2024-04-12 RX ADMIN — Medication 250 MG: at 21:24

## 2024-04-12 RX ADMIN — Medication 10 ML: at 12:15

## 2024-04-12 RX ADMIN — ARFORMOTEROL TARTRATE 15 MCG: 15 SOLUTION RESPIRATORY (INHALATION) at 07:53

## 2024-04-12 RX ADMIN — PRIMIDONE 250 MG: 250 TABLET ORAL at 17:38

## 2024-04-12 RX ADMIN — OXYCODONE HYDROCHLORIDE 10 MG: 5 TABLET ORAL at 17:38

## 2024-04-12 RX ADMIN — CEFAZOLIN 1000 MG: 1 INJECTION, POWDER, FOR SOLUTION INTRAMUSCULAR; INTRAVENOUS at 09:27

## 2024-04-12 RX ADMIN — ARFORMOTEROL TARTRATE 15 MCG: 15 SOLUTION RESPIRATORY (INHALATION) at 18:50

## 2024-04-12 RX ADMIN — VENLAFAXINE HYDROCHLORIDE 37.5 MG: 37.5 TABLET ORAL at 17:38

## 2024-04-12 RX ADMIN — PRIMIDONE 250 MG: 250 TABLET ORAL at 21:24

## 2024-04-12 RX ADMIN — OXYCODONE 10 MG: 5 TABLET ORAL at 04:33

## 2024-04-12 RX ADMIN — Medication 10 ML: at 21:23

## 2024-04-12 RX ADMIN — BUDESONIDE 0.5 MG: 0.5 INHALANT ORAL at 18:50

## 2024-04-12 RX ADMIN — EPINEPHRINE 50 MCG: 0.1 INJECTION INTRAVENOUS at 10:10

## 2024-04-12 NOTE — ANESTHESIA PREPROCEDURE EVALUATION
" Anesthesia Evaluation     Patient summary reviewed and Nursing notes reviewed   no history of anesthetic complications:   NPO Solid Status: > 8 hours  NPO Liquid Status: > 8 hours           Airway   Mallampati: II  TM distance: >3 FB  Neck ROM: limited  Possible difficult intubation  Comment: Pain with neck extension, favors a flexed position, cannot lie flat.   Dental    (+) edentulous    Pulmonary - normal exam    breath sounds clear to auscultation  (+) a smoker Current,    ROS comment: Septic shock PNM resolved.  Cardiovascular - normal exam  Exercise tolerance: poor (<4 METS)    ECG reviewed  PT is on anticoagulation therapy  Patient on routine beta blocker and Beta blocker given within 24 hours of surgery  Rhythm: regular  Rate: normal    (+) hypertension, hyperlipidemia    ROS comment: Poor circulation, several purplish digits, difficulty obtaining spO2 on fingers, will try alar pulseox    Neuro/Psych  (+) seizures (Does not know when last sz was, \"a long time ago\"), CVA (nov 2020 and current visit. Aspirates, dysphagia Acute infarct involving right frontal cortex) residual symptoms, numbness (ELSA feet neuropathy), psychiatric history  GI/Hepatic/Renal/Endo    (+) renal disease (DIONNA)-    Musculoskeletal     (+) back pain, neck pain (Hx of anterocollis and cervical dystonia)      ROS comment: Sees pain mgt for back.  Baseline opioid  Abdominal    Substance History - negative use     OB/GYN negative ob/gyn ROS         Other   arthritis,     ROS/Med Hx Other:  3/27/24 Extremely technically difficult study with very limited views.  ·  The subcostal view is the only one where we can see myocardium and it looks like ejection fraction is greater than 50%.  Cannot rule out regional wall motion abnormalities.  ·  No obvious significant valvular disease by Doppler.  The valves are not well-visualized.  ·  Left ventricular diastolic function was indeterminate.     ABNORMAL ECG -  Sinus tachycardia  Inferior infarct, " old  Extensive anterior infarct, old  When compared with ECG of 27-Mar-2024 3:42:39,  Significant rate increase  New or worsened ischemia or infarction  Electronically Signed By: Era Light ([facility]) 04-Apr-2024 09:03:55  Date and Time of Study: 2024-04-01 19:23:43          Phys Exam Other: · Dysphagia/silent aspiration  · Acute infarct involving right frontal cortex  · Aspiration pneumonia with Klebsiella, Streptococcus, MSSA and Moraxella  · Acute hypoxemic and hypercapnic respiratory failure requiring mechanical intervention  · Acute metabolic encephalopathy in setting of polypharmacy and hypercapnia - improving  · Hx of seizures and Essential tremor - on primidone  · Hypernatremia  · Hypokalemia  · Transaminitis  · NSTEMI, suspect type II from demand/sepsis  · Lactic acidosis, POA.  Clinically significant  · Hx of Hyperlipidemia  · Hx of Depression/anxiety on outpatient Cymbalta  · Hx of Chronic low back pain 2/2 lumbosacral degenerative disc disease  · Hx of anterocollis and cervical dystonia  · Hx of left corona radiata stroke 2020   · DIONNA, secondary to sepsis/shock. Resolved  · Septic shock 2/2 pneumonia. Resolved                    Anesthesia Plan    ASA 4     general   total IV anesthesia  (Total IV Anesthesia    Patient understands increased risk for stroke, mi, death, intubation. Pt and wife accept all risks and wish to proceed.      )  intravenous induction     Anesthetic plan, risks, benefits, and alternatives have been provided, discussed and informed consent has been obtained with: patient and spouse/significant other.    Use of blood products discussed with patient  Consented to blood products.    Plan discussed with CRNA.

## 2024-04-12 NOTE — PROGRESS NOTES
[facility]   Hospitalist Progress Note  Date: 2024  Patient Name: Davonte Marshall  : 1955  MRN: [id number]  Date of admission: 3/27/2024  Room/Bed: ENDO/ENDO      Subjective   Subjective     Chief Complaint: Follow up for somnolence     Summary:Davonte Marshall is a 68 y.o. male with hypertension, chronic pain on MS Contin/Cymbalta, seizure/essential tremor on primidone presented with altered mental status and hypoxia. Required BiPAP, had aspiration episode and required intubation for airway distress.  Initial event attributed to seizure as had not been taking primidone.  Initially received Keppra, switched back to primidone with improvement in mental status and no further seizure activity.  Extubated to BiPAP and now on room air.  Completing course of Unasyn for aspiration pneumonia.  Had left lower lobe collapse but opened up with bronchopulmonary hygiene protocol.  Hospital course complicated by acute CVA; not candidate for thrombolytic; DAPT recommended from neurology and cardiology standpoint given concern for ACS; cath eventually recommended.  Plavix was held for PEG tube placement today.  Overall better but remains very debilitated and rehab planned.       Interval Followup: No acute events overnight.  Patient became hypotensive and bradycardic during his PEG tube placement, received 1 dose of epi.  Vital stable thereafter.  Cardiology was consulted.  During my evaluation after PEG tube placement, his vitals including blood pressure and heart rate were stable.  Could be in the setting of anesthesia medications.  Denied any fever, chills, rigors, chest pain or difficulty breathing.  Not in acute distress.  Currently saturating well on 2 L/min of oxygen via NC.    Review of Systems    All systems reviewed and negative except for what is outlined above.      Objective   Objective     Vitals:   Temp:  [97.1 °F (36.2 °C)-98.8 °F (37.1 °C)] 97.1 °F (36.2 °C)  Heart Rate:  [78-99] 97  Resp:   [16-24] 17  BP: ()/(60-87) 105/74  Flow (L/min):  [2-4] 2    Physical Exam   General: Awake, alert, NAD  Cardiovascular: RRR, no murmurs   Pulmonary: CTA bilaterally; no wheezes; no conversational dyspnea; on 2 L/min of oxygen via NC.  Gastrointestinal: S/ND/NT, +BS  Neuro: Alert, awake, oriented x 3; speech clear; no tremor      Result Review    Result Review:  I have personally reviewed these results:  [x]  Laboratory      Lab 04/12/24  0430 04/11/24  0441 04/10/24  0422   WBC 11.58* 10.79 10.85*   HEMOGLOBIN 12.5* 11.8* 12.1*   HEMATOCRIT 38.1 35.1* 36.0*   PLATELETS 386 385 362   NEUTROS ABS 7.20* 6.73 6.94   IMMATURE GRANS (ABS) 0.08* 0.06* 0.06*   LYMPHS ABS 2.98 2.58 2.51   MONOS ABS 0.76 0.82 0.79   EOS ABS 0.39 0.43* 0.36   MCV 91.6 91.4 89.8   PROTIME 14.1  --   --          Lab 04/12/24  0430 04/11/24  0441 04/10/24  0422   SODIUM 136 136 137   POTASSIUM 4.5 3.9 3.7   CHLORIDE 99 99 100   CO2 25.9 27.0 24.4   ANION GAP 11.1 10.0 12.6   BUN 33* 32* 31*   CREATININE 0.89 0.81 0.77   EGFR 94.5 97.2 98.7   GLUCOSE 87 91 111*   CALCIUM 9.3 8.9 9.0   MAGNESIUM 2.2 2.3 2.2   PHOSPHORUS 4.3 4.0 3.7         Lab 04/08/24 0433 04/07/24 0442 04/06/24  0407   TOTAL PROTEIN 6.6 6.6 7.3   ALBUMIN 3.6 3.5 3.6   GLOBULIN  --  3.1 3.7   ALT (SGPT) 104* 116* 142*   AST (SGOT) 46* 56* 75*   BILIRUBIN 0.4 0.4 0.7   BILIRUBIN DIRECT <0.2  --   --    ALK PHOS 90 89 103         Lab 04/12/24 0430   PROTIME 14.1   INR 1.06                       Brief Urine Lab Results  (Last result in the past 365 days)        Color   Clarity   Blood   Leuk Est   Nitrite   Protein   CREAT   Urine HCG        04/02/24 1846 Yellow   Turbid   Small (1+)   Negative   Negative   Negative                 [x]  Microbiology   Microbiology Results (last 10 days)       Procedure Component Value - Date/Time    MRSA Screen, PCR (Inpatient) - Swab, Nares [[id number]]  (Normal) Collected: 04/03/24 0301    Lab Status: Final result Specimen: Swab from Nares  Updated: 04/03/24 0514     MRSA PCR No MRSA Detected    Narrative:      The negative predictive value of this diagnostic test is high and should only be used to consider de-escalating anti-MRSA therapy. A positive result may indicate colonization with MRSA and must be correlated clinically.    Blood Culture - Blood, Arm, Left [[id number]]  (Normal) Collected: 04/03/24 0011    Lab Status: Final result Specimen: Blood from Arm, Left Updated: 04/08/24 0030     Blood Culture No growth at 5 days    Blood Culture - Blood, Arm, Right [[id number]]  (Normal) Collected: 04/03/24 0010    Lab Status: Final result Specimen: Blood from Arm, Right Updated: 04/08/24 0030     Blood Culture No growth at 5 days          [x]  Radiology  XR Abdomen KUB    Result Date: 4/11/2024  Impression: 1.  Feeding tube with tip in distal descending duodenum   Electronically Signed By-Emerson Magallanes MD On:4/11/2024 5:28 PM      XR Chest 1 View    Result Date: 4/7/2024  No acute cardiopulmonary process identified.   Electronically Signed By-Brian Pike MD On:4/7/2024 6:13 AM      XR Chest 1 View    Result Date: 4/5/2024   IMPRESSION: 1. Significantly improved aeration at left lung base with minimal residual left basilar atelectasis. 2. Esophagogastric tube tip below the diaphragm.  Electronically Signed By-Jarocho Aden MD On:4/5/2024 3:24 PM      XR Abdomen KUB    Result Date: 4/5/2024  Dobbhoff tube with tip in mid duodenum.   Electronically Signed By-Brian Pike MD On:4/5/2024 11:39 AM      MRI Angiogram Head Without Contrast    Result Date: 4/4/2024  Negative intracranial MRA allowing for some technical artifact on the reformatted images.    Electronically Signed By-Dr. Tu Bernal MD On:4/4/2024 10:57 PM      MRI Angiogram Neck Without Contrast    Result Date: 4/4/2024   1. Proximal left ICA stenosis measuring less than 50%. 2. Otherwise negative MRA of the neck.  Electronically Signed By-Dr. uT Bernal MD On:4/4/2024 10:54 PM      []  EKG/Telemetry   []  Cardiology/Vascular   []  Pathology  []  Old records  []  Other:    Assessment & Plan   Assessment / Plan     Assessment:  Dysphagia/silent aspiration  Acute infarct involving right frontal cortex  Aspiration pneumonia with Klebsiella, Streptococcus, MSSA and Moraxella  Acute hypoxemic and hypercapnic respiratory failure requiring mechanical intervention  Acute metabolic encephalopathy in setting of polypharmacy and hypercapnia - improving  Hx of seizures and Essential tremor - on primidone  Hypernatremia  Hypokalemia  Transaminitis  NSTEMI, suspect type II from demand/sepsis  Lactic acidosis, POA.  Clinically significant  Hx of Hyperlipidemia  Hx of Depression/anxiety on outpatient Cymbalta  Hx of Chronic low back pain 2/2 lumbosacral degenerative disc disease  Hx of anterocollis and cervical dystonia  Hx of left corona radiata stroke 2020 with no residual deficits  DIONNA, secondary to sepsis/shock. Resolved  Septic shock 2/2 pneumonia. Resolved    Plan:  Patient currently being managed in medicine service.  Underwent PEG tube placement today.  Tolerated procedure well.  Will restart tube feeding as per protocol.  Affix was on hold for PEG tube placement, will restart from tomorrow morning.  Patient became hypotensive and bradycardic during PEG tube placement, received 1 dose of epi.  Vital stable thereafter.  Could be in the setting of anesthesia medications.  Cardiology consulted, appreciate input.  Mentation at baseline.  On dual antiplatelet for total of 21 days followed by monotherapy with aspirin as per teleneurology.  Will restart Plavix once PEG tube is placed.  Currently holding statin in the setting of transaminitis.  Will restart from tonight given his transaminitis was improving.  Continue to trend and monitor in AM.  Pulmonology following the patient, appreciate further input.  S/p IV Unasyn.  Blood culture showing no growth at 5 days.  Bowel culture on 3/27 positive for  Streptococcus pneumonia, Klebsiella pneumonia and Staphylococcus aureus.  Pneumonia panel on 3/27 showed positive Klebsiella pneumoniae, Klebsiella aerogenes, Staphylococcus aureus and Streptococcus pneumonia.  S/p 5-day course of prednisone.  Continue IV Lasix 40 mg twice daily.  Hypokalemia resolved.  Continue metoprolol tartrate 12.5 mg every 12 hours.  Unable to cross MS Contin, switch to oxycodone IR 10 mg every 4 hours scheduled.  Unable to crush Cymbalta so currently on hold, discussed with pharmacy only SNRI that we have that can be given via Cortrak/PEG is venlafaxine.  Will start at 37.5 mg TID.   Continue rest of the current management.  Likely discharge in next 24-48 hours.       DVT prophylaxis:  No DVT prophylaxis order currently exists.        CODE STATUS:   Level Of Support Discussed With: Health Care Surrogate  Code Status (Patient has no pulse and is not breathing): CPR (Attempt to Resuscitate)  Medical Interventions (Patient has pulse or is breathing): Full Support      Electronically signed by Laurie Crowley MD, 04/12/24, 10:21 AM EDT.

## 2024-04-12 NOTE — SIGNIFICANT NOTE
04/12/24 1200   Physical Therapy Time and Intention   Session Not Performed patient unavailable for treatment  (had a procedure done)

## 2024-04-12 NOTE — PROGRESS NOTES
"Nutrition Services    Patient Name: Davonte Marshall  YOB: 1955  MRN: [id number]  Admission date: 3/27/2024    PROGRESS NOTE      Encounter Information: EN Follow Up       PO Diet: NPO Diet NPO Type: Strict NPO   PO Supplements: NPO   PO Intake:  NPO       Current nutrition support: Fibersource HN @ 85 ml/hr  FWF: 65 ml q3h (520 ml)    Provided: 2244 kcal, 101 g pro, 2035 ml fluid       Estimated Needs: 6491-4981 kcal, 71-85 g, 9616-5905 ml       Nutrition support review: Tube feeds being held. Pt had upper GI endoscopy and PEG placement today.       Labs (reviewed below): Electrolytes WDL.        GI Function:  Last BM noted 4/12.       Nutrition Intervention Updates: Anticipate EN to resume post procedure.      Results from last 7 days   Lab Units 04/12/24  0430 04/11/24  0441 04/10/24  0422 04/09/24  0435 04/08/24  0433 04/07/24  0442 04/06/24  0407   SODIUM mmol/L 136 136 137   < > 139 140 141   POTASSIUM mmol/L 4.5 3.9 3.7   < > 4.0 3.3* 3.2*   CHLORIDE mmol/L 99 99 100   < > 103 103 103   CO2 mmol/L 25.9 27.0 24.4   < > 26.2 24.5 25.7   BUN mg/dL 33* 32* 31*   < > 35* 37* 40*   CREATININE mg/dL 0.89 0.81 0.77   < > 0.90 0.91 1.11   CALCIUM mg/dL 9.3 8.9 9.0   < > 8.8 8.8 8.9   BILIRUBIN mg/dL  --   --   --   --  0.4 0.4 0.7   ALK PHOS U/L  --   --   --   --  90 89 103   ALT (SGPT) U/L  --   --   --   --  104* 116* 142*   AST (SGOT) U/L  --   --   --   --  46* 56* 75*   GLUCOSE mg/dL 87 91 111*   < > 92 114* 132*    < > = values in this interval not displayed.     Results from last 7 days   Lab Units 04/12/24  0430 04/11/24  0441 04/10/24  0422   MAGNESIUM mg/dL 2.2 2.3 2.2   PHOSPHORUS mg/dL 4.3 4.0 3.7   HEMOGLOBIN g/dL 12.5* 11.8* 12.1*   HEMATOCRIT % 38.1 35.1* 36.0*     COVID19   Date Value Ref Range Status   03/27/2024 Not Detected Not Detected - Ref. Range Final     No results found for: \"HGBA1C\"    RD to follow up per protocol.    Electronically signed by:  Lisa Pitts RD  04/12/24 " 13:09 EDT

## 2024-04-12 NOTE — PLAN OF CARE
Goal Outcome Evaluation:   No acute events this shift. VSS

## 2024-04-12 NOTE — ANESTHESIA POSTPROCEDURE EVALUATION
Patient: Davonte Marshall    Procedure Summary       Date: 04/12/24 Room / Location: [facility] ENDOSCOPY 2 / [facility] ENDOSCOPY    Anesthesia Start: 0955 Anesthesia Stop: 1027    Procedure: ESOPHAGOGASTRODUODENOSCOPY WITH PERCUTANEOUS ENDOSCOPIC GASTROSTOMY TUBE INSERTION WITH ANESTHESIA Diagnosis:       Anemia      (Anemia [D64.9])    Surgeons: Will Dugan MD Provider: Rosalina Poole CRNA    Anesthesia Type: general ASA Status: 4            Anesthesia Type: general    Vitals  Vitals Value Taken Time   BP 98/68 04/12/24 1039   Temp 36.2 °C (97.1 °F) 04/12/24 1034   Pulse 88 04/12/24 1048   Resp 17 04/12/24 1034   SpO2 94 % 04/12/24 1048   Vitals shown include unfiled device data.        Post Anesthesia Care and Evaluation    Patient location during evaluation: bedside  Patient participation: complete - patient participated  Level of consciousness: awake and alert  Pain management: adequate    Airway patency: patent  Anesthesia complication: See note below..  PONV Status: controlled  Cardiovascular status: acceptable and stable  Respiratory status: acceptable  Post Neuraxial Block status: Motor and sensory function returned to baseline  Comments: Pt had episode of hypotension 60/39 (47) HR 60, 55/38 (45) HR 61 after very minimal dose of Propofol-total 30mg. BP did not respond to Neosynephrine or Ephedrine, responded well to 50mcg Epi. Pt had bounding femoral pulse, very weak carotid and radial pulses. Preoperatively, pt's digits were purple, unable to obtain accurate pulse ox, poor waveform. Alar pulseox with good pleth. RN to RN report given to return to floor. VSS, pt very alert and oriented. RN will have hospitalist obtain cardiology consult. Pt and family aware of hypotension episode and plan.  No anesthesia care post op

## 2024-04-12 NOTE — PROGRESS NOTES
CARDIOLOGY  INPATIENT PROGRESS NOTE                [facility] UNIT    4/12/2024      PATIENT IDENTIFICATION:   Name:  Davonte Marshall      MRN:  [id number]     68 y.o.  male                 SUBJECTIVE:   Patient with a episode of hypertension with blood pressure dropping into the 50s and heart rate in the mid 40s after receiving propofol for sedation for CT placement today the patient respond to  50 Mcg of epi.  The patient has had no events afterwards she denies any chest discomfort issues    OBJECTIVE:  Vitals:    04/12/24 1024 04/12/24 1029 04/12/24 1031 04/12/24 1034   BP: 90/61 105/68 105/68 105/74   BP Location: Right arm      Patient Position: Sitting      Pulse: 88 94 94 97   Resp: 24 19 19 17   Temp: 97.4 °F (36.3 °C)   97.1 °F (36.2 °C)   TempSrc: Temporal   Temporal   SpO2: 99% 92% 97% 94%   Weight:       Height:         FiO2 (%): 30 %     Body mass index is 31.56 kg/m².    Intake/Output Summary (Last 24 hours) at 4/12/2024 1443  Last data filed at 4/12/2024 1025  Gross per 24 hour   Intake 2083 ml   Output 1150 ml   Net 933 ml       Telemetry: Normal sinus rhythm    Physical Exam  General Appearance:   no acute distress  Alert and oriented x3  HENT:   lips not cyanotic  Atraumatic  Neck:  No jvd   supple  Respiratory:  no respiratory distress  normal breath sounds  no rales  Cardiovascular:    regular rhythm  no S3, no S4   no murmur  no rub  lower extremity edema: none    Skin:   warm, dry  No rashes      Allergies   Allergen Reactions    Codeine Arrhythmia     Chest pain    Guaifenesin-Codeine Arrhythmia     Chest pain    Cefdinir Unknown - Low Severity     Scheduled meds:  arformoterol, 15 mcg, Nebulization, BID - RT  aspirin, 81 mg, Nasogastric, Daily  [Held by provider] atorvastatin, 20 mg, Nasogastric, Nightly  bacitracin, 1 Application, Topical, BID  budesonide, 0.5 mg, Nebulization, BID - RT  [Held by provider] clopidogrel, 75 mg, Nasogastric, Daily  famotidine, 20  "mg, Nasogastric, Daily  furosemide, 40 mg, Intravenous, BID  Lidocaine, 2 patch, Transdermal, Daily  metoprolol tartrate, 12.5 mg, Nasogastric, Q12H  oxyCODONE, 10 mg, Nasogastric, Q4H  potassium chloride, 40 mEq, Nasogastric, Daily  primidone, 250 mg, Nasogastric, TID  saccharomyces boulardii, 250 mg, Nasogastric, BID  senna-docusate sodium, 1 tablet, Nasogastric, Nightly  sodium chloride, 10 mL, Intravenous, Q12H  venlafaxine, 37.5 mg, Nasogastric, TID With Meals      IV meds:                         Data Review:  CBC          4/10/2024    04:22 4/11/2024    04:41 4/12/2024    04:30   CBC   WBC 10.85  10.79  11.58    RBC 4.01  3.84  4.16    Hemoglobin 12.1  11.8  12.5    Hematocrit 36.0  35.1  38.1    MCV 89.8  91.4  91.6    MCH 30.2  30.7  30.0    MCHC 33.6  33.6  32.8    RDW 14.9  15.1  15.2    Platelets 362  385  386      CMP          4/10/2024    04:22 4/11/2024    04:41 4/12/2024    04:30   CMP   Glucose 111  91  87    BUN 31  32  33    Creatinine 0.77  0.81  0.89    EGFR 98.7  97.2  94.5    Sodium 137  136  136    Potassium 3.7  3.9  4.5    Chloride 100  99  99    Calcium 9.0  8.9  9.3    BUN/Creatinine Ratio 40.3  39.5  37.1    Anion Gap 12.6  10.0  11.1       CARDIAC LABS:      Lab 04/12/24  0430   PROTIME 14.1   INR 1.06        No results found for: \"DIGOXIN\"   Lab Results   Component Value Date    TSH 2.130 04/02/2024           Invalid input(s): \"LDLCALC\"  No results found for: \"POCTROP\"  Lab Results   Component Value Date    TROPONINT 1,517 (C) 04/01/2024   (  Lab Results   Component Value Date    MG 2.2 04/12/2024     Results for orders placed during the hospital encounter of 03/27/24    Adult Transthoracic Echo Limited W/ Cont if Necessary Per Protocol    Interpretation Summary    Extremely technically difficult study with very limited views.    The subcostal view is the only one where we can see myocardium and it looks like ejection fraction is greater than 50%.  Cannot rule out regional wall motion " abnormalities.    No obvious significant valvular disease by Doppler.  The valves are not well-visualized.    Left ventricular diastolic function was indeterminate.           ASSESSMENT:    Acute metabolic encephalopathy    Bradycardia    Hypotension after procedure    Anemia    Bradycardia hypotension occurring after sedation with propofol responses to pressors patient with no other dysrhythmias noted denies any chest pain issues suspect likely related to medication side effect repeat EKG shows no significant abnormalities we will continue to watch patient while in the hospital would resume Plavix and statin when felt to be safe after procedure.        Renzo Barron MD  4/12/2024    14:43 EDT

## 2024-04-12 NOTE — PLAN OF CARE
Goal Outcome Evaluation:  Plan of Care Reviewed With: patient        Progress: no change. NPO for G-tube placement.

## 2024-04-13 LAB
ALBUMIN SERPL-MCNC: 3.9 G/DL (ref 3.5–5.2)
ALP SERPL-CCNC: 153 U/L (ref 39–117)
ALT SERPL W P-5'-P-CCNC: 74 U/L (ref 1–41)
ANION GAP SERPL CALCULATED.3IONS-SCNC: 11.2 MMOL/L (ref 5–15)
AST SERPL-CCNC: 41 U/L (ref 1–40)
BASOPHILS # BLD AUTO: 0.15 10*3/MM3 (ref 0–0.2)
BASOPHILS NFR BLD AUTO: 1.5 % (ref 0–1.5)
BILIRUB CONJ SERPL-MCNC: 0.2 MG/DL (ref 0–0.3)
BILIRUB INDIRECT SERPL-MCNC: 0.3 MG/DL
BILIRUB SERPL-MCNC: 0.5 MG/DL (ref 0–1.2)
BUN SERPL-MCNC: 37 MG/DL (ref 8–23)
BUN/CREAT SERPL: 34.3 (ref 7–25)
CALCIUM SPEC-SCNC: 9.8 MG/DL (ref 8.6–10.5)
CHLORIDE SERPL-SCNC: 96 MMOL/L (ref 98–107)
CO2 SERPL-SCNC: 27.8 MMOL/L (ref 22–29)
CREAT SERPL-MCNC: 1.08 MG/DL (ref 0.76–1.27)
DEPRECATED RDW RBC AUTO: 52.1 FL (ref 37–54)
EGFRCR SERPLBLD CKD-EPI 2021: 75.7 ML/MIN/1.73
EOSINOPHIL # BLD AUTO: 0.3 10*3/MM3 (ref 0–0.4)
EOSINOPHIL NFR BLD AUTO: 2.9 % (ref 0.3–6.2)
ERYTHROCYTE [DISTWIDTH] IN BLOOD BY AUTOMATED COUNT: 15.5 % (ref 12.3–15.4)
GLUCOSE SERPL-MCNC: 128 MG/DL (ref 65–99)
HCT VFR BLD AUTO: 39.9 % (ref 37.5–51)
HGB BLD-MCNC: 13 G/DL (ref 13–17.7)
IMM GRANULOCYTES # BLD AUTO: 0.06 10*3/MM3 (ref 0–0.05)
IMM GRANULOCYTES NFR BLD AUTO: 0.6 % (ref 0–0.5)
LYMPHOCYTES # BLD AUTO: 2.76 10*3/MM3 (ref 0.7–3.1)
LYMPHOCYTES NFR BLD AUTO: 26.8 % (ref 19.6–45.3)
MAGNESIUM SERPL-MCNC: 2.5 MG/DL (ref 1.6–2.4)
MCH RBC QN AUTO: 30.5 PG (ref 26.6–33)
MCHC RBC AUTO-ENTMCNC: 32.6 G/DL (ref 31.5–35.7)
MCV RBC AUTO: 93.7 FL (ref 79–97)
MONOCYTES # BLD AUTO: 0.63 10*3/MM3 (ref 0.1–0.9)
MONOCYTES NFR BLD AUTO: 6.1 % (ref 5–12)
NEUTROPHILS NFR BLD AUTO: 6.4 10*3/MM3 (ref 1.7–7)
NEUTROPHILS NFR BLD AUTO: 62.1 % (ref 42.7–76)
NRBC BLD AUTO-RTO: 0 /100 WBC (ref 0–0.2)
PHOSPHATE SERPL-MCNC: 4.9 MG/DL (ref 2.5–4.5)
PLATELET # BLD AUTO: 401 10*3/MM3 (ref 140–450)
PMV BLD AUTO: 12.1 FL (ref 6–12)
POTASSIUM SERPL-SCNC: 3.5 MMOL/L (ref 3.5–5.2)
PROT SERPL-MCNC: 7.4 G/DL (ref 6–8.5)
RBC # BLD AUTO: 4.26 10*6/MM3 (ref 4.14–5.8)
SODIUM SERPL-SCNC: 135 MMOL/L (ref 136–145)
WBC NRBC COR # BLD AUTO: 10.3 10*3/MM3 (ref 3.4–10.8)

## 2024-04-13 PROCEDURE — 94664 DEMO&/EVAL PT USE INHALER: CPT

## 2024-04-13 PROCEDURE — 97110 THERAPEUTIC EXERCISES: CPT

## 2024-04-13 PROCEDURE — 94799 UNLISTED PULMONARY SVC/PX: CPT

## 2024-04-13 PROCEDURE — 25010000002 FUROSEMIDE PER 20 MG: Performed by: INTERNAL MEDICINE

## 2024-04-13 PROCEDURE — 85025 COMPLETE CBC W/AUTO DIFF WBC: CPT | Performed by: INTERNAL MEDICINE

## 2024-04-13 PROCEDURE — 94761 N-INVAS EAR/PLS OXIMETRY MLT: CPT

## 2024-04-13 PROCEDURE — 80048 BASIC METABOLIC PNL TOTAL CA: CPT | Performed by: INTERNAL MEDICINE

## 2024-04-13 PROCEDURE — 97116 GAIT TRAINING THERAPY: CPT

## 2024-04-13 PROCEDURE — 84100 ASSAY OF PHOSPHORUS: CPT | Performed by: INTERNAL MEDICINE

## 2024-04-13 PROCEDURE — 99232 SBSQ HOSP IP/OBS MODERATE 35: CPT | Performed by: STUDENT IN AN ORGANIZED HEALTH CARE EDUCATION/TRAINING PROGRAM

## 2024-04-13 PROCEDURE — 80076 HEPATIC FUNCTION PANEL: CPT | Performed by: STUDENT IN AN ORGANIZED HEALTH CARE EDUCATION/TRAINING PROGRAM

## 2024-04-13 PROCEDURE — 83735 ASSAY OF MAGNESIUM: CPT | Performed by: INTERNAL MEDICINE

## 2024-04-13 RX ORDER — CHOLECALCIFEROL (VITAMIN D3) 125 MCG
5 CAPSULE ORAL NIGHTLY PRN
Status: DISCONTINUED | OUTPATIENT
Start: 2024-04-13 | End: 2024-04-16 | Stop reason: HOSPADM

## 2024-04-13 RX ORDER — POLYETHYLENE GLYCOL 3350 17 G/17G
17 POWDER, FOR SOLUTION ORAL DAILY PRN
Status: DISCONTINUED | OUTPATIENT
Start: 2024-04-13 | End: 2024-04-16 | Stop reason: HOSPADM

## 2024-04-13 RX ORDER — FAMOTIDINE 20 MG/1
20 TABLET, FILM COATED ORAL DAILY
Status: DISCONTINUED | OUTPATIENT
Start: 2024-04-13 | End: 2024-04-16 | Stop reason: HOSPADM

## 2024-04-13 RX ORDER — BISACODYL 10 MG
10 SUPPOSITORY, RECTAL RECTAL DAILY PRN
Status: DISCONTINUED | OUTPATIENT
Start: 2024-04-13 | End: 2024-04-16 | Stop reason: HOSPADM

## 2024-04-13 RX ORDER — OXYCODONE HYDROCHLORIDE 5 MG/1
10 TABLET ORAL EVERY 4 HOURS
Status: DISCONTINUED | OUTPATIENT
Start: 2024-04-13 | End: 2024-04-16 | Stop reason: HOSPADM

## 2024-04-13 RX ADMIN — LIDOCAINE 2 PATCH: 4 PATCH TOPICAL at 08:46

## 2024-04-13 RX ADMIN — OXYCODONE HYDROCHLORIDE 10 MG: 5 TABLET ORAL at 14:12

## 2024-04-13 RX ADMIN — FUROSEMIDE 40 MG: 10 INJECTION, SOLUTION INTRAMUSCULAR; INTRAVENOUS at 08:46

## 2024-04-13 RX ADMIN — OXYCODONE HYDROCHLORIDE 10 MG: 5 TABLET ORAL at 04:13

## 2024-04-13 RX ADMIN — METOPROLOL TARTRATE 12.5 MG: 25 TABLET, FILM COATED ORAL at 08:47

## 2024-04-13 RX ADMIN — OXYCODONE HYDROCHLORIDE 10 MG: 5 TABLET ORAL at 21:38

## 2024-04-13 RX ADMIN — Medication 5 MG: at 21:38

## 2024-04-13 RX ADMIN — Medication 250 MG: at 08:47

## 2024-04-13 RX ADMIN — Medication 10 ML: at 21:38

## 2024-04-13 RX ADMIN — POTASSIUM CHLORIDE 40 MEQ: 1.5 POWDER, FOR SOLUTION ORAL at 08:46

## 2024-04-13 RX ADMIN — ARFORMOTEROL TARTRATE 15 MCG: 15 SOLUTION RESPIRATORY (INHALATION) at 18:58

## 2024-04-13 RX ADMIN — DOCUSATE SODIUM 50MG AND SENNOSIDES 8.6MG 1 TABLET: 8.6; 5 TABLET, FILM COATED ORAL at 21:38

## 2024-04-13 RX ADMIN — ASPIRIN 81 MG: 81 TABLET, CHEWABLE ORAL at 08:47

## 2024-04-13 RX ADMIN — ARFORMOTEROL TARTRATE 15 MCG: 15 SOLUTION RESPIRATORY (INHALATION) at 07:38

## 2024-04-13 RX ADMIN — OXYCODONE HYDROCHLORIDE 10 MG: 5 TABLET ORAL at 00:28

## 2024-04-13 RX ADMIN — PRIMIDONE 250 MG: 250 TABLET ORAL at 21:38

## 2024-04-13 RX ADMIN — BUDESONIDE 0.5 MG: 0.5 INHALANT ORAL at 07:38

## 2024-04-13 RX ADMIN — BUDESONIDE 0.5 MG: 0.5 INHALANT ORAL at 18:58

## 2024-04-13 RX ADMIN — OXYCODONE HYDROCHLORIDE 10 MG: 5 TABLET ORAL at 18:09

## 2024-04-13 RX ADMIN — FAMOTIDINE 20 MG: 20 TABLET ORAL at 08:47

## 2024-04-13 RX ADMIN — CLOPIDOGREL BISULFATE 75 MG: 75 TABLET ORAL at 08:47

## 2024-04-13 RX ADMIN — Medication 10 ML: at 08:46

## 2024-04-13 RX ADMIN — Medication 250 MG: at 21:38

## 2024-04-13 RX ADMIN — VENLAFAXINE HYDROCHLORIDE 37.5 MG: 37.5 TABLET ORAL at 18:10

## 2024-04-13 RX ADMIN — PRIMIDONE 250 MG: 250 TABLET ORAL at 15:58

## 2024-04-13 RX ADMIN — BACITRACIN 0.9 G: 500 OINTMENT TOPICAL at 08:47

## 2024-04-13 RX ADMIN — PRIMIDONE 250 MG: 250 TABLET ORAL at 08:47

## 2024-04-13 RX ADMIN — BACITRACIN 0.9 G: 500 OINTMENT TOPICAL at 21:38

## 2024-04-13 RX ADMIN — ATORVASTATIN CALCIUM 20 MG: 20 TABLET, FILM COATED ORAL at 21:38

## 2024-04-13 RX ADMIN — HYDROXYZINE HYDROCHLORIDE 25 MG: 25 TABLET, FILM COATED ORAL at 21:38

## 2024-04-13 RX ADMIN — FUROSEMIDE 40 MG: 10 INJECTION, SOLUTION INTRAMUSCULAR; INTRAVENOUS at 18:09

## 2024-04-13 RX ADMIN — METOPROLOL TARTRATE 12.5 MG: 25 TABLET, FILM COATED ORAL at 21:38

## 2024-04-13 RX ADMIN — VENLAFAXINE HYDROCHLORIDE 37.5 MG: 37.5 TABLET ORAL at 12:03

## 2024-04-13 RX ADMIN — OXYCODONE HYDROCHLORIDE 10 MG: 5 TABLET ORAL at 08:47

## 2024-04-13 RX ADMIN — VENLAFAXINE HYDROCHLORIDE 37.5 MG: 37.5 TABLET ORAL at 08:47

## 2024-04-13 NOTE — PROGRESS NOTES
"Nutrition Services    Patient Name: Davonte Marshall  YOB: 1955  MRN: [id number]  Admission date: 3/27/2024    PROGRESS NOTE      Encounter Information: EN Follow Up       PO Diet: NPO Diet NPO Type: Strict NPO   PO Supplements: NPO   PO Intake:  NPO       Current nutrition support: Fibersource HN @ 85 ml/hr  FWF: 50 ml q3h (400 ml)    Provided: 2244 kcal, 101 g pro, 1915 ml fluid       Estimated Needs: 4126-4120 kcal, 71-85 g, 6382-3668 ml       Nutrition support review: Tube feed restarted and advanced to goal s/p PEG placement yesterday.        Labs (reviewed below): Na 135, Phos 4.9, Mg 2.5       GI Function:  Last BM noted 4/12. + flatus and abd pain s/p PEG       Nutrition Intervention Updates: FWF decreased 2/2 hyponatremia.      Results from last 7 days   Lab Units 04/13/24 0356 04/12/24 0430 04/11/24 0441 04/09/24 0435 04/08/24 0433 04/07/24 0442   SODIUM mmol/L 135* 136 136   < > 139 140   POTASSIUM mmol/L 3.5 4.5 3.9   < > 4.0 3.3*   CHLORIDE mmol/L 96* 99 99   < > 103 103   CO2 mmol/L 27.8 25.9 27.0   < > 26.2 24.5   BUN mg/dL 37* 33* 32*   < > 35* 37*   CREATININE mg/dL 1.08 0.89 0.81   < > 0.90 0.91   CALCIUM mg/dL 9.8 9.3 8.9   < > 8.8 8.8   BILIRUBIN mg/dL 0.5  --   --   --  0.4 0.4   ALK PHOS U/L 153*  --   --   --  90 89   ALT (SGPT) U/L 74*  --   --   --  104* 116*   AST (SGOT) U/L 41*  --   --   --  46* 56*   GLUCOSE mg/dL 128* 87 91   < > 92 114*    < > = values in this interval not displayed.     Results from last 7 days   Lab Units 04/13/24  0356 04/12/24  0430 04/11/24  0441   MAGNESIUM mg/dL 2.5* 2.2 2.3   PHOSPHORUS mg/dL 4.9* 4.3 4.0   HEMOGLOBIN g/dL 13.0 12.5* 11.8*   HEMATOCRIT % 39.9 38.1 35.1*     COVID19   Date Value Ref Range Status   03/27/2024 Not Detected Not Detected - Ref. Range Final     No results found for: \"HGBA1C\"    RD to follow up per protocol.    Electronically signed by:  Tessie Marshall RD  04/13/24 09:45 EDT    "

## 2024-04-13 NOTE — CASE MANAGEMENT/SOCIAL WORK
CMS Certification completed by Dr. Crowley -    04/12/24 1104  CMS Certification  Once     Completed     Reason for continued hospitalization: PEG tube placement; hypotension and bradycardia  Reasons for high cost stay: PEG tube placement; hypotension and bradycardia  Estimated discharge date: 4/15/2024  Post discharge plans: SNF

## 2024-04-13 NOTE — PROGRESS NOTES
[facility]   Hospitalist Progress Note  Date: 2024  Patient Name: Davonte Marshall  : 1955  MRN: [id number]  Date of admission: 3/27/2024  Room/Bed: [facility]      Subjective   Subjective     Chief Complaint: Follow up for somnolence     Summary:Davonte Marshall is a 68 y.o. male with hypertension, chronic pain on MS Contin/Cymbalta, seizure/essential tremor on primidone presented with altered mental status and hypoxia. Required BiPAP, had aspiration episode and required intubation for airway distress.  Initial event attributed to seizure as had not been taking primidone.  Initially received Keppra, switched back to primidone with improvement in mental status and no further seizure activity.  Extubated to BiPAP and now on room air.  Completing course of Unasyn for aspiration pneumonia.  Had left lower lobe collapse but opened up with bronchopulmonary hygiene protocol.  Hospital course complicated by acute CVA; not candidate for thrombolytic; DAPT recommended from neurology and cardiology standpoint given concern for ACS; cath eventually recommended.  Plavix was held for 5 days and patient underwent PEG tube placement on . Patient became hypotensive and bradycardic during his PEG tube placement, received 1 dose of epi.  Vital stable thereafter.  Cardiology was consulted who thought the episode of bradycardia and hypotension was likely from anesthesia medications. His Plavix and statin were resumed. Overall better but remains very debilitated and rehab planned.  Pending disposition.       Interval Followup: No acute events overnight.  Denied any fever, chills, rigors, chest pain or difficulty breathing.  Not in acute distress.  Currently saturating well on 2 L/min of oxygen via NC.    Review of Systems    All systems reviewed and negative except for what is outlined above.      Objective   Objective     Vitals:   Temp:  [97.7 °F (36.5 °C)-98.2 °F (36.8 °C)] 98.2 °F (36.8 °C)  Heart Rate:  [75-96]  79  Resp:  [16-20] 16  BP: (108-135)/(64-73) 113/69    Physical Exam   General: Awake, alert, NAD  Cardiovascular: RRR, no murmurs   Pulmonary: CTA bilaterally; no wheezes; no conversational dyspnea; on 2 L/min of oxygen via NC.  Gastrointestinal: S/ND/NT, +BS  Neuro: Alert, awake, oriented x 3; speech clear; no tremor      Result Review    Result Review:  I have personally reviewed these results:  [x]  Laboratory      Lab 04/13/24  0356 04/12/24 0430 04/11/24 0441   WBC 10.30 11.58* 10.79   HEMOGLOBIN 13.0 12.5* 11.8*   HEMATOCRIT 39.9 38.1 35.1*   PLATELETS 401 386 385   NEUTROS ABS 6.40 7.20* 6.73   IMMATURE GRANS (ABS) 0.06* 0.08* 0.06*   LYMPHS ABS 2.76 2.98 2.58   MONOS ABS 0.63 0.76 0.82   EOS ABS 0.30 0.39 0.43*   MCV 93.7 91.6 91.4   PROTIME  --  14.1  --          Lab 04/13/24  0356 04/12/24 0430 04/11/24 0441   SODIUM 135* 136 136   POTASSIUM 3.5 4.5 3.9   CHLORIDE 96* 99 99   CO2 27.8 25.9 27.0   ANION GAP 11.2 11.1 10.0   BUN 37* 33* 32*   CREATININE 1.08 0.89 0.81   EGFR 75.7 94.5 97.2   GLUCOSE 128* 87 91   CALCIUM 9.8 9.3 8.9   MAGNESIUM 2.5* 2.2 2.3   PHOSPHORUS 4.9* 4.3 4.0         Lab 04/13/24  0356 04/08/24  0433 04/07/24  0442   TOTAL PROTEIN 7.4 6.6 6.6   ALBUMIN 3.9 3.6 3.5   GLOBULIN  --   --  3.1   ALT (SGPT) 74* 104* 116*   AST (SGOT) 41* 46* 56*   BILIRUBIN 0.5 0.4 0.4   INDIRECT BILIRUBIN 0.3  --   --    BILIRUBIN DIRECT 0.2 <0.2  --    ALK PHOS 153* 90 89         Lab 04/12/24 0430   PROTIME 14.1   INR 1.06                       Brief Urine Lab Results  (Last result in the past 365 days)        Color   Clarity   Blood   Leuk Est   Nitrite   Protein   CREAT   Urine HCG        04/02/24 1846 Yellow   Turbid   Small (1+)   Negative   Negative   Negative                 [x]  Microbiology   Microbiology Results (last 10 days)       ** No results found for the last 240 hours. **          [x]  Radiology  XR Abdomen KUB    Result Date: 4/11/2024  Impression: 1.  Feeding tube with tip in distal  descending duodenum   Electronically Signed By-Emerson Magallanes MD On:4/11/2024 5:28 PM      XR Chest 1 View    Result Date: 4/7/2024  No acute cardiopulmonary process identified.   Electronically Signed By-Brian Pike MD On:4/7/2024 6:13 AM     []  EKG/Telemetry   []  Cardiology/Vascular   []  Pathology  []  Old records  []  Other:    Assessment & Plan   Assessment / Plan     Assessment:  Dysphagia/silent aspiration  Acute infarct involving right frontal cortex  Aspiration pneumonia with Klebsiella, Streptococcus, MSSA and Moraxella  Acute hypoxemic and hypercapnic respiratory failure requiring mechanical intervention  Acute metabolic encephalopathy in setting of polypharmacy and hypercapnia - improving  Hx of seizures and Essential tremor - on primidone  Hypernatremia  Hypokalemia  Transaminitis  NSTEMI, suspect type II from demand/sepsis  Lactic acidosis, POA.  Clinically significant  Hx of Hyperlipidemia  Hx of Depression/anxiety on outpatient Cymbalta  Hx of Chronic low back pain 2/2 lumbosacral degenerative disc disease  Hx of anterocollis and cervical dystonia  Hx of left corona radiata stroke 2020 with no residual deficits  DIONNA, secondary to sepsis/shock. Resolved  Septic shock 2/2 pneumonia. Resolved    Plan:  Patient currently being managed in medicine service.  Underwent PEG tube placement on 4/12.  Tolerated procedure well.  Currently started on tube feeding as per protocol.  Plavix and statin restarted.  Patient became hypotensive and bradycardic during PEG tube placement, received 1 dose of epi.  Vital stable thereafter.  Could be in the setting of anesthesia medications.  Cardiology consulted, appreciate input.  Mentation at baseline.  On dual antiplatelet for total of 21 days followed by monotherapy with aspirin as per teleneurology.  Plavix was restarted after PEG tube placement.  Statin restarted.  Hepatic panel stable.  Will continue to monitor.  Pulmonology following the patient, appreciate  further input.  S/p IV Unasyn.  Blood culture showing no growth at 5 days.  Bowel culture on 3/27 positive for Streptococcus pneumonia, Klebsiella pneumonia and Staphylococcus aureus.  Pneumonia panel on 3/27 showed positive Klebsiella pneumoniae, Klebsiella aerogenes, Staphylococcus aureus and Streptococcus pneumonia.  S/p 5-day course of prednisone.  Continue IV Lasix 40 mg twice daily.  Hypokalemia resolved.  Continue metoprolol tartrate 12.5 mg every 12 hours.  Unable to cross MS Contin, switch to oxycodone IR 10 mg every 4 hours scheduled.  Unable to crush Cymbalta so currently on hold, discussed with pharmacy only SNRI that we have that can be given via Cortrak/PEG is venlafaxine.  Will start at 37.5 mg TID.   Continue rest of the current management.  Likely discharge in next 24-48 hours.  Pending disposition.                                    DVT prophylaxis:  No DVT prophylaxis order currently exists.        CODE STATUS:   Level Of Support Discussed With: Health Care Surrogate  Code Status (Patient has no pulse and is not breathing): CPR (Attempt to Resuscitate)  Medical Interventions (Patient has pulse or is breathing): Full Support      Electronically signed by Laurie Crowley MD, 04/13/24, 10:21 AM EDT.

## 2024-04-13 NOTE — THERAPY TREATMENT NOTE
"Acute Care - Physical Therapy Treatment Note  [facility]     Patient Name: Davonte Marshall  : 1955  MRN: [id number]  Today's Date: 2024      Visit Dx:     ICD-10-CM ICD-9-CM   1. Acute respiratory failure with hypoxia  J96.01 518.81   2. Metabolic encephalopathy  G93.41 348.31   3. DIONNA (acute kidney injury)  N17.9 584.9   4. NSTEMI (non-ST elevated myocardial infarction)  I21.4 410.70   5. Oropharyngeal dysphagia  R13.12 787.22   6. Impaired mobility and ADLs  Z74.09 V49.89    Z78.9    7. Difficulty in walking  R26.2 719.7     Patient Active Problem List   Diagnosis    Anemia    Anxiety    Arthritis    Back pain    Chronic bronchitis    Complication of surgical procedure    Degeneration of lumbosacral intervertebral disc    Depression    Encounter for long-term (current) use of insulin    Essential tremor    Gall stones    Head injury    Hemorrhoids    Herniation of intervertebral disc    Hyperlipidemia    Leg pain    Neck pain    Renal insufficiency    Scoliosis    Seizure disorder    Smokes 1.5 packs of cigarettes per day    Vitamin D deficiency disease    Gross hematuria    BPH without obstruction/lower urinary tract symptoms    Urge incontinence of urine    Benign prostatic hyperplasia with urinary hesitancy    Sequelae, post-stroke    S/P TURP    Postoperative anemia    Postoperative hypoxia    Essential hypertension    Opioid dependence    Cervical dystonia    Acute metabolic encephalopathy    Bradycardia    Hypotension after procedure     Past Medical History:   Diagnosis Date    Anemia     Anxiety     Arthritis     Benign essential hypertension     Chronic bronchitis     Depression     Enlarged prostate     Floaters in visual field     Gall stones     Generalized weakness     Head injury     Hemorrhoids     Hyperlipidemia     Leg pain     Numbness in both hands 2015    Seizures     last \"quite a while ago\"    Stroke 2020    left sided weakness     Past Surgical History:   Procedure " Laterality Date    APPENDECTOMY      BACK SURGERY      4    CHOLECYSTECTOMY      CYSTOSCOPY Bilateral     7/12/21    CYSTOSCOPY TRANSURETHRAL RESECTION OF PROSTATE N/A 7/12/2021    Procedure: CYSTOSCOPY TRANSURETHRAL RESECTION OF PROSTATE;  Surgeon: Penelope Fox MD;  Location: [facility] MAIN OR;  Service: Urology;  Laterality: N/A;    HAND SURGERY Bilateral     thumb    LYMPH NODE DISSECTION      jaw line    TURP / TRANSURETHRAL INCISION / DRAINAGE PROSTATE  07/12/2021     PT Assessment (Last 12 Hours)       PT Evaluation and Treatment       Row Name 04/13/24 1516          Physical Therapy Time and Intention    Subjective Information complains of;pain  -TS     Document Type therapy note (daily note)  -TS     Mode of Treatment individual therapy;physical therapy  -TS       Row Name 04/13/24 1516          Pain    Pretreatment Pain Rating 3/10  -TS     Posttreatment Pain Rating 3/10  -TS     Pain Location lower  -TS     Pain Location - back  -TS       Row Name 04/13/24 1516          Cognition    Affect/Mental Status (Cognition) WFL  -TS       Row Name 04/13/24 1516          Bed Mobility    Bed Mobility supine-sit;sit-supine;scooting/bridging  -TS     Scooting/Bridging [place] (Bed Mobility) standby assist;verbal cues  -TS     Supine-Sit [place] (Bed Mobility) standby assist;verbal cues  -TS     Sit-Supine [place] (Bed Mobility) standby assist;verbal cues  -TS     Bed Mobility, Safety Issues decreased use of legs for bridging/pushing;decreased use of arms for pushing/pulling  -TS     Assistive Device (Bed Mobility) bed rails;head of bed elevated  -TS       Row Name 04/13/24 1516          Transfers    Transfers sit-stand transfer;stand-sit transfer  -TS       Row Name 04/13/24 1516          Sit-Stand Transfer    Sit-Stand [place] (Transfers) contact guard;verbal cues  -TS     Assistive Device (Sit-Stand Transfers) walker, front-wheeled  -TS       Row Name 04/13/24 1516          Stand-Sit Transfer     Stand-Sit [place] (Transfers) contact guard;verbal cues  -TS     Assistive Device (Stand-Sit Transfers) walker, front-wheeled  -TS       Row Name 04/13/24 1516          Gait/Stairs (Locomotion)    Gait/Stairs Locomotion gait/ambulation assistive device  -TS     [place] Level (Gait) contact guard;verbal cues  -TS     Assistive Device (Gait) walker, front-wheeled  -TS     Patient was able to Ambulate yes  -TS     Distance in Feet (Gait) 45  -TS     Pattern (Gait) 3-point;step-to  -TS     Deviations/Abnormal Patterns (Gait) ana ro[place] decreased;gait speed decreased  -TS     Bilateral Gait Deviations forward flexed posture;heel strike decreased  -TS       Row Name 04/13/24 1516          Safety Issues, Functional Mobility    Impairments Affecting Function (Mobility) balance;cognition;endurance/activity tolerance;muscle tone abnormal;strength  -TS       Row Name 04/13/24 1516          Motor Skills    Therapeutic Exercise hip;knee;ankle  AROM/AAROM BLE x20 reps, seated position  -TS       Row Name             Wound 04/04/24 1200 Right anterior second toe    Wound - Properties Group Placement Date: 04/04/24  -EP Placement Time: 1200  -EP Side: Right  -EP Orientation: anterior  -EP Location: second toe  -EP    Retired Wound - Properties Group Placement Date: 04/04/24  -EP Placement Time: 1200  -EP Side: Right  -EP Orientation: anterior  -EP Location: second toe  -EP    Retired Wound - Properties Group Date first assessed: 04/04/24  -EP Time first assessed: 1200  -EP Side: Right  -EP Location: second toe  -EP      Row Name             Wound 04/04/24 1200 Right anterior third toe    Wound - Properties Group Placement Date: 04/04/24  -EP Placement Time: 1200  -EP Side: Right  -EP Orientation: anterior  -EP Location: third toe  -EP    Retired Wound - Properties Group Placement Date: 04/04/24  -EP Placement Time: 1200  -EP Side: Right  -EP Orientation: anterior  -EP Location: third toe  -EP    Retired Wound - Properties  Group Date first assessed: 04/04/24  -EP Time first assessed: 1200  -EP Side: Right  -EP Location: third toe  -EP      Row Name             Wound 04/04/24 1200 Left anterior great toe    Wound - Properties Group Placement Date: 04/04/24  -EP Placement Time: 1200  -EP Side: Left  -EP Orientation: anterior  -EP Location: great toe  -EP    Retired Wound - Properties Group Placement Date: 04/04/24  -EP Placement Time: 1200  -EP Side: Left  -EP Orientation: anterior  -EP Location: great toe  -EP    Retired Wound - Properties Group Date first assessed: 04/04/24  -EP Time first assessed: 1200  -EP Side: Left  -EP Location: great toe  -EP      Row Name             Wound 04/04/24 1200 Left anterior second toe    Wound - Properties Group Placement Date: 04/04/24  -EP Placement Time: 1200  -EP Side: Left  -EP Orientation: anterior  -EP Location: second toe  -EP    Retired Wound - Properties Group Placement Date: 04/04/24  -EP Placement Time: 1200  -EP Side: Left  -EP Orientation: anterior  -EP Location: second toe  -EP    Retired Wound - Properties Group Date first assessed: 04/04/24  -EP Time first assessed: 1200  -EP Side: Left  -EP Location: second toe  -EP      Row Name 04/13/24 1516          Positioning and Restraints    Pre-Treatment Position in bed  -TS     Post Treatment Position bed  -TS     In Bed notified nsg;fowlers;call light within reach;exit alarm on;heels elevated;with family/caregiver  -TS       Row Name 04/13/24 1516          Progress Summary (PT)    Progress Toward Functional Goals (PT) progress toward functional goals is good  -TS               User Key  (r) = Recorded By, (t) = Taken By, (c) = Cosigned By      Initials Name Provider Type    Salas Rudolph PTA Physical Therapist Assistant    Alise Altamirano RN Registered Nurse                Bilateral Lower Extremity   Exercise  Reps  Sets    Long arc quads   10 2   Heel slides  10 2   Heel/Ankle raises  10 2   Marches 10 2   Hip ab/adduction 10 2                  PT Recommendation and Plan     Progress Summary (PT)  Progress Toward Functional Goals (PT): progress toward functional goals is good   Outcome Measures       Row Name 04/13/24 1522 04/11/24 1500          How much help from another person do you currently need...    Turning from your back to your side while in flat bed without using bedrails? 3  -TS 4  -RH     Moving from lying on back to sitting on the side of a flat bed without bedrails? 3  -TS 3  -RH     Moving to and from a bed to a chair (including a wheelchair)? 3  -TS 2  -RH     Standing up from a chair using your arms (e.g., wheelchair, bedside chair)? 3  -TS 3  -RH     Climbing 3-5 steps with a railing? 2  -TS 1  -RH     To walk in hospital room? 3  -TS 2  -RH     AM-PAC 6 Clicks Score (PT) 17  -TS 15  -RH     Highest Level of Mobility Goal 5 --> Static standing  -TS 4 --> Transfer to chair/commode  -RH               User Key  (r) = Recorded By, (t) = Taken By, (c) = Cosigned By      Initials Name Provider Type    Salas Rudolph PTA Physical Therapist Assistant     William Ambriz PTA Physical Therapist Assistant                     Time Calculation:    PT Charges       Row Name 04/13/24 1514             Time Calculation    PT Received On 04/13/24  -TS      PT Goal Re-Cert Due Date 04/16/24  -TS         Timed Charges    10706 - PT Therapeutic Exercise Minutes 11  -TS      44587 - Gait Training Minutes  7  -TS      21733 - PT Therapeutic Activity Minutes 5  -TS         Total Minutes    Timed Charges Total Minutes 23  -TS       Total Minutes 23  -TS                User Key  (r) = Recorded By, (t) = Taken By, (c) = Cosigned By      Initials Name Provider Type    aSlas Rudolph PTA Physical Therapist Assistant                  Therapy Charges for Today       Code Description Service Date Service Provider Modifiers Qty    09796279655 HC PT THER PROC EA 15 MIN 4/13/2024 Salas Ramos PTA GP 1    86723216761 HC GAIT TRAINING EA 15 MIN  4/13/2024 Salas Ramos, PTA GP 1            PT G-Codes  Outcome Measure Options: AM-PAC 6 Clicks Basic Mobility (PT)  AM-PAC 6 Clicks Score (PT): 17  AM-PAC 6 Clicks Score (OT): 10    Salas Ramos PTA  4/13/2024

## 2024-04-14 LAB
ALBUMIN SERPL-MCNC: 3.8 G/DL (ref 3.5–5.2)
ALP SERPL-CCNC: 139 U/L (ref 39–117)
ALT SERPL W P-5'-P-CCNC: 58 U/L (ref 1–41)
ANION GAP SERPL CALCULATED.3IONS-SCNC: 11.3 MMOL/L (ref 5–15)
AST SERPL-CCNC: 29 U/L (ref 1–40)
BASOPHILS # BLD AUTO: 0.14 10*3/MM3 (ref 0–0.2)
BASOPHILS NFR BLD AUTO: 1.3 % (ref 0–1.5)
BILIRUB CONJ SERPL-MCNC: <0.2 MG/DL (ref 0–0.3)
BILIRUB INDIRECT SERPL-MCNC: ABNORMAL MG/DL
BILIRUB SERPL-MCNC: 0.3 MG/DL (ref 0–1.2)
BUN SERPL-MCNC: 38 MG/DL (ref 8–23)
BUN/CREAT SERPL: 40 (ref 7–25)
CALCIUM SPEC-SCNC: 9.4 MG/DL (ref 8.6–10.5)
CHLORIDE SERPL-SCNC: 96 MMOL/L (ref 98–107)
CO2 SERPL-SCNC: 28.7 MMOL/L (ref 22–29)
CREAT SERPL-MCNC: 0.95 MG/DL (ref 0.76–1.27)
DEPRECATED RDW RBC AUTO: 52.9 FL (ref 37–54)
EGFRCR SERPLBLD CKD-EPI 2021: 88.3 ML/MIN/1.73
EOSINOPHIL # BLD AUTO: 0.32 10*3/MM3 (ref 0–0.4)
EOSINOPHIL NFR BLD AUTO: 2.9 % (ref 0.3–6.2)
ERYTHROCYTE [DISTWIDTH] IN BLOOD BY AUTOMATED COUNT: 15.4 % (ref 12.3–15.4)
GLUCOSE BLDC GLUCOMTR-MCNC: 109 MG/DL (ref 70–99)
GLUCOSE BLDC GLUCOMTR-MCNC: 109 MG/DL (ref 70–99)
GLUCOSE BLDC GLUCOMTR-MCNC: 121 MG/DL (ref 70–99)
GLUCOSE SERPL-MCNC: 82 MG/DL (ref 65–99)
HCT VFR BLD AUTO: 40.3 % (ref 37.5–51)
HGB BLD-MCNC: 12.6 G/DL (ref 13–17.7)
IMM GRANULOCYTES # BLD AUTO: 0.09 10*3/MM3 (ref 0–0.05)
IMM GRANULOCYTES NFR BLD AUTO: 0.8 % (ref 0–0.5)
LYMPHOCYTES # BLD AUTO: 2.01 10*3/MM3 (ref 0.7–3.1)
LYMPHOCYTES NFR BLD AUTO: 18.1 % (ref 19.6–45.3)
MAGNESIUM SERPL-MCNC: 2.5 MG/DL (ref 1.6–2.4)
MCH RBC QN AUTO: 29.8 PG (ref 26.6–33)
MCHC RBC AUTO-ENTMCNC: 31.3 G/DL (ref 31.5–35.7)
MCV RBC AUTO: 95.3 FL (ref 79–97)
MONOCYTES # BLD AUTO: 0.92 10*3/MM3 (ref 0.1–0.9)
MONOCYTES NFR BLD AUTO: 8.3 % (ref 5–12)
NEUTROPHILS NFR BLD AUTO: 68.6 % (ref 42.7–76)
NEUTROPHILS NFR BLD AUTO: 7.65 10*3/MM3 (ref 1.7–7)
NRBC BLD AUTO-RTO: 0 /100 WBC (ref 0–0.2)
PHOSPHATE SERPL-MCNC: 3.8 MG/DL (ref 2.5–4.5)
PLATELET # BLD AUTO: 351 10*3/MM3 (ref 140–450)
PMV BLD AUTO: 11.7 FL (ref 6–12)
POTASSIUM SERPL-SCNC: 3.6 MMOL/L (ref 3.5–5.2)
PROT SERPL-MCNC: 7.3 G/DL (ref 6–8.5)
RBC # BLD AUTO: 4.23 10*6/MM3 (ref 4.14–5.8)
SODIUM SERPL-SCNC: 136 MMOL/L (ref 136–145)
WBC NRBC COR # BLD AUTO: 11.13 10*3/MM3 (ref 3.4–10.8)

## 2024-04-14 PROCEDURE — 99232 SBSQ HOSP IP/OBS MODERATE 35: CPT | Performed by: STUDENT IN AN ORGANIZED HEALTH CARE EDUCATION/TRAINING PROGRAM

## 2024-04-14 PROCEDURE — 82948 REAGENT STRIP/BLOOD GLUCOSE: CPT

## 2024-04-14 PROCEDURE — 25010000002 ONDANSETRON PER 1 MG: Performed by: INTERNAL MEDICINE

## 2024-04-14 PROCEDURE — 94799 UNLISTED PULMONARY SVC/PX: CPT

## 2024-04-14 PROCEDURE — 94664 DEMO&/EVAL PT USE INHALER: CPT

## 2024-04-14 PROCEDURE — 80048 BASIC METABOLIC PNL TOTAL CA: CPT | Performed by: INTERNAL MEDICINE

## 2024-04-14 PROCEDURE — 83735 ASSAY OF MAGNESIUM: CPT | Performed by: INTERNAL MEDICINE

## 2024-04-14 PROCEDURE — 99231 SBSQ HOSP IP/OBS SF/LOW 25: CPT | Performed by: INTERNAL MEDICINE

## 2024-04-14 PROCEDURE — 97116 GAIT TRAINING THERAPY: CPT

## 2024-04-14 PROCEDURE — 97530 THERAPEUTIC ACTIVITIES: CPT

## 2024-04-14 PROCEDURE — 85025 COMPLETE CBC W/AUTO DIFF WBC: CPT | Performed by: INTERNAL MEDICINE

## 2024-04-14 PROCEDURE — 94761 N-INVAS EAR/PLS OXIMETRY MLT: CPT

## 2024-04-14 PROCEDURE — 84100 ASSAY OF PHOSPHORUS: CPT | Performed by: INTERNAL MEDICINE

## 2024-04-14 PROCEDURE — 25010000002 FUROSEMIDE PER 20 MG: Performed by: INTERNAL MEDICINE

## 2024-04-14 PROCEDURE — 80076 HEPATIC FUNCTION PANEL: CPT | Performed by: STUDENT IN AN ORGANIZED HEALTH CARE EDUCATION/TRAINING PROGRAM

## 2024-04-14 RX ORDER — ALUMINA, MAGNESIA, AND SIMETHICONE 2400; 2400; 240 MG/30ML; MG/30ML; MG/30ML
15 SUSPENSION ORAL EVERY 6 HOURS PRN
Status: DISCONTINUED | OUTPATIENT
Start: 2024-04-14 | End: 2024-04-16 | Stop reason: HOSPADM

## 2024-04-14 RX ORDER — CALCIUM CARBONATE 500 MG/1
2 TABLET, CHEWABLE ORAL 3 TIMES DAILY PRN
Status: DISCONTINUED | OUTPATIENT
Start: 2024-04-14 | End: 2024-04-14

## 2024-04-14 RX ORDER — CALCIUM CARBONATE 500 MG/1
2 TABLET, CHEWABLE ORAL 3 TIMES DAILY PRN
Status: DISCONTINUED | OUTPATIENT
Start: 2024-04-14 | End: 2024-04-16 | Stop reason: HOSPADM

## 2024-04-14 RX ADMIN — VENLAFAXINE HYDROCHLORIDE 37.5 MG: 37.5 TABLET ORAL at 18:22

## 2024-04-14 RX ADMIN — VENLAFAXINE HYDROCHLORIDE 37.5 MG: 37.5 TABLET ORAL at 11:02

## 2024-04-14 RX ADMIN — BACITRACIN 0.9 G: 500 OINTMENT TOPICAL at 21:53

## 2024-04-14 RX ADMIN — FUROSEMIDE 40 MG: 10 INJECTION, SOLUTION INTRAMUSCULAR; INTRAVENOUS at 08:36

## 2024-04-14 RX ADMIN — BUDESONIDE 0.5 MG: 0.5 INHALANT ORAL at 20:04

## 2024-04-14 RX ADMIN — ARFORMOTEROL TARTRATE 15 MCG: 15 SOLUTION RESPIRATORY (INHALATION) at 07:16

## 2024-04-14 RX ADMIN — LIDOCAINE 2 PATCH: 4 PATCH TOPICAL at 08:37

## 2024-04-14 RX ADMIN — METOPROLOL TARTRATE 12.5 MG: 25 TABLET, FILM COATED ORAL at 08:36

## 2024-04-14 RX ADMIN — OXYCODONE HYDROCHLORIDE 10 MG: 5 TABLET ORAL at 21:53

## 2024-04-14 RX ADMIN — ATORVASTATIN CALCIUM 20 MG: 20 TABLET, FILM COATED ORAL at 21:53

## 2024-04-14 RX ADMIN — PRIMIDONE 250 MG: 250 TABLET ORAL at 08:37

## 2024-04-14 RX ADMIN — Medication 10 ML: at 08:38

## 2024-04-14 RX ADMIN — ONDANSETRON 4 MG: 2 INJECTION INTRAMUSCULAR; INTRAVENOUS at 01:49

## 2024-04-14 RX ADMIN — VENLAFAXINE HYDROCHLORIDE 37.5 MG: 37.5 TABLET ORAL at 08:35

## 2024-04-14 RX ADMIN — ALUMINUM HYDROXIDE, MAGNESIUM HYDROXIDE, AND DIMETHICONE 15 ML: 400; 400; 40 SUSPENSION ORAL at 13:42

## 2024-04-14 RX ADMIN — DOCUSATE SODIUM 50MG AND SENNOSIDES 8.6MG 1 TABLET: 8.6; 5 TABLET, FILM COATED ORAL at 21:53

## 2024-04-14 RX ADMIN — FUROSEMIDE 40 MG: 10 INJECTION, SOLUTION INTRAMUSCULAR; INTRAVENOUS at 18:21

## 2024-04-14 RX ADMIN — POTASSIUM CHLORIDE 40 MEQ: 1.5 POWDER, FOR SOLUTION ORAL at 08:37

## 2024-04-14 RX ADMIN — OXYCODONE HYDROCHLORIDE 10 MG: 5 TABLET ORAL at 06:03

## 2024-04-14 RX ADMIN — Medication 250 MG: at 08:37

## 2024-04-14 RX ADMIN — BUDESONIDE 0.5 MG: 0.5 INHALANT ORAL at 07:16

## 2024-04-14 RX ADMIN — OXYCODONE HYDROCHLORIDE 10 MG: 5 TABLET ORAL at 15:05

## 2024-04-14 RX ADMIN — METOPROLOL TARTRATE 12.5 MG: 25 TABLET, FILM COATED ORAL at 21:53

## 2024-04-14 RX ADMIN — OXYCODONE HYDROCHLORIDE 10 MG: 5 TABLET ORAL at 11:02

## 2024-04-14 RX ADMIN — Medication 10 ML: at 21:53

## 2024-04-14 RX ADMIN — PRIMIDONE 250 MG: 250 TABLET ORAL at 15:05

## 2024-04-14 RX ADMIN — BACITRACIN 0.9 G: 500 OINTMENT TOPICAL at 08:37

## 2024-04-14 RX ADMIN — ASPIRIN 81 MG: 81 TABLET, CHEWABLE ORAL at 08:36

## 2024-04-14 RX ADMIN — CALCIUM CARBONATE 2 TABLET: 500 TABLET, CHEWABLE ORAL at 11:02

## 2024-04-14 RX ADMIN — ARFORMOTEROL TARTRATE 15 MCG: 15 SOLUTION RESPIRATORY (INHALATION) at 20:04

## 2024-04-14 RX ADMIN — PRIMIDONE 250 MG: 250 TABLET ORAL at 21:53

## 2024-04-14 RX ADMIN — CLOPIDOGREL BISULFATE 75 MG: 75 TABLET ORAL at 08:35

## 2024-04-14 RX ADMIN — FAMOTIDINE 20 MG: 20 TABLET ORAL at 08:36

## 2024-04-14 RX ADMIN — OXYCODONE HYDROCHLORIDE 10 MG: 5 TABLET ORAL at 18:21

## 2024-04-14 RX ADMIN — OXYCODONE HYDROCHLORIDE 10 MG: 5 TABLET ORAL at 01:48

## 2024-04-14 RX ADMIN — Medication 250 MG: at 21:53

## 2024-04-14 NOTE — THERAPY TREATMENT NOTE
"Acute Care - Physical Therapy Progress Note  [facility]     Patient Name: Davonte Marshall  : 1955  MRN: [id number]  Today's Date: 2024      Visit Dx:     ICD-10-CM ICD-9-CM   1. Acute respiratory failure with hypoxia  J96.01 518.81   2. Metabolic encephalopathy  G93.41 348.31   3. DIONNA (acute kidney injury)  N17.9 584.9   4. NSTEMI (non-ST elevated myocardial infarction)  I21.4 410.70   5. Oropharyngeal dysphagia  R13.12 787.22   6. Impaired mobility and ADLs  Z74.09 V49.89    Z78.9    7. Difficulty in walking  R26.2 719.7     Patient Active Problem List   Diagnosis    Anemia    Anxiety    Arthritis    Back pain    Chronic bronchitis    Complication of surgical procedure    Degeneration of lumbosacral intervertebral disc    Depression    Encounter for long-term (current) use of insulin    Essential tremor    Gall stones    Head injury    Hemorrhoids    Herniation of intervertebral disc    Hyperlipidemia    Leg pain    Neck pain    Renal insufficiency    Scoliosis    Seizure disorder    Smokes 1.5 packs of cigarettes per day    Vitamin D deficiency disease    Gross hematuria    BPH without obstruction/lower urinary tract symptoms    Urge incontinence of urine    Benign prostatic hyperplasia with urinary hesitancy    Sequelae, post-stroke    S/P TURP    Postoperative anemia    Postoperative hypoxia    Essential hypertension    Opioid dependence    Cervical dystonia    Acute metabolic encephalopathy    Bradycardia    Hypotension after procedure     Past Medical History:   Diagnosis Date    Anemia     Anxiety     Arthritis     Benign essential hypertension     Chronic bronchitis     Depression     Enlarged prostate     Floaters in visual field     Gall stones     Generalized weakness     Head injury     Hemorrhoids     Hyperlipidemia     Leg pain     Numbness in both hands 2015    Seizures     last \"quite a while ago\"    Stroke 2020    left sided weakness     Past Surgical History:   Procedure " Laterality Date    APPENDECTOMY      BACK SURGERY      4    CHOLECYSTECTOMY      CYSTOSCOPY Bilateral     7/12/21    CYSTOSCOPY TRANSURETHRAL RESECTION OF PROSTATE N/A 7/12/2021    Procedure: CYSTOSCOPY TRANSURETHRAL RESECTION OF PROSTATE;  Surgeon: Penelope Fox MD;  Location: [facility] MAIN OR;  Service: Urology;  Laterality: N/A;    HAND SURGERY Bilateral     thumb    LYMPH NODE DISSECTION      jaw line    TURP / TRANSURETHRAL INCISION / DRAINAGE PROSTATE  07/12/2021     PT Assessment (Last 12 Hours)       PT Evaluation and Treatment       Row Name 04/14/24 1200          Physical Therapy Time and Intention    Subjective Information no complaints  -CS     Document Type therapy note (daily note)  -CS     Mode of Treatment individual therapy;physical therapy  -CS     Patient Effort adequate  -CS     Symptoms Noted During/After Treatment fatigue;dizziness  -CS       Row Name 04/14/24 1200          Bed Mobility    Supine-Sit [place] (Bed Mobility) standby assist  -CS     Sit-Supine [place] (Bed Mobility) standby assist  -CS     Assistive Device (Bed Mobility) bed rails;head of bed elevated  -CS       Row Name 04/14/24 1200          Sit-Stand Transfer    Sit-Stand [place] (Transfers) verbal cues;minimum assist (75% patient effort);1 person assist  -CS     Assistive Device (Sit-Stand Transfers) walker, front-wheeled  -CS       Row Name 04/14/24 1200          Stand-Sit Transfer    Stand-Sit [place] (Transfers) verbal cues;contact guard;1 person assist  -CS     Assistive Device (Stand-Sit Transfers) walker, front-wheeled  -CS       Row Name 04/14/24 1200          Gait/Stairs (Locomotion)    [place] Level (Gait) verbal cues;contact guard;1 person assist  -CS     Assistive Device (Gait) walker, front-wheeled  -CS     Distance in Feet (Gait) 45  -CS     Pattern (Gait) 4-point;step-to  -CS     Deviations/Abnormal Patterns (Gait) gait speed decreased;stride length decreased  -CS     Bilateral Gait  Deviations forward flexed posture;heel strike decreased  -CS     Gait Assessment/Intervention reported dizziness with ambulation and standing today  -CS       Row Name             Wound 04/04/24 1200 Right anterior second toe    Wound - Properties Group Placement Date: 04/04/24  -EP Placement Time: 1200  -EP Side: Right  -EP Orientation: anterior  -EP Location: second toe  -EP    Retired Wound - Properties Group Placement Date: 04/04/24  -EP Placement Time: 1200  -EP Side: Right  -EP Orientation: anterior  -EP Location: second toe  -EP    Retired Wound - Properties Group Date first assessed: 04/04/24  -EP Time first assessed: 1200  -EP Side: Right  -EP Location: second toe  -EP      Row Name             Wound 04/04/24 1200 Right anterior third toe    Wound - Properties Group Placement Date: 04/04/24  -EP Placement Time: 1200  -EP Side: Right  -EP Orientation: anterior  -EP Location: third toe  -EP    Retired Wound - Properties Group Placement Date: 04/04/24  -EP Placement Time: 1200  -EP Side: Right  -EP Orientation: anterior  -EP Location: third toe  -EP    Retired Wound - Properties Group Date first assessed: 04/04/24  -EP Time first assessed: 1200  -EP Side: Right  -EP Location: third toe  -EP      Row Name             Wound 04/04/24 1200 Left anterior great toe    Wound - Properties Group Placement Date: 04/04/24  -EP Placement Time: 1200  -EP Side: Left  -EP Orientation: anterior  -EP Location: great toe  -EP    Retired Wound - Properties Group Placement Date: 04/04/24  -EP Placement Time: 1200  -EP Side: Left  -EP Orientation: anterior  -EP Location: great toe  -EP    Retired Wound - Properties Group Date first assessed: 04/04/24  -EP Time first assessed: 1200  -EP Side: Left  -EP Location: great toe  -EP      Row Name             Wound 04/04/24 1200 Left anterior second toe    Wound - Properties Group Placement Date: 04/04/24  -EP Placement Time: 1200  -EP Side: Left  -EP Orientation: anterior  -EP Location:  second toe  -EP    Retired Wound - Properties Group Placement Date: 04/04/24  -EP Placement Time: 1200  -EP Side: Left  -EP Orientation: anterior  -EP Location: second toe  -EP    Retired Wound - Properties Group Date first assessed: 04/04/24  -EP Time first assessed: 1200  -EP Side: Left  -EP Location: second toe  -EP      Row Name 04/14/24 1200          Vital Signs    Intra Systolic BP Rehab 127  -CS     Intra Treatment Diastolic BP 77  -CS     Intratreatment Heart Rate (beats/min) 91  -CS     Intra SpO2 (%) 99  -CS     O2 Delivery Intra Treatment room air  -CS     Intra Patient Position Sitting  -CS       Row Name 04/14/24 1200          Positioning and Restraints    Pre-Treatment Position in bed  -CS     Post Treatment Position bed  -CS     In Bed fowlers;call light within reach;encouraged to call for assist;with family/caregiver;legs elevated;heels elevated  no alarm active upon entering room  -CS       Row Name 04/14/24 1200          Progress Summary (PT)    Progress Toward Functional Goals (PT) progress toward functional goals is good  -CS     Daily Progress Summary (PT) After gait training, pt. returned to bed and vital signs were assessed to address pt's report of dizziness.  PTA then discussed D/C planning with pt. and pt. states that he wants to go to inpatient rehab .  -CS               User Key  (r) = Recorded By, (t) = Taken By, (c) = Cosigned By      Initials Name Provider Type    Austin Etienne PTA Physical Therapist Assistant    Alise Altamirano RN Registered Nurse                      PT Recommendation and Plan  Anticipated Discharge Disposition (PT): inpatient rehabilitation facility  Progress Summary (PT)  Progress Toward Functional Goals (PT): progress toward functional goals is good  Daily Progress Summary (PT): After gait training, pt. returned to bed and vital signs were assessed to address pt's report of dizziness.  PTA then discussed D/C planning with pt. and pt. states that he wants  to go to inpatient rehab .   Outcome Measures       Row Name 04/14/24 1200 04/13/24 1522 04/11/24 1500       How much help from another person do you currently need...    Turning from your back to your side while in flat bed without using bedrails? 3  -CS 3  -TS 4  -RH    Moving from lying on back to sitting on the side of a flat bed without bedrails? 3  -CS 3  -TS 3  -RH    Moving to and from a bed to a chair (including a wheelchair)? 3  -CS 3  -TS 2  -RH    Standing up from a chair using your arms (e.g., wheelchair, bedside chair)? 3  -CS 3  -TS 3  -RH    Climbing 3-5 steps with a railing? 3  -CS 2  -TS 1  -RH    To walk in hospital room? 3  -CS 3  -TS 2  -RH    AM-PAC 6 Clicks Score (PT) 18  -CS 17  -TS 15  -RH    Highest Level of Mobility Goal 6 --> Walk 10 steps or more  -CS 5 --> Static standing  -TS 4 --> Transfer to chair/commode  -RH       Functional Assessment    Outcome Measure Options AM-PAC 6 Clicks Basic Mobility (PT)  -CS -- --              User Key  (r) = Recorded By, (t) = Taken By, (c) = Cosigned By      Initials Name Provider Type    TS Salas Ramos PTA Physical Therapist Assistant     William Ambriz PTA Physical Therapist Assistant     Austin Murphy PTA Physical Therapist Assistant                     Time Calculation:    PT Charges       Row Name 04/14/24 1227             Time Calculation    Start Time 1145  -CS      PT Received On 04/14/24  -CS         Timed Charges    94460 - Gait Training Minutes  8  -CS      79334 - PT Therapeutic Activity Minutes 15  -CS         Total Minutes    Timed Charges Total Minutes 23  -CS       Total Minutes 23  -CS                User Key  (r) = Recorded By, (t) = Taken By, (c) = Cosigned By      Initials Name Provider Type    Austin Etienne PTA Physical Therapist Assistant                  Therapy Charges for Today       Code Description Service Date Service Provider Modifiers Qty    50732470241 HC GAIT TRAINING EA 15 MIN 4/14/2024 Jeffrey  ASHLEY Avila GP 1    36386691051 HC PT THERAPEUTIC ACT EA 15 MIN 4/14/2024 Austin Murphy PTA GP 1            PT G-Codes  Outcome Measure Options: AM-PAC 6 Clicks Basic Mobility (PT)  AM-PAC 6 Clicks Score (PT): 18  AM-PAC 6 Clicks Score (OT): 10    Austin Murphy PTA  4/14/2024

## 2024-04-14 NOTE — PROGRESS NOTES
[facility]   Hospitalist Progress Note  Date: 2024  Patient Name: Davonte Marshall  : 1955  MRN: [id number]  Date of admission: 3/27/2024  Room/Bed: [facility]      Subjective   Subjective     Chief Complaint: Follow up for somnolence     Summary:Davonte Marshall is a 68 y.o. male with hypertension, chronic pain on MS Contin/Cymbalta, seizure/essential tremor on primidone presented with altered mental status and hypoxia. Required BiPAP, had aspiration episode and required intubation for airway distress.  Initial event attributed to seizure as had not been taking primidone.  Initially received Keppra, switched back to primidone with improvement in mental status and no further seizure activity.  Extubated to BiPAP and now on room air.  Completing course of Unasyn for aspiration pneumonia.  Had left lower lobe collapse but opened up with bronchopulmonary hygiene protocol.  Hospital course complicated by acute CVA; not candidate for thrombolytic; DAPT recommended from neurology and cardiology standpoint given concern for ACS; cath eventually recommended.  Plavix was held for 5 days and patient underwent PEG tube placement on . Patient became hypotensive and bradycardic during his PEG tube placement, received 1 dose of epi.  Vital stable thereafter.  Cardiology was consulted who thought the episode of bradycardia and hypotension was likely from anesthesia medications. His Plavix and statin were resumed. Overall better but remains very debilitated and rehab planned.  Pending disposition.       Interval Followup: No acute events overnight.  Denied any fever, chills, rigors, chest pain or difficulty breathing.  Not in acute distress.  Currently saturating well on 2 L/min of oxygen via NC.    Review of Systems    All systems reviewed and negative except for what is outlined above.      Objective   Objective     Vitals:   Temp:  [97.7 °F (36.5 °C)-98.6 °F (37 °C)] 97.7 °F (36.5 °C)  Heart Rate:  [75-93]  91  Resp:  [16-18] 18  BP: (111-131)/(63-85) 118/73    Physical Exam   General: Awake, alert, NAD  Cardiovascular: RRR, no murmurs   Pulmonary: CTA bilaterally; no wheezes; no conversational dyspnea; on 2 L/min of oxygen via NC.  Gastrointestinal: S/ND/NT, +BS  Neuro: Alert, awake, oriented x 3; speech clear; no tremor      Result Review    Result Review:  I have personally reviewed these results:  [x]  Laboratory      Lab 04/14/24 0417 04/13/24 0356 04/12/24 0430   WBC 11.13* 10.30 11.58*   HEMOGLOBIN 12.6* 13.0 12.5*   HEMATOCRIT 40.3 39.9 38.1   PLATELETS 351 401 386   NEUTROS ABS 7.65* 6.40 7.20*   IMMATURE GRANS (ABS) 0.09* 0.06* 0.08*   LYMPHS ABS 2.01 2.76 2.98   MONOS ABS 0.92* 0.63 0.76   EOS ABS 0.32 0.30 0.39   MCV 95.3 93.7 91.6   PROTIME  --   --  14.1         Lab 04/14/24 0417 04/13/24 0356 04/12/24 0430   SODIUM 136 135* 136   POTASSIUM 3.6 3.5 4.5   CHLORIDE 96* 96* 99   CO2 28.7 27.8 25.9   ANION GAP 11.3 11.2 11.1   BUN 38* 37* 33*   CREATININE 0.95 1.08 0.89   EGFR 88.3 75.7 94.5   GLUCOSE 82 128* 87   CALCIUM 9.4 9.8 9.3   MAGNESIUM 2.5* 2.5* 2.2   PHOSPHORUS 3.8 4.9* 4.3         Lab 04/14/24 0417 04/13/24 0356 04/08/24 0433   TOTAL PROTEIN 7.3 7.4 6.6   ALBUMIN 3.8 3.9 3.6   ALT (SGPT) 58* 74* 104*   AST (SGOT) 29 41* 46*   BILIRUBIN 0.3 0.5 0.4   INDIRECT BILIRUBIN  --  0.3  --    BILIRUBIN DIRECT <0.2 0.2 <0.2   ALK PHOS 139* 153* 90         Lab 04/12/24 0430   PROTIME 14.1   INR 1.06                       Brief Urine Lab Results  (Last result in the past 365 days)        Color   Clarity   Blood   Leuk Est   Nitrite   Protein   CREAT   Urine HCG        04/02/24 1846 Yellow   Turbid   Small (1+)   Negative   Negative   Negative                 [x]  Microbiology   Microbiology Results (last 10 days)       ** No results found for the last 240 hours. **          [x]  Radiology  XR Abdomen KUB    Result Date: 4/11/2024  Impression: 1.  Feeding tube with tip in distal descending duodenum    Electronically Signed By-Emerson Magallanes MD On:4/11/2024 5:28 PM      XR Chest 1 View    Result Date: 4/7/2024  No acute cardiopulmonary process identified.   Electronically Signed By-Brian Pike MD On:4/7/2024 6:13 AM     []  EKG/Telemetry   []  Cardiology/Vascular   []  Pathology  []  Old records  []  Other:    Assessment & Plan   Assessment / Plan     Assessment:  Dysphagia/silent aspiration  Acute infarct involving right frontal cortex  Aspiration pneumonia with Klebsiella, Streptococcus, MSSA and Moraxella  Acute hypoxemic and hypercapnic respiratory failure requiring mechanical intervention  Acute metabolic encephalopathy in setting of polypharmacy and hypercapnia - improving  Hx of seizures and Essential tremor - on primidone  Hypernatremia  Hypokalemia  Transaminitis  NSTEMI, suspect type II from demand/sepsis  Lactic acidosis, POA.  Clinically significant  Hx of Hyperlipidemia  Hx of Depression/anxiety on outpatient Cymbalta  Hx of Chronic low back pain 2/2 lumbosacral degenerative disc disease  Hx of anterocollis and cervical dystonia  Hx of left corona radiata stroke 2020 with no residual deficits  DIONNA, secondary to sepsis/shock. Resolved  Septic shock 2/2 pneumonia. Resolved    Plan:  Patient currently being managed in medicine service.  Underwent PEG tube placement on 4/12.  Tolerated procedure well.  Currently started on tube feeding as per protocol.  Plavix and statin restarted.  Patient became hypotensive and bradycardic during PEG tube placement, received 1 dose of epi.  Vital stable thereafter.  Could be in the setting of anesthesia medications.  Cardiology consulted, appreciate input.  Mentation at baseline.  On dual antiplatelet for total of 21 days followed by monotherapy with aspirin as per teleneurology.  Plavix was restarted after PEG tube placement.  Statin restarted.  Hepatic panel stable.  Will continue to monitor.  Pulmonology following the patient, appreciate further input.  S/p IV  Unasyn.  Blood culture showing no growth at 5 days.  Bowel culture on 3/27 positive for Streptococcus pneumonia, Klebsiella pneumonia and Staphylococcus aureus.  Pneumonia panel on 3/27 showed positive Klebsiella pneumoniae, Klebsiella aerogenes, Staphylococcus aureus and Streptococcus pneumonia.  S/p 5-day course of prednisone.  Continue IV Lasix 40 mg twice daily.  Hypokalemia resolved.  Continue metoprolol tartrate 12.5 mg every 12 hours.  Unable to cross MS Contin, switch to oxycodone IR 10 mg every 4 hours scheduled.  Unable to crush Cymbalta so currently on hold, discussed with pharmacy only SNRI that we have that can be given via Cortrak/PEG is venlafaxine.  Will start at 37.5 mg TID.   Continue rest of the current management.  Likely discharge in next 24 hours.  Pending disposition, Awaiting bed placement.                                    DVT prophylaxis:  No DVT prophylaxis order currently exists.        CODE STATUS:   Level Of Support Discussed With: Health Care Surrogate  Code Status (Patient has no pulse and is not breathing): CPR (Attempt to Resuscitate)  Medical Interventions (Patient has pulse or is breathing): Full Support      Electronically signed by Laurie Crowley MD, 04/14/24, 10:21 AM EDT.

## 2024-04-14 NOTE — PROGRESS NOTES
CARDIOLOGY  INPATIENT PROGRESS NOTE                [facility] ER UNIT    4/14/2024      PATIENT IDENTIFICATION:   Name:  Davonte Marshall      MRN:  [id number]     68 y.o.  male                 SUBJECTIVE:   Patient with no further events on telemetry  OBJECTIVE:  Vitals:    04/14/24 0716 04/14/24 0720 04/14/24 0737 04/14/24 1116   BP:   118/73 104/74   BP Location:   Right arm Right arm   Patient Position:   Lying Lying   Pulse: 90 93 91 74   Resp: 16  18 18   Temp:   97.7 °F (36.5 °C) 98.6 °F (37 °C)   TempSrc:   Oral Oral   SpO2: 96%  96% 99%   Weight:       Height:         FiO2 (%): 30 %     Body mass index is 31.56 kg/m².    Intake/Output Summary (Last 24 hours) at 4/14/2024 1239  Last data filed at 4/14/2024 0737  Gross per 24 hour   Intake 1189 ml   Output 1225 ml   Net -36 ml       Telemetry: Normal sinus rhythm    Physical Exam  General Appearance:   no acute distress  Alert and oriented x3  HENT:   lips not cyanotic  Atraumatic  Neck:  No jvd   supple  Respiratory:  no respiratory distress  normal breath sounds  no rales  Cardiovascular:    regular rhythm  no S3, no S4   no murmur  no rub  lower extremity edema: none    Skin:   warm, dry  No rashes      Allergies   Allergen Reactions    Codeine Arrhythmia     Chest pain    Guaifenesin-Codeine Arrhythmia     Chest pain    Cefdinir Unknown - Low Severity     Scheduled meds:  arformoterol, 15 mcg, Nebulization, BID - RT  aspirin, 81 mg, Per PEG Tube, Daily  atorvastatin, 20 mg, Per PEG Tube, Nightly  bacitracin, 1 Application, Topical, BID  budesonide, 0.5 mg, Nebulization, BID - RT  clopidogrel, 75 mg, Per PEG Tube, Daily  famotidine, 20 mg, Per PEG Tube, Daily  furosemide, 40 mg, Intravenous, BID  Lidocaine, 2 patch, Transdermal, Daily  metoprolol tartrate, 12.5 mg, Per PEG Tube, Q12H  oxyCODONE, 10 mg, Per PEG Tube, Q4H  potassium chloride, 40 mEq, Per PEG Tube, Daily  primidone, 250 mg, Per PEG Tube, TID  saccharomyces boulardii,  "250 mg, Per PEG Tube, BID  senna-docusate sodium, 1 tablet, Per PEG Tube, Nightly  sodium chloride, 10 mL, Intravenous, Q12H  venlafaxine, 37.5 mg, Per PEG Tube, TID With Meals      IV meds:                         Data Review:  CBC          4/12/2024    04:30 4/13/2024    03:56 4/14/2024    04:17   CBC   WBC 11.58  10.30  11.13    RBC 4.16  4.26  4.23    Hemoglobin 12.5  13.0  12.6    Hematocrit 38.1  39.9  40.3    MCV 91.6  93.7  95.3    MCH 30.0  30.5  29.8    MCHC 32.8  32.6  31.3    RDW 15.2  15.5  15.4    Platelets 386  401  351      CMP          4/12/2024    04:30 4/13/2024    03:56 4/14/2024    04:17   CMP   Glucose 87  128  82    BUN 33  37  38    Creatinine 0.89  1.08  0.95    EGFR 94.5  75.7  88.3    Sodium 136  135  136    Potassium 4.5  3.5  3.6    Chloride 99  96  96    Calcium 9.3  9.8  9.4    Total Protein  7.4  7.3    Albumin  3.9  3.8    Total Bilirubin  0.5  0.3    Alkaline Phosphatase  153  139    AST (SGOT)  41  29    ALT (SGPT)  74  58    BUN/Creatinine Ratio 37.1  34.3  40.0    Anion Gap 11.1  11.2  11.3       CARDIAC LABS:      Lab 04/12/24  0430   PROTIME 14.1   INR 1.06        No results found for: \"DIGOXIN\"   Lab Results   Component Value Date    TSH 2.130 04/02/2024           Invalid input(s): \"LDLCALC\"  No results found for: \"POCTROP\"  Lab Results   Component Value Date    TROPONINT 1,517 (C) 04/01/2024   (  Lab Results   Component Value Date    MG 2.5 (H) 04/14/2024     Results for orders placed during the hospital encounter of 03/27/24    Adult Transthoracic Echo Limited W/ Cont if Necessary Per Protocol    Interpretation Summary    Extremely technically difficult study with very limited views.    The subcostal view is the only one where we can see myocardium and it looks like ejection fraction is greater than 50%.  Cannot rule out regional wall motion abnormalities.    No obvious significant valvular disease by Doppler.  The valves are not well-visualized.    Left ventricular " diastolic function was indeterminate.           ASSESSMENT:    Acute metabolic encephalopathy    Bradycardia    Hypotension after procedure    Anemia        PLAN:  1.  Continue with aspirin and Plavix daily  2.  Lipitor 20 nightly  3.  Patient can be discharged to rehab from cardiac standpoint follow-up as outpatient cardiology in 2 weeks plan to cath after resolved from his current event          Renzo Barron MD  4/14/2024    12:39 EDT

## 2024-04-14 NOTE — PLAN OF CARE
Goal Outcome Evaluation:   No acute events this shift.  Wife in to visit.  Awaiting rehab placement.  VSS.

## 2024-04-15 ENCOUNTER — APPOINTMENT (OUTPATIENT)
Dept: GENERAL RADIOLOGY | Facility: HOSPITAL | Age: 69
DRG: 871 | End: 2024-04-15
Payer: MEDICARE

## 2024-04-15 LAB
ANION GAP SERPL CALCULATED.3IONS-SCNC: 11.5 MMOL/L (ref 5–15)
BASOPHILS # BLD AUTO: 0.09 10*3/MM3 (ref 0–0.2)
BASOPHILS NFR BLD AUTO: 0.6 % (ref 0–1.5)
BUN SERPL-MCNC: 33 MG/DL (ref 8–23)
BUN/CREAT SERPL: 39.3 (ref 7–25)
CALCIUM SPEC-SCNC: 9 MG/DL (ref 8.6–10.5)
CHLORIDE SERPL-SCNC: 99 MMOL/L (ref 98–107)
CO2 SERPL-SCNC: 25.5 MMOL/L (ref 22–29)
CREAT SERPL-MCNC: 0.84 MG/DL (ref 0.76–1.27)
DEPRECATED RDW RBC AUTO: 52.4 FL (ref 37–54)
EGFRCR SERPLBLD CKD-EPI 2021: 96.2 ML/MIN/1.73
EOSINOPHIL # BLD AUTO: 0.25 10*3/MM3 (ref 0–0.4)
EOSINOPHIL NFR BLD AUTO: 1.6 % (ref 0.3–6.2)
ERYTHROCYTE [DISTWIDTH] IN BLOOD BY AUTOMATED COUNT: 15.7 % (ref 12.3–15.4)
GLUCOSE BLDC GLUCOMTR-MCNC: 103 MG/DL (ref 70–99)
GLUCOSE BLDC GLUCOMTR-MCNC: 112 MG/DL (ref 70–99)
GLUCOSE BLDC GLUCOMTR-MCNC: 130 MG/DL (ref 70–99)
GLUCOSE BLDC GLUCOMTR-MCNC: 134 MG/DL (ref 70–99)
GLUCOSE SERPL-MCNC: 125 MG/DL (ref 65–99)
HCT VFR BLD AUTO: 38.3 % (ref 37.5–51)
HGB BLD-MCNC: 12.6 G/DL (ref 13–17.7)
IMM GRANULOCYTES # BLD AUTO: 0.08 10*3/MM3 (ref 0–0.05)
IMM GRANULOCYTES NFR BLD AUTO: 0.5 % (ref 0–0.5)
LYMPHOCYTES # BLD AUTO: 2.59 10*3/MM3 (ref 0.7–3.1)
LYMPHOCYTES NFR BLD AUTO: 17 % (ref 19.6–45.3)
MAGNESIUM SERPL-MCNC: 2.2 MG/DL (ref 1.6–2.4)
MCH RBC QN AUTO: 30.4 PG (ref 26.6–33)
MCHC RBC AUTO-ENTMCNC: 32.9 G/DL (ref 31.5–35.7)
MCV RBC AUTO: 92.3 FL (ref 79–97)
MONOCYTES # BLD AUTO: 0.82 10*3/MM3 (ref 0.1–0.9)
MONOCYTES NFR BLD AUTO: 5.4 % (ref 5–12)
NEUTROPHILS NFR BLD AUTO: 11.43 10*3/MM3 (ref 1.7–7)
NEUTROPHILS NFR BLD AUTO: 74.9 % (ref 42.7–76)
NRBC BLD AUTO-RTO: 0 /100 WBC (ref 0–0.2)
PHOSPHATE SERPL-MCNC: 3 MG/DL (ref 2.5–4.5)
PLATELET # BLD AUTO: 350 10*3/MM3 (ref 140–450)
PMV BLD AUTO: 11 FL (ref 6–12)
POTASSIUM SERPL-SCNC: 3.8 MMOL/L (ref 3.5–5.2)
RBC # BLD AUTO: 4.15 10*6/MM3 (ref 4.14–5.8)
SODIUM SERPL-SCNC: 136 MMOL/L (ref 136–145)
WBC NRBC COR # BLD AUTO: 15.26 10*3/MM3 (ref 3.4–10.8)

## 2024-04-15 PROCEDURE — 94799 UNLISTED PULMONARY SVC/PX: CPT

## 2024-04-15 PROCEDURE — 84100 ASSAY OF PHOSPHORUS: CPT | Performed by: INTERNAL MEDICINE

## 2024-04-15 PROCEDURE — 82948 REAGENT STRIP/BLOOD GLUCOSE: CPT

## 2024-04-15 PROCEDURE — 99232 SBSQ HOSP IP/OBS MODERATE 35: CPT | Performed by: STUDENT IN AN ORGANIZED HEALTH CARE EDUCATION/TRAINING PROGRAM

## 2024-04-15 PROCEDURE — 85025 COMPLETE CBC W/AUTO DIFF WBC: CPT | Performed by: INTERNAL MEDICINE

## 2024-04-15 PROCEDURE — 71045 X-RAY EXAM CHEST 1 VIEW: CPT

## 2024-04-15 PROCEDURE — 25010000002 FUROSEMIDE PER 20 MG: Performed by: INTERNAL MEDICINE

## 2024-04-15 PROCEDURE — 94664 DEMO&/EVAL PT USE INHALER: CPT

## 2024-04-15 PROCEDURE — 83735 ASSAY OF MAGNESIUM: CPT | Performed by: INTERNAL MEDICINE

## 2024-04-15 PROCEDURE — 97116 GAIT TRAINING THERAPY: CPT

## 2024-04-15 PROCEDURE — 80048 BASIC METABOLIC PNL TOTAL CA: CPT | Performed by: INTERNAL MEDICINE

## 2024-04-15 PROCEDURE — 94761 N-INVAS EAR/PLS OXIMETRY MLT: CPT

## 2024-04-15 RX ORDER — CLOPIDOGREL BISULFATE 75 MG/1
75 TABLET ORAL DAILY
Qty: 30 TABLET | Refills: 0 | Status: SHIPPED | OUTPATIENT
Start: 2024-04-16 | End: 2024-05-16

## 2024-04-15 RX ORDER — BUDESONIDE 0.5 MG/2ML
0.5 INHALANT ORAL
Qty: 120 ML | Refills: 0 | Status: SHIPPED | OUTPATIENT
Start: 2024-04-15 | End: 2024-05-15

## 2024-04-15 RX ORDER — AMOXICILLIN 250 MG
1 CAPSULE ORAL NIGHTLY
Qty: 14 TABLET | Refills: 0 | Status: SHIPPED | OUTPATIENT
Start: 2024-04-15 | End: 2024-04-29

## 2024-04-15 RX ORDER — POTASSIUM CHLORIDE 1.5 G/1.58G
40 POWDER, FOR SOLUTION ORAL DAILY
Qty: 28 PACKET | Refills: 0 | Status: SHIPPED | OUTPATIENT
Start: 2024-04-16 | End: 2024-04-30

## 2024-04-15 RX ORDER — FUROSEMIDE 40 MG/1
40 TABLET ORAL 2 TIMES DAILY
Qty: 60 TABLET | Refills: 0 | Status: SHIPPED | OUTPATIENT
Start: 2024-04-15 | End: 2024-05-15

## 2024-04-15 RX ORDER — OXYCODONE HYDROCHLORIDE 10 MG/1
10 TABLET ORAL EVERY 6 HOURS PRN
Qty: 12 TABLET | Refills: 0 | Status: SHIPPED | OUTPATIENT
Start: 2024-04-15 | End: 2024-04-16

## 2024-04-15 RX ORDER — ALBUTEROL SULFATE 90 UG/1
2 AEROSOL, METERED RESPIRATORY (INHALATION) EVERY 4 HOURS PRN
Qty: 0.04 G | Refills: 0 | Status: SHIPPED | OUTPATIENT
Start: 2024-04-15 | End: 2024-05-22

## 2024-04-15 RX ORDER — NALOXONE HYDROCHLORIDE 4 MG/.1ML
SPRAY NASAL
Qty: 2 EACH | Refills: 0 | Status: SHIPPED | OUTPATIENT
Start: 2024-04-15

## 2024-04-15 RX ORDER — VENLAFAXINE 37.5 MG/1
37.5 TABLET ORAL
Qty: 90 TABLET | Refills: 0 | Status: SHIPPED | OUTPATIENT
Start: 2024-04-15 | End: 2024-05-15

## 2024-04-15 RX ORDER — ARFORMOTEROL TARTRATE 15 UG/2ML
15 SOLUTION RESPIRATORY (INHALATION)
Qty: 120 ML | Refills: 0 | Status: SHIPPED | OUTPATIENT
Start: 2024-04-15 | End: 2024-05-15

## 2024-04-15 RX ORDER — CLOPIDOGREL BISULFATE 75 MG/1
75 TABLET ORAL DAILY
Qty: 7 TABLET | Refills: 0 | Status: SHIPPED | OUTPATIENT
Start: 2024-04-16 | End: 2024-04-15

## 2024-04-15 RX ADMIN — ARFORMOTEROL TARTRATE 15 MCG: 15 SOLUTION RESPIRATORY (INHALATION) at 18:59

## 2024-04-15 RX ADMIN — BACITRACIN 0.9 G: 500 OINTMENT TOPICAL at 21:58

## 2024-04-15 RX ADMIN — OXYCODONE HYDROCHLORIDE 10 MG: 5 TABLET ORAL at 22:58

## 2024-04-15 RX ADMIN — FUROSEMIDE 40 MG: 10 INJECTION, SOLUTION INTRAMUSCULAR; INTRAVENOUS at 17:35

## 2024-04-15 RX ADMIN — FUROSEMIDE 40 MG: 10 INJECTION, SOLUTION INTRAMUSCULAR; INTRAVENOUS at 09:07

## 2024-04-15 RX ADMIN — METOPROLOL TARTRATE 12.5 MG: 25 TABLET, FILM COATED ORAL at 09:08

## 2024-04-15 RX ADMIN — BACITRACIN 0.9 G: 500 OINTMENT TOPICAL at 09:09

## 2024-04-15 RX ADMIN — METOPROLOL TARTRATE 12.5 MG: 25 TABLET, FILM COATED ORAL at 21:58

## 2024-04-15 RX ADMIN — ASPIRIN 81 MG: 81 TABLET, CHEWABLE ORAL at 09:48

## 2024-04-15 RX ADMIN — OXYCODONE HYDROCHLORIDE 10 MG: 5 TABLET ORAL at 09:50

## 2024-04-15 RX ADMIN — VENLAFAXINE HYDROCHLORIDE 37.5 MG: 37.5 TABLET ORAL at 17:35

## 2024-04-15 RX ADMIN — VENLAFAXINE HYDROCHLORIDE 37.5 MG: 37.5 TABLET ORAL at 12:58

## 2024-04-15 RX ADMIN — LIDOCAINE 2 PATCH: 4 PATCH TOPICAL at 09:09

## 2024-04-15 RX ADMIN — Medication 250 MG: at 21:58

## 2024-04-15 RX ADMIN — OXYCODONE HYDROCHLORIDE 10 MG: 5 TABLET ORAL at 06:27

## 2024-04-15 RX ADMIN — PRIMIDONE 250 MG: 250 TABLET ORAL at 15:15

## 2024-04-15 RX ADMIN — PRIMIDONE 250 MG: 250 TABLET ORAL at 09:07

## 2024-04-15 RX ADMIN — CLOPIDOGREL BISULFATE 75 MG: 75 TABLET ORAL at 09:08

## 2024-04-15 RX ADMIN — OXYCODONE HYDROCHLORIDE 10 MG: 5 TABLET ORAL at 17:35

## 2024-04-15 RX ADMIN — OXYCODONE HYDROCHLORIDE 10 MG: 5 TABLET ORAL at 02:53

## 2024-04-15 RX ADMIN — VENLAFAXINE HYDROCHLORIDE 37.5 MG: 37.5 TABLET ORAL at 09:08

## 2024-04-15 RX ADMIN — Medication 10 ML: at 22:39

## 2024-04-15 RX ADMIN — POTASSIUM CHLORIDE 40 MEQ: 1.5 POWDER, FOR SOLUTION ORAL at 09:08

## 2024-04-15 RX ADMIN — FAMOTIDINE 20 MG: 20 TABLET ORAL at 09:08

## 2024-04-15 RX ADMIN — OXYCODONE HYDROCHLORIDE 10 MG: 5 TABLET ORAL at 15:15

## 2024-04-15 RX ADMIN — BUDESONIDE 0.5 MG: 0.5 INHALANT ORAL at 07:18

## 2024-04-15 RX ADMIN — BUDESONIDE 0.5 MG: 0.5 INHALANT ORAL at 18:59

## 2024-04-15 RX ADMIN — Medication 250 MG: at 09:08

## 2024-04-15 RX ADMIN — Medication 10 ML: at 09:10

## 2024-04-15 RX ADMIN — ATORVASTATIN CALCIUM 20 MG: 20 TABLET, FILM COATED ORAL at 21:58

## 2024-04-15 RX ADMIN — ARFORMOTEROL TARTRATE 15 MCG: 15 SOLUTION RESPIRATORY (INHALATION) at 07:18

## 2024-04-15 RX ADMIN — PRIMIDONE 250 MG: 250 TABLET ORAL at 21:58

## 2024-04-15 NOTE — PLAN OF CARE
Goal Outcome Evaluation:      Discharge to [facility]

## 2024-04-15 NOTE — PROGRESS NOTES
[facility]   Hospitalist Progress Note  Date: 4/15/2024  Patient Name: Davonte Marshall  : 1955  MRN: [id number]  Date of admission: 3/27/2024  Room/Bed: [facility]/      Subjective   Subjective     Chief Complaint: Follow up for somnolence     Summary:Davonte Marshall is a 68 y.o. male with hypertension, chronic pain on MS Contin/Cymbalta, seizure/essential tremor on primidone presented with altered mental status and hypoxia. Required BiPAP, had aspiration episode and required intubation for airway distress.  Initial event attributed to seizure as had not been taking primidone.  Initially received Keppra, switched back to primidone with improvement in mental status and no further seizure activity.  Extubated to BiPAP and now on room air.  Completing course of Unasyn for aspiration pneumonia.  Had left lower lobe collapse but opened up with bronchopulmonary hygiene protocol.  Hospital course complicated by acute CVA; not candidate for thrombolytic; DAPT recommended from neurology and cardiology standpoint given concern for ACS; cath eventually recommended.  Plavix was held for 5 days and patient underwent PEG tube placement on . Patient became hypotensive and bradycardic during his PEG tube placement, received 1 dose of epi.  Vital stable thereafter.  Cardiology was consulted who thought the episode of bradycardia and hypotension was likely from anesthesia medications. His Plavix and statin were resumed. Overall better but remains very debilitated and rehab planned.  Pending disposition.       Interval Followup: No acute events overnight.  Denied any fever, chills, rigors, chest pain or difficulty breathing.  Not in acute distress.  Currently saturating well on 2 L/min of oxygen via NC.  2 episodes of loose stool today.  Currently on Odalis-Colace nightly.    Patient was planned to be discharged today which was canceled later, as his rehab did not have tube feed pump available.    Review of Systems    All  systems reviewed and negative except for what is outlined above.      Objective   Objective     Vitals:   Temp:  [97.3 °F (36.3 °C)-98.6 °F (37 °C)] 97.3 °F (36.3 °C)  Heart Rate:  [80-95] 86  Resp:  [18-20] 20  BP: (111-131)/(69-78) 114/75    Physical Exam   General: Awake, alert, NAD  Cardiovascular: RRR, no murmurs   Pulmonary: CTA bilaterally; no wheezes; no conversational dyspnea; on 2 L/min of oxygen via NC.  Gastrointestinal: S/ND/NT, +BS  Neuro: Alert, awake, oriented x 3; speech clear; no tremor      Result Review    Result Review:  I have personally reviewed these results:  [x]  Laboratory      Lab 04/15/24  0335 04/14/24  0417 04/13/24  0356 04/12/24  0430   WBC 15.26* 11.13* 10.30 11.58*   HEMOGLOBIN 12.6* 12.6* 13.0 12.5*   HEMATOCRIT 38.3 40.3 39.9 38.1   PLATELETS 350 351 401 386   NEUTROS ABS 11.43* 7.65* 6.40 7.20*   IMMATURE GRANS (ABS) 0.08* 0.09* 0.06* 0.08*   LYMPHS ABS 2.59 2.01 2.76 2.98   MONOS ABS 0.82 0.92* 0.63 0.76   EOS ABS 0.25 0.32 0.30 0.39   MCV 92.3 95.3 93.7 91.6   PROTIME  --   --   --  14.1         Lab 04/15/24  0335 04/14/24  0417 04/13/24  0356   SODIUM 136 136 135*   POTASSIUM 3.8 3.6 3.5   CHLORIDE 99 96* 96*   CO2 25.5 28.7 27.8   ANION GAP 11.5 11.3 11.2   BUN 33* 38* 37*   CREATININE 0.84 0.95 1.08   EGFR 96.2 88.3 75.7   GLUCOSE 125* 82 128*   CALCIUM 9.0 9.4 9.8   MAGNESIUM 2.2 2.5* 2.5*   PHOSPHORUS 3.0 3.8 4.9*         Lab 04/14/24  0417 04/13/24  0356   TOTAL PROTEIN 7.3 7.4   ALBUMIN 3.8 3.9   ALT (SGPT) 58* 74*   AST (SGOT) 29 41*   BILIRUBIN 0.3 0.5   INDIRECT BILIRUBIN  --  0.3   BILIRUBIN DIRECT <0.2 0.2   ALK PHOS 139* 153*         Lab 04/12/24  0430   PROTIME 14.1   INR 1.06                       Brief Urine Lab Results  (Last result in the past 365 days)        Color   Clarity   Blood   Leuk Est   Nitrite   Protein   CREAT   Urine HCG        04/02/24 1846 Yellow   Turbid   Small (1+)   Negative   Negative   Negative                 [x]  Microbiology    Microbiology Results (last 10 days)       ** No results found for the last 240 hours. **          [x]  Radiology  XR Chest 1 View    Result Date: 4/15/2024  IMPRESSION : No acute process.[  Electronically Signed By-Emeka Antonio On:4/15/2024 12:52 PM      XR Abdomen KUB    Result Date: 4/11/2024  Impression: 1.  Feeding tube with tip in distal descending duodenum   Electronically Signed By-Emerson Magallanes MD On:4/11/2024 5:28 PM     []  EKG/Telemetry   []  Cardiology/Vascular   []  Pathology  []  Old records  []  Other:    Assessment & Plan   Assessment / Plan     Assessment:  Dysphagia/silent aspiration s/p PEG tube placement on 4/12  Acute infarct involving right frontal cortex  Aspiration pneumonia with Klebsiella, Streptococcus, MSSA and Moraxella  Acute hypoxemic and hypercapnic respiratory failure requiring mechanical intervention  Acute metabolic encephalopathy in setting of polypharmacy and hypercapnia - improving  Hx of seizures and Essential tremor - on primidone  Hypernatremia, resolved  Hypokalemia, resolved  Transaminitis, improving  NSTEMI, suspect type II from demand/sepsis  Lactic acidosis, POA.  Clinically significant  Leukocytosis  Hx of Hyperlipidemia  Hx of Depression/anxiety on outpatient Cymbalta  Hx of Chronic low back pain 2/2 lumbosacral degenerative disc disease  Hx of anterocollis and cervical dystonia  Hx of left corona radiata stroke 2020 with no residual deficits  DIONNA, secondary to sepsis/shock. Resolved  Septic shock 2/2 pneumonia. Resolved    Plan:  Patient currently being managed in medicine service.  Underwent PEG tube placement on 4/12.  Tolerated procedure well.  Currently started on tube feeding as per protocol.  Plavix and statin restarted.  Patient became hypotensive and bradycardic during PEG tube placement, received 1 dose of epi.  Vital stable thereafter.  Could be in the setting of anesthesia medications.  Cardiology consulted, appreciate input.  Mentation at  baseline.  On dual antiplatelet for total of 21 days followed by monotherapy with aspirin as per teleneurology.  Plavix was restarted after PEG tube placement.  Statin restarted.  Hepatic panel stable.  Will continue to monitor.  Pulmonology following the patient, appreciate further input.  S/p IV Unasyn.  Leukocytosis of 15.29 today.  No signs or symptoms of infection.  Chest x-ray was nonsignificant.  Urine analysis was ordered.  Could be reactive.  Holding antibiotics currently.  Will continue to monitor.  Blood culture showing no growth at 5 days.  Bowel culture on 3/27 positive for Streptococcus pneumonia, Klebsiella pneumonia and Staphylococcus aureus.  Pneumonia panel on 3/27 showed positive Klebsiella pneumoniae, Klebsiella aerogenes, Staphylococcus aureus and Streptococcus pneumonia.  S/p 5-day course of prednisone.  Continue IV Lasix 40 mg twice daily.  Hypokalemia resolved.  Continue metoprolol tartrate 12.5 mg every 12 hours.  Unable to cross MS Contin, switch to oxycodone IR 10 mg every 4 hours scheduled.  Unable to crush Cymbalta so currently on hold, discussed with pharmacy only SNRI that we have that can be given via Cortrak/PEG is venlafaxine.  Will start at 37.5 mg TID.   Patient had 2 episodes of loose stool this morning, on Odalis-Colace nightly.  Discontinued.  Will continue to monitor.  Continue rest of the current management.  Likely discharge in next 24 hours.  Pending disposition, plan to discharge today but Rehab facility did not have tube feed pump.  Likely tomorrow.                                 DVT prophylaxis:  No DVT prophylaxis order currently exists.        CODE STATUS:   Level Of Support Discussed With: Health Care Surrogate  Code Status (Patient has no pulse and is not breathing): CPR (Attempt to Resuscitate)  Medical Interventions (Patient has pulse or is breathing): Full Support      Electronically signed by Laurie Crowley MD, 04/15/24, 10:21 AM EDT.

## 2024-04-15 NOTE — PLAN OF CARE
Goal Outcome Evaluation:  Plan of Care Reviewed With: patient        Progress: no change  Outcome Evaluation: Schedule pain medication per mar, Q2T. Tolerating tube feeds through PEG at goal rate. No acute changes overnight. Call light within reach.

## 2024-04-15 NOTE — THERAPY TREATMENT NOTE
"Acute Care - Physical Therapy Treatment Note  [facility]     Patient Name: Davonte Marshall  : 1955  MRN: [id number]  Today's Date: 4/15/2024      Visit Dx:     ICD-10-CM ICD-9-CM   1. Acute respiratory failure with hypoxia  J96.01 518.81   2. Metabolic encephalopathy  G93.41 348.31   3. DIONNA (acute kidney injury)  N17.9 584.9   4. NSTEMI (non-ST elevated myocardial infarction)  I21.4 410.70   5. Oropharyngeal dysphagia  R13.12 787.22   6. Impaired mobility and ADLs  Z74.09 V49.89    Z78.9    7. Difficulty in walking  R26.2 719.7     Patient Active Problem List   Diagnosis    Anemia    Anxiety    Arthritis    Back pain    Chronic bronchitis    Complication of surgical procedure    Degeneration of lumbosacral intervertebral disc    Depression    Encounter for long-term (current) use of insulin    Essential tremor    Gall stones    Head injury    Hemorrhoids    Herniation of intervertebral disc    Hyperlipidemia    Leg pain    Neck pain    Renal insufficiency    Scoliosis    Seizure disorder    Smokes 1.5 packs of cigarettes per day    Vitamin D deficiency disease    Gross hematuria    BPH without obstruction/lower urinary tract symptoms    Urge incontinence of urine    Benign prostatic hyperplasia with urinary hesitancy    Sequelae, post-stroke    S/P TURP    Postoperative anemia    Postoperative hypoxia    Essential hypertension    Opioid dependence    Cervical dystonia    Acute metabolic encephalopathy    Bradycardia    Hypotension after procedure     Past Medical History:   Diagnosis Date    Anemia     Anxiety     Arthritis     Benign essential hypertension     Chronic bronchitis     Depression     Enlarged prostate     Floaters in visual field     Gall stones     Generalized weakness     Head injury     Hemorrhoids     Hyperlipidemia     Leg pain     Numbness in both hands 2015    Seizures     last \"quite a while ago\"    Stroke 2020    left sided weakness     Past Surgical History:   Procedure " Laterality Date    APPENDECTOMY      BACK SURGERY      4    CHOLECYSTECTOMY      CYSTOSCOPY Bilateral     7/12/21    CYSTOSCOPY TRANSURETHRAL RESECTION OF PROSTATE N/A 7/12/2021    Procedure: CYSTOSCOPY TRANSURETHRAL RESECTION OF PROSTATE;  Surgeon: Penelope Fox MD;  Location: [facility] MAIN OR;  Service: Urology;  Laterality: N/A;    ENDOSCOPY W/ PEG TUBE PLACEMENT N/A 4/12/2024    Procedure: ESOPHAGOGASTRODUODENOSCOPY WITH PERCUTANEOUS ENDOSCOPIC GASTROSTOMY TUBE INSERTION WITH ANESTHESIA;  Surgeon: Will Dugan MD;  Location: [facility] ENDOSCOPY;  Service: Gastroenterology;  Laterality: N/A;  SUCCESSFUL PEG TUBE PLACEMENT    HAND SURGERY Bilateral     thumb    LYMPH NODE DISSECTION      jaw line    TURP / TRANSURETHRAL INCISION / DRAINAGE PROSTATE  07/12/2021     PT Assessment (Last 12 Hours)       PT Evaluation and Treatment       Row Name 04/15/24 1036          Physical Therapy Time and Intention    Subjective Information no complaints (P)   -     Document Type therapy note (daily note) (P)   -     Mode of Treatment individual therapy;physical therapy (P)   -     Patient Effort good (P)   -     Symptoms Noted During/After Treatment fatigue (P)   -       Row Name 04/15/24 1036          Pain    Pretreatment Pain Rating 0/10 - no pain (P)   -     Posttreatment Pain Rating 0/10 - no pain (P)   -       Row Name 04/15/24 1036          Bed Mobility    Bed Mobility supine-sit;sit-supine;scooting/bridging (P)   -     Scooting/Bridging Sumner (Bed Mobility) standby assist (P)   -     Supine-Sit [place]ner (Bed Mobility) standby assist (P)   -     Sit-Supine [place]ner (Bed Mobility) standby assist (P)   -     Bed Mobility, Safety Issues decreased use of legs for bridging/pushing;decreased use of arms for pushing/pulling (P)   -     Assistive Device (Bed Mobility) bed rails;head of bed elevated (P)   -       Row Name 04/15/24 1036          Transfers    Transfers sit-stand  transfer;stand-sit transfer (P)   -SM       Row Name 04/15/24 1036          Sit-Stand Transfer    Sit-Stand Barren (Transfers) contact guard (P)   -     Assistive Device (Sit-Stand Transfers) walker, front-wheeled (P)   -SM       Row Name 04/15/24 1036          Stand-Sit Transfer    Stand-Sit Barren (Transfers) contact guard (P)   -     Assistive Device (Stand-Sit Transfers) walker, front-wheeled (P)   -SM       Row Name 04/15/24 1036          Gait/Stairs (Locomotion)    Gait/Stairs Locomotion gait/ambulation assistive device (P)   -     Barren Level (Gait) contact guard (P)   -     Assistive Device (Gait) walker, front-wheeled (P)   -     Patient was able to Ambulate yes (P)   -     Distance in Feet (Gait) 45 (P)   -     Pattern (Gait) step-through (P)   -SM     Deviations/Abnormal Patterns (Gait) gait speed decreased;stride length decreased (P)   -SM     Bilateral Gait Deviations forward flexed posture;heel strike decreased (P)   -SM       Row Name 04/15/24 1036          Safety Issues, Functional Mobility    Impairments Affecting Function (Mobility) balance;endurance/activity tolerance;muscle tone abnormal;strength (P)   -SM       Row Name 04/15/24 1036          Balance    Dynamic Standing Balance contact guard (P)   -SM       Row Name 04/15/24 1036          Motor Skills    Therapeutic Exercise hip;knee;ankle (P)   -SM       Row Name 04/15/24 1036          Hip (Therapeutic Exercise)    Hip Isometrics (Therapeutic Exercise) gluteal sets;2 sets;10 repetitions (P)   -SM       Row Name 04/15/24 1036          Knee (Therapeutic Exercise)    Knee Isometrics (Therapeutic Exercise) quad sets;2 sets;10 repetitions (P)   -SM       Row Name 04/15/24 1036          Ankle (Therapeutic Exercise)    Ankle AROM (Therapeutic Exercise) bilateral;dorsiflexion;plantarflexion;20 repititions (P)   -SM       Row Name             Wound 04/04/24 1200 Right anterior second toe    Wound - Properties Group  Placement Date: 04/04/24  -EP Placement Time: 1200  -EP Side: Right  -EP Orientation: anterior  -EP Location: second toe  -EP    Retired Wound - Properties Group Placement Date: 04/04/24  -EP Placement Time: 1200  -EP Side: Right  -EP Orientation: anterior  -EP Location: second toe  -EP    Retired Wound - Properties Group Date first assessed: 04/04/24  -EP Time first assessed: 1200  -EP Side: Right  -EP Location: second toe  -EP      Row Name             Wound 04/04/24 1200 Right anterior third toe    Wound - Properties Group Placement Date: 04/04/24  -EP Placement Time: 1200  -EP Side: Right  -EP Orientation: anterior  -EP Location: third toe  -EP    Retired Wound - Properties Group Placement Date: 04/04/24  -EP Placement Time: 1200  -EP Side: Right  -EP Orientation: anterior  -EP Location: third toe  -EP    Retired Wound - Properties Group Date first assessed: 04/04/24  -EP Time first assessed: 1200  -EP Side: Right  -EP Location: third toe  -EP      Row Name             Wound 04/04/24 1200 Left anterior great toe    Wound - Properties Group Placement Date: 04/04/24  -EP Placement Time: 1200  -EP Side: Left  -EP Orientation: anterior  -EP Location: great toe  -EP    Retired Wound - Properties Group Placement Date: 04/04/24  -EP Placement Time: 1200  -EP Side: Left  -EP Orientation: anterior  -EP Location: great toe  -EP    Retired Wound - Properties Group Date first assessed: 04/04/24  -EP Time first assessed: 1200  -EP Side: Left  -EP Location: great toe  -EP      Row Name             Wound 04/04/24 1200 Left anterior second toe    Wound - Properties Group Placement Date: 04/04/24  -EP Placement Time: 1200  -EP Side: Left  -EP Orientation: anterior  -EP Location: second toe  -EP    Retired Wound - Properties Group Placement Date: 04/04/24  -EP Placement Time: 1200  -EP Side: Left  -EP Orientation: anterior  -EP Location: second toe  -EP    Retired Wound - Properties Group Date first assessed: 04/04/24  -EP Time  first assessed: 1200  -EP Side: Left  -EP Location: second toe  -EP      Row Name 04/15/24 1036          Vital Signs    O2 Delivery Intra Treatment room air (P)   -       Row Name 04/15/24 1036          Positioning and Restraints    Post Treatment Position bed (P)   -     In Bed supine;call light within reach;encouraged to call for assist;exit alarm on;with family/caregiver (P)   -       Row Name 04/15/24 1036          Progress Summary (PT)    Progress Toward Functional Goals (PT) progress toward functional goals is good (P)   -SM     Daily Progress Summary (PT) Pt tolerated TE well, performed tf's w/ increased independence, experiences fatigue with amb. Continue w/ current POC. (P)   -               User Key  (r) = Recorded By, (t) = Taken By, (c) = Cosigned By      Initials Name Provider Type    Alise Altamirano, RN Registered Nurse    Nisha Torres, PTA Student PTA Student                      PT Recommendation and Plan     Progress Summary (PT)  Progress Toward Functional Goals (PT): (P) progress toward functional goals is good  Daily Progress Summary (PT): (P) Pt tolerated TE well, performed tf's w/ increased independence, experiences fatigue with amb. Continue w/ current POC.   Outcome Measures       Row Name 04/15/24 1000 04/14/24 1200 04/13/24 1522       How much help from another person do you currently need...    Turning from your back to your side while in flat bed without using bedrails? 4 (P)   -SM 3  -CS 3  -TS    Moving from lying on back to sitting on the side of a flat bed without bedrails? 3 (P)   -SM 3  -CS 3  -TS    Moving to and from a bed to a chair (including a wheelchair)? 3 (P)   -SM 3  -CS 3  -TS    Standing up from a chair using your arms (e.g., wheelchair, bedside chair)? 3 (P)   -SM 3  -CS 3  -TS    Climbing 3-5 steps with a railing? 2 (P)   -SM 3  -CS 2  -TS    To walk in hospital room? 3 (P)   -SM 3  -CS 3  -TS    AM-PAC 6 Clicks Score (PT) 18 (P)   -SM 18  -CS 17  -TS     Highest Level of Mobility Goal 6 --> Walk 10 steps or more (P)   - 6 --> Walk 10 steps or more  - 5 --> Static standing  -TS       Functional Assessment    Outcome Measure Options -- AM-PAC 6 Clicks Basic Mobility (PT)  - --              User Key  (r) = Recorded By, (t) = Taken By, (c) = Cosigned By      Initials Name Provider Type     Salas Ramos, PTA Physical Therapist Assistant    Austin Etienne PTA Physical Therapist Assistant    Nisha Torres, ASHLEY Student PTA Student                     Time Calculation:    PT Charges       Row Name 04/15/24 1034             Time Calculation    PT Received On 04/15/24 (P)   -         Timed Charges    23196 - PT Therapeutic Exercise Minutes 5 (P)   -      33695 - Gait Training Minutes  8 (P)   -      45760 - PT Therapeutic Activity Minutes 5 (P)   -SM         Total Minutes    Timed Charges Total Minutes 18 (P)   -       Total Minutes 18 (P)   -                User Key  (r) = Recorded By, (t) = Taken By, (c) = Cosigned By      Initials Name Provider Type    Nisha Torres PTA Student PTA Student                  Therapy Charges for Today       Code Description Service Date Service Provider Modifiers Qty    84322040751 HC GAIT TRAINING EA 15 MIN 4/15/2024 Nisha Sanderson PTA Student GP 1            PT G-Codes  Outcome Measure Options: AM-PAC 6 Clicks Basic Mobility (PT)  AM-PAC 6 Clicks Score (PT): (P) 18  AM-PAC 6 Clicks Score (OT): 10    ASHLEY Ashraf  4/15/2024

## 2024-04-15 NOTE — CONSULTS
"Nutrition Services    Patient Name: Davonte Marshall  YOB: 1955  MRN: [id number]  Admission date: 3/27/2024      CLINICAL NUTRITION ASSESSMENT      Reason for Assessment  Follow Up   H&P:  Past Medical History:   Diagnosis Date    Anemia     Anxiety     Arthritis     Benign essential hypertension     Chronic bronchitis     Depression     Enlarged prostate     Floaters in visual field     Gall stones     Generalized weakness     Head injury     Hemorrhoids     Hyperlipidemia     Leg pain     Numbness in both hands 7/6/2015    Seizures     last \"quite a while ago\"    Stroke 11/2020    left sided weakness        Current Problems:   Active Hospital Problems    Diagnosis     **Acute metabolic encephalopathy     Bradycardia     Hypotension after procedure     Anemia         Nutrition/Diet History         Narrative   Pt had PEG placed on 4/12. Tolerating current enteral nutrition at goal rate. Electrolytes WDL. Last BM noted 4/15/24. Pt with plans to possibly discharge to [facility]. Case management reached out requesting a nutrition discharge plan. RD will write out both continuous and bolus orders for the formula available at [facility].     Anthropometrics        Current Height, Weight Height: 175.3 cm (69.02\")  Weight: 97 kg (213 lb 13.5 oz)   Current BMI Body mass index is 31.56 kg/m².   BMI Classification Obese Class I   % %   Adjusted Body Weight (ABW) 81 kg   Weight Hx  Wt Readings from Last 30 Encounters:   03/29/24 0551 97 kg (213 lb 13.5 oz)   03/27/24 0931 89 kg (196 lb 3.4 oz)   03/27/24 0842 89 kg (196 lb 3.4 oz)   03/27/24 0347 92.1 kg (203 lb 0.7 oz)   03/16/23 1526 103 kg (227 lb)   02/28/22 1429 97.8 kg (215 lb 8 oz)   09/21/21 1059 97.4 kg (214 lb 12.8 oz)   07/21/21 0911 96.2 kg (212 lb)   07/12/21 0611 96.3 kg (212 lb 4.9 oz)   07/06/21 0948 94.3 kg (208 lb)   06/30/21 1555 94.8 kg (209 lb)   06/15/21 1312 96.5 kg (212 lb 12.8 oz)   01/28/21 0000 95.3 kg (210 lb) " "  12/30/20 0000 94.5 kg (208 lb 6 oz)   10/29/20 0000 98.6 kg (217 lb 6 oz)   10/09/20 0000 97.5 kg (215 lb)   07/09/20 0000 103 kg (226 lb)   01/09/20 0000 98 kg (216 lb)   10/07/19 0000 96.6 kg (213 lb)   09/06/19 0000 94.8 kg (209 lb)          Wt Change Observation 31# weight loss in 1 year     Estimated/Assessed Needs  Estimated Needs based on: Adjusted Body Weight 81 kg       Energy Requirements 25-30 kcal/kg   EST Needs (kcal/day) 9461-2396 kcal       Protein Requirements 1.0 g/kg   EST Daily Needs (g/day) 81 g       Fluid Requirements 25 ml/kg    Estimated Needs (mL/day) 2025 ml     Labs/Medications         Pertinent Labs Reviewed.   Results from last 7 days   Lab Units 04/15/24  0335 04/14/24  0417 04/13/24  0356   SODIUM mmol/L 136 136 135*   POTASSIUM mmol/L 3.8 3.6 3.5   CHLORIDE mmol/L 99 96* 96*   CO2 mmol/L 25.5 28.7 27.8   BUN mg/dL 33* 38* 37*   CREATININE mg/dL 0.84 0.95 1.08   CALCIUM mg/dL 9.0 9.4 9.8   BILIRUBIN mg/dL  --  0.3 0.5   ALK PHOS U/L  --  139* 153*   ALT (SGPT) U/L  --  58* 74*   AST (SGOT) U/L  --  29 41*   GLUCOSE mg/dL 125* 82 128*     Results from last 7 days   Lab Units 04/15/24  0335 04/14/24 0417 04/13/24  0356   MAGNESIUM mg/dL 2.2 2.5* 2.5*   PHOSPHORUS mg/dL 3.0 3.8 4.9*   HEMOGLOBIN g/dL 12.6* 12.6* 13.0   HEMATOCRIT % 38.3 40.3 39.9     COVID19   Date Value Ref Range Status   03/27/2024 Not Detected Not Detected - Ref. Range Final     No results found for: \"HGBA1C\"      Pertinent Medications Reviewed.     Malnutrition Severity Assessment              Nutrition Diagnosis         Nutrition Dx Problem 1 Inadequate oral Intake related to decreased ability to consume sufficient energy as evidenced by NPO. and PEG for primary source of nutrition.     Nutrition Intervention           Current Nutrition Orders & Evaluation of Intake       Current PO Diet NPO Diet NPO Type: Strict NPO, Ice Chips   Supplement Orders Placed This Encounter      Tube Feeding: Formula: Fibersource HN; " Feeding Type: Continuous; Start at: 25 mL/hr; Then Advance By: 25 mL/hr; Every: 4 hours; To Goal Rate of: 85 mL/hr; Water Flush: 50 mL; Every: 3 hours; Water Bolus: None           Nutrition Intervention/Prescription        Fibersource HN @ 85 ml/hr  FWF 50 ml q3h (400 ml)  Provides: 2244 kcal, 101 g pro, 1915 ml        Medical Nutrition Therapy/Nutrition Education          Learner     Readiness N/A  N/A     Method     Response N/A  N/A     Monitor/Evaluation        Monitor Per protocol, I&O, Pertinent labs, EN delivery/tolerance, GI status, POC/GOC     Nutrition Discharge Plan         Continuous Feeds:  Jevity 1.5 @ 65 ml/hr x 22 hr  Provides: 2145 kcal, 92 g pro, 1087 ml fluid  Flush with 65 ml q3h (520 ml) provides 1607 ml total free water/day    Bolus Feeds:  Bolus 6 cans of Jevity 1.5 daily  (2 cartons TID)  Provides: 2130 kcal, 91 g pro, 1080 ml fluid  Flush with 85 ml water before and after each bolus (510 ml) provides 1590 ml total free water/day     Electronically signed by:  Lisa Pitts, TAMIA  04/15/24 13:08 EDT

## 2024-04-15 NOTE — DISCHARGE SUMMARY
[facility]         HOSPITALIST  DISCHARGE SUMMARY    Patient Name: Davonte Marshall  : 1955  MRN: [id number]    Date of Admission: 3/27/2024  Date of Discharge:  4/15/2024  Primary Care Physician: Ivanna Low APRN    Consults       Date and Time Order Name Status Description    2024 12:01 PM Inpatient Cardiology Consult      2024 12:08 PM Inpatient Gastroenterology Consult      2024 11:33 AM Inpatient Gastroenterology Consult Completed     4/3/2024 11:59 AM Inpatient Neurology Consult General      2024  8:27 PM Inpatient Cardiology Consult      3/29/2024  6:05 AM Inpatient Neurology Consult General      3/27/2024  9:31 AM Inpatient Pulmonology Consult Completed     3/27/2024  6:47 AM Inpatient Cardiology Consult      3/27/2024  5:35 AM Inpatient Hospitalist Consult              Active and Resolved Hospital Problems:  Active Hospital Problems    Diagnosis POA    **Acute metabolic encephalopathy [G93.41] Yes    Bradycardia [R00.1] Unknown    Hypotension after procedure [I95.81] Unknown    Anemia [D64.9] Unknown      Resolved Hospital Problems   No resolved problems to display.       Hospital Course     Hospital Course:  Davonte Marshall is a 68 y.o. male with hypertension, chronic pain on MS Contin/Cymbalta, seizure/essential tremor on primidone presented with altered mental status and hypoxia. Required BiPAP, had aspiration episode and required intubation for airway distress. Initial event attributed to seizure as had not been taking primidone. Initially received Keppra, switched back to primidone with improvement in mental status and no further seizure activity. Extubated to BiPAP and now on room air. Completing course of Unasyn for aspiration pneumonia. Had left lower lobe collapse but opened up with bronchopulmonary hygiene protocol. Hospital course complicated by acute CVA; not candidate for thrombolytic; DAPT recommended from neurology and cardiology  standpoint given concern for ACS; cath eventually recommended. Plavix was held for 5 days and patient underwent PEG tube placement on 4/12. Patient became hypotensive and bradycardic during his PEG tube placement, received 1 dose of epi. Vital stable thereafter. Cardiology was consulted who thought the episode of bradycardia and hypotension was likely from anesthesia medications. His Plavix and statin were resumed. Overall better but remains very debilitated and rehab planned.  Tube feed was readjusted at the time of discharge.  Cardiology recommended follow-up in 2 weeks as outpatient for possible cath.  Continue dual antiplatelet.  Continue statin.  Follow-up with PCP in 3-5 days, needs CBC and chemistries trended.  Monitor for signs of infection.  Patient is asymptomatic at the time of discharge with no signs and symptoms of infection.  Follow-up with neurology in 4 weeks as outpatient.  Patient is being discharged today and is stable at the time of discharge.  Medications that were changed and patient's medication that needs to be taken are listed as below.  Fall precautions.  High risk for readmission due to underlying chronic conditions.      Diet:  Tube Feeding: Formula: Fibersource HN; Feeding Type: Continuous; Start at: 25 mL/hr; Then Advance By: 25 mL/hr; Every: 4 hours; To Goal Rate of: 85 mL/hr; Water Flush: 50 mL; Every: 3 hours; Water Bolus: None     DISCHARGE Follow Up Recommendations for labs and diagnostics: As mentioned above.      Day of Discharge     Vital Signs:  Temp:  [97.3 °F (36.3 °C)-98.6 °F (37 °C)] 97.3 °F (36.3 °C)  Heart Rate:  [80-95] 86  Resp:  [18-20] 20  BP: (111-131)/(69-78) 114/75  Physical Exam:   eneral: Awake, alert, NAD  Cardiovascular: RRR, no murmurs   Pulmonary: CTA bilaterally; no wheezes; no conversational dyspnea; on 2 L/min of oxygen via NC.  Gastrointestinal: S/ND/NT, +BS  Neuro: Alert, awake, oriented x 3; speech clear; no tremor    Discharge Details        Discharge  Medications        New Medications        Instructions Start Date   albuterol sulfate  (90 Base) MCG/ACT inhaler  Commonly known as: PROVENTIL HFA;VENTOLIN HFA;PROAIR HFA   2 puffs, Inhalation, Every 4 Hours PRN      arformoterol 15 MCG/2ML nebulizer solution  Commonly known as: BROVANA   15 mcg, Nebulization, 2 Times Daily - RT      budesonide 0.5 MG/2ML nebulizer solution  Commonly known as: PULMICORT   0.5 mg, Nebulization, 2 Times Daily - RT      clopidogrel 75 MG tablet  Commonly known as: PLAVIX   75 mg, Oral, Daily   Start Date: April 16, 2024     furosemide 40 MG tablet  Commonly known as: Lasix   40 mg, Oral, 2 Times Daily      metoprolol tartrate 25 MG tablet  Commonly known as: LOPRESSOR   12.5 mg, Oral, Every 12 Hours Scheduled      naloxone 4 MG/0.1ML nasal spray  Commonly known as: NARCAN   Call 911. Don't prime. [place] in 1 nostril for overdose. Repeat in 2-3 minutes in other nostril if no or minimal breathing/responsiveness.      oxyCODONE 10 MG tablet  Commonly known as: ROXICODONE   10 mg, Per G Tube, Every 6 Hours PRN      potassium chloride 20 MEQ packet  Commonly known as: KLOR-CON   40 mEq, Oral, Daily   Start Date: April 16, 2024     sennosides-docusate 8.6-50 MG per tablet  Commonly known as: PERICOLACE   1 tablet, Per PEG Tube, Nightly      venlafaxine 37.5 MG tablet  Commonly known as: EFFEXOR   37.5 mg, Per G Tube, 3 Times Daily With Meals             Changes to Medications        Instructions Start Date   atorvastatin 20 MG tablet  Commonly known as: LIPITOR  What changed: when to take this   TAKE 1 TABLET BY MOUTH ONCE DAILY      docusate sodium 100 MG capsule  Commonly known as: COLACE  What changed: when to take this   200 mg, Oral, 2 Times Daily PRN      primidone 250 MG tablet  Commonly known as: MYSOLINE  What changed:   how much to take  how to take this  when to take this   1 tablet 3 times a day             Continue These Medications        Instructions Start Date   aspirin  81 MG chewable tablet   81 mg, Oral, Daily, Per Dr. Arroyo pt instructed to stop 3 days prior to procedure      benzonatate 200 MG capsule  Commonly known as: TESSALON   200 mg, Oral, 3 Times Daily PRN      HYDROcodone-acetaminophen  MG per tablet  Commonly known as: NORCO   1 tablet, Oral, Every 8 Hours PRN      hydrOXYzine 25 MG tablet  Commonly known as: ATARAX   25 mg, Oral, Every Evening      Morphine 60 MG 12 hr tablet  Commonly known as: MS CONTIN   60 mg, Oral, 2 Times Daily             Stop These Medications      DULoxetine 60 MG capsule  Commonly known as: CYMBALTA     losartan-hydrochlorothiazide 100-12.5 MG per tablet  Commonly known as: HYZAAR              Allergies   Allergen Reactions    Codeine Arrhythmia     Chest pain    Guaifenesin-Codeine Arrhythmia     Chest pain    Cefdinir Unknown - Low Severity       Discharge Disposition:  Rehab Facility or Unit (DC - External)    Diet:  Hospital:  Diet Order   Procedures    NPO Diet NPO Type: Strict NPO, Ice Chips       Discharge Activity:       CODE STATUS:  Code Status and Medical Interventions:   Ordered at: 03/27/24 0657     Level Of Support Discussed With:    Health Care Surrogate     Code Status (Patient has no pulse and is not breathing):    CPR (Attempt to Resuscitate)     Medical Interventions (Patient has pulse or is breathing):    Full Support       No future appointments.    Additional Instructions for the Follow-ups that You Need to Schedule       Discharge Follow-up with PCP   As directed       Currently Documented PCP:    Ivanna Low APRN    PCP Phone Number:    [phone]     Follow Up Details: In 3-7 days; needs CBC and chemistries trended during follow up.                Pertinent  and/or Most Recent Results     PROCEDURES:       LAB RESULTS:      Lab 04/15/24  0335 04/14/24  0417 04/13/24  0356 04/12/24  0430 04/11/24  0441   WBC 15.26* 11.13* 10.30 11.58* 10.79   HEMOGLOBIN 12.6* 12.6* 13.0 12.5* 11.8*   HEMATOCRIT 38.3  40.3 39.9 38.1 35.1*   PLATELETS 350 351 401 386 385   NEUTROS ABS 11.43* 7.65* 6.40 7.20* 6.73   IMMATURE GRANS (ABS) 0.08* 0.09* 0.06* 0.08* 0.06*   LYMPHS ABS 2.59 2.01 2.76 2.98 2.58   MONOS ABS 0.82 0.92* 0.63 0.76 0.82   EOS ABS 0.25 0.32 0.30 0.39 0.43*   MCV 92.3 95.3 93.7 91.6 91.4   PROTIME  --   --   --  14.1  --          Lab 04/15/24  0335 04/14/24 0417 04/13/24  0356 04/12/24  0430 04/11/24  0441   SODIUM 136 136 135* 136 136   POTASSIUM 3.8 3.6 3.5 4.5 3.9   CHLORIDE 99 96* 96* 99 99   CO2 25.5 28.7 27.8 25.9 27.0   ANION GAP 11.5 11.3 11.2 11.1 10.0   BUN 33* 38* 37* 33* 32*   CREATININE 0.84 0.95 1.08 0.89 0.81   EGFR 96.2 88.3 75.7 94.5 97.2   GLUCOSE 125* 82 128* 87 91   CALCIUM 9.0 9.4 9.8 9.3 8.9   MAGNESIUM 2.2 2.5* 2.5* 2.2 2.3   PHOSPHORUS 3.0 3.8 4.9* 4.3 4.0         Lab 04/14/24 0417 04/13/24  0356   TOTAL PROTEIN 7.3 7.4   ALBUMIN 3.8 3.9   ALT (SGPT) 58* 74*   AST (SGOT) 29 41*   BILIRUBIN 0.3 0.5   INDIRECT BILIRUBIN  --  0.3   BILIRUBIN DIRECT <0.2 0.2   ALK PHOS 139* 153*         Lab 04/12/24  0430   PROTIME 14.1   INR 1.06                 Brief Urine Lab Results  (Last result in the past 365 days)        Color   Clarity   Blood   Leuk Est   Nitrite   Protein   CREAT   Urine HCG        04/02/24 1846 Yellow   Turbid   Small (1+)   Negative   Negative   Negative                 Microbiology Results (last 10 days)       ** No results found for the last 240 hours. **            XR Chest 1 View    Result Date: 4/15/2024  IMPRESSION : No acute process.[  Electronically Signed By-Emkea Antonio On:4/15/2024 12:52 PM      XR Abdomen KUB    Result Date: 4/11/2024  Impression: 1.  Feeding tube with tip in distal descending duodenum   Electronically Signed By-Emerson Magallanes MD On:4/11/2024 5:28 PM                Results for orders placed during the hospital encounter of 03/27/24    Adult Transthoracic Echo Limited W/ Cont if Necessary Per Protocol    Interpretation Summary    Extremely  technically difficult study with very limited views.    The subcostal view is the only one where we can see myocardium and it looks like ejection fraction is greater than 50%.  Cannot rule out regional wall motion abnormalities.    No obvious significant valvular disease by Doppler.  The valves are not well-visualized.    Left ventricular diastolic function was indeterminate.      Labs Pending at Discharge:        Time spent on Discharge including face to face service:  35 minutes    Electronically signed by Laurie Crowley MD, 04/15/24, 1:32 PM EDT.

## 2024-04-15 NOTE — SIGNIFICANT NOTE
" Wound Eval / Progress Noted     [facility]     Patient Name: Davonte Marshall  : 1955  MRN: [id number]  Today's Date: 4/15/2024                 Admit Date: 3/27/2024    Visit Dx:    ICD-10-CM ICD-9-CM   1. Acute respiratory failure with hypoxia  J96.01 518.81   2. Metabolic encephalopathy  G93.41 348.31   3. DIONNA (acute kidney injury)  N17.9 584.9   4. NSTEMI (non-ST elevated myocardial infarction)  I21.4 410.70   5. Oropharyngeal dysphagia  R13.12 787.22   6. Impaired mobility and ADLs  Z74.09 V49.89    Z78.9    7. Difficulty in walking  R26.2 719.7         Acute metabolic encephalopathy    Anemia    Bradycardia    Hypotension after procedure        Past Medical History:   Diagnosis Date    Anemia     Anxiety     Arthritis     Benign essential hypertension     Chronic bronchitis     Depression     Enlarged prostate     Floaters in visual field     Gall stones     Generalized weakness     Head injury     Hemorrhoids     Hyperlipidemia     Leg pain     Numbness in both hands 2015    Seizures     last \"quite a while ago\"    Stroke 2020    left sided weakness        Past Surgical History:   Procedure Laterality Date    APPENDECTOMY      BACK SURGERY      4    CHOLECYSTECTOMY      CYSTOSCOPY Bilateral     21    CYSTOSCOPY TRANSURETHRAL RESECTION OF PROSTATE N/A 2021    Procedure: CYSTOSCOPY TRANSURETHRAL RESECTION OF PROSTATE;  Surgeon: Penelope Fox MD;  Location: [facility] MAIN OR;  Service: Urology;  Laterality: N/A;    ENDOSCOPY W/ PEG TUBE PLACEMENT N/A 2024    Procedure: ESOPHAGOGASTRODUODENOSCOPY WITH PERCUTANEOUS ENDOSCOPIC GASTROSTOMY TUBE INSERTION WITH ANESTHESIA;  Surgeon: Will Dugan MD;  Location: [facility] ENDOSCOPY;  Service: Gastroenterology;  Laterality: N/A;  SUCCESSFUL PEG TUBE PLACEMENT    HAND SURGERY Bilateral     thumb    LYMPH NODE DISSECTION      jaw line    TURP / TRANSURETHRAL INCISION / DRAINAGE PROSTATE  2021         Physical Assessment:  Wound " 04/04/24 1200 Right anterior second toe (Active)   Dressing Appearance open to air 04/15/24 1116   Closure None 04/15/24 1116   Base dry;red;scab 04/15/24 1116   Periwound dry;intact 04/15/24 1116   Periwound Temperature warm 04/15/24 1116   Periwound Skin Turgor soft 04/15/24 1116   Edges open 04/15/24 1116   Drainage Amount none 04/15/24 1116   Dressing Care open to air 04/15/24 1116       Wound 04/04/24 1200 Right anterior third toe (Active)   Wound Image   04/15/24 1116   Dressing Appearance open to air 04/15/24 1116   Closure None 04/15/24 1116   Base dry;red;scab 04/15/24 1116   Periwound dry;intact 04/15/24 1116   Periwound Temperature warm 04/15/24 1116   Periwound Skin Turgor soft 04/15/24 1116   Edges open 04/15/24 1116   Drainage Amount none 04/15/24 1116   Dressing Care open to air 04/15/24 1116       Wound 04/04/24 1200 Left anterior great toe (Active)   Dressing Appearance open to air 04/15/24 1116   Closure None 04/15/24 1116   Base dry;scab;yellow 04/15/24 1116   Periwound dry;intact 04/15/24 1116   Periwound Temperature warm 04/15/24 1116   Periwound Skin Turgor soft 04/15/24 1116   Edges open 04/15/24 1116   Drainage Amount none 04/15/24 1116   Dressing Care open to air 04/15/24 1116       Wound 04/04/24 1200 Left anterior second toe (Active)   Wound Image   04/15/24 1116   Dressing Appearance open to air 04/15/24 1116   Closure None 04/15/24 1116   Base dry;red;scab 04/15/24 1116   Periwound dry;intact 04/15/24 1116   Periwound Temperature warm 04/15/24 1116   Periwound Skin Turgor soft 04/15/24 1116   Edges open 04/15/24 1116   Drainage Amount none 04/15/24 1116   Dressing Care open to air 04/15/24 1116      Wound Check / Follow-up: Patient seen today for wound follow-up. Patient is awake and alert at time of visit. He is agreeable to assessment. Patient's wife is present at bedside.    Scattered scabbed areas remain to bilateral toes. Areas are dry and red. Left great toe area with dry, yellow  tissue. Primary RN had already performed wound care prior to my arrival. Recommending to continue current treatment with application of bacitracin ointment.    Bilateral gluteal aspects intact without discoloration. Recommending to continue skin protection and pressure reduction.       Impression: Scattered scabbing to bilateral toes.      Short term goals: Regain skin integrity, skin protection, moisture prevention, pressure reduction, topical treatment, skin care.      aHlie Holbrook RN    4/15/2024    17:09 EDT

## 2024-04-16 VITALS
RESPIRATION RATE: 16 BRPM | OXYGEN SATURATION: 96 % | SYSTOLIC BLOOD PRESSURE: 111 MMHG | BODY MASS INDEX: 31.67 KG/M2 | TEMPERATURE: 97.9 F | HEART RATE: 74 BPM | WEIGHT: 213.85 LBS | HEIGHT: 69 IN | DIASTOLIC BLOOD PRESSURE: 77 MMHG

## 2024-04-16 LAB
ANION GAP SERPL CALCULATED.3IONS-SCNC: 11.6 MMOL/L (ref 5–15)
BACTERIA UR QL AUTO: ABNORMAL /HPF
BASOPHILS # BLD AUTO: 0.09 10*3/MM3 (ref 0–0.2)
BASOPHILS NFR BLD AUTO: 0.5 % (ref 0–1.5)
BILIRUB UR QL STRIP: NEGATIVE
BUN SERPL-MCNC: 33 MG/DL (ref 8–23)
BUN/CREAT SERPL: 40.7 (ref 7–25)
CALCIUM SPEC-SCNC: 9 MG/DL (ref 8.6–10.5)
CHLORIDE SERPL-SCNC: 96 MMOL/L (ref 98–107)
CLARITY UR: CLEAR
CO2 SERPL-SCNC: 26.4 MMOL/L (ref 22–29)
COLOR UR: YELLOW
CREAT SERPL-MCNC: 0.81 MG/DL (ref 0.76–1.27)
DEPRECATED RDW RBC AUTO: 52.1 FL (ref 37–54)
EGFRCR SERPLBLD CKD-EPI 2021: 97.2 ML/MIN/1.73
EOSINOPHIL # BLD AUTO: 0.4 10*3/MM3 (ref 0–0.4)
EOSINOPHIL NFR BLD AUTO: 2.4 % (ref 0.3–6.2)
ERYTHROCYTE [DISTWIDTH] IN BLOOD BY AUTOMATED COUNT: 15.9 % (ref 12.3–15.4)
GLUCOSE BLDC GLUCOMTR-MCNC: 116 MG/DL (ref 70–99)
GLUCOSE BLDC GLUCOMTR-MCNC: 126 MG/DL (ref 70–99)
GLUCOSE SERPL-MCNC: 120 MG/DL (ref 65–99)
GLUCOSE UR STRIP-MCNC: NEGATIVE MG/DL
HCT VFR BLD AUTO: 38 % (ref 37.5–51)
HGB BLD-MCNC: 12.5 G/DL (ref 13–17.7)
HGB UR QL STRIP.AUTO: NEGATIVE
HYALINE CASTS UR QL AUTO: ABNORMAL /LPF
IMM GRANULOCYTES # BLD AUTO: 0.09 10*3/MM3 (ref 0–0.05)
IMM GRANULOCYTES NFR BLD AUTO: 0.5 % (ref 0–0.5)
KETONES UR QL STRIP: NEGATIVE
LEUKOCYTE ESTERASE UR QL STRIP.AUTO: ABNORMAL
LYMPHOCYTES # BLD AUTO: 2.13 10*3/MM3 (ref 0.7–3.1)
LYMPHOCYTES NFR BLD AUTO: 12.9 % (ref 19.6–45.3)
MAGNESIUM SERPL-MCNC: 2.1 MG/DL (ref 1.6–2.4)
MCH RBC QN AUTO: 30.2 PG (ref 26.6–33)
MCHC RBC AUTO-ENTMCNC: 32.9 G/DL (ref 31.5–35.7)
MCV RBC AUTO: 91.8 FL (ref 79–97)
MONOCYTES # BLD AUTO: 0.89 10*3/MM3 (ref 0.1–0.9)
MONOCYTES NFR BLD AUTO: 5.4 % (ref 5–12)
NEUTROPHILS NFR BLD AUTO: 12.92 10*3/MM3 (ref 1.7–7)
NEUTROPHILS NFR BLD AUTO: 78.3 % (ref 42.7–76)
NITRITE UR QL STRIP: NEGATIVE
NRBC BLD AUTO-RTO: 0 /100 WBC (ref 0–0.2)
PH UR STRIP.AUTO: 5.5 [PH] (ref 5–8)
PHOSPHATE SERPL-MCNC: 3.4 MG/DL (ref 2.5–4.5)
PLATELET # BLD AUTO: 332 10*3/MM3 (ref 140–450)
PMV BLD AUTO: 11.1 FL (ref 6–12)
POTASSIUM SERPL-SCNC: 3.7 MMOL/L (ref 3.5–5.2)
PROT UR QL STRIP: NEGATIVE
RBC # BLD AUTO: 4.14 10*6/MM3 (ref 4.14–5.8)
RBC # UR STRIP: ABNORMAL /HPF
REF LAB TEST METHOD: ABNORMAL
SODIUM SERPL-SCNC: 134 MMOL/L (ref 136–145)
SP GR UR STRIP: 1.02 (ref 1–1.03)
SQUAMOUS #/AREA URNS HPF: ABNORMAL /HPF
UROBILINOGEN UR QL STRIP: ABNORMAL
WBC # UR STRIP: ABNORMAL /HPF
WBC NRBC COR # BLD AUTO: 16.52 10*3/MM3 (ref 3.4–10.8)

## 2024-04-16 PROCEDURE — 94664 DEMO&/EVAL PT USE INHALER: CPT

## 2024-04-16 PROCEDURE — 85025 COMPLETE CBC W/AUTO DIFF WBC: CPT | Performed by: INTERNAL MEDICINE

## 2024-04-16 PROCEDURE — 94799 UNLISTED PULMONARY SVC/PX: CPT

## 2024-04-16 PROCEDURE — 80048 BASIC METABOLIC PNL TOTAL CA: CPT | Performed by: INTERNAL MEDICINE

## 2024-04-16 PROCEDURE — 82948 REAGENT STRIP/BLOOD GLUCOSE: CPT

## 2024-04-16 PROCEDURE — 25010000002 FUROSEMIDE PER 20 MG: Performed by: INTERNAL MEDICINE

## 2024-04-16 PROCEDURE — 83735 ASSAY OF MAGNESIUM: CPT | Performed by: INTERNAL MEDICINE

## 2024-04-16 PROCEDURE — 81001 URINALYSIS AUTO W/SCOPE: CPT | Performed by: STUDENT IN AN ORGANIZED HEALTH CARE EDUCATION/TRAINING PROGRAM

## 2024-04-16 PROCEDURE — 94761 N-INVAS EAR/PLS OXIMETRY MLT: CPT

## 2024-04-16 PROCEDURE — 84100 ASSAY OF PHOSPHORUS: CPT | Performed by: INTERNAL MEDICINE

## 2024-04-16 PROCEDURE — 94668 MNPJ CHEST WALL SBSQ: CPT

## 2024-04-16 RX ORDER — OXYCODONE HYDROCHLORIDE 10 MG/1
10 TABLET ORAL EVERY 6 HOURS PRN
Qty: 8 TABLET | Refills: 0 | Status: SHIPPED | OUTPATIENT
Start: 2024-04-16 | End: 2024-04-18

## 2024-04-16 RX ADMIN — BUDESONIDE 0.5 MG: 0.5 INHALANT ORAL at 07:28

## 2024-04-16 RX ADMIN — OXYCODONE HYDROCHLORIDE 10 MG: 5 TABLET ORAL at 06:00

## 2024-04-16 RX ADMIN — Medication 5 MG: at 00:59

## 2024-04-16 RX ADMIN — ASPIRIN 81 MG: 81 TABLET, CHEWABLE ORAL at 08:46

## 2024-04-16 RX ADMIN — FUROSEMIDE 40 MG: 10 INJECTION, SOLUTION INTRAMUSCULAR; INTRAVENOUS at 08:45

## 2024-04-16 RX ADMIN — LIDOCAINE 2 PATCH: 4 PATCH TOPICAL at 08:44

## 2024-04-16 RX ADMIN — Medication 250 MG: at 08:45

## 2024-04-16 RX ADMIN — CLOPIDOGREL BISULFATE 75 MG: 75 TABLET ORAL at 08:45

## 2024-04-16 RX ADMIN — PRIMIDONE 250 MG: 250 TABLET ORAL at 08:45

## 2024-04-16 RX ADMIN — Medication 10 ML: at 08:46

## 2024-04-16 RX ADMIN — OXYCODONE HYDROCHLORIDE 10 MG: 5 TABLET ORAL at 13:45

## 2024-04-16 RX ADMIN — POTASSIUM CHLORIDE 40 MEQ: 1.5 POWDER, FOR SOLUTION ORAL at 08:44

## 2024-04-16 RX ADMIN — VENLAFAXINE HYDROCHLORIDE 37.5 MG: 37.5 TABLET ORAL at 08:45

## 2024-04-16 RX ADMIN — OXYCODONE HYDROCHLORIDE 10 MG: 5 TABLET ORAL at 10:55

## 2024-04-16 RX ADMIN — METOPROLOL TARTRATE 12.5 MG: 25 TABLET, FILM COATED ORAL at 08:45

## 2024-04-16 RX ADMIN — VENLAFAXINE HYDROCHLORIDE 37.5 MG: 37.5 TABLET ORAL at 12:29

## 2024-04-16 RX ADMIN — OXYCODONE HYDROCHLORIDE 10 MG: 5 TABLET ORAL at 02:13

## 2024-04-16 RX ADMIN — FAMOTIDINE 20 MG: 20 TABLET ORAL at 08:45

## 2024-04-16 RX ADMIN — BACITRACIN 0.9 G: 500 OINTMENT TOPICAL at 08:44

## 2024-04-16 RX ADMIN — ARFORMOTEROL TARTRATE 15 MCG: 15 SOLUTION RESPIRATORY (INHALATION) at 07:28

## 2024-04-16 NOTE — PLAN OF CARE
Goal Outcome Evaluation:      Discharged to [facility]

## 2024-04-16 NOTE — CASE MANAGEMENT/SOCIAL WORK
Patient will discharge to [facility] [place].      [facility] [place]    [address] [facility]  [place]  Ph# [phone]  Fax# [phone]

## 2024-04-16 NOTE — PROGRESS NOTES
Please note Patient was discharged on 4-15 by Dr. Crowley however the nursing facility did not have a tube feeding machine therefore his discharge was delayed due to the facility not having the appropriate equipment.  Patient will be discharged to the facility this morning.  I have been asked to send patient's pain medications as they were not sent yesterday.  However after reading Dr. Calvo note  patient's MS Contin was discontinued as it is unable to be crushed and he was switched to oxycodone.  His short acting Norco was also discontinued.  I have updated his discharge med list and have sent a 2-day supply of oxycodone to the nursing facility.       Your medication list        START taking these medications        Instructions Last Dose Given Next Dose Due   albuterol sulfate  (90 Base) MCG/ACT inhaler  Commonly known as: PROVENTIL HFA;VENTOLIN HFA;PROAIR HFA      Inhale 2 puffs Every 4 (Four) Hours As Needed for Wheezing or Shortness of Air for up to 37 days.       arformoterol 15 MCG/2ML nebulizer solution  Commonly known as: BROVANA      Take 2 mL by nebulization 2 (Two) Times a Day for 30 days.       budesonide 0.5 MG/2ML nebulizer solution  Commonly known as: PULMICORT      Take 2 mL by nebulization 2 (Two) Times a Day for 30 days.       clopidogrel 75 MG tablet  Commonly known as: PLAVIX      Take 1 tablet by mouth Daily for 30 days.       furosemide 40 MG tablet  Commonly known as: Lasix      Take 1 tablet by mouth 2 (Two) Times a Day for 30 days.       metoprolol tartrate 25 MG tablet  Commonly known as: LOPRESSOR      Take 0.5 tablets by mouth Every 12 (Twelve) Hours for 30 days.       naloxone 4 MG/0.1ML nasal spray  Commonly known as: NARCAN      Call 911. Don't prime. [place] in 1 nostril for overdose. Repeat in 2-3 minutes in other nostril if no or minimal breathing/responsiveness.       oxyCODONE 10 MG tablet  Commonly known as: ROXICODONE      Administer 1 tablet per G tube Every 6 (Six)  Hours As Needed for Moderate Pain or Severe Pain for up to 2 days.       potassium chloride 20 MEQ packet  Commonly known as: KLOR-CON      Take 40 mEq by mouth Daily for 14 days.       sennosides-docusate 8.6-50 MG per tablet  Commonly known as: PERICOLACE      1 tablet by Per PEG Tube route Every Night for 14 days.       venlafaxine 37.5 MG tablet  Commonly known as: EFFEXOR      Administer 1 tablet per G tube 3 (Three) Times a Day With Meals for 30 days.              CHANGE how you take these medications        Instructions Last Dose Given Next Dose Due   atorvastatin 20 MG tablet  Commonly known as: LIPITOR  What changed: when to take this      TAKE 1 TABLET BY MOUTH ONCE DAILY       docusate sodium 100 MG capsule  Commonly known as: COLACE  What changed: when to take this      Take 2 capsules by mouth 2 (Two) Times a Day As Needed for Constipation.       primidone 250 MG tablet  Commonly known as: MYSOLINE  What changed:   how much to take  how to take this  when to take this      1 tablet 3 times a day              CONTINUE taking these medications        Instructions Last Dose Given Next Dose Due   aspirin 81 MG chewable tablet      Chew 1 tablet Daily. Per Dr. Arroyo pt instructed to stop 3 days prior to procedure       benzonatate 200 MG capsule  Commonly known as: TESSALON      Take 1 capsule by mouth 3 (Three) Times a Day As Needed for Cough.       hydrOXYzine 25 MG tablet  Commonly known as: ATARAX      Take 1 tablet by mouth Every Evening.              STOP taking these medications      DULoxetine 60 MG capsule  Commonly known as: CYMBALTA        HYDROcodone-acetaminophen  MG per tablet  Commonly known as: NORCO        losartan-hydrochlorothiazide 100-12.5 MG per tablet  Commonly known as: HYZAAR        Morphine 60 MG 12 hr tablet  Commonly known as: MS CONTIN                  Where to Get Your Medications        These medications were sent to [facility], [place] - [address]  [address] - [phone] [facility] [phone] FX  [address] [address], [facility] [address]      Phone: [phone]   albuterol sulfate  (90 Base) MCG/ACT inhaler  arformoterol 15 MCG/2ML nebulizer solution  budesonide 0.5 MG/2ML nebulizer solution  clopidogrel 75 MG tablet  furosemide 40 MG tablet  metoprolol tartrate 25 MG tablet  naloxone 4 MG/0.1ML nasal spray  oxyCODONE 10 MG tablet  potassium chloride 20 MEQ packet  sennosides-docusate 8.6-50 MG per tablet  venlafaxine 37.5 MG tablet

## 2024-04-16 NOTE — PROGRESS NOTES
"Nutrition Services    Patient Name: Davonte Marshall  YOB: 1955  MRN: [id number]  Admission date: 3/27/2024    PROGRESS NOTE      Encounter Information: EN Follow Up       PO Diet: NPO Diet NPO Type: Strict NPO, Ice Chips   PO Supplements: NPO   PO Intake:  NPO       Current nutrition support: Fibersource HN @ 85 ml/hr  FWF: 50 ml q3h (400 ml)    Provided: 2244 kcal, 101 g pro, 1915 ml fluid       Estimated Needs: 1851-2724 kcal, 71-85 g, 0672-9877 ml       Nutrition support review: Patient complained of diarrhea to physician yesterday afternoon 4/15/24. RD spoke with primary nurse this morning who reports after a stool softener yesterday AM, patient had loose BM x 2. Nurse believes it was from the stool softener.        Labs (reviewed below): Hyponatremia. All other lytes WDL.       GI Function:  Last BM noted 4/16/24.       Nutrition Intervention Updates: Possibly discharging today. Will monitor BM while inpatient. If diarrhea persists, would recommend addition of Banatrol BID.      Results from last 7 days   Lab Units 04/16/24  0400 04/15/24  0335 04/14/24  0417 04/13/24  0356   SODIUM mmol/L 134* 136 136 135*   POTASSIUM mmol/L 3.7 3.8 3.6 3.5   CHLORIDE mmol/L 96* 99 96* 96*   CO2 mmol/L 26.4 25.5 28.7 27.8   BUN mg/dL 33* 33* 38* 37*   CREATININE mg/dL 0.81 0.84 0.95 1.08   CALCIUM mg/dL 9.0 9.0 9.4 9.8   BILIRUBIN mg/dL  --   --  0.3 0.5   ALK PHOS U/L  --   --  139* 153*   ALT (SGPT) U/L  --   --  58* 74*   AST (SGOT) U/L  --   --  29 41*   GLUCOSE mg/dL 120* 125* 82 128*     Results from last 7 days   Lab Units 04/16/24  0400 04/15/24  0335 04/14/24  0417   MAGNESIUM mg/dL 2.1 2.2 2.5*   PHOSPHORUS mg/dL 3.4 3.0 3.8   HEMOGLOBIN g/dL 12.5* 12.6* 12.6*   HEMATOCRIT % 38.0 38.3 40.3     COVID19   Date Value Ref Range Status   03/27/2024 Not Detected Not Detected - Ref. Range Final     No results found for: \"HGBA1C\"    RD to follow up per protocol.    Electronically signed by:  Lisa" TAMIA Pitts  04/16/24 08:51 EDT

## 2024-05-03 ENCOUNTER — TELEPHONE (OUTPATIENT)
Dept: GASTROENTEROLOGY | Facility: CLINIC | Age: 69
End: 2024-05-03
Payer: MEDICARE

## 2024-05-03 NOTE — TELEPHONE ENCOUNTER
Hub staff attempted to follow warm transfer process and was unsuccessful     Caller: ROSALINA    Relationship to patient:     Best call back number: [phone]    Patient is needing: ROSALINA WITH [facility] CALLED IN AND STATED THAT SHE NEEDS TO SET AN APPOINTMENT FOR THE PATIENT TO HAVE HIS FEEDING TUBE/PEG TUBE REMOVED. ROSALINA STATED THAT DR. RASHEED PUT THE TUBE IN. PLEASE CALL BACK ANYTIME BEFORE 6P. ROSALINA STATED THAT SHE DOES NOT HAVE VM, SO IF SHE IS NOT REACHED, ASK FOR THE PATIENT'S NURSE ANDTHEY WOULD BE ABLE TO HELP.

## 2024-05-03 NOTE — TELEPHONE ENCOUNTER
Called the nursing home back and LM with  that I have sent a message to Dr. Dugan for review and as soon as I get a response from him I would schedule patient per provider.

## 2024-05-06 NOTE — TELEPHONE ENCOUNTER
Reached out to spouse, Mrs. Mona Marshall, and confirmed arrival time of 1200 noon for PEG removal on Monday, 06.03.24.  Reviewed NPO after midnight and reminded to hold Plavix as instructed.  Mrs. Marshall verbalized understanding.    Advised to reach out to the office as soon possible if any changes need to be made pending patient progress/planned discharge from [facility].

## 2024-05-06 NOTE — TELEPHONE ENCOUNTER
I spoke with patients spouse- Mona Marshall.     Per Dr. Dugan- that peg was placed on 4/12/24 and needs to stay in place until after June 1. then u can schedule for peg removal after that and hold plavix x 5 days to removal     Patient is scheduled for 6/3/24 for removal. Mailed information to patient.

## 2024-05-14 ENCOUNTER — HOSPITAL ENCOUNTER (EMERGENCY)
Facility: HOSPITAL | Age: 69
Discharge: HOME | End: 2024-05-14
Attending: EMERGENCY MEDICINE
Payer: MEDICARE

## 2024-05-14 ENCOUNTER — APPOINTMENT (OUTPATIENT)
Dept: GENERAL RADIOLOGY | Facility: HOSPITAL | Age: 69
End: 2024-05-14
Payer: MEDICARE

## 2024-05-14 VITALS
RESPIRATION RATE: 19 BRPM | DIASTOLIC BLOOD PRESSURE: 77 MMHG | OXYGEN SATURATION: 98 % | HEART RATE: 80 BPM | BODY MASS INDEX: 37.57 KG/M2 | WEIGHT: 191.36 LBS | SYSTOLIC BLOOD PRESSURE: 106 MMHG | TEMPERATURE: 98.7 F | HEIGHT: 60 IN

## 2024-05-14 DIAGNOSIS — E87.6 HYPOKALEMIA: ICD-10-CM

## 2024-05-14 DIAGNOSIS — N17.9 AKI (ACUTE KIDNEY INJURY): ICD-10-CM

## 2024-05-14 DIAGNOSIS — I95.9 HYPOTENSION, UNSPECIFIED HYPOTENSION TYPE: Primary | ICD-10-CM

## 2024-05-14 LAB
ALBUMIN SERPL-MCNC: 3.8 G/DL (ref 3.5–5.2)
ALBUMIN/GLOB SERPL: 0.9 G/DL
ALP SERPL-CCNC: 128 U/L (ref 39–117)
ALT SERPL W P-5'-P-CCNC: 19 U/L (ref 1–41)
ANION GAP SERPL CALCULATED.3IONS-SCNC: 12 MMOL/L (ref 5–15)
ANION GAP SERPL CALCULATED.3IONS-SCNC: 14 MMOL/L (ref 5–15)
AST SERPL-CCNC: 13 U/L (ref 1–40)
BASOPHILS # BLD AUTO: 0.17 10*3/MM3 (ref 0–0.2)
BASOPHILS NFR BLD AUTO: 1.1 % (ref 0–1.5)
BILIRUB SERPL-MCNC: 0.4 MG/DL (ref 0–1.2)
BILIRUB UR QL STRIP: NEGATIVE
BUN SERPL-MCNC: 48 MG/DL (ref 8–23)
BUN SERPL-MCNC: 49 MG/DL (ref 8–23)
BUN/CREAT SERPL: 35.5 (ref 7–25)
BUN/CREAT SERPL: 43.2 (ref 7–25)
CALCIUM SPEC-SCNC: 8.6 MG/DL (ref 8.6–10.5)
CALCIUM SPEC-SCNC: 9.7 MG/DL (ref 8.6–10.5)
CHLORIDE SERPL-SCNC: 92 MMOL/L (ref 98–107)
CHLORIDE SERPL-SCNC: 97 MMOL/L (ref 98–107)
CK SERPL-CCNC: 21 U/L (ref 20–200)
CLARITY UR: CLEAR
CO2 SERPL-SCNC: 30 MMOL/L (ref 22–29)
CO2 SERPL-SCNC: 33 MMOL/L (ref 22–29)
COLOR UR: YELLOW
CREAT SERPL-MCNC: 1.11 MG/DL (ref 0.76–1.27)
CREAT SERPL-MCNC: 1.38 MG/DL (ref 0.76–1.27)
D-LACTATE SERPL-SCNC: 2.4 MMOL/L (ref 0.5–2)
DEPRECATED RDW RBC AUTO: 53 FL (ref 37–54)
EGFRCR SERPLBLD CKD-EPI 2021: 56.4 ML/MIN/1.73
EGFRCR SERPLBLD CKD-EPI 2021: 73.2 ML/MIN/1.73
EOSINOPHIL # BLD AUTO: 0.13 10*3/MM3 (ref 0–0.4)
EOSINOPHIL NFR BLD AUTO: 0.8 % (ref 0.3–6.2)
ERYTHROCYTE [DISTWIDTH] IN BLOOD BY AUTOMATED COUNT: 15.3 % (ref 12.3–15.4)
GLOBULIN UR ELPH-MCNC: 4.3 GM/DL
GLUCOSE SERPL-MCNC: 113 MG/DL (ref 65–99)
GLUCOSE SERPL-MCNC: 115 MG/DL (ref 65–99)
GLUCOSE UR STRIP-MCNC: NEGATIVE MG/DL
HCT VFR BLD AUTO: 41.4 % (ref 37.5–51)
HGB BLD-MCNC: 13.5 G/DL (ref 13–17.7)
HGB UR QL STRIP.AUTO: NEGATIVE
HOLD SPECIMEN: NORMAL
HOLD SPECIMEN: NORMAL
IMM GRANULOCYTES # BLD AUTO: 0.2 10*3/MM3 (ref 0–0.05)
IMM GRANULOCYTES NFR BLD AUTO: 1.3 % (ref 0–0.5)
KETONES UR QL STRIP: NEGATIVE
LEUKOCYTE ESTERASE UR QL STRIP.AUTO: NEGATIVE
LYMPHOCYTES # BLD AUTO: 2.24 10*3/MM3 (ref 0.7–3.1)
LYMPHOCYTES NFR BLD AUTO: 14.6 % (ref 19.6–45.3)
MAGNESIUM SERPL-MCNC: 1.7 MG/DL (ref 1.6–2.4)
MCH RBC QN AUTO: 30.5 PG (ref 26.6–33)
MCHC RBC AUTO-ENTMCNC: 32.6 G/DL (ref 31.5–35.7)
MCV RBC AUTO: 93.5 FL (ref 79–97)
MONOCYTES # BLD AUTO: 1.14 10*3/MM3 (ref 0.1–0.9)
MONOCYTES NFR BLD AUTO: 7.4 % (ref 5–12)
NEUTROPHILS NFR BLD AUTO: 11.5 10*3/MM3 (ref 1.7–7)
NEUTROPHILS NFR BLD AUTO: 74.8 % (ref 42.7–76)
NITRITE UR QL STRIP: NEGATIVE
NRBC BLD AUTO-RTO: 0 /100 WBC (ref 0–0.2)
PH UR STRIP.AUTO: 6 [PH] (ref 5–8)
PLATELET # BLD AUTO: 476 10*3/MM3 (ref 140–450)
PMV BLD AUTO: 9.1 FL (ref 6–12)
POTASSIUM SERPL-SCNC: 2.9 MMOL/L (ref 3.5–5.2)
POTASSIUM SERPL-SCNC: 3.3 MMOL/L (ref 3.5–5.2)
PROT SERPL-MCNC: 8.1 G/DL (ref 6–8.5)
PROT UR QL STRIP: NEGATIVE
RBC # BLD AUTO: 4.43 10*6/MM3 (ref 4.14–5.8)
SODIUM SERPL-SCNC: 139 MMOL/L (ref 136–145)
SODIUM SERPL-SCNC: 139 MMOL/L (ref 136–145)
SP GR UR STRIP: 1.01 (ref 1–1.03)
UROBILINOGEN UR QL STRIP: NORMAL
WBC NRBC COR # BLD AUTO: 15.38 10*3/MM3 (ref 3.4–10.8)
WHOLE BLOOD HOLD COAG: NORMAL
WHOLE BLOOD HOLD SPECIMEN: NORMAL

## 2024-05-14 PROCEDURE — 25810000003 SODIUM CHLORIDE 0.9 % SOLUTION: Performed by: NURSE PRACTITIONER

## 2024-05-14 PROCEDURE — 99283 EMERGENCY DEPT VISIT LOW MDM: CPT

## 2024-05-14 PROCEDURE — 96360 HYDRATION IV INFUSION INIT: CPT

## 2024-05-14 PROCEDURE — 82550 ASSAY OF CK (CPK): CPT | Performed by: NURSE PRACTITIONER

## 2024-05-14 PROCEDURE — 81003 URINALYSIS AUTO W/O SCOPE: CPT | Performed by: NURSE PRACTITIONER

## 2024-05-14 PROCEDURE — 71045 X-RAY EXAM CHEST 1 VIEW: CPT

## 2024-05-14 PROCEDURE — 87040 BLOOD CULTURE FOR BACTERIA: CPT | Performed by: NURSE PRACTITIONER

## 2024-05-14 PROCEDURE — 96361 HYDRATE IV INFUSION ADD-ON: CPT

## 2024-05-14 PROCEDURE — 80053 COMPREHEN METABOLIC PANEL: CPT | Performed by: EMERGENCY MEDICINE

## 2024-05-14 PROCEDURE — 85025 COMPLETE CBC W/AUTO DIFF WBC: CPT | Performed by: EMERGENCY MEDICINE

## 2024-05-14 PROCEDURE — 83605 ASSAY OF LACTIC ACID: CPT | Performed by: NURSE PRACTITIONER

## 2024-05-14 PROCEDURE — 83735 ASSAY OF MAGNESIUM: CPT | Performed by: NURSE PRACTITIONER

## 2024-05-14 PROCEDURE — 36415 COLL VENOUS BLD VENIPUNCTURE: CPT

## 2024-05-14 RX ORDER — SODIUM CHLORIDE 0.9 % (FLUSH) 0.9 %
10 SYRINGE (ML) INJECTION AS NEEDED
Status: DISCONTINUED | OUTPATIENT
Start: 2024-05-14 | End: 2024-05-14 | Stop reason: HOSPADM

## 2024-05-14 RX ORDER — POTASSIUM CHLORIDE 750 MG/1
40 CAPSULE, EXTENDED RELEASE ORAL ONCE
Status: COMPLETED | OUTPATIENT
Start: 2024-05-14 | End: 2024-05-14

## 2024-05-14 RX ADMIN — SODIUM CHLORIDE 1000 ML: 9 INJECTION, SOLUTION INTRAVENOUS at 15:05

## 2024-05-14 RX ADMIN — SODIUM CHLORIDE 1000 ML: 9 INJECTION, SOLUTION INTRAVENOUS at 11:40

## 2024-05-14 RX ADMIN — POTASSIUM CHLORIDE 40 MEQ: 750 CAPSULE, EXTENDED RELEASE ORAL at 17:42

## 2024-05-14 NOTE — ED PROVIDER NOTES
"Time: 11:33 AM EDT  Date of encounter:  5/14/2024  Independent Historian/Clinical History and Information was obtained by:   Patient and Family    History is limited by: N/A    Chief Complaint: Hypotension      History of Present Illness:  Patient is a 68 y.o. year old male with history of hypertension, chronic pain on MS Contin, recent recent CVA and hospitalization for pneumonia who presents to the emergency department for evaluation of hypotension.  Patient states he was recently admitted to the hospital for 20 days for pneumonia and was on a ventilator.  During that hospitalization he had a stroke.  He was discharged to a rehab facility where he stayed another 20 days.  States he was recently discharged home 1 week ago and has been doing fairly well.  States for the past 3 days he has been extremely fatigued and sleeping a lot.  States he cannot stay awake and has had a decreased appetite.  Thinks this may have been caused from overdoing it on Sunday by going to [employer] and going out to eat.  States he does have some shortness of breath with exertion but otherwise his respiratory status has improved.  He denies any cough or fevers.  Wife at bedside states that when he was discharged from the rehab facility last week they said he had \"a touch of pneumonia\" and was prescribed doxycycline.  States he is currently taking that.  [facility] home health nurse came to visit him today and took his blood pressure and it was noted to be 88/58 so they sent him to the ER for further evaluation.        Patient Care Team  Primary Care Provider: Ivanna Low APRN    Past Medical History:     Allergies   Allergen Reactions    Codeine Arrhythmia     Chest pain    Guaifenesin-Codeine Arrhythmia     Chest pain    Cefdinir Unknown - Low Severity     Past Medical History:   Diagnosis Date    Anemia     Anxiety     Arthritis     Benign essential hypertension     Chronic bronchitis     Depression     Enlarged prostate     Floaters in " "visual field     Gall stones     Generalized weakness     Head injury     Hemorrhoids     Hyperlipidemia     Leg pain     Numbness in both hands 7/6/2015    Seizures     last \"quite a while ago\"    Stroke 11/2020    left sided weakness     Past Surgical History:   Procedure Laterality Date    APPENDECTOMY      BACK SURGERY      4    CHOLECYSTECTOMY      CYSTOSCOPY Bilateral     7/12/21    CYSTOSCOPY TRANSURETHRAL RESECTION OF PROSTATE N/A 7/12/2021    Procedure: CYSTOSCOPY TRANSURETHRAL RESECTION OF PROSTATE;  Surgeon: Penelope Fox MD;  Location: [facility] MAIN OR;  Service: Urology;  Laterality: N/A;    ENDOSCOPY W/ PEG TUBE PLACEMENT N/A 4/12/2024    Procedure: ESOPHAGOGASTRODUODENOSCOPY WITH PERCUTANEOUS ENDOSCOPIC GASTROSTOMY TUBE INSERTION WITH ANESTHESIA;  Surgeon: Will Dugan MD;  Location: [facility] ENDOSCOPY;  Service: Gastroenterology;  Laterality: N/A;  SUCCESSFUL PEG TUBE PLACEMENT    HAND SURGERY Bilateral     thumb    LYMPH NODE DISSECTION      jaw line    TURP / TRANSURETHRAL INCISION / DRAINAGE PROSTATE  07/12/2021     Family History   Problem Relation Age of Onset    Bleeding Disorder Mother     Stroke Father     Heart disease Father     Heart disease Other     Malig Hyperthermia Neg Hx        Home Medications:  Prior to Admission medications    Medication Sig Start Date End Date Taking? Authorizing Provider   albuterol sulfate  (90 Base) MCG/ACT inhaler Inhale 2 puffs Every 4 (Four) Hours As Needed for Wheezing or Shortness of Air for up to 37 days. 4/15/24 5/22/24  Laurie Crowley MD   arformoterol (BROVANA) 15 MCG/2ML nebulizer solution Take 2 mL by nebulization 2 (Two) Times a Day for 30 days. 4/15/24 5/15/24  Laurie Crowley MD   aspirin 81 MG chewable tablet Chew 1 tablet Daily. Per Dr. Fox pt instructed to stop 3 days prior to procedure 7/14/21   Penelope Fox MD   atorvastatin (LIPITOR) 20 MG tablet TAKE 1 TABLET BY MOUTH ONCE DAILY  Patient taking differently: " Take 1 tablet by mouth Every Night. 8/31/21   Glen Pantoja MD   benzonatate (TESSALON) 200 MG capsule Take 1 capsule by mouth 3 (Three) Times a Day As Needed for Cough.    ProviderAva MD   budesonide (PULMICORT) 0.5 MG/2ML nebulizer solution Take 2 mL by nebulization 2 (Two) Times a Day for 30 days. 4/15/24 5/15/24  Laurie Crowley MD   clopidogrel (PLAVIX) 75 MG tablet Take 1 tablet by mouth Daily for 30 days. 4/16/24 5/16/24  Laurie Crowley MD   docusate sodium (COLACE) 100 MG capsule Take 2 capsules by mouth 2 (Two) Times a Day As Needed for Constipation. 7/13/21   Penelope Fox MD   furosemide (Lasix) 40 MG tablet Take 1 tablet by mouth 2 (Two) Times a Day for 30 days. 4/15/24 5/15/24  Laurie Crowley MD   hydrOXYzine (ATARAX) 25 MG tablet Take 1 tablet by mouth Every Evening. 12/13/22   ProviderAva MD   metoprolol tartrate (LOPRESSOR) 25 MG tablet Take 0.5 tablets by mouth Every 12 (Twelve) Hours for 30 days. 4/15/24 5/15/24  Laurie Crowley MD   naloxone (NARCAN) 4 MG/0.1ML nasal spray Call 911. Don't prime. [place] in 1 nostril for overdose. Repeat in 2-3 minutes in other nostril if no or minimal breathing/responsiveness. 4/15/24   Laurie Crowley MD   primidone (MYSOLINE) 250 MG tablet 1 tablet 3 times a day  Patient taking differently: Take 1 tablet by mouth 3 (Three) Times a Day. 1 tablet 3 times a day 8/31/23   Glen Pantoja MD   venlafaxine (EFFEXOR) 37.5 MG tablet Administer 1 tablet per G tube 3 (Three) Times a Day With Meals for 30 days. 4/15/24 5/15/24  Laurie Crowley MD        Social History:   Social History     Tobacco Use    Smoking status: Every Day     Current packs/day: 0.50     Average packs/day: 0.5 packs/day for 82.4 years (41.2 ttl pk-yrs)     Types: Cigarettes     Start date: 1987    Smokeless tobacco: Never    Tobacco comments:     last this morning 4am   Vaping Use    Vaping status: Never Used   Substance Use Topics    Alcohol use:  "Never    Drug use: Never       Physical Exam:  /51   Pulse 72   Temp 97.7 °F (36.5 °C) (Oral)   Resp 12   Ht 149.9 cm (59\")   Wt 86.8 kg (191 lb 5.8 oz)   SpO2 98%   BMI 38.65 kg/m²     Physical Exam  Vitals and nursing note reviewed.   Constitutional:       General: He is not in acute distress.     Appearance: Normal appearance. He is not toxic-appearing.   HENT:      Head: Normocephalic and atraumatic.      Nose: Nose normal.      Mouth/Throat:      Mouth: Mucous membranes are moist.   Eyes:      Conjunctiva/sclera: Conjunctivae normal.   Cardiovascular:      Rate and Rhythm: Normal rate and regular rhythm.      Pulses: Normal pulses.      Heart sounds: Normal heart sounds.   Pulmonary:      Effort: Pulmonary effort is normal.      Breath sounds: Normal breath sounds.   Abdominal:      General: Bowel sounds are normal.      Palpations: Abdomen is soft.      Tenderness: There is no abdominal tenderness.      Comments: Feeding tube noted   Musculoskeletal:         General: Normal range of motion.      Cervical back: Normal range of motion.   Skin:     General: Skin is warm and dry.   Neurological:      General: No focal deficit present.      Mental Status: He is alert and oriented to person, place, and time.   Psychiatric:         Mood and Affect: Mood normal.         Behavior: Behavior normal.         Thought Content: Thought content normal.         Judgment: Judgment normal.                  Procedures:  Procedures      Medical Decision Making:      Comorbidities that affect care:    Coronary Artery Disease, Hypertension    External Notes reviewed:    Hospital Discharge Summary: Patient recently admitted from 3/27/2024 to 4/15/2024.  Patient was initially admitted for pneumonia on ventilator.  Hospital course complicated by an acute CVA.  He had PEG tube placed during admission.  During PEG tube placement he became hypotensive and bradycardic.  Cardiology was consulted and thought episode was likely " from anesthesia.  Patient improved during hospital stay and was discharged to rehab facility.      The following orders were placed and all results were independently analyzed by me:  Orders Placed This Encounter   Procedures    Blood Culture - Blood,    Blood Culture - Blood,    XR Chest 1 View    [place] Draw    Comprehensive Metabolic Panel    CBC Auto Differential    Lactic Acid, Plasma    Urinalysis With Culture If Indicated - Urine, Clean Catch    STAT Lactic Acid, Reflex    CK    Basic Metabolic Panel    Magnesium    Insert peripheral IV    CBC & Differential    Green Top (Gel)    Lavender Top    Gold Top - SST    Light Blue Top       Medications Given in the Emergency Department:  Medications   sodium chloride 0.9 % flush 10 mL (has no administration in time range)   sodium chloride 0.9 % bolus 1,000 mL (0 mL Intravenous Stopped 5/14/24 1246)   sodium chloride 0.9 % bolus 1,000 mL (0 mL Intravenous Stopped 5/14/24 1614)   potassium chloride (MICRO-K/KLOR-CON) CR capsule (40 mEq Oral Given 5/14/24 1742)        ED Course:  1445: Patient reassessed.  Discussed ED findings with patient and wife including abnormal kidney function.  Discussed with patient admission versus discharge with IV fluids.  Patient does not want to be admitted to the hospital and is preferring to have IV fluids at this time.    1740: Patient reassessed and states feels better.  Standing blood pressure 117/40.  Patient denies any complaints currently did discuss with patient low potassium with repeat blood work and he was given 40 of potassium p.o.  States he has potassium at home and he will make sure to take.  Did discuss with patient possibly holding 1 dose daily of his Lasix as this could be causing low blood pressure.        Labs:    Lab Results (last 24 hours)       Procedure Component Value Units Date/Time    CBC & Differential [[id number]]  (Abnormal) Collected: 05/14/24 1119    Specimen: Blood Updated: 05/14/24 1125    Narrative:       The following orders were created for panel order CBC & Differential.  Procedure                               Abnormality         Status                     ---------                               -----------         ------                     CBC Auto Differential[[id number]]        Abnormal            Final result                 Please view results for these tests on the individual orders.    Comprehensive Metabolic Panel [[id number]]  (Abnormal) Collected: 05/14/24 1119    Specimen: Blood Updated: 05/14/24 1144     Glucose 115 mg/dL      BUN 49 mg/dL      Creatinine 1.38 mg/dL      Sodium 139 mmol/L      Potassium 3.3 mmol/L      Chloride 92 mmol/L      CO2 33.0 mmol/L      Calcium 9.7 mg/dL      Total Protein 8.1 g/dL      Albumin 3.8 g/dL      ALT (SGPT) 19 U/L      AST (SGOT) 13 U/L      Alkaline Phosphatase 128 U/L      Total Bilirubin 0.4 mg/dL      Globulin 4.3 gm/dL      A/G Ratio 0.9 g/dL      BUN/Creatinine Ratio 35.5     Anion Gap 14.0 mmol/L      eGFR 56.4 mL/min/1.73     Narrative:      GFR Normal >60  Chronic Kidney Disease <60  Kidney Failure <15      CBC Auto Differential [[id number]]  (Abnormal) Collected: 05/14/24 1119    Specimen: Blood Updated: 05/14/24 1125     WBC 15.38 10*3/mm3      RBC 4.43 10*6/mm3      Hemoglobin 13.5 g/dL      Hematocrit 41.4 %      MCV 93.5 fL      MCH 30.5 pg      MCHC 32.6 g/dL      RDW 15.3 %      RDW-SD 53.0 fl      MPV 9.1 fL      Platelets 476 10*3/mm3      Neutrophil % 74.8 %      Lymphocyte % 14.6 %      Monocyte % 7.4 %      Eosinophil % 0.8 %      Basophil % 1.1 %      Immature Grans % 1.3 %      Neutrophils, Absolute 11.50 10*3/mm3      Lymphocytes, Absolute 2.24 10*3/mm3      Monocytes, Absolute 1.14 10*3/mm3      Eosinophils, Absolute 0.13 10*3/mm3      Basophils, Absolute 0.17 10*3/mm3      Immature Grans, Absolute 0.20 10*3/mm3      nRBC 0.0 /100 WBC     CK [[id number]]  (Normal) Collected: 05/14/24 1119    Specimen: Blood Updated: 05/14/24 1516      Creatine Kinase 21 U/L     Lactic Acid, Plasma [[id number]]  (Abnormal) Collected: 05/14/24 1139    Specimen: Blood from Arm, Right Updated: 05/14/24 1223     Lactate 2.4 mmol/L     Blood Culture - Blood, Arm, Right [[id number]] Collected: 05/14/24 1139    Specimen: Blood from Arm, Right Updated: 05/14/24 1143    Blood Culture - Blood, Arm, Left [[id number]] Collected: 05/14/24 1139    Specimen: Blood from Arm, Left Updated: 05/14/24 1143    Urinalysis With Culture If Indicated - Urine, Clean Catch [[id number]]  (Normal) Collected: 05/14/24 1406    Specimen: Urine, Clean Catch Updated: 05/14/24 1424     Color, UA Yellow     Appearance, UA Clear     pH, UA 6.0     Specific Gravity, UA 1.010     Glucose, UA Negative     Ketones, UA Negative     Bilirubin, UA Negative     Blood, UA Negative     Protein, UA Negative     Leuk Esterase, UA Negative     Nitrite, UA Negative     Urobilinogen, UA 0.2 E.U./dL    Narrative:      In absence of clinical symptoms, the presence of pyuria, bacteria, and/or nitrites on the urinalysis result does not correlate with infection.  Urine microscopic not indicated.    Basic Metabolic Panel [[id number]]  (Abnormal) Collected: 05/14/24 1631    Specimen: Blood Updated: 05/14/24 1655     Glucose 113 mg/dL      BUN 48 mg/dL      Creatinine 1.11 mg/dL      Sodium 139 mmol/L      Potassium 2.9 mmol/L      Chloride 97 mmol/L      CO2 30.0 mmol/L      Calcium 8.6 mg/dL      BUN/Creatinine Ratio 43.2     Anion Gap 12.0 mmol/L      eGFR 73.2 mL/min/1.73     Narrative:      GFR Normal >60  Chronic Kidney Disease <60  Kidney Failure <15      Magnesium [[id number]]  (Normal) Collected: 05/14/24 1631    Specimen: Blood Updated: 05/14/24 1728     Magnesium 1.7 mg/dL              Imaging:    XR Chest 1 View    Result Date: 5/14/2024  XR CHEST 1 VW-  Date of Exam: 5/14/2024 12:05 PM  Indication: SOA, recent pneumonia  Comparison: Chest radiograph 4/15/2024  Findings: Heart size normal. Negative for lobar  consolidation, edema, large effusion or pneumothorax. Degenerative related osseous changes.      Impression: No active pulmonary process.   Electronically Signed By-Glen Latham MD On:5/14/2024 12:08 PM         Differential Diagnosis and Discussion:    Weakness: Based on the patient's history, signs, and symptoms, the diffential diagnosis includes but is not limited to meningitis, stroke, sepsis, subarachnoid hemorrhage, intracranial bleeding, encephalitis, acute uti, dehydration, MS, myasthenia gravis, Guillan Norman, migraine variant, neuromuscular disorders vertigo, electrolyte imbalance, and metabolic disorders.    All labs were reviewed and interpreted by me.  All X-rays impressions were independently interpreted by me.  EKG was interpreted by me.    MDM     Amount and/or Complexity of Data Reviewed  Decide to obtain previous medical records or to obtain history from someone other than the patient: yes               Patient Care Considerations:    SEPSIS was considered but is NOT present in the emergency department as SIRS criteria is not present.      Consultants/Shared Management Plan:    I have discussed the case with Dr. Costa, ED attending who states that the patient can be safely discharged with close follow up.    Social Determinants of Health:    Patient is independent, reliable, and has access to care.       Disposition and Care Coordination:    Discharged: I considered escalation of care by admitting this patient to the hospital, however patient stable and had improvement of renal function after IV fluids.  I did discuss admission versus discharge with patient and he opted for discharge.  Strong return instructions were provided and he and wife agreed with plan.    I have explained discharge medications and the need for follow up with the patient/caretakers. This was also printed in the discharge instructions. Patient was discharged with the following medications and follow up:      Medication  List        Changed      atorvastatin 20 MG tablet  Commonly known as: LIPITOR  TAKE 1 TABLET BY MOUTH ONCE DAILY  What changed: when to take this     primidone 250 MG tablet  Commonly known as: MYSOLINE  1 tablet 3 times a day  What changed:   how much to take  how to take this  when to take this           Ivanna Low, APRN  [address]  [facility] [address]  [place] [address]  [phone]             Final diagnoses:   Hypotension, unspecified hypotension type   DIONNA (acute kidney injury)   Hypokalemia        ED Disposition       ED Disposition   Discharge    Condition   Stable    Comment   --               This medical record created using voice recognition software.             Mercedes Kovacs, APRN  05/14/24 7502

## 2024-05-14 NOTE — DISCHARGE INSTRUCTIONS
Stay hydrated and drink tons and tons of fluids.  Make sure to take your potassium.  Decrease Lasix to 1 tab daily.  Follow-up with your PCP and cardiologist.  Return to ED immediately for any new or worsening symptoms.

## 2024-05-19 LAB
BACTERIA SPEC AEROBE CULT: NORMAL
BACTERIA SPEC AEROBE CULT: NORMAL

## 2024-06-03 ENCOUNTER — HOSPITAL ENCOUNTER (OUTPATIENT)
Dept: GASTROENTEROLOGY | Facility: HOSPITAL | Age: 69
Discharge: HOME | End: 2024-06-03
Payer: MEDICARE

## 2024-06-03 VITALS
OXYGEN SATURATION: 98 % | DIASTOLIC BLOOD PRESSURE: 71 MMHG | RESPIRATION RATE: 16 BRPM | BODY MASS INDEX: 39.63 KG/M2 | SYSTOLIC BLOOD PRESSURE: 112 MMHG | WEIGHT: 196.21 LBS | HEART RATE: 74 BPM | TEMPERATURE: 97.2 F

## 2024-06-03 NOTE — PROCEDURES
PEG REMOVAL    Pt registered to endoscopy for PEG tube removal at the bedside.  PEG had been placed initially in April 12. 2024 after suffering a stroke.    He no longer uses the tube and wishes it to be removed.    Therefore Gentle traction was used to successfully removed the traction removal Gtube.  The wound was then dressed and covered.  No bleeding was observed.    Pt given instructions to avoid soaking baths for 1 wk and ok to resume plavix tomorrow.  Pt will call my office with questions or problems.    EBL - 0

## 2024-06-03 NOTE — NURSING NOTE
Assisted Dr. Dugan with PEG tube at bedside. Sterile 4x4 and paper tape applied to site. Patient tolerated well.

## 2024-10-30 ENCOUNTER — TELEPHONE (OUTPATIENT)
Dept: NEUROLOGY | Facility: CLINIC | Age: 69
End: 2024-10-30

## 2024-10-30 NOTE — TELEPHONE ENCOUNTER
Provider: DR. PITTMAN    Caller: MIKEY TSE    Relationship to Patient: SPOUSE    Phone Number:[phone]      Reason for Call: PT'S WIFE STATES PT HAS HAD THREE FALLS THIS WEEK W/O INJURY, PT HAD TWO STROKES, WAS HOSPITALIZED FROM 03/27/24-04/15/24, DISCHARGED TO REHAB ON 04/15/24 WHERE HE STAYED FOR 21 DAYS, PT'S WIFE EXPRESSES CONCERN WITH FALLS AND POSSIBLE NEED FOR MEDICATION ADJUSTMENT, NEXT AVAILABLE APPT SCHEDULED 02/25/25.    PLEASE REVIEW AND ADVISE FOR A SOONER APPT.    When was the patient last seen: 02/28/22

## 2024-11-03 ENCOUNTER — APPOINTMENT (OUTPATIENT)
Dept: GENERAL RADIOLOGY | Facility: HOSPITAL | Age: 69
End: 2024-11-03
Payer: MEDICARE

## 2024-11-03 ENCOUNTER — APPOINTMENT (OUTPATIENT)
Dept: CT IMAGING | Facility: HOSPITAL | Age: 69
End: 2024-11-03
Payer: MEDICARE

## 2024-11-03 ENCOUNTER — HOSPITAL ENCOUNTER (EMERGENCY)
Facility: HOSPITAL | Age: 69
Discharge: HOME | End: 2024-11-03
Attending: EMERGENCY MEDICINE | Admitting: EMERGENCY MEDICINE
Payer: MEDICARE

## 2024-11-03 VITALS
RESPIRATION RATE: 18 BRPM | OXYGEN SATURATION: 93 % | WEIGHT: 208.56 LBS | HEIGHT: 69 IN | HEART RATE: 67 BPM | DIASTOLIC BLOOD PRESSURE: 67 MMHG | SYSTOLIC BLOOD PRESSURE: 123 MMHG | BODY MASS INDEX: 30.89 KG/M2 | TEMPERATURE: 98 F

## 2024-11-03 DIAGNOSIS — E87.6 HYPOKALEMIA: ICD-10-CM

## 2024-11-03 DIAGNOSIS — R53.81 PHYSICAL DECONDITIONING: Primary | ICD-10-CM

## 2024-11-03 LAB
ALBUMIN SERPL-MCNC: 4.2 G/DL (ref 3.5–5.2)
ALBUMIN/GLOB SERPL: 1.3 G/DL
ALP SERPL-CCNC: 106 U/L (ref 39–117)
ALT SERPL W P-5'-P-CCNC: 9 U/L (ref 1–41)
ANION GAP SERPL CALCULATED.3IONS-SCNC: 11 MMOL/L (ref 5–15)
AST SERPL-CCNC: 13 U/L (ref 1–40)
BASOPHILS # BLD AUTO: 0.09 10*3/MM3 (ref 0–0.2)
BASOPHILS NFR BLD AUTO: 1.3 % (ref 0–1.5)
BILIRUB SERPL-MCNC: 0.3 MG/DL (ref 0–1.2)
BILIRUB UR QL STRIP: NEGATIVE
BUN SERPL-MCNC: 18 MG/DL (ref 8–23)
BUN/CREAT SERPL: 15.3 (ref 7–25)
CALCIUM SPEC-SCNC: 9 MG/DL (ref 8.6–10.5)
CHLORIDE SERPL-SCNC: 93 MMOL/L (ref 98–107)
CLARITY UR: CLEAR
CO2 SERPL-SCNC: 36 MMOL/L (ref 22–29)
COLOR UR: YELLOW
CREAT SERPL-MCNC: 1.18 MG/DL (ref 0.76–1.27)
DEPRECATED RDW RBC AUTO: 45.9 FL (ref 37–54)
EGFRCR SERPLBLD CKD-EPI 2021: 67.6 ML/MIN/1.73
EOSINOPHIL # BLD AUTO: 0.54 10*3/MM3 (ref 0–0.4)
EOSINOPHIL NFR BLD AUTO: 7.7 % (ref 0.3–6.2)
ERYTHROCYTE [DISTWIDTH] IN BLOOD BY AUTOMATED COUNT: 14.2 % (ref 12.3–15.4)
GLOBULIN UR ELPH-MCNC: 3.2 GM/DL
GLUCOSE SERPL-MCNC: 118 MG/DL (ref 65–99)
GLUCOSE UR STRIP-MCNC: NEGATIVE MG/DL
HCT VFR BLD AUTO: 38.3 % (ref 37.5–51)
HGB BLD-MCNC: 12.8 G/DL (ref 13–17.7)
HGB UR QL STRIP.AUTO: NEGATIVE
HOLD SPECIMEN: NORMAL
HOLD SPECIMEN: NORMAL
IMM GRANULOCYTES # BLD AUTO: 0.02 10*3/MM3 (ref 0–0.05)
IMM GRANULOCYTES NFR BLD AUTO: 0.3 % (ref 0–0.5)
KETONES UR QL STRIP: NEGATIVE
LEUKOCYTE ESTERASE UR QL STRIP.AUTO: NEGATIVE
LYMPHOCYTES # BLD AUTO: 2.3 10*3/MM3 (ref 0.7–3.1)
LYMPHOCYTES NFR BLD AUTO: 32.9 % (ref 19.6–45.3)
MAGNESIUM SERPL-MCNC: 1.9 MG/DL (ref 1.6–2.4)
MCH RBC QN AUTO: 29.4 PG (ref 26.6–33)
MCHC RBC AUTO-ENTMCNC: 33.4 G/DL (ref 31.5–35.7)
MCV RBC AUTO: 88 FL (ref 79–97)
MONOCYTES # BLD AUTO: 0.43 10*3/MM3 (ref 0.1–0.9)
MONOCYTES NFR BLD AUTO: 6.2 % (ref 5–12)
NEUTROPHILS NFR BLD AUTO: 3.61 10*3/MM3 (ref 1.7–7)
NEUTROPHILS NFR BLD AUTO: 51.6 % (ref 42.7–76)
NITRITE UR QL STRIP: NEGATIVE
NRBC BLD AUTO-RTO: 0 /100 WBC (ref 0–0.2)
PH UR STRIP.AUTO: 7.5 [PH] (ref 5–8)
PLATELET # BLD AUTO: 207 10*3/MM3 (ref 140–450)
PMV BLD AUTO: 8.7 FL (ref 6–12)
POTASSIUM SERPL-SCNC: 2.7 MMOL/L (ref 3.5–5.2)
PROT SERPL-MCNC: 7.4 G/DL (ref 6–8.5)
PROT UR QL STRIP: NEGATIVE
RBC # BLD AUTO: 4.35 10*6/MM3 (ref 4.14–5.8)
SODIUM SERPL-SCNC: 140 MMOL/L (ref 136–145)
SP GR UR STRIP: 1.01 (ref 1–1.03)
TROPONIN T SERPL HS-MCNC: 19 NG/L
UROBILINOGEN UR QL STRIP: NORMAL
WBC NRBC COR # BLD AUTO: 6.99 10*3/MM3 (ref 3.4–10.8)
WHOLE BLOOD HOLD COAG: NORMAL
WHOLE BLOOD HOLD SPECIMEN: NORMAL

## 2024-11-03 PROCEDURE — 93005 ELECTROCARDIOGRAM TRACING: CPT | Performed by: EMERGENCY MEDICINE

## 2024-11-03 PROCEDURE — 85025 COMPLETE CBC W/AUTO DIFF WBC: CPT

## 2024-11-03 PROCEDURE — 80053 COMPREHEN METABOLIC PANEL: CPT

## 2024-11-03 PROCEDURE — 93005 ELECTROCARDIOGRAM TRACING: CPT

## 2024-11-03 PROCEDURE — 71045 X-RAY EXAM CHEST 1 VIEW: CPT

## 2024-11-03 PROCEDURE — 99284 EMERGENCY DEPT VISIT MOD MDM: CPT

## 2024-11-03 PROCEDURE — 83735 ASSAY OF MAGNESIUM: CPT

## 2024-11-03 PROCEDURE — 84484 ASSAY OF TROPONIN QUANT: CPT

## 2024-11-03 PROCEDURE — 25810000003 SODIUM CHLORIDE 0.9 % SOLUTION: Performed by: EMERGENCY MEDICINE

## 2024-11-03 PROCEDURE — 81003 URINALYSIS AUTO W/O SCOPE: CPT | Performed by: EMERGENCY MEDICINE

## 2024-11-03 PROCEDURE — 70450 CT HEAD/BRAIN W/O DYE: CPT

## 2024-11-03 RX ORDER — POTASSIUM CHLORIDE 1500 MG/1
20 TABLET, EXTENDED RELEASE ORAL 2 TIMES DAILY
Qty: 20 TABLET | Refills: 0 | Status: SHIPPED | OUTPATIENT
Start: 2024-11-03

## 2024-11-03 RX ORDER — SODIUM CHLORIDE 0.9 % (FLUSH) 0.9 %
10 SYRINGE (ML) INJECTION AS NEEDED
Status: DISCONTINUED | OUTPATIENT
Start: 2024-11-03 | End: 2024-11-03 | Stop reason: HOSPADM

## 2024-11-03 RX ORDER — POTASSIUM CHLORIDE 750 MG/1
40 CAPSULE, EXTENDED RELEASE ORAL ONCE
Status: COMPLETED | OUTPATIENT
Start: 2024-11-03 | End: 2024-11-03

## 2024-11-03 RX ADMIN — POTASSIUM CHLORIDE 40 MEQ: 750 CAPSULE, EXTENDED RELEASE ORAL at 13:54

## 2024-11-03 RX ADMIN — SODIUM CHLORIDE 1000 ML: 9 INJECTION, SOLUTION INTRAVENOUS at 13:54

## 2024-11-03 NOTE — ED PROVIDER NOTES
"Time: 1:21 PM EST  Date of encounter:  11/3/2024  Independent Historian/Clinical History and Information was obtained by:   Patient and Family      History is limited by: N/A    Chief Complaint: Generalized weakness      History of Present Illness:  Patient is a 69 y.o. year old male who presents to the emergency department for evaluation of generalized weakness.  Patient according to wife, has become more weak and physically immobile last several days/weeks.      Patient Care Team  Primary Care Provider: Ivanna Low APRN    Past Medical History:     Allergies   Allergen Reactions    Codeine Arrhythmia     Chest pain    Guaifenesin-Codeine Arrhythmia     Chest pain    Cefdinir Unknown - Low Severity     Past Medical History:   Diagnosis Date    Anemia     Anxiety     Arthritis     Benign essential hypertension     Chronic bronchitis     Depression     Enlarged prostate     Floaters in visual field     Gall stones     Generalized weakness     Head injury     Hemorrhoids     Hyperlipidemia     Leg pain     Numbness in both hands 7/6/2015    Seizures     last \"quite a while ago\"    Stroke 11/2020    left sided weakness     Past Surgical History:   Procedure Laterality Date    APPENDECTOMY      BACK SURGERY      4    CHOLECYSTECTOMY      CYSTOSCOPY Bilateral     7/12/21    CYSTOSCOPY TRANSURETHRAL RESECTION OF PROSTATE N/A 7/12/2021    Procedure: CYSTOSCOPY TRANSURETHRAL RESECTION OF PROSTATE;  Surgeon: Penelope Fxo MD;  Location: [facility] MAIN OR;  Service: Urology;  Laterality: N/A;    ENDOSCOPY W/ PEG TUBE PLACEMENT N/A 4/12/2024    Procedure: ESOPHAGOGASTRODUODENOSCOPY WITH PERCUTANEOUS ENDOSCOPIC GASTROSTOMY TUBE INSERTION WITH ANESTHESIA;  Surgeon: Will Dugan MD;  Location: [facility] ENDOSCOPY;  Service: Gastroenterology;  Laterality: N/A;  SUCCESSFUL PEG TUBE PLACEMENT    HAND SURGERY Bilateral     thumb    LYMPH NODE DISSECTION      jaw line    TURP / TRANSURETHRAL INCISION / DRAINAGE " PROSTATE  07/12/2021     Family History   Problem Relation Age of Onset    Bleeding Disorder Mother     Stroke Father     Heart disease Father     Heart disease Other     Malig Hyperthermia Neg Hx        Home Medications:  Prior to Admission medications    Medication Sig Start Date End Date Taking? Authorizing Provider   aspirin 81 MG chewable tablet Chew 1 tablet Daily. Per Dr. Fox pt instructed to stop 3 days prior to procedure 7/14/21   Penelope Fox MD   atorvastatin (LIPITOR) 20 MG tablet TAKE 1 TABLET BY MOUTH ONCE DAILY  Patient taking differently: Take 1 tablet by mouth Every Night. 8/31/21   Glen Pantoja MD   benzonatate (TESSALON) 200 MG capsule Take 1 capsule by mouth 3 (Three) Times a Day As Needed for Cough.    ProviderAva MD   budesonide (PULMICORT) 0.5 MG/2ML nebulizer solution Take 2 mL by nebulization 2 (Two) Times a Day for 30 days. 4/15/24 5/15/24  Laurie Crowley MD   docusate sodium (COLACE) 100 MG capsule Take 2 capsules by mouth 2 (Two) Times a Day As Needed for Constipation. 7/13/21   Penelope Fox MD   furosemide (Lasix) 40 MG tablet Take 1 tablet by mouth 2 (Two) Times a Day for 30 days. 4/15/24 5/15/24  Laurie Crowley MD   hydrOXYzine (ATARAX) 25 MG tablet Take 1 tablet by mouth Every Evening. 12/13/22   ProviderAva MD   metoprolol tartrate (LOPRESSOR) 25 MG tablet Take 0.5 tablets by mouth Every 12 (Twelve) Hours for 30 days. 4/15/24 5/15/24  Laurie Crowley MD   naloxone (NARCAN) 4 MG/0.1ML nasal spray Call 911. Don't prime. [place] in 1 nostril for overdose. Repeat in 2-3 minutes in other nostril if no or minimal breathing/responsiveness. 4/15/24   Laurie Crowley MD   primidone (MYSOLINE) 250 MG tablet 1 tablet 3 times a day  Patient taking differently: Take 1 tablet by mouth 3 (Three) Times a Day. 1 tablet 3 times a day 8/31/23   Glen Pantoja MD   venlafaxine (EFFEXOR) 37.5 MG tablet Administer 1 tablet per G tube 3 (Three)  "Times a Day With Meals for 30 days. 4/15/24 5/15/24  Laurie Crowley MD        Social History:   Social History     Tobacco Use    Smoking status: Every Day     Current packs/day: 0.50     Average packs/day: 0.5 packs/day for 82.8 years (41.4 ttl pk-yrs)     Types: Cigarettes     Start date: 1987    Smokeless tobacco: Never    Tobacco comments:     last this morning 4am   Vaping Use    Vaping status: Never Used   Substance Use Topics    Alcohol use: Never    Drug use: Never         Review of Systems:  Review of Systems   Constitutional:  Negative for chills and fever.   HENT:  Negative for congestion, rhinorrhea and sore throat.    Eyes:  Negative for pain and visual disturbance.   Respiratory:  Negative for apnea, cough, chest tightness and shortness of breath.    Cardiovascular:  Negative for chest pain and palpitations.   Gastrointestinal:  Negative for abdominal pain, diarrhea, nausea and vomiting.   Genitourinary:  Negative for difficulty urinating and dysuria.   Musculoskeletal:  Negative for joint swelling and myalgias.   Skin:  Negative for color change.   Neurological:  Positive for tremors and weakness. Negative for seizures and headaches.   Psychiatric/Behavioral: Negative.     All other systems reviewed and are negative.       Physical Exam:  /70   Pulse 64   Temp 98 °F (36.7 °C) (Oral)   Resp 18   Ht 175.3 cm (69\")   Wt 94.6 kg (208 lb 8.9 oz)   SpO2 96%   BMI 30.80 kg/m²     Physical Exam  Vitals and nursing note reviewed.   Constitutional:       General: He is not in acute distress.     Appearance: He is obese. He is ill-appearing. He is not toxic-appearing.   HENT:      Head: Normocephalic and atraumatic.      Jaw: There is normal jaw occlusion.   Eyes:      General: Lids are normal.      Extraocular Movements: Extraocular movements intact.      Conjunctiva/sclera: Conjunctivae normal.      Pupils: Pupils are equal, round, and reactive to light.   Cardiovascular:      Rate and Rhythm: " Normal rate and regular rhythm.      Pulses: Normal pulses.      Heart sounds: Normal heart sounds.   Pulmonary:      Effort: Pulmonary effort is normal. No respiratory distress.      Breath sounds: Normal breath sounds. No wheezing or rhonchi.   Abdominal:      General: Abdomen is flat.      Palpations: Abdomen is soft.      Tenderness: There is no abdominal tenderness. There is no guarding or rebound.   Musculoskeletal:         General: Normal range of motion.      Cervical back: Normal range of motion and neck supple.      Right lower leg: No edema.      Left lower leg: No edema.   Skin:     General: Skin is warm and dry.   Neurological:      Mental Status: He is alert and oriented to person, place, and time. Mental status is at baseline.   Psychiatric:         Mood and Affect: Mood normal.                  Procedures:  Procedures      Medical Decision Making:      Comorbidities that affect care:    Hypertension, hyperlipidemia, seizures, stroke    External Notes reviewed:    Previous Clinic Note: Family medicine office visit for general medical management      The following orders were placed and all results were independently analyzed by me:  Orders Placed This Encounter   Procedures    XR Chest 1 View    CT Head Without Contrast    [place] Draw    Comprehensive Metabolic Panel    Single High Sensitivity Troponin T    Magnesium    Urinalysis With Microscopic If Indicated (No Culture) - Urine, Clean Catch    CBC Auto Differential    NPO Diet NPO Type: Strict NPO    Undress & Gown    Continuous Pulse Oximetry    Vital Signs    Orthostatic Blood Pressure    Straight Cath    Oxygen Therapy- Nasal Cannula; Titrate 1-6 LPM Per SpO2; 90 - 95%    POC Glucose Once    ECG 12 Lead ED Triage Standing Order; Weak / Dizzy / AMS    Insert Peripheral IV    Fall Precautions    CBC & Differential    Green Top (Gel)    Lavender Top    Gold Top - SST    Light Blue Top       Medications Given in the Emergency  Department:  Medications   sodium chloride 0.9 % flush 10 mL (has no administration in time range)   potassium chloride (MICRO-K/KLOR-CON) CR capsule (40 mEq Oral Given 11/3/24 1354)   sodium chloride 0.9 % bolus 1,000 mL (0 mL Intravenous Stopped 11/3/24 1424)        ED Course:         Labs:    Lab Results (last 24 hours)       Procedure Component Value Units Date/Time    CBC & Differential [[id number]]  (Abnormal) Collected: 11/03/24 1208    Specimen: Blood Updated: 11/03/24 1216    Narrative:      The following orders were created for panel order CBC & Differential.  Procedure                               Abnormality         Status                     ---------                               -----------         ------                     CBC Auto Differential[[id number]]        Abnormal            Final result                 Please view results for these tests on the individual orders.    Comprehensive Metabolic Panel [[id number]]  (Abnormal) Collected: 11/03/24 1208    Specimen: Blood Updated: 11/03/24 1242     Glucose 118 mg/dL      BUN 18 mg/dL      Creatinine 1.18 mg/dL      Sodium 140 mmol/L      Potassium 2.7 mmol/L      Chloride 93 mmol/L      CO2 36.0 mmol/L      Calcium 9.0 mg/dL      Total Protein 7.4 g/dL      Albumin 4.2 g/dL      ALT (SGPT) 9 U/L      AST (SGOT) 13 U/L      Alkaline Phosphatase 106 U/L      Total Bilirubin 0.3 mg/dL      Globulin 3.2 gm/dL      A/G Ratio 1.3 g/dL      BUN/Creatinine Ratio 15.3     Anion Gap 11.0 mmol/L      eGFR 67.6 mL/min/1.73     Narrative:      GFR Normal >60  Chronic Kidney Disease <60  Kidney Failure <15      Single High Sensitivity Troponin T [[id number]]  (Normal) Collected: 11/03/24 1208    Specimen: Blood Updated: 11/03/24 1245     HS Troponin T 19 ng/L     Narrative:      High Sensitive Troponin T Reference Range:  <14.0 ng/L- Negative Female for AMI  <22.0 ng/L- Negative Male for AMI  >=14 - Abnormal Female indicating possible myocardial injury.  >=22 -  Abnormal Male indicating possible myocardial injury.   Clinicians would have to utilize clinical acumen, EKG, Troponin, and serial changes to determine if it is an Acute Myocardial Infarction or myocardial injury due to an underlying chronic condition.         Magnesium [[id number]]  (Normal) Collected: 11/03/24 1208    Specimen: Blood Updated: 11/03/24 1242     Magnesium 1.9 mg/dL     CBC Auto Differential [[id number]]  (Abnormal) Collected: 11/03/24 1208    Specimen: Blood Updated: 11/03/24 1216     WBC 6.99 10*3/mm3      RBC 4.35 10*6/mm3      Hemoglobin 12.8 g/dL      Hematocrit 38.3 %      MCV 88.0 fL      MCH 29.4 pg      MCHC 33.4 g/dL      RDW 14.2 %      RDW-SD 45.9 fl      MPV 8.7 fL      Platelets 207 10*3/mm3      Neutrophil % 51.6 %      Lymphocyte % 32.9 %      Monocyte % 6.2 %      Eosinophil % 7.7 %      Basophil % 1.3 %      Immature Grans % 0.3 %      Neutrophils, Absolute 3.61 10*3/mm3      Lymphocytes, Absolute 2.30 10*3/mm3      Monocytes, Absolute 0.43 10*3/mm3      Eosinophils, Absolute 0.54 10*3/mm3      Basophils, Absolute 0.09 10*3/mm3      Immature Grans, Absolute 0.02 10*3/mm3      nRBC 0.0 /100 WBC     Urinalysis With Microscopic If Indicated (No Culture) - Urine, Clean Catch [[id number]]  (Normal) Collected: 11/03/24 1422    Specimen: Urine, Clean Catch Updated: 11/03/24 1433     Color, UA Yellow     Appearance, UA Clear     pH, UA 7.5     Specific Gravity, UA 1.010     Glucose, UA Negative     Ketones, UA Negative     Bilirubin, UA Negative     Blood, UA Negative     Protein, UA Negative     Leuk Esterase, UA Negative     Nitrite, UA Negative     Urobilinogen, UA 1.0 E.U./dL    Narrative:      Urine microscopic not indicated.             Imaging:    CT Head Without Contrast    Result Date: 11/3/2024  CT HEAD WO CONTRAST Date of Exam: 11/3/2024 2:04 PM EST Indication: Generalized weakness. Comparison: 3/27/2024. Technique: Axial CT images were obtained of the head without contrast  administration.  Reconstructed coronal and sagittal images were also obtained. Automated exposure control and iterative construction methods were used. Findings: There is chronic volume loss with enlargement of the sulci, fissures, ventricles, and basal cisterns. There are areas of decreased density in the periventricular white matter felt to represent chronic microvascular ischemia. The gray-white matter differentiation is preserved. There is no acute hemorrhage, midline shift, or suspicious extra-axial fluid collections. There are atherosclerotic vascular calcifications in the cavernous carotid arteries. The orbital contents are normal. The paranasal and mastoid sinuses are clear. The calvarium is unremarkable.     Impression: 1. Chronic volume loss secondary to cerebral atrophy. 2. Chronic periventricular white matter microvascular ischemia. 3. No acute findings. Electronically Signed: Stepan Morelos MD  11/3/2024 2:21 PM EST  Workstation ID: [id number]    XR Chest 1 View    Result Date: 11/3/2024  XR CHEST 1 VW Date of Exam: 11/3/2024 12:18 PM EST Indication: Weakness, dizziness, altered mental status Comparison: 5/14/2024. Findings: The heart size is normal. The pulmonary apices are obscured by the patient's chin. The pulmonary vascular markings are normal. There is dense nodule in the left lung base consistent with a stable calcified granuloma. The lungs and pleural spaces are clear. There are chronic age-related changes involving the bony thorax.     Impression: No active disease. Electronically Signed: Stepan Morelos MD  11/3/2024 12:47 PM EST  Workstation ID: [id number]       Differential Diagnosis and Discussion:    Metabolic: Differential diagnosis includes but is not limited to hypertension, hyperglycemia, hyperkalemia, hypocalcemia, metabolic acidosis, hypokalemia, hypoglycemia, malnutrition, hypothyroidism, hyperthyroidism, and adrenal insufficiency.   Weakness: Based on the patient's history, signs, and  symptoms, the diffential diagnosis includes but is not limited to meningitis, stroke, sepsis, subarachnoid hemorrhage, intracranial bleeding, encephalitis, acute uti, dehydration, MS, myasthenia gravis, Guillan [place], migraine variant, neuromuscular disorders vertigo, electrolyte imbalance, and metabolic disorders.    All labs were reviewed and interpreted by me.  All X-rays impressions were independently interpreted by me.  EKG was interpreted by me.  CT scan radiology impression was interpreted by me.    MDM  Number of Diagnoses or Management Options  Hypokalemia  Physical deconditioning  Diagnosis management comments: In summary this is a 69-year-old male patient who presents to the emergency department for evaluation of generalized weakness.  Wife at bedside reports she has had a slow decline over the last several weeks.  CBC independently reviewed and interpreted by me and shows no critical abnormalities.  CMP independently reviewed and interpreted by me and shows no critical abnormalities except hypokalemia.  Hypokalemia has been repleted using oral medications for which she will also be discharged home with.  Urinalysis independently reviewed interpreted by me is unremarkable and negative for acute infection.  CT of the brain and chest x-ray were both performed and I reviewed the results which are negative for acute pathology.  Patient does appear deconditioned and after discussion with social work they will arrange for home health and PT.  Very strict return to ER and follow-up instructions have been provided to the patient.                         Patient Care Considerations:    ANTIBIOTICS: I considered prescribing antibiotics as an outpatient however no bacterial focus of infection was found.      Consultants/Shared Management Plan:    None    Social Determinants of Health:    Patient has presented with family members who are responsible, reliable and will ensure follow up care.      Disposition and Care  Coordination:    Discharged: The patient is suitable and stable for discharge with no need for consideration of admission.    I have explained the patient´s condition, diagnoses and treatment plan based on the information available to me at this time. I have answered questions and addressed any concerns. The patient has a good  understanding of the patient´s diagnosis, condition, and treatment plan as can be expected at this point. The vital signs have been stable. The patient´s condition is stable and appropriate for discharge from the emergency department.      The patient will pursue further outpatient evaluation with the primary care physician or other designated or consulting physician as outlined in the discharge instructions. They are agreeable to this plan of care and follow-up instructions have been explained in detail. The patient has received these instructions in written format and has expressed an understanding of the discharge instructions. The patient is aware that any significant change in condition or worsening of symptoms should prompt an immediate return to this or the closest emergency department or call to 911.  I have explained discharge medications and the need for follow up with the patient/caretakers. This was also printed in the discharge instructions. Patient was discharged with the following medications and follow up:      Medication List        New Prescriptions      potassium chloride 20 MEQ CR tablet  Commonly known as: KLOR-CON M20  Take 1 tablet by mouth 2 (Two) Times a Day.            Changed      atorvastatin 20 MG tablet  Commonly known as: LIPITOR  TAKE 1 TABLET BY MOUTH ONCE DAILY  What changed: when to take this     primidone 250 MG tablet  Commonly known as: MYSOLINE  1 tablet 3 times a day  What changed:   how much to take  how to take this  when to take this               Where to Get Your Medications        These medications were sent to [facility]/pharmacy #[id number] [place]  [place] - [address] - [phone]  - [phone] FX  [address] [place] [address]      Hours: 24-hours Phone: [phone]   potassium chloride 20 MEQ CR tablet      Ivanna Low, APRN  [address]  [facility]  [address]  [place] [address]  [phone]    In 1 week         Final diagnoses:   Physical deconditioning   Hypokalemia        ED Disposition       ED Disposition   Discharge    Condition   Stable    Comment   --               This medical record created using voice recognition software.             Ramon St MD  11/03/24 1703       Ramon St MD  11/03/24 1704

## 2024-11-03 NOTE — SIGNIFICANT NOTE
11/03/24 1643   Plan   Final Note SW met with pt and wife bedside in ED. SW discussed home health options. Patient and wife agreeable to home health referral being sent. SW sent home health referral to [facility].

## 2024-11-05 ENCOUNTER — OFFICE VISIT (OUTPATIENT)
Dept: NEUROLOGY | Facility: CLINIC | Age: 69
End: 2024-11-05
Payer: MEDICARE

## 2024-11-05 ENCOUNTER — LAB (OUTPATIENT)
Dept: LAB | Facility: HOSPITAL | Age: 69
End: 2024-11-05
Payer: MEDICARE

## 2024-11-05 VITALS
WEIGHT: 211 LBS | SYSTOLIC BLOOD PRESSURE: 136 MMHG | BODY MASS INDEX: 31.25 KG/M2 | DIASTOLIC BLOOD PRESSURE: 71 MMHG | HEIGHT: 69 IN | HEART RATE: 78 BPM

## 2024-11-05 DIAGNOSIS — G25.0 ESSENTIAL TREMOR: ICD-10-CM

## 2024-11-05 DIAGNOSIS — R73.03 PREDIABETES: ICD-10-CM

## 2024-11-05 DIAGNOSIS — E08.42 DIABETIC POLYNEUROPATHY ASSOCIATED WITH DIABETES MELLITUS DUE TO UNDERLYING CONDITION: Primary | ICD-10-CM

## 2024-11-05 DIAGNOSIS — G24.3 CERVICAL DYSTONIA: ICD-10-CM

## 2024-11-05 DIAGNOSIS — E08.42 DIABETIC POLYNEUROPATHY ASSOCIATED WITH DIABETES MELLITUS DUE TO UNDERLYING CONDITION: ICD-10-CM

## 2024-11-05 LAB
FOLATE SERPL-MCNC: 2.76 NG/ML (ref 4.78–24.2)
HBA1C MFR BLD: 5.3 % (ref 4.8–5.6)
VIT B12 BLD-MCNC: 293 PG/ML (ref 211–946)

## 2024-11-05 PROCEDURE — 82607 VITAMIN B-12: CPT

## 2024-11-05 PROCEDURE — 80188 ASSAY OF PRIMIDONE: CPT

## 2024-11-05 PROCEDURE — 82746 ASSAY OF FOLIC ACID SERUM: CPT

## 2024-11-05 PROCEDURE — 83036 HEMOGLOBIN GLYCOSYLATED A1C: CPT

## 2024-11-05 PROCEDURE — 80184 ASSAY OF PHENOBARBITAL: CPT

## 2024-11-05 PROCEDURE — 36415 COLL VENOUS BLD VENIPUNCTURE: CPT

## 2024-11-05 RX ORDER — LOSARTAN POTASSIUM AND HYDROCHLOROTHIAZIDE 12.5; 1 MG/1; MG/1
1 TABLET ORAL DAILY
COMMUNITY
Start: 2024-05-17

## 2024-11-05 RX ORDER — DULOXETIN HYDROCHLORIDE 60 MG/1
60 CAPSULE, DELAYED RELEASE ORAL DAILY
COMMUNITY

## 2024-11-05 NOTE — ASSESSMENT & PLAN NOTE
I will obtain a hemoglobin A1c and he is to call us to find out the results next week.  This is most likely secondary to diabetic polyneuropathy causing him to have unsteadiness.

## 2024-11-05 NOTE — ASSESSMENT & PLAN NOTE
His essential tremor is controlled.  He is to continue taking primidone 250 mg 3 times a day.  I will obtain primidone level.  I will see him again in 6 months time for follow-up.  Thank you for let me participate in his care.

## 2024-11-05 NOTE — PROGRESS NOTES
"Chief Complaint  Follow-up (Worsening symptoms- Recent ED)    Subjective          Davonte Marshall is a 69 y.o. male who presents to [facility]  History of Present Illness  Six 9-year-old man here for follow-up of his essential tremor, stroke and unsteadiness.  His glucose has been elevated borderline for the last 2 years.  He is complains of being unsteady when he walks.  He is using a 4 point cane.  He had a stroke earlier this year which was a small vessel disease and not explain his changes in mental status as well as his difficulty with swallowing.  He is here today with his wife.    Reviewed his workup when he was in the hospital and March 2023.      His essential tremor is better on primidone 250 mg 3 times a day.    He did not get Botox injections.  He does not want it.  Objective   Vital Signs:   /71   Pulse 78   Ht 175.3 cm (69.02\")   Wt 95.7 kg (211 lb)   BMI 31.14 kg/m²     Physical Exam   He is alert, fluent, phasic, follows commands well.  He has the same anterocollis.  He flexes are absent.  There is no weakness of the upper or lower extremities individual muscle testing.  No significant tremor noted hands extended and finger-to-nose testing.  Vibration is absent in the toes and significant impaired in the ankles.  Romberg is positive.  Station gait he is unsteady when he walks without a cane.  He is better holding onto him.        Assessment and Plan  Diagnoses and all orders for this visit:    1. Diabetic polyneuropathy associated with diabetes mellitus due to underlying condition (Primary)  Assessment & Plan:  I will obtain a hemoglobin A1c and he is to call us to find out the results next week.  This is most likely secondary to diabetic polyneuropathy causing him to have unsteadiness.    Orders:  -     Hemoglobin A1c; Future  -     Vitamin B12; Future  -     Folate; Future  -     Primidone Level; Future    2. Prediabetes  -     Hemoglobin A1c; " Future  -     Vitamin B12; Future  -     Folate; Future  -     Primidone Level; Future    3. Essential tremor  Assessment & Plan:  His essential tremor is controlled.  He is to continue taking primidone 250 mg 3 times a day.  I will obtain primidone level.  I will see him again in 6 months time for follow-up.  Thank you for let me participate in his care.      4. Cervical dystonia  Assessment & Plan:  He does not want any botulinum toxin injections even though we can do it in this office.           Total time spent with the patient and coordinating patient care was 40minutes.    Follow Up  No follow-ups on file.  Patient was given instructions and counseling regarding his condition or for health maintenance advice. Please see specific information pulled into the AVS if appropriate.

## 2024-11-06 LAB
QT INTERVAL: 395 MS
QTC INTERVAL: 438 MS

## 2024-11-07 ENCOUNTER — TELEPHONE (OUTPATIENT)
Dept: NEUROLOGY | Facility: CLINIC | Age: 69
End: 2024-11-07
Payer: MEDICARE

## 2024-11-07 NOTE — TELEPHONE ENCOUNTER
----- Message from Glen Pantoja sent at 11/6/2024 11:22 PM EST -----  Needs follow up with primary care physician to get monthly B12 injections  ----- Message -----  From: Lab, Background User  Sent: 11/5/2024  11:00 PM EST  To: Glen Pantoja MD

## 2024-11-08 LAB
PHENOBARB SERPL-MCNC: 47 UG/ML (ref 15–40)
PRIMIDONE SERPL-MCNC: 12.3 UG/ML (ref 5–12)

## 2024-11-08 NOTE — TELEPHONE ENCOUNTER
Spouse returned call, relayed results & informed her I did fax results to PCP office as well as discuss with them. She stated that he does have an appointment on 11/18 with them already and will reach out to see if one is needed sooner. No further questions.

## 2024-11-08 NOTE — TELEPHONE ENCOUNTER
Was unable to contact patient or leave voicemail. Labs faxed to PCP office-Ivanna Trevino. I did call their office and speak with the nurse line to relay message as well as inform them labs were faxed, she stated she would send a message to provider and reach out to patient.

## 2024-11-08 NOTE — TELEPHONE ENCOUNTER
PCP CALLED STATES THEY ARE PLACING THE PATIENT ON FOLIC ACID 1 GRAM DAILY AND B-12 SUBLINGUAL DAILY

## 2024-11-15 LAB
PHENOBARB SERPL-MCNC: 47 UG/ML (ref 15–40)
PRIMIDONE SERPL-MCNC: 26.1 UG/ML (ref 5–12)

## 2024-11-18 ENCOUNTER — TELEPHONE (OUTPATIENT)
Dept: NEUROLOGY | Facility: CLINIC | Age: 69
End: 2024-11-18
Payer: MEDICARE

## 2024-11-18 NOTE — TELEPHONE ENCOUNTER
----- Message from Glen Pantoja sent at 11/15/2024  6:45 PM EST -----  Tell patient to lower his dose of primidone to 250 mg twice a day.  ----- Message -----  From: Lab, Background User  Sent: 11/5/2024  11:00 PM EST  To: Glen Pantoja MD

## 2024-11-26 ENCOUNTER — TELEPHONE (OUTPATIENT)
Dept: NEUROLOGY | Facility: CLINIC | Age: 69
End: 2024-11-26

## 2024-11-26 NOTE — TELEPHONE ENCOUNTER
PT SPOUSE REPORTS INCREASE IN CONFUSION, SHE WOULD LIKE PT TESTED FOR DEMENTIA    PLEASE ADVISE MIKEY

## 2024-12-03 ENCOUNTER — LAB (OUTPATIENT)
Dept: LAB | Facility: HOSPITAL | Age: 69
End: 2024-12-03
Payer: MEDICARE

## 2024-12-03 ENCOUNTER — OFFICE VISIT (OUTPATIENT)
Dept: NEUROLOGY | Facility: CLINIC | Age: 69
End: 2024-12-03
Payer: MEDICARE

## 2024-12-03 VITALS
DIASTOLIC BLOOD PRESSURE: 63 MMHG | WEIGHT: 206 LBS | HEART RATE: 74 BPM | SYSTOLIC BLOOD PRESSURE: 108 MMHG | BODY MASS INDEX: 30.51 KG/M2 | HEIGHT: 69 IN

## 2024-12-03 DIAGNOSIS — G25.0 ESSENTIAL TREMOR: Primary | ICD-10-CM

## 2024-12-03 DIAGNOSIS — G25.0 ESSENTIAL TREMOR: ICD-10-CM

## 2024-12-03 PROCEDURE — 80188 ASSAY OF PRIMIDONE: CPT

## 2024-12-03 PROCEDURE — 3074F SYST BP LT 130 MM HG: CPT | Performed by: PSYCHIATRY & NEUROLOGY

## 2024-12-03 PROCEDURE — 99214 OFFICE O/P EST MOD 30 MIN: CPT | Performed by: PSYCHIATRY & NEUROLOGY

## 2024-12-03 PROCEDURE — 80184 ASSAY OF PHENOBARBITAL: CPT

## 2024-12-03 PROCEDURE — 36415 COLL VENOUS BLD VENIPUNCTURE: CPT

## 2024-12-03 PROCEDURE — 3078F DIAST BP <80 MM HG: CPT | Performed by: PSYCHIATRY & NEUROLOGY

## 2024-12-03 RX ORDER — PRIMIDONE 250 MG/1
TABLET ORAL
Qty: 180 TABLET | Refills: 3 | Status: SHIPPED | OUTPATIENT
Start: 2024-12-03

## 2024-12-03 RX ORDER — LANOLIN ALCOHOL/MO/W.PET/CERES
1000 CREAM (GRAM) TOPICAL DAILY
COMMUNITY

## 2024-12-03 RX ORDER — MORPHINE SULFATE 30 MG/1
30 TABLET, FILM COATED, EXTENDED RELEASE ORAL 2 TIMES DAILY
COMMUNITY
Start: 2024-11-05

## 2024-12-03 RX ORDER — FOLIC ACID 1 MG/1
1 TABLET ORAL DAILY
COMMUNITY
Start: 2024-11-18

## 2024-12-03 NOTE — ASSESSMENT & PLAN NOTE
He is taking primidone 250 mg twice a day at this time.  He is no longer confused.  I discussed with his wife that he indeed can have dementia however she is came from him and he does not drive.  I discussed with him that I will reevaluate him during the next clinic visit in May to see if there is continued deterioration of his mental status at that time.  I discussed with her that the event that occurred 2 weeks ago was secondary to metabolic encephalopathy from primidone toxicity.  Thank you for letting me participate in his care.

## 2024-12-03 NOTE — PROGRESS NOTES
"Chief Complaint  Neurologic Problem (Complaints of confusion lasting about a week. States it has gotten better since primidone decrease.)    Subjective          Davonte Marshall is a 69 y.o. male who presents to [facility]ERY  History of Present Illness  69-year-old man here for follow-up of his essential tremor.  He is wife states that 2 weeks ago he got confused.  He was taking primidone 250 mg 3 times a day at that time his primidone level was 26.1 with phenobarbital level of 47.  He was confused for 1 week and he was sleepy, slurring her speech, talking out of his head and unsteady.  We instructed his wife to lower the dose of primidone to 250 mg twice a day and he got back to baseline.  His wife wants him to be checked for dementia.  He had a CT scan of the brain that was obtained at the emergency room on 11/3/2024 for generalized weakness.  Shows chronic volume loss secondary to cerebral atrophy.  Chronic periventricular white matter microvascular ischemia.    Objective   Vital Signs:   /63 (BP Location: Left arm, Patient Position: Sitting, Cuff Size: Adult)   Pulse 74   Ht 175.3 cm (69.02\")   Wt 93.4 kg (206 lb)   BMI 30.41 kg/m²     Physical Exam   He is alert, fluent, phasic, follows commands well.  He states that his tremor is improved.  He is able to ambulate with a cane.        Assessment and Plan  Diagnoses and all orders for this visit:    1. Essential tremor (Primary)  Assessment & Plan:  He is taking primidone 250 mg twice a day at this time.  He is no longer confused.  I discussed with his wife that he indeed can have dementia however she is came from him and he does not drive.  I discussed with him that I will reevaluate him during the next clinic visit in May to see if there is continued deterioration of his mental status at that time.  I discussed with her that the event that occurred 2 weeks ago was secondary to metabolic encephalopathy from " primidone toxicity.  Thank you for letting me participate in his care.    Orders:  -     Primidone Level; Future    Other orders  -     primidone (MYSOLINE) 250 MG tablet; 1 tablet twice a day  Dispense: 180 tablet; Refill: 3         Total time spent with the patient and coordinating patient care was 30 minutes.    Follow Up  No follow-ups on file.  Patient was given instructions and counseling regarding his condition or for health maintenance advice. Please see specific information pulled into the AVS if appropriate.

## 2024-12-07 LAB
PHENOBARB SERPL-MCNC: 36 UG/ML (ref 15–40)
PRIMIDONE SERPL-MCNC: 13.9 UG/ML (ref 5–12)

## 2025-07-31 ENCOUNTER — OFFICE VISIT (OUTPATIENT)
Dept: NEUROLOGY | Facility: CLINIC | Age: 70
End: 2025-07-31
Payer: MEDICARE

## 2025-07-31 VITALS
SYSTOLIC BLOOD PRESSURE: 140 MMHG | WEIGHT: 222.1 LBS | HEIGHT: 69 IN | DIASTOLIC BLOOD PRESSURE: 69 MMHG | HEART RATE: 87 BPM | BODY MASS INDEX: 32.89 KG/M2

## 2025-07-31 DIAGNOSIS — G25.0 ESSENTIAL TREMOR: Primary | ICD-10-CM

## 2025-07-31 DIAGNOSIS — I69.30 SEQUELAE, POST-STROKE: ICD-10-CM

## 2025-07-31 DIAGNOSIS — F32.A DEPRESSION, UNSPECIFIED DEPRESSION TYPE: ICD-10-CM

## 2025-07-31 PROCEDURE — 3078F DIAST BP <80 MM HG: CPT | Performed by: PSYCHIATRY & NEUROLOGY

## 2025-07-31 PROCEDURE — 1159F MED LIST DOCD IN RCRD: CPT | Performed by: PSYCHIATRY & NEUROLOGY

## 2025-07-31 PROCEDURE — 99213 OFFICE O/P EST LOW 20 MIN: CPT | Performed by: PSYCHIATRY & NEUROLOGY

## 2025-07-31 PROCEDURE — 1160F RVW MEDS BY RX/DR IN RCRD: CPT | Performed by: PSYCHIATRY & NEUROLOGY

## 2025-07-31 PROCEDURE — 3077F SYST BP >= 140 MM HG: CPT | Performed by: PSYCHIATRY & NEUROLOGY

## 2025-07-31 RX ORDER — TAMSULOSIN HYDROCHLORIDE 0.4 MG/1
0.4 CAPSULE ORAL DAILY
COMMUNITY
Start: 2025-05-29

## 2025-07-31 RX ORDER — CLOPIDOGREL BISULFATE 75 MG/1
75 TABLET ORAL DAILY
COMMUNITY
Start: 2025-05-29

## 2025-07-31 RX ORDER — QUETIAPINE FUMARATE 50 MG/1
50 TABLET, FILM COATED ORAL DAILY
COMMUNITY
Start: 2025-05-29 | End: 2026-05-29

## 2025-07-31 RX ORDER — PRIMIDONE 250 MG/1
TABLET ORAL
Qty: 180 TABLET | Refills: 3 | Status: SHIPPED | OUTPATIENT
Start: 2025-07-31

## 2025-07-31 NOTE — PROGRESS NOTES
"Chief Complaint  Tremors (6 month follow up )    Subjective          Davonte Marshall is a 70 y.o. male who presents to [facility] NEUROLOGY & NEUROSURGERY    History of Present Illness  The patient is a 70-year-old male who presents for evaluation of tremors, memory loss, and back pain.    He reports no recurrence of confusion since his last visit. Tremors are well-managed with primidone, taken twice daily, once in the morning and once at night. Previously, he was taking three doses of primidone per day. Despite this, he continues to experience difficulty with walking. Memory issues have not advanced, although he still has trouble remembering things. He is currently on a regimen of baby aspirin and duloxetine.     Severe back pain is also reported, for which he is taking morphine.    He mentions having had two additional strokes during a hospital admission over a year ago.    INTERVAL: Since last visit, he has not experienced any recurrence of confusion. Tremors are well-managed with a reduced dose of primidone. Memory issues have not advanced.    MEDICATIONS  CURRENT MEDS:  Primidone Oral Twice daily  Aspirin Oral  Duloxetine Oral  Morphine  PREVIOUS MEDS:  Primidone Oral Three times daily  Reason for Discontinuation: Headache  Dilantin      Objective   Vital Signs:   /69   Pulse 87   Ht 175.3 cm (69.02\")   Wt 101 kg (222 lb 1.6 oz)   BMI 32.78 kg/m²       Physical Exam:  Alert, fluent, phasic, follows commands well.  He is talkative and in good spirits.  There is no significant tremor noted hands extended and finger-to-nose testing.  Station and gait is able to ambulate with wide-based gait and stooped forward.  Heart is regular rhythm normal in rate         Results           Assessment and Plan  Diagnoses and all orders for this visit:    1. Essential tremor (Primary)    2. Sequelae, post-stroke    3. Depression, unspecified depression type    Other orders  -     primidone (MYSOLINE) " 250 MG tablet; 1 tablet twice a day  Dispense: 180 tablet; Refill: 3         Assessment & Plan  1. Tremor.  The patient's tremor is currently well-managed with primidone, taking 1 tablet in the morning and 1 tablet at night. He reports that the shaking is controllable. He is advised to continue this dosage.    2. Memory loss.  The patient's memory issues have not advanced. He reports still having trouble remembering things but manages to cope. There is no indication of Alzheimer's disease based on current assessments.    3. Stroke.  The patient had 2 more strokes since the last visit, with the most recent hospitalization being over a year ago. He is currently taking baby aspirin as part of his treatment plan. He is advised to continue with the baby aspirin.    4. Back pain.  The patient experiences significant back pain and is currently taking morphine for relief. He is advised to continue with the current pain management regimen.      Total time spent with the patient and coordinating patient care was 25 minutes.    Follow Up  No follow-ups on file.  Patient was given instructions and counseling regarding his condition or for health maintenance advice. Please see specific information pulled into the AVS if appropriate.     Patient or patient representative verbalized consent for the use of Ambient Listening during the visit with  Glen Pantoja MD for chart documentation. 7/31/2025  14:43 EDT

## (undated) DEVICE — MAT FLR ABS W/BLU/LINER 56X72IN WHT

## (undated) DEVICE — TRY PREP SCRB VAG PVP

## (undated) DEVICE — URINE DRAINAGE BAG,LUER-SLIP SAMPLING, ANTI-REFLUX DEVICE, DRAIN PORT: Brand: DOVER

## (undated) DEVICE — GLV SURG SENSICARE SLT PF LF 7 STRL

## (undated) DEVICE — KT PEG ENDOVIVE ENFIT SFTY PULL 20F 1P/U

## (undated) DEVICE — HF-RESECTION ELECTRODE PLASMALOOP LOOP, LARGE, 24 FR., 12°-30°, PK TURIS: Brand: OLYMPUS

## (undated) DEVICE — SOL IRR NACL 0.9PCT 3000ML

## (undated) DEVICE — CYSTO PACK: Brand: MEDLINE INDUSTRIES, INC.

## (undated) DEVICE — GW ULTRATRACK HYBRID ANG/TP .035IN 3X150CM

## (undated) DEVICE — Device

## (undated) DEVICE — TRY IRR

## (undated) DEVICE — CATH FOLEY LUBRICATH 3WY 22F 30CC

## (undated) DEVICE — MEDI-VAC NON-CONDUCTIVE SUCTION TUBING: Brand: CARDINAL HEALTH

## (undated) DEVICE — SYRINGE,TOOMEY,IRRIGATION,70CC,STERILE: Brand: MEDLINE